# Patient Record
Sex: MALE | Race: WHITE | Employment: FULL TIME | URBAN - METROPOLITAN AREA
[De-identification: names, ages, dates, MRNs, and addresses within clinical notes are randomized per-mention and may not be internally consistent; named-entity substitution may affect disease eponyms.]

---

## 2017-01-19 ENCOUNTER — OFFICE VISIT (OUTPATIENT)
Dept: INTERNAL MEDICINE CLINIC | Facility: CLINIC | Age: 52
End: 2017-01-19

## 2017-01-19 VITALS
DIASTOLIC BLOOD PRESSURE: 82 MMHG | SYSTOLIC BLOOD PRESSURE: 128 MMHG | WEIGHT: 233 LBS | HEIGHT: 71 IN | HEART RATE: 76 BPM | BODY MASS INDEX: 32.62 KG/M2 | RESPIRATION RATE: 16 BRPM

## 2017-01-19 DIAGNOSIS — R51.9 HEADACHE, UNSPECIFIED HEADACHE TYPE: Primary | ICD-10-CM

## 2017-01-19 PROCEDURE — 99212 OFFICE O/P EST SF 10 MIN: CPT | Performed by: INTERNAL MEDICINE

## 2017-01-19 PROCEDURE — 99213 OFFICE O/P EST LOW 20 MIN: CPT | Performed by: INTERNAL MEDICINE

## 2017-01-19 RX ORDER — SIMVASTATIN 20 MG
TABLET ORAL
Qty: 90 TABLET | Refills: 0 | Status: SHIPPED | OUTPATIENT
Start: 2017-01-19 | End: 2017-02-20

## 2017-01-19 RX ORDER — LISINOPRIL 40 MG/1
TABLET ORAL
Qty: 90 TABLET | Refills: 0 | Status: SHIPPED | OUTPATIENT
Start: 2017-01-19 | End: 2017-02-20

## 2017-01-19 NOTE — PROGRESS NOTES
Joyce Lewis is a 49year old male. Patient presents with:  Headache  Dizziness  Neck Pain  Ear Problem: itching    HPI:   Mr. Lamine Muller presents this morning for evaluation of multiple recent symptoms, concerned about a \"tumor. \"    For approxim nuclear; normal stress echo   • Other and unspecified hyperlipidemia    • Type II or unspecified type diabetes mellitus without mention of complication, not stated as uncontrolled    • Colon polyps 11/2016     Hyperplastic, repeat in 10 years      Social H plan.  The patient is asked to return soon to see Dr. Qasim Smallwood.     David Michael MD  1/19/2017  11:52 AM

## 2017-01-20 ENCOUNTER — TELEPHONE (OUTPATIENT)
Dept: INTERNAL MEDICINE CLINIC | Facility: CLINIC | Age: 52
End: 2017-01-20

## 2017-01-20 DIAGNOSIS — R51.9 NONINTRACTABLE HEADACHE, UNSPECIFIED CHRONICITY PATTERN, UNSPECIFIED HEADACHE TYPE: Primary | ICD-10-CM

## 2017-01-20 DIAGNOSIS — E11.9 TYPE 2 DIABETES MELLITUS WITHOUT COMPLICATION, UNSPECIFIED LONG TERM INSULIN USE STATUS: Primary | ICD-10-CM

## 2017-01-20 NOTE — TELEPHONE ENCOUNTER
Patient reports that he had just been seen in office by Dr Jared Tamayo and was given orders for CT scan.  He is concerned with this because he has recalled not tolerating the iodine that had been used for a previous test. Would like to know if MRI might be more a

## 2017-01-20 NOTE — TELEPHONE ENCOUNTER
Patient has been scheduled for an office visit to follow-up regarding his diabetes on February 20, 2017. He is requesting to have lab orders for routine testing so that he may discuss those results in office and obtain med refills at this visit.  Please tenisha

## 2017-01-23 ENCOUNTER — PATIENT MESSAGE (OUTPATIENT)
Dept: INTERNAL MEDICINE CLINIC | Facility: CLINIC | Age: 52
End: 2017-01-23

## 2017-01-23 ENCOUNTER — TELEPHONE (OUTPATIENT)
Dept: INTERNAL MEDICINE CLINIC | Facility: CLINIC | Age: 52
End: 2017-01-23

## 2017-01-24 RX ORDER — LANCETS 30 GAUGE
EACH MISCELLANEOUS
Qty: 100 EACH | Refills: 3 | Status: SHIPPED | OUTPATIENT
Start: 2017-01-24 | End: 2018-06-12

## 2017-01-24 RX ORDER — BLOOD-GLUCOSE METER
1 EACH MISCELLANEOUS DAILY
Qty: 1 KIT | Refills: 0 | Status: SHIPPED | OUTPATIENT
Start: 2017-01-24

## 2017-01-24 NOTE — TELEPHONE ENCOUNTER
[address] Rios  PER PHARM : NEED NEW RX FOR TRUEMETRIX MACHINE,STRPS,LANCETS IS OK TO [facility]  Refill Protocol Appointment Criteria  · Appointment scheduled in the past 6 months or in the next 3 months  Recent Visits       Provider Department P

## 2017-01-25 NOTE — TELEPHONE ENCOUNTER
[facility]  To: Param Oh MD  Sent: 1/23/2017 8:12 AM CST  Subject: Other    Last week Dr Sherrell Molina ordered a CT scan for me because I had complained of almost-daily headaches, brief bouts of dizziness and pain/tenderness behind the ear

## 2017-01-26 NOTE — TELEPHONE ENCOUNTER
PATIENT CONTACTED AND INFORMED, STATES HE CALLED INSURANCE AND PRIOR AUTH IS NOT NEEDED AND MRI SCHEDULED.

## 2017-02-04 ENCOUNTER — TELEPHONE (OUTPATIENT)
Dept: INTERNAL MEDICINE CLINIC | Facility: CLINIC | Age: 52
End: 2017-02-04

## 2017-02-04 NOTE — TELEPHONE ENCOUNTER
Patient was questioning the dye in the MRI. Talked to MRI department and Iodine is not used but gadolinium. Patient was called and informed with understanding. Will continue with the MRI.

## 2017-02-04 NOTE — TELEPHONE ENCOUNTER
Please advise on refill request   No current  Creatine noted. Informed the patient labs are pending and will have the labs done on 2/19/17.     Diabetes Medications  Protocol Criteria:  · Appointment scheduled in the past 6 months or the next 3 months  ·

## 2017-02-04 NOTE — TELEPHONE ENCOUNTER
Current outpatient prescriptions:     •  MetFORMIN HCl 500 MG Oral Tab, Take 1 tablet (500 mg total) by mouth 3 (three) times daily. , Disp: 90 tablet, Rfl: 0    Pt stts that he has been out of this medication for 1 week and that his pharmacy has been try

## 2017-02-04 NOTE — TELEPHONE ENCOUNTER
Pt left a voice message yesterday 02/03 at 3:45pm stating that he has an MRI scheduled for 02/10. He is questioning the contrast that was ordered with it. Could we please contact him to answer his questions?

## 2017-02-15 ENCOUNTER — HOSPITAL ENCOUNTER (OUTPATIENT)
Dept: MRI IMAGING | Facility: HOSPITAL | Age: 52
Discharge: HOME | End: 2017-02-15
Attending: INTERNAL MEDICINE
Payer: COMMERCIAL

## 2017-02-15 ENCOUNTER — LAB ENCOUNTER (OUTPATIENT)
Dept: LAB | Facility: HOSPITAL | Age: 52
End: 2017-02-15
Attending: INTERNAL MEDICINE
Payer: COMMERCIAL

## 2017-02-15 DIAGNOSIS — R51.9 NONINTRACTABLE HEADACHE, UNSPECIFIED CHRONICITY PATTERN, UNSPECIFIED HEADACHE TYPE: ICD-10-CM

## 2017-02-15 DIAGNOSIS — E11.9 TYPE 2 DIABETES MELLITUS WITHOUT COMPLICATION, UNSPECIFIED LONG TERM INSULIN USE STATUS: ICD-10-CM

## 2017-02-15 LAB
ALBUMIN SERPL BCP-MCNC: 4.6 G/DL (ref 3.5–4.8)
ALBUMIN/GLOB SERPL: 1.8 {RATIO} (ref 1–2)
ALP SERPL-CCNC: 57 U/L (ref 32–100)
ALT SERPL-CCNC: 56 U/L (ref 17–63)
ANION GAP SERPL CALC-SCNC: 9 MMOL/L (ref 0–18)
AST SERPL-CCNC: 32 U/L (ref 15–41)
BASOPHILS # BLD: 0 K/UL (ref 0–0.2)
BASOPHILS NFR BLD: 0 %
BILIRUB SERPL-MCNC: 0.7 MG/DL (ref 0.3–1.2)
BUN SERPL-MCNC: 13 MG/DL (ref 8–20)
BUN/CREAT SERPL: 16.7 (ref 10–20)
CALCIUM SERPL-MCNC: 9.2 MG/DL (ref 8.5–10.5)
CHLORIDE SERPL-SCNC: 104 MMOL/L (ref 95–110)
CHOLEST SERPL-MCNC: 160 MG/DL (ref 110–200)
CO2 SERPL-SCNC: 26 MMOL/L (ref 22–32)
CREAT SERPL-MCNC: 0.78 MG/DL (ref 0.5–1.5)
CREAT UR-MCNC: 141.1 MG/DL
EOSINOPHIL # BLD: 0.1 K/UL (ref 0–0.7)
EOSINOPHIL NFR BLD: 3 %
ERYTHROCYTE [DISTWIDTH] IN BLOOD BY AUTOMATED COUNT: 13.9 % (ref 11–15)
GLOBULIN PLAS-MCNC: 2.5 G/DL (ref 2.5–3.7)
GLUCOSE SERPL-MCNC: 233 MG/DL (ref 70–99)
HCT VFR BLD AUTO: 43.8 % (ref 41–52)
HDLC SERPL-MCNC: 46 MG/DL
HGB BLD-MCNC: 14.8 G/DL (ref 13.5–17.5)
LDLC SERPL CALC-MCNC: 89 MG/DL (ref 0–99)
LYMPHOCYTES # BLD: 1.8 K/UL (ref 1–4)
LYMPHOCYTES NFR BLD: 38 %
MCH RBC QN AUTO: 30.9 PG (ref 27–32)
MCHC RBC AUTO-ENTMCNC: 33.8 G/DL (ref 32–37)
MCV RBC AUTO: 91.7 FL (ref 80–100)
MICROALBUMIN UR-MCNC: 1.3 MG/DL (ref 0–1.8)
MICROALBUMIN/CREAT UR: 9.2 MG/G{CREAT} (ref 0–20)
MONOCYTES # BLD: 0.3 K/UL (ref 0–1)
MONOCYTES NFR BLD: 7 %
NEUTROPHILS # BLD AUTO: 2.5 K/UL (ref 1.8–7.7)
NEUTROPHILS NFR BLD: 52 %
NONHDLC SERPL-MCNC: 114 MG/DL
OSMOLALITY UR CALC.SUM OF ELEC: 296 MOSM/KG (ref 275–295)
PLATELET # BLD AUTO: 173 K/UL (ref 140–400)
PMV BLD AUTO: 8.9 FL (ref 7.4–10.3)
POTASSIUM SERPL-SCNC: 4.3 MMOL/L (ref 3.3–5.1)
PROT SERPL-MCNC: 7.1 G/DL (ref 5.9–8.4)
RBC # BLD AUTO: 4.78 M/UL (ref 4.5–5.9)
SODIUM SERPL-SCNC: 139 MMOL/L (ref 136–144)
TRIGL SERPL-MCNC: 123 MG/DL (ref 1–149)
TSH SERPL-ACNC: 3.29 UIU/ML (ref 0.34–5.6)
WBC # BLD AUTO: 4.7 K/UL (ref 4–11)

## 2017-02-15 PROCEDURE — 36415 COLL VENOUS BLD VENIPUNCTURE: CPT

## 2017-02-15 PROCEDURE — 80053 COMPREHEN METABOLIC PANEL: CPT

## 2017-02-15 PROCEDURE — 84443 ASSAY THYROID STIM HORMONE: CPT

## 2017-02-15 PROCEDURE — 82043 UR ALBUMIN QUANTITATIVE: CPT

## 2017-02-15 PROCEDURE — 70553 MRI BRAIN STEM W/O & W/DYE: CPT

## 2017-02-15 PROCEDURE — 80061 LIPID PANEL: CPT

## 2017-02-15 PROCEDURE — A9575 INJ GADOTERATE MEGLUMI 0.1ML: HCPCS | Performed by: INTERNAL MEDICINE

## 2017-02-15 PROCEDURE — 83036 HEMOGLOBIN GLYCOSYLATED A1C: CPT

## 2017-02-15 PROCEDURE — 85025 COMPLETE CBC W/AUTO DIFF WBC: CPT

## 2017-02-15 PROCEDURE — 82570 ASSAY OF URINE CREATININE: CPT

## 2017-02-16 LAB — HBA1C MFR BLD: 7.8 % (ref 4–6)

## 2017-02-20 ENCOUNTER — OFFICE VISIT (OUTPATIENT)
Dept: INTERNAL MEDICINE CLINIC | Facility: CLINIC | Age: 52
End: 2017-02-20

## 2017-02-20 VITALS
TEMPERATURE: 98 F | HEART RATE: 76 BPM | HEIGHT: 71 IN | DIASTOLIC BLOOD PRESSURE: 86 MMHG | RESPIRATION RATE: 18 BRPM | SYSTOLIC BLOOD PRESSURE: 128 MMHG | BODY MASS INDEX: 32.59 KG/M2 | WEIGHT: 232.81 LBS

## 2017-02-20 DIAGNOSIS — R51.9 HEADACHE, UNSPECIFIED HEADACHE TYPE: ICD-10-CM

## 2017-02-20 DIAGNOSIS — E78.5 HYPERLIPIDEMIA LDL GOAL <100: ICD-10-CM

## 2017-02-20 DIAGNOSIS — E11.9 TYPE 2 DIABETES MELLITUS WITHOUT COMPLICATION, UNSPECIFIED LONG TERM INSULIN USE STATUS: Primary | ICD-10-CM

## 2017-02-20 DIAGNOSIS — I10 ESSENTIAL HYPERTENSION: ICD-10-CM

## 2017-02-20 PROCEDURE — 99212 OFFICE O/P EST SF 10 MIN: CPT | Performed by: INTERNAL MEDICINE

## 2017-02-20 PROCEDURE — 99214 OFFICE O/P EST MOD 30 MIN: CPT | Performed by: INTERNAL MEDICINE

## 2017-02-20 RX ORDER — SIMVASTATIN 20 MG
TABLET ORAL
Qty: 90 TABLET | Refills: 3 | Status: SHIPPED | OUTPATIENT
Start: 2017-02-20 | End: 2017-06-01

## 2017-02-20 RX ORDER — LISINOPRIL 40 MG/1
TABLET ORAL
Qty: 90 TABLET | Refills: 3 | Status: SHIPPED | OUTPATIENT
Start: 2017-02-20 | End: 2017-06-01

## 2017-02-20 NOTE — PROGRESS NOTES
HPI:    Patient ID: Sidra Benavidez is a 49year old male.     HPI  Patient returns to the office today to discuss chronic medical issues which include Patient Active Problem List:     Type II diabetes mellitus ([facility] [address]     Hyperlipidemia     Obesity Type II or unspecified type diabetes mellitus without mention of complication, not stated as uncontrolled    • Colon polyps 11/2016     Hyperplastic, repeat in 10 years          Past Surgical History    COLONOSCOPY N/A 11/15/2016    Comment Procedure: COLO TABLET BY MOUTH EVERY EVENING Disp: 90 tablet Rfl: 0   lisinopril 40 MG Oral Tab TAKE 1 TABLET(40 MG) BY MOUTH EVERY DAY Disp: 90 tablet Rfl: 0   Glucose Blood (TRUETRACK TEST) In Vitro Strip Test blood sugar by intradermal route once daily.   Diagnosis cod headache type  Plan: Patient with recent worsening of headache. Likely related to stress. MRI unremarkable other than empty sella. No masses or other abnormalities in the pituitary gland. No symptoms or signs of pituitary dysfunction.   We will monitor

## 2017-04-06 ENCOUNTER — TELEPHONE (OUTPATIENT)
Dept: INTERNAL MEDICINE CLINIC | Facility: CLINIC | Age: 52
End: 2017-04-06

## 2017-04-06 DIAGNOSIS — E78.5 HYPERLIPIDEMIA, UNSPECIFIED HYPERLIPIDEMIA TYPE: Primary | ICD-10-CM

## 2017-04-06 DIAGNOSIS — E11.9 TYPE 2 DIABETES MELLITUS WITHOUT COMPLICATION, UNSPECIFIED LONG TERM INSULIN USE STATUS: ICD-10-CM

## 2017-04-24 ENCOUNTER — PATIENT MESSAGE (OUTPATIENT)
Dept: INTERNAL MEDICINE CLINIC | Facility: CLINIC | Age: 52
End: 2017-04-24

## 2017-04-26 NOTE — TELEPHONE ENCOUNTER
From: Lexx Moscoso  To: Nisa Lyons MD  Sent: 4/24/2017 2:02 PM CDT  Subject: Prescription Question    My waking blood sugars are still in the 190s. .. despite daily exercise and diet restrictions.  Should I increase my current Metformin dose to

## 2017-05-22 ENCOUNTER — PATIENT MESSAGE (OUTPATIENT)
Dept: INTERNAL MEDICINE CLINIC | Facility: CLINIC | Age: 52
End: 2017-05-22

## 2017-05-24 NOTE — TELEPHONE ENCOUNTER
From: Kishor Vazquez  To: Alie Padilla MD  Sent: 5/22/2017 8:39 AM CDT  Subject: Prescription Question    I am nearly out of glucose test strips. .. can you submit a prescription order to [facility] in [facility] [place])?     Does my insuran

## 2017-05-30 ENCOUNTER — APPOINTMENT (OUTPATIENT)
Dept: LAB | Facility: HOSPITAL | Age: 52
End: 2017-05-30
Attending: INTERNAL MEDICINE
Payer: COMMERCIAL

## 2017-05-30 DIAGNOSIS — E11.9 TYPE 2 DIABETES MELLITUS WITHOUT COMPLICATION, UNSPECIFIED LONG TERM INSULIN USE STATUS: ICD-10-CM

## 2017-05-30 DIAGNOSIS — E78.5 HYPERLIPIDEMIA, UNSPECIFIED HYPERLIPIDEMIA TYPE: ICD-10-CM

## 2017-05-30 PROCEDURE — 36415 COLL VENOUS BLD VENIPUNCTURE: CPT

## 2017-05-30 PROCEDURE — 84450 TRANSFERASE (AST) (SGOT): CPT

## 2017-05-30 PROCEDURE — 82947 ASSAY GLUCOSE BLOOD QUANT: CPT

## 2017-05-30 PROCEDURE — 83036 HEMOGLOBIN GLYCOSYLATED A1C: CPT

## 2017-05-30 PROCEDURE — 80061 LIPID PANEL: CPT

## 2017-05-30 PROCEDURE — 84460 ALANINE AMINO (ALT) (SGPT): CPT

## 2017-06-01 ENCOUNTER — OFFICE VISIT (OUTPATIENT)
Dept: INTERNAL MEDICINE CLINIC | Facility: CLINIC | Age: 52
End: 2017-06-01

## 2017-06-01 VITALS
DIASTOLIC BLOOD PRESSURE: 74 MMHG | BODY MASS INDEX: 32.57 KG/M2 | HEIGHT: 71 IN | RESPIRATION RATE: 18 BRPM | TEMPERATURE: 98 F | HEART RATE: 78 BPM | WEIGHT: 232.63 LBS | SYSTOLIC BLOOD PRESSURE: 124 MMHG

## 2017-06-01 DIAGNOSIS — E11.9 TYPE 2 DIABETES MELLITUS WITHOUT COMPLICATION, WITHOUT LONG-TERM CURRENT USE OF INSULIN (HCC): Primary | ICD-10-CM

## 2017-06-01 DIAGNOSIS — I10 ESSENTIAL HYPERTENSION: ICD-10-CM

## 2017-06-01 DIAGNOSIS — E78.5 HYPERLIPIDEMIA, UNSPECIFIED HYPERLIPIDEMIA TYPE: ICD-10-CM

## 2017-06-01 PROCEDURE — 99214 OFFICE O/P EST MOD 30 MIN: CPT | Performed by: INTERNAL MEDICINE

## 2017-06-01 PROCEDURE — 99212 OFFICE O/P EST SF 10 MIN: CPT | Performed by: INTERNAL MEDICINE

## 2017-06-01 RX ORDER — LISINOPRIL 40 MG/1
TABLET ORAL
Qty: 90 TABLET | Refills: 3 | Status: SHIPPED | OUTPATIENT
Start: 2017-06-01 | End: 2017-09-07

## 2017-06-01 RX ORDER — SIMVASTATIN 20 MG
TABLET ORAL
Qty: 90 TABLET | Refills: 3 | Status: SHIPPED | OUTPATIENT
Start: 2017-06-01 | End: 2017-09-07

## 2017-06-01 NOTE — PROGRESS NOTES
HPI:    Patient ID: Geovany Slater is a 49year old male. HPI  Patient here for follow-up on chronic medical issues as listed below. Last seen here in February. At that time increased metformin 500 mg 3 times a day.   On May 1 over the phone we i Grandfather      Colon Ca        Smoking Status: Never Smoker                      Alcohol Use: No                   Review of Systems   Constitutional: Negative for fever, chills and fatigue. HENT: Negative for hearing loss.     Eyes: Negative for visual appears well-developed and well-nourished. HENT:   Nose: Nose normal.   Mouth/Throat: Oropharynx is clear and moist.   Eyes: Pupils are equal, round, and reactive to light.    Cardiovascular: Normal rate, regular rhythm, normal heart sounds and intact dis (500 mg total) by mouth 4 (four) times daily.            Imaging & Referrals:  None         FE#[id number]

## 2017-09-05 ENCOUNTER — APPOINTMENT (OUTPATIENT)
Dept: LAB | Facility: HOSPITAL | Age: 52
End: 2017-09-05
Attending: INTERNAL MEDICINE
Payer: COMMERCIAL

## 2017-09-05 DIAGNOSIS — E11.9 TYPE 2 DIABETES MELLITUS WITHOUT COMPLICATION, WITHOUT LONG-TERM CURRENT USE OF INSULIN (HCC): ICD-10-CM

## 2017-09-05 LAB — HBA1C MFR BLD: 7 % (ref 4–6)

## 2017-09-05 PROCEDURE — 36415 COLL VENOUS BLD VENIPUNCTURE: CPT

## 2017-09-05 PROCEDURE — 83036 HEMOGLOBIN GLYCOSYLATED A1C: CPT

## 2017-09-07 ENCOUNTER — OFFICE VISIT (OUTPATIENT)
Dept: INTERNAL MEDICINE CLINIC | Facility: CLINIC | Age: 52
End: 2017-09-07

## 2017-09-07 VITALS
BODY MASS INDEX: 32.37 KG/M2 | HEART RATE: 82 BPM | RESPIRATION RATE: 18 BRPM | DIASTOLIC BLOOD PRESSURE: 80 MMHG | WEIGHT: 231.19 LBS | TEMPERATURE: 99 F | SYSTOLIC BLOOD PRESSURE: 136 MMHG | HEIGHT: 71 IN

## 2017-09-07 DIAGNOSIS — E11.9 TYPE 2 DIABETES MELLITUS WITHOUT COMPLICATION, WITHOUT LONG-TERM CURRENT USE OF INSULIN (HCC): Primary | ICD-10-CM

## 2017-09-07 DIAGNOSIS — I10 ESSENTIAL HYPERTENSION: ICD-10-CM

## 2017-09-07 PROCEDURE — 99212 OFFICE O/P EST SF 10 MIN: CPT | Performed by: INTERNAL MEDICINE

## 2017-09-07 PROCEDURE — 99213 OFFICE O/P EST LOW 20 MIN: CPT | Performed by: INTERNAL MEDICINE

## 2017-09-07 RX ORDER — LISINOPRIL 40 MG/1
TABLET ORAL
Qty: 90 TABLET | Refills: 3 | Status: SHIPPED | OUTPATIENT
Start: 2017-09-07 | End: 2017-12-05

## 2017-09-07 RX ORDER — SIMVASTATIN 20 MG
TABLET ORAL
Qty: 90 TABLET | Refills: 3 | Status: SHIPPED | OUTPATIENT
Start: 2017-09-07 | End: 2017-12-05

## 2017-09-07 NOTE — PROGRESS NOTES
HPI:    Patient ID: Judy Guajardo is a 47year old male. HPI  Patient is here for follow-up on chronic medical issues as listed below. Last seen here in June. A1c at that time 7.8.   Patient refused any change in medication and said want to try status: Never Smoker                                                              Smokeless tobacco: Never Used                      Alcohol use: No                   Review of Systems   Constitutional: Negative for chills, fatigue and fever.    HENT: Antoine Pendleton diabetes mellitus without complication, without long-term current use of insulin ([facility] [address]  (primary encounter diagnosis)  Plan: LIPID PANEL, HEMOGLOBIN A1C, TESTOSTERONE,FREE         AND TOTAL, COMP METABOLIC PANEL (14)         Entire visit today was spent fa

## 2017-09-21 ENCOUNTER — OFFICE VISIT (OUTPATIENT)
Dept: OPTOMETRY | Facility: CLINIC | Age: 52
End: 2017-09-21

## 2017-09-21 DIAGNOSIS — E11.9 TYPE 2 DIABETES MELLITUS WITHOUT COMPLICATION, WITHOUT LONG-TERM CURRENT USE OF INSULIN (HCC): Primary | ICD-10-CM

## 2017-09-21 DIAGNOSIS — H52.13 MYOPIA OF BOTH EYES: ICD-10-CM

## 2017-09-21 PROCEDURE — 92015 DETERMINE REFRACTIVE STATE: CPT | Performed by: OPTOMETRIST

## 2017-09-21 PROCEDURE — 92014 COMPRE OPH EXAM EST PT 1/>: CPT | Performed by: OPTOMETRIST

## 2017-09-21 NOTE — PROGRESS NOTES
Krystle Flores is a 47year old male. HPI:     HPI     Diabetic Eye Exam   Diabetes characteristics include Type 2, controlled with diet and taking oral medications. Duration of 9 years.            Comments   Patient is In for an annual diabetic e TABLET(40 MG) BY MOUTH EVERY DAY Disp: 90 tablet Rfl: 3   Blood Glucose Monitoring Suppl (TRUE METRIX GO GLUCOSE METER) W/DEVICE Does not apply Kit 1 Stick by Does not apply route daily.  E11.9 Disp: 1 kit Rfl: 0   Glucose Blood (TRUE METRIX BLOOD GLUCOSE T Clear Clear          Fundus Exam       Right Left    Disc Normal Normal    C/D Ratio 0.5 0.5    Macula Normal No BDR Normal No BDR    Vessels Normal Normal    Periphery Normal Normal            Refraction     Wearing Rx       Sphere Cylinder Axis    Right

## 2017-09-21 NOTE — PATIENT INSTRUCTIONS
Myopia  New glasses RX given to update as needed.       Type 2 diabetes mellitus without complication, without long-term current use of insulin ([address]  I advised patient that there is no background diabetic retinopathy in either eye and that they should cont

## 2017-12-01 ENCOUNTER — APPOINTMENT (OUTPATIENT)
Dept: LAB | Facility: HOSPITAL | Age: 52
End: 2017-12-01
Attending: INTERNAL MEDICINE
Payer: COMMERCIAL

## 2017-12-01 DIAGNOSIS — I10 ESSENTIAL HYPERTENSION: ICD-10-CM

## 2017-12-01 DIAGNOSIS — E11.9 TYPE 2 DIABETES MELLITUS WITHOUT COMPLICATION, WITHOUT LONG-TERM CURRENT USE OF INSULIN (HCC): ICD-10-CM

## 2017-12-01 PROCEDURE — 84403 ASSAY OF TOTAL TESTOSTERONE: CPT

## 2017-12-01 PROCEDURE — 36415 COLL VENOUS BLD VENIPUNCTURE: CPT

## 2017-12-01 PROCEDURE — 83036 HEMOGLOBIN GLYCOSYLATED A1C: CPT

## 2017-12-01 PROCEDURE — 80053 COMPREHEN METABOLIC PANEL: CPT

## 2017-12-01 PROCEDURE — 80061 LIPID PANEL: CPT

## 2017-12-01 PROCEDURE — 84402 ASSAY OF FREE TESTOSTERONE: CPT

## 2017-12-05 ENCOUNTER — OFFICE VISIT (OUTPATIENT)
Dept: INTERNAL MEDICINE CLINIC | Facility: CLINIC | Age: 52
End: 2017-12-05

## 2017-12-05 VITALS
TEMPERATURE: 98 F | BODY MASS INDEX: 32.34 KG/M2 | WEIGHT: 231 LBS | SYSTOLIC BLOOD PRESSURE: 146 MMHG | DIASTOLIC BLOOD PRESSURE: 84 MMHG | HEIGHT: 71 IN | HEART RATE: 64 BPM | RESPIRATION RATE: 20 BRPM

## 2017-12-05 DIAGNOSIS — E78.5 HYPERLIPIDEMIA, UNSPECIFIED HYPERLIPIDEMIA TYPE: ICD-10-CM

## 2017-12-05 DIAGNOSIS — E11.9 TYPE 2 DIABETES MELLITUS WITHOUT COMPLICATION, WITHOUT LONG-TERM CURRENT USE OF INSULIN (HCC): Primary | ICD-10-CM

## 2017-12-05 DIAGNOSIS — I10 ESSENTIAL HYPERTENSION: ICD-10-CM

## 2017-12-05 PROCEDURE — 99212 OFFICE O/P EST SF 10 MIN: CPT | Performed by: INTERNAL MEDICINE

## 2017-12-05 PROCEDURE — 99214 OFFICE O/P EST MOD 30 MIN: CPT | Performed by: INTERNAL MEDICINE

## 2017-12-05 NOTE — PROGRESS NOTES
HPI:    Patient ID: Tanvir Coates is a 47year old male. HPI  Patient is here to discuss recent test results and follow-up on chronic medical issues as listed below. Last seen here about 3 months ago. No changes made at that time.   He seen the Family History   Problem Relation Age of Onset   • Cancer Paternal Grandfather      Colon Ca   • Gastro-Intestinal Disorder Brother      celiac sprue   • Cancer Other      liver cancer - close relative   • Diabetes Neg    • Glaucoma Neg       Smoking sta Comment:Other reaction(s): Hives   PHYSICAL EXAM:   Physical Exam    Constitutional: He appears well-developed and well-nourished.    HENT:   Nose: Nose normal.   Mouth/Throat: Oropharynx is clear and moist.   Eyes: Pupils are equal, round, and reactive Prescriptions Disp Refills    lisinopril 40 MG Oral Tab 90 tablet 3     Sig: TAKE 1 TABLET(40 MG) BY MOUTH EVERY DAY      simvastatin 20 MG Oral Tab 90 tablet 3     Sig: TAKE 1 TABLET BY MOUTH EVERY EVENING      MetFORMIN HCl 500 MG Oral Tab 360 tablet 3

## 2017-12-06 RX ORDER — SIMVASTATIN 20 MG
TABLET ORAL
Qty: 90 TABLET | Refills: 3 | Status: SHIPPED | OUTPATIENT
Start: 2017-12-06 | End: 2018-03-06

## 2017-12-06 RX ORDER — LISINOPRIL 40 MG/1
TABLET ORAL
Qty: 90 TABLET | Refills: 3 | Status: SHIPPED | OUTPATIENT
Start: 2017-12-06 | End: 2018-03-06

## 2017-12-18 ENCOUNTER — NURSE TRIAGE (OUTPATIENT)
Dept: OTHER | Age: 52
End: 2017-12-18

## 2017-12-18 ENCOUNTER — OFFICE VISIT (OUTPATIENT)
Dept: FAMILY MEDICINE CLINIC | Facility: CLINIC | Age: 52
End: 2017-12-18

## 2017-12-18 VITALS
DIASTOLIC BLOOD PRESSURE: 85 MMHG | WEIGHT: 233 LBS | RESPIRATION RATE: 18 BRPM | HEIGHT: 71 IN | HEART RATE: 73 BPM | BODY MASS INDEX: 32.62 KG/M2 | TEMPERATURE: 99 F | SYSTOLIC BLOOD PRESSURE: 152 MMHG

## 2017-12-18 DIAGNOSIS — B02.9 HERPES ZOSTER WITHOUT COMPLICATION: ICD-10-CM

## 2017-12-18 PROCEDURE — 99213 OFFICE O/P EST LOW 20 MIN: CPT | Performed by: FAMILY MEDICINE

## 2017-12-18 PROCEDURE — 99212 OFFICE O/P EST SF 10 MIN: CPT | Performed by: FAMILY MEDICINE

## 2017-12-18 RX ORDER — VALACYCLOVIR HYDROCHLORIDE 1 G/1
1 TABLET, FILM COATED ORAL 3 TIMES DAILY
Qty: 21 TABLET | Refills: 0 | Status: SHIPPED | OUTPATIENT
Start: 2017-12-18 | End: 2018-01-09 | Stop reason: ALTCHOICE

## 2017-12-18 NOTE — TELEPHONE ENCOUNTER
Action Requested: Summary for Provider     []  Critical Lab, Recommendations Needed  [] Need Additional Advice  [x]   FYI    []   Need Orders  [] Need Medications Sent to Pharmacy  []  Other     SUMMARY: rash and pain on the right side of the body-hip and

## 2017-12-18 NOTE — PROGRESS NOTES
HPI:    Patient ID: Mirella Luevano is a 47year old male. Pt presents with some burning rash on the right flank area over the last 2 days. Pt has had no fevers. Pt has had hx of chicken pox. Pt has had some sig stress recently and hx of DM. Visit:  Signed Prescriptions Disp Refills    ValACYclovir HCl (VALTREX) 1 G Oral Tab 21 tablet 0      Sig: Take 1 tablet (1,000 mg total) by mouth 3 (three) times daily.  For shingles           Imaging & Referrals:  None       GF#[id number]

## 2017-12-20 ENCOUNTER — NURSE TRIAGE (OUTPATIENT)
Dept: OTHER | Age: 52
End: 2017-12-20

## 2017-12-20 NOTE — TELEPHONE ENCOUNTER
Action Requested: Summary for Provider     []  Critical Lab, Recommendations Needed  [x] Need Additional Advice  []   FYI    []   Need Orders  [] Need Medications Sent to Pharmacy  []  Other     SUMMARY:possible further MD advise ( pt requesting advice fro

## 2017-12-21 ENCOUNTER — PATIENT MESSAGE (OUTPATIENT)
Dept: INTERNAL MEDICINE CLINIC | Facility: CLINIC | Age: 52
End: 2017-12-21

## 2017-12-21 NOTE — TELEPHONE ENCOUNTER
From: Dylon Regalado  To: Lucy Fan MD  Sent: 12/21/2017 10:58 AM CST  Subject: Non-Urgent Medical Question    Dr Nik Davenport,    On 12/19 Dr Xochitl Cruz diagnosed me with shingles. He put me on Valacyclovir.  A day after I began this Rx, I developed a mus

## 2017-12-21 NOTE — TELEPHONE ENCOUNTER
Pt sent a [employer] message asking if doctor could see him today, see message below. Contacted pt and informed him no appt available today. Please advise.

## 2017-12-22 ENCOUNTER — OFFICE VISIT (OUTPATIENT)
Dept: INTERNAL MEDICINE CLINIC | Facility: CLINIC | Age: 52
End: 2017-12-22

## 2017-12-22 VITALS
DIASTOLIC BLOOD PRESSURE: 82 MMHG | SYSTOLIC BLOOD PRESSURE: 120 MMHG | BODY MASS INDEX: 32 KG/M2 | HEART RATE: 69 BPM | WEIGHT: 231.69 LBS | TEMPERATURE: 98 F

## 2017-12-22 DIAGNOSIS — S29.011A PECTORALIS MUSCLE STRAIN, INITIAL ENCOUNTER: Primary | ICD-10-CM

## 2017-12-22 PROCEDURE — 99212 OFFICE O/P EST SF 10 MIN: CPT | Performed by: PHYSICIAN ASSISTANT

## 2017-12-22 NOTE — TELEPHONE ENCOUNTER
This is difficult for me to diagnose over the phone. I am going to be out of town starting tomorrow. He should come in tomorrow make an appointment with my [de-identified] assistant, Cindy Olivas.   Continue with the valacyclovir

## 2017-12-22 NOTE — PROGRESS NOTES
Juancarlos English is a 47year old male. Patient presents with:  Shingles: f/u      HPI:   Patient presents today complaining of right pectoral pain for the last 4 days.  Was recently diagnosed with herpes zoster on the right flank 5 days ago and is chato hyperlipidemia    • Type II or unspecified type diabetes mellitus without mention of complication, not stated as uncontrolled    • Unspecified essential hypertension       Past Surgical History:   Procedure Laterality Date   • Colonoscopy N/A 11/15/2016 injury to the area. Mild tenderness on palpation, no mass or spasm. Advised to continue Tylenol as needed for pain, application of heat and/or ice three times daily. Gentle stretching as discussed in the office.  Contact the office if symptoms worsen or do

## 2017-12-27 ENCOUNTER — PATIENT MESSAGE (OUTPATIENT)
Dept: INTERNAL MEDICINE CLINIC | Facility: CLINIC | Age: 52
End: 2017-12-27

## 2017-12-28 RX ORDER — VALACYCLOVIR HYDROCHLORIDE 1 G/1
1 TABLET, FILM COATED ORAL 3 TIMES DAILY
Qty: 21 TABLET | Refills: 0 | Status: CANCELLED | OUTPATIENT
Start: 2017-12-28

## 2017-12-28 NOTE — TELEPHONE ENCOUNTER
From: Donna Michelle  To: Maykel Troy MD  Sent: 12/27/2017 6:20 PM CST  Subject: Prescription Question    Dr,    I finished my week long regimen of Valacyclovir yesterday for my shingles.  I still have pronounced remnants of the rash (scarring?)

## 2018-01-02 ENCOUNTER — PATIENT MESSAGE (OUTPATIENT)
Dept: INTERNAL MEDICINE CLINIC | Facility: CLINIC | Age: 53
End: 2018-01-02

## 2018-01-02 NOTE — TELEPHONE ENCOUNTER
BRITTANY please advise on below     From: Sindhu White  To: Stefan Kathleen MD  Sent: 1/2/2018  8:29 AM CST  Subject: Non-Urgent Medical Question    Dr,    So what is the name of the flu vaccine that [facility] will administer me, should you approve it?   Her

## 2018-01-09 ENCOUNTER — OFFICE VISIT (OUTPATIENT)
Dept: INTERNAL MEDICINE CLINIC | Facility: CLINIC | Age: 53
End: 2018-01-09

## 2018-01-09 VITALS
SYSTOLIC BLOOD PRESSURE: 108 MMHG | DIASTOLIC BLOOD PRESSURE: 74 MMHG | TEMPERATURE: 98 F | WEIGHT: 227 LBS | HEIGHT: 72 IN | HEART RATE: 89 BPM | BODY MASS INDEX: 30.75 KG/M2

## 2018-01-09 DIAGNOSIS — J06.9 VIRAL URI: Primary | ICD-10-CM

## 2018-01-09 PROCEDURE — 99211 OFF/OP EST MAY X REQ PHY/QHP: CPT | Performed by: PHYSICIAN ASSISTANT

## 2018-01-10 NOTE — PROGRESS NOTES
HPI:    Patient ID: Ramon Mace is a 47year old male. Patient presents today with upper respiratory symptoms for the last 5 days. He has increased fatigue, body aches, chest congestion, and diarrhea.   Symptoms worsened over the weekend but he Physical Exam   Nursing note and vitals reviewed. Constitutional: He appears well-developed and well-nourished. HENT:   Head: Normocephalic and atraumatic.    Right Ear: Tympanic membrane and external ear normal.   Left Ear: Tympanic membrane and exte

## 2018-01-30 ENCOUNTER — IMMUNIZATION (OUTPATIENT)
Dept: INTERNAL MEDICINE CLINIC | Facility: CLINIC | Age: 53
End: 2018-01-30

## 2018-01-30 DIAGNOSIS — Z23 NEED FOR VACCINATION: ICD-10-CM

## 2018-01-30 PROCEDURE — 90686 IIV4 VACC NO PRSV 0.5 ML IM: CPT | Performed by: INTERNAL MEDICINE

## 2018-01-30 PROCEDURE — 90471 IMMUNIZATION ADMIN: CPT | Performed by: INTERNAL MEDICINE

## 2018-03-01 ENCOUNTER — APPOINTMENT (OUTPATIENT)
Dept: LAB | Facility: HOSPITAL | Age: 53
End: 2018-03-01
Attending: INTERNAL MEDICINE
Payer: COMMERCIAL

## 2018-03-01 PROCEDURE — 36415 COLL VENOUS BLD VENIPUNCTURE: CPT | Performed by: INTERNAL MEDICINE

## 2018-03-01 PROCEDURE — 83036 HEMOGLOBIN GLYCOSYLATED A1C: CPT | Performed by: INTERNAL MEDICINE

## 2018-03-01 PROCEDURE — 85025 COMPLETE CBC W/AUTO DIFF WBC: CPT | Performed by: INTERNAL MEDICINE

## 2018-03-01 PROCEDURE — 84403 ASSAY OF TOTAL TESTOSTERONE: CPT | Performed by: INTERNAL MEDICINE

## 2018-03-01 PROCEDURE — 80053 COMPREHEN METABOLIC PANEL: CPT | Performed by: INTERNAL MEDICINE

## 2018-03-01 PROCEDURE — 84402 ASSAY OF FREE TESTOSTERONE: CPT | Performed by: INTERNAL MEDICINE

## 2018-03-06 ENCOUNTER — OFFICE VISIT (OUTPATIENT)
Dept: INTERNAL MEDICINE CLINIC | Facility: CLINIC | Age: 53
End: 2018-03-06

## 2018-03-06 VITALS
SYSTOLIC BLOOD PRESSURE: 138 MMHG | HEART RATE: 82 BPM | RESPIRATION RATE: 18 BRPM | TEMPERATURE: 98 F | HEIGHT: 72 IN | BODY MASS INDEX: 30.88 KG/M2 | DIASTOLIC BLOOD PRESSURE: 84 MMHG | WEIGHT: 228 LBS

## 2018-03-06 DIAGNOSIS — M25.511 RIGHT SHOULDER PAIN, UNSPECIFIED CHRONICITY: ICD-10-CM

## 2018-03-06 DIAGNOSIS — E11.9 TYPE 2 DIABETES MELLITUS WITHOUT COMPLICATION, WITHOUT LONG-TERM CURRENT USE OF INSULIN (HCC): Primary | ICD-10-CM

## 2018-03-06 DIAGNOSIS — R97.20 ELEVATED PSA: ICD-10-CM

## 2018-03-06 DIAGNOSIS — I10 ESSENTIAL HYPERTENSION: ICD-10-CM

## 2018-03-06 PROCEDURE — 99214 OFFICE O/P EST MOD 30 MIN: CPT | Performed by: INTERNAL MEDICINE

## 2018-03-06 PROCEDURE — 99212 OFFICE O/P EST SF 10 MIN: CPT | Performed by: INTERNAL MEDICINE

## 2018-03-06 RX ORDER — VALACYCLOVIR HYDROCHLORIDE 1 G/1
TABLET, FILM COATED ORAL
Refills: 0 | COMMUNITY
Start: 2017-12-18 | End: 2018-03-06 | Stop reason: ALTCHOICE

## 2018-03-06 RX ORDER — SIMVASTATIN 20 MG
TABLET ORAL
Qty: 90 TABLET | Refills: 3 | Status: SHIPPED | OUTPATIENT
Start: 2018-03-06 | End: 2018-06-12

## 2018-03-06 RX ORDER — LISINOPRIL 40 MG/1
TABLET ORAL
Qty: 90 TABLET | Refills: 3 | Status: SHIPPED | OUTPATIENT
Start: 2018-03-06 | End: 2018-06-12

## 2018-03-06 NOTE — PROGRESS NOTES
HPI:    Patient ID: Oscar Mcelroy is a 47year old male. HPI  Patient is here for follow-up on chronic medical issues as listed below. Last seen here in December. A1c at that time 7.9. We again discussed the importance of medication.   He was a unspecified type diabetes mellitus without mention of complication, not stated as uncontrolled    • Unspecified essential hypertension       Past Surgical History:  11/15/2016: COLONOSCOPY N/A      Comment: Procedure: COLONOSCOPY;  Surgeon: Masood Davis apply Kit 1 Stick by Does not apply route daily. E11.9 Disp: 1 kit Rfl: 0   Lancets Does not apply Misc Test blood glucose daily.   E11.9 Disp: 100 each Rfl: 3     Allergies:  Aspirin                     Comment:Other reaction(s): ASPIRIN  Sulfanilamide medications.    (M25.006) Right shoulder pain, unspecified chronicity  Plan: ORTHOPEDIC - INTERNAL         Patient with right shoulder pain reinjured in January. Now with significant limitations and pain. Refer to orthopedics for evaluation.   May need MR obese/well-developed/well-groomed/well-nourished/no distress

## 2018-03-13 ENCOUNTER — OFFICE VISIT (OUTPATIENT)
Dept: ORTHOPEDICS CLINIC | Facility: CLINIC | Age: 53
End: 2018-03-13

## 2018-03-13 ENCOUNTER — HOSPITAL ENCOUNTER (OUTPATIENT)
Dept: GENERAL RADIOLOGY | Facility: HOSPITAL | Age: 53
Discharge: HOME | End: 2018-03-13
Attending: ORTHOPAEDIC SURGERY
Payer: COMMERCIAL

## 2018-03-13 DIAGNOSIS — M75.41 INTERNAL IMPINGEMENT OF RIGHT SHOULDER: ICD-10-CM

## 2018-03-13 DIAGNOSIS — S43.431S SUPERIOR GLENOID LABRUM LESION OF RIGHT SHOULDER, SEQUELA: ICD-10-CM

## 2018-03-13 DIAGNOSIS — M25.511 ACUTE PAIN OF RIGHT SHOULDER: ICD-10-CM

## 2018-03-13 DIAGNOSIS — M25.511 ACUTE PAIN OF RIGHT SHOULDER: Primary | ICD-10-CM

## 2018-03-13 PROBLEM — S43.431A SUPERIOR GLENOID LABRUM LESION OF RIGHT SHOULDER: Status: ACTIVE | Noted: 2018-03-13

## 2018-03-13 PROCEDURE — 73030 X-RAY EXAM OF SHOULDER: CPT | Performed by: ORTHOPAEDIC SURGERY

## 2018-03-13 PROCEDURE — 99212 OFFICE O/P EST SF 10 MIN: CPT | Performed by: ORTHOPAEDIC SURGERY

## 2018-03-13 PROCEDURE — 99243 OFF/OP CNSLTJ NEW/EST LOW 30: CPT | Performed by: ORTHOPAEDIC SURGERY

## 2018-03-13 NOTE — PROGRESS NOTES
3/13/2018  Ray Pelayo  66/1965  47year old   male  Ana Ascencio MD    HPI:   Patient presents with:  Shoulder Pain: Right - onset in January when with a throw he reinjured his R shoulder - had initialy injured his R  shoulder in 2007 - rate Disp: 100 each Rfl: 3      HISTORY:  Past Medical History:   Diagnosis Date   • Chest pain 2004,2010    normal stress nuclear; normal stress echo   • Colon polyps 11/2016    Hyperplastic, repeat in 10 years   • Diabetes ([facility] [address]     diet controlled w/ no BDR interosseous muscle function.   The patient has right shoulder range of motion of 180 degrees of elevation, 80 degrees of external rotation, and internal rotation to L1 compared to 180 degrees of elevation, 80 degrees of external rotation, and internal rota were answered and they are in agreement with the treatment plan. The patient will return to clinic in 6 weeks as needed to consider an MRI or injection    No orders of the defined types were placed in this encounter.

## 2018-03-14 ENCOUNTER — PATIENT MESSAGE (OUTPATIENT)
Dept: INTERNAL MEDICINE CLINIC | Facility: CLINIC | Age: 53
End: 2018-03-14

## 2018-03-14 NOTE — TELEPHONE ENCOUNTER
From: Ignacia Sutherland  To: Jackelyn Saba MD  Sent: 3/14/2018 9:33 AM CDT  Subject: Other    Dr,    Dr Margo Rios recommended a cortisone shot yesterday to ease the swelling and pain in my right shoulder.  I declined because I was worried about this st

## 2018-03-16 NOTE — TELEPHONE ENCOUNTER
Called pt, he was under the impression that he could walk in and just get the cortisone injection at [facility]. Advised pt that this would require an appt with either Dr. Ml Castro or Dr. Erika Roberts if he agrees to administer.  He voices understanding and agrees with joelle

## 2018-03-19 ENCOUNTER — TELEPHONE (OUTPATIENT)
Dept: ORTHOPEDICS CLINIC | Facility: CLINIC | Age: 53
End: 2018-03-19

## 2018-03-19 ENCOUNTER — PATIENT MESSAGE (OUTPATIENT)
Dept: INTERNAL MEDICINE CLINIC | Facility: CLINIC | Age: 53
End: 2018-03-19

## 2018-03-19 ENCOUNTER — PATIENT MESSAGE (OUTPATIENT)
Dept: ORTHOPEDICS CLINIC | Facility: CLINIC | Age: 53
End: 2018-03-19

## 2018-03-19 NOTE — TELEPHONE ENCOUNTER
From: Yoan Freeman  To: Yvonne Jean-Baptiste MD  Sent: 3/19/2018 1:26 PM CDT  Subject: Other    Dr Ml Castro,    I saw you last week for my shoulder and received an X-ray.  You offered me a cortisone shot, but I declined because I was concerned about th

## 2018-03-19 NOTE — TELEPHONE ENCOUNTER
LVM for the patient stating that we have openings in [facility] on Wednesday for the injection. Patient has to be seen by Dr. Sully Esparza to get the injection. Asked for patient to call back to be scheduled for the injection.

## 2018-03-19 NOTE — TELEPHONE ENCOUNTER
From: Audie Pardo  To: Celeste Hathaway MD  Sent: 3/19/2018 1:08 PM CDT  Subject: Non-Urgent Medical Question    Can I stop by today and get a cortisone shot for my shoulder? Or should I ask Dr Hector Cisneros?

## 2018-03-19 NOTE — TELEPHONE ENCOUNTER
Pt states he continues to have shoulder pain, pt would like cortisone injection, pt upset there is no availability online for this week, pt asking if he can be seen this week. Pls advise thank you. Aware office is closed for the day.

## 2018-03-20 NOTE — TELEPHONE ENCOUNTER
Dr. Rosanne Weston, this pt was seen last week on  03/13/18 for his left shoulder. Pt was instructed to do therapy. Pt calling today wanting to schedule appt for cortisone injection. May I schedule pt in the MD only slot this Thursday, 03/22/18?

## 2018-03-20 NOTE — TELEPHONE ENCOUNTER
LMTCB on pt's preferred #     -- You may use MD slot for 03/22/18 to schedule pt when he calls back. Thank you!

## 2018-03-20 NOTE — TELEPHONE ENCOUNTER
From: Addy Covington  To: Chelsie Arias MD  Sent: 3/19/2018 5:25 PM CDT  Subject: Other    Dr,     I can't seem to get an appt with Dr Arely Schroeder -- or anyone else for that matter -- for my cortisone shot. It's like a comedy of errors over there.  I'

## 2018-03-21 NOTE — TELEPHONE ENCOUNTER
Pt. Called back and he has accepted the appt for 3/22 at 9:50am for his Cortisone Inj. For his shoulder.

## 2018-03-22 ENCOUNTER — OFFICE VISIT (OUTPATIENT)
Dept: ORTHOPEDICS CLINIC | Facility: CLINIC | Age: 53
End: 2018-03-22

## 2018-03-22 VITALS — DIASTOLIC BLOOD PRESSURE: 83 MMHG | RESPIRATION RATE: 16 BRPM | SYSTOLIC BLOOD PRESSURE: 138 MMHG | HEART RATE: 76 BPM

## 2018-03-22 DIAGNOSIS — S43.431A SUPERIOR GLENOID LABRUM LESION OF RIGHT SHOULDER, INITIAL ENCOUNTER: ICD-10-CM

## 2018-03-22 DIAGNOSIS — IMO0002 DISORDER OF ROTATOR CUFF SYNDROME OF RIGHT SHOULDER AND ALLIED DISORDER: Primary | ICD-10-CM

## 2018-03-22 PROCEDURE — 99213 OFFICE O/P EST LOW 20 MIN: CPT | Performed by: ORTHOPAEDIC SURGERY

## 2018-03-22 PROCEDURE — 20610 DRAIN/INJ JOINT/BURSA W/O US: CPT | Performed by: ORTHOPAEDIC SURGERY

## 2018-03-22 PROCEDURE — 99212 OFFICE O/P EST SF 10 MIN: CPT | Performed by: ORTHOPAEDIC SURGERY

## 2018-03-22 NOTE — PROGRESS NOTES
Per verbal order from JL, draw up 8ml 1% lidocaine and 2ml of Kenalog 10 for cortisone injection to right shoulder. Pt given steroid medication information sheet. Pre injection vs stable.  Time out progressed and Consent signed for right shoulder steroid in

## 2018-03-22 NOTE — PROGRESS NOTES
3/22/2018  Maurice Carmona  66/1965  47year old   male  Dona Haywood MD    HPI:   Patient presents with:  Shoulder Pain: Pt is here for his right shoulder. Pt wants a cortisone injection. Has had bad reactions in the past with oral steroids.  Gil Herrera Flowers sign, and abduction sign. The patient has no pain with cross body adduction localized to the acromioclavicular joint. The patient has a negative Speed's test and negative Knox test.  The patient has no evidence of generalized laxity.       WALTER

## 2018-04-17 ENCOUNTER — OFFICE VISIT (OUTPATIENT)
Dept: ORTHOPEDICS CLINIC | Facility: CLINIC | Age: 53
End: 2018-04-17

## 2018-04-17 DIAGNOSIS — IMO0002 DISORDER OF ROTATOR CUFF SYNDROME OF RIGHT SHOULDER AND ALLIED DISORDER: ICD-10-CM

## 2018-04-17 DIAGNOSIS — M25.511 CHRONIC RIGHT SHOULDER PAIN: Primary | ICD-10-CM

## 2018-04-17 DIAGNOSIS — G89.29 CHRONIC RIGHT SHOULDER PAIN: Primary | ICD-10-CM

## 2018-04-17 DIAGNOSIS — S43.431A SUPERIOR GLENOID LABRUM LESION OF RIGHT SHOULDER, INITIAL ENCOUNTER: ICD-10-CM

## 2018-04-17 PROCEDURE — 99213 OFFICE O/P EST LOW 20 MIN: CPT | Performed by: ORTHOPAEDIC SURGERY

## 2018-04-17 PROCEDURE — 99212 OFFICE O/P EST SF 10 MIN: CPT | Performed by: ORTHOPAEDIC SURGERY

## 2018-04-17 NOTE — PROGRESS NOTES
4/17/2018  Trish Moe  66/1965  47year old   male  Elyssa Lin MD    HPI:   Patient presents with:  Shoulder Pain: Right f/u - had cortisone inj on 3/22/18 - had PT and will doo HEP - shoulder is better but it dose not feel right - has occ laxity.       ASSESSMENT AND PLAN:   Chronic right shoulder pain  (primary encounter diagnosis)  Disorder of rotator cuff syndrome of right shoulder and allied disorder  Superior glenoid labrum lesion of right shoulder, initial encounter    The risks, indic

## 2018-04-18 ENCOUNTER — TELEPHONE (OUTPATIENT)
Dept: ORTHOPEDICS CLINIC | Facility: CLINIC | Age: 53
End: 2018-04-18

## 2018-04-18 NOTE — TELEPHONE ENCOUNTER
Called [facility] and s/w faheem to initiate PA for MRI right shoulder arthrogram. Gave clinicals per [facility] OV notes. MRI approved ALE#[id number] exp 5/17/18 at [address] [place] [address].    Called pt and informed him of MRI approval and he was driving so asked that I send him a mychart m

## 2018-04-19 ENCOUNTER — OFFICE VISIT (OUTPATIENT)
Dept: SURGERY | Facility: CLINIC | Age: 53
End: 2018-04-19

## 2018-04-19 VITALS
RESPIRATION RATE: 16 BRPM | WEIGHT: 215 LBS | TEMPERATURE: 98 F | DIASTOLIC BLOOD PRESSURE: 78 MMHG | HEART RATE: 69 BPM | BODY MASS INDEX: 28.49 KG/M2 | SYSTOLIC BLOOD PRESSURE: 136 MMHG | HEIGHT: 73 IN

## 2018-04-19 DIAGNOSIS — N13.8 BPH WITH OBSTRUCTION/LOWER URINARY TRACT SYMPTOMS: ICD-10-CM

## 2018-04-19 DIAGNOSIS — N40.1 BPH WITH OBSTRUCTION/LOWER URINARY TRACT SYMPTOMS: ICD-10-CM

## 2018-04-19 DIAGNOSIS — R97.20 ELEVATED PSA: Primary | ICD-10-CM

## 2018-04-19 PROCEDURE — 99244 OFF/OP CNSLTJ NEW/EST MOD 40: CPT | Performed by: UROLOGY

## 2018-04-19 PROCEDURE — 99212 OFFICE O/P EST SF 10 MIN: CPT | Performed by: UROLOGY

## 2018-04-19 NOTE — PROGRESS NOTES
SUBJECTIVE:  Ramon Mace is a 47year old male who presents for a consultation at the request of, and a copy of this note will be sent to, Dr. Frances Boss, for evaluation of  benign prostatic hyperplasia and elevated PSA.  He states that the problem is Neg    • Glaucoma Neg       Social History: Smoking status: Never Smoker                                                              Smokeless tobacco: Never Used                      Alcohol use:  No                     REVIEW OF SYSTEMS:  RESPIRATORY:  N defined types were placed in this encounter. Reviewed findings at length with patient. He and I agreed to have him follow-up in 6 weeks with a repeat PSA 1-2 days prior to the visit to confirm elevation.   If he continues to be elevated, the patient under

## 2018-06-06 ENCOUNTER — APPOINTMENT (OUTPATIENT)
Dept: LAB | Facility: HOSPITAL | Age: 53
End: 2018-06-06
Attending: INTERNAL MEDICINE
Payer: COMMERCIAL

## 2018-06-06 DIAGNOSIS — E11.9 TYPE 2 DIABETES MELLITUS WITHOUT COMPLICATION, WITHOUT LONG-TERM CURRENT USE OF INSULIN (HCC): ICD-10-CM

## 2018-06-06 DIAGNOSIS — R97.20 ELEVATED PSA: ICD-10-CM

## 2018-06-06 PROCEDURE — 82947 ASSAY GLUCOSE BLOOD QUANT: CPT

## 2018-06-06 PROCEDURE — 80061 LIPID PANEL: CPT

## 2018-06-06 PROCEDURE — 84153 ASSAY OF PSA TOTAL: CPT

## 2018-06-06 PROCEDURE — 36415 COLL VENOUS BLD VENIPUNCTURE: CPT

## 2018-06-06 PROCEDURE — 83036 HEMOGLOBIN GLYCOSYLATED A1C: CPT

## 2018-06-11 ENCOUNTER — OFFICE VISIT (OUTPATIENT)
Dept: SURGERY | Facility: CLINIC | Age: 53
End: 2018-06-11

## 2018-06-11 VITALS
BODY MASS INDEX: 28.99 KG/M2 | SYSTOLIC BLOOD PRESSURE: 149 MMHG | TEMPERATURE: 98 F | HEART RATE: 67 BPM | RESPIRATION RATE: 16 BRPM | HEIGHT: 72 IN | DIASTOLIC BLOOD PRESSURE: 87 MMHG | WEIGHT: 214 LBS

## 2018-06-11 DIAGNOSIS — R97.20 ELEVATED PSA: Primary | ICD-10-CM

## 2018-06-11 DIAGNOSIS — N40.1 BPH WITH OBSTRUCTION/LOWER URINARY TRACT SYMPTOMS: ICD-10-CM

## 2018-06-11 DIAGNOSIS — N13.8 BPH WITH OBSTRUCTION/LOWER URINARY TRACT SYMPTOMS: ICD-10-CM

## 2018-06-11 PROCEDURE — 99212 OFFICE O/P EST SF 10 MIN: CPT | Performed by: UROLOGY

## 2018-06-11 PROCEDURE — 99213 OFFICE O/P EST LOW 20 MIN: CPT | Performed by: UROLOGY

## 2018-06-12 ENCOUNTER — OFFICE VISIT (OUTPATIENT)
Dept: INTERNAL MEDICINE CLINIC | Facility: CLINIC | Age: 53
End: 2018-06-12

## 2018-06-12 VITALS
WEIGHT: 223.13 LBS | TEMPERATURE: 98 F | SYSTOLIC BLOOD PRESSURE: 142 MMHG | HEIGHT: 72 IN | BODY MASS INDEX: 30.22 KG/M2 | RESPIRATION RATE: 20 BRPM | DIASTOLIC BLOOD PRESSURE: 84 MMHG | HEART RATE: 68 BPM

## 2018-06-12 DIAGNOSIS — E11.9 TYPE 2 DIABETES MELLITUS WITHOUT COMPLICATION, WITHOUT LONG-TERM CURRENT USE OF INSULIN (HCC): Primary | ICD-10-CM

## 2018-06-12 DIAGNOSIS — E78.5 HYPERLIPIDEMIA LDL GOAL <100: ICD-10-CM

## 2018-06-12 DIAGNOSIS — I10 ESSENTIAL HYPERTENSION: ICD-10-CM

## 2018-06-12 PROCEDURE — 99214 OFFICE O/P EST MOD 30 MIN: CPT | Performed by: INTERNAL MEDICINE

## 2018-06-12 PROCEDURE — 99212 OFFICE O/P EST SF 10 MIN: CPT | Performed by: INTERNAL MEDICINE

## 2018-06-12 RX ORDER — PEN NEEDLE, DIABETIC
NEEDLE, DISPOSABLE MISCELLANEOUS
Refills: 0 | COMMUNITY
Start: 2018-04-06 | End: 2018-08-09

## 2018-06-12 RX ORDER — LANCETS 30 GAUGE
EACH MISCELLANEOUS
Qty: 100 EACH | Refills: 3 | Status: SHIPPED | OUTPATIENT
Start: 2018-06-12 | End: 2019-02-27

## 2018-06-12 RX ORDER — LISINOPRIL 40 MG/1
TABLET ORAL
Qty: 90 TABLET | Refills: 3 | Status: SHIPPED | OUTPATIENT
Start: 2018-06-12 | End: 2019-06-17

## 2018-06-12 RX ORDER — LISINOPRIL 10 MG/1
10 TABLET ORAL
COMMUNITY
End: 2018-06-12

## 2018-06-12 RX ORDER — SIMVASTATIN 40 MG
40 TABLET ORAL
COMMUNITY
End: 2018-06-12

## 2018-06-12 RX ORDER — SIMVASTATIN 20 MG
TABLET ORAL
Qty: 90 TABLET | Refills: 3 | Status: SHIPPED | OUTPATIENT
Start: 2018-06-12 | End: 2019-05-01

## 2018-06-12 NOTE — PROGRESS NOTES
HPI:    Patient ID: Kishor Vazquez is a 47year old male. HPI  Patient is here for follow-up on chronic medical issues as listed below. Last seen here on March 6. At that time diabetes was not terribly well controlled. We prescribed Victoza.   H OU   • Other and unspecified hyperlipidemia    • Type II or unspecified type diabetes mellitus without mention of complication, not stated as uncontrolled    • Unspecified essential hypertension       Past Surgical History:  11/15/2016: COLONOSCOPY N/A blood glucose daily. E11.9 Disp: 100 strip Rfl: 3   Blood Glucose Monitoring Suppl (TRUE METRIX GO GLUCOSE METER) W/DEVICE Does not apply Kit 1 Stick by Does not apply route daily.  E11.9 Disp: 1 kit Rfl: 0   Lancets Does not apply Misc Test blood glucose Currently well controlled. Continue current medications.    (E78.5) Hyperlipidemia LDL goal <100  Plan: Recent lipid profile looks good. Continue current treatment. Keep working on diet and exercise.     Elevated PSA  It has since returned to normal.

## 2018-06-13 NOTE — PROGRESS NOTES
Lesli Chin is a 47year old male. HPI:   Patient presents with:  elevated psa      32-year-old male most recently seen in the office April 19, 2018 for evaluation of BPH and elevated serum PSA.   Noted on recent testing to have an elevated PSA to today's visit):    Current Outpatient Prescriptions:  Blood Glucose Monitoring Suppl (TRUE METRIX GO GLUCOSE METER) W/DEVICE Does not apply Kit 1 Stick by Does not apply route daily. E11.9 Disp: 1 kit Rfl: 0   aspirin 81 MG Oral Tab Take 81 mg by mouth.

## 2018-08-09 ENCOUNTER — OFFICE VISIT (OUTPATIENT)
Dept: SURGERY | Facility: CLINIC | Age: 53
End: 2018-08-09
Payer: COMMERCIAL

## 2018-08-09 VITALS
RESPIRATION RATE: 18 BRPM | DIASTOLIC BLOOD PRESSURE: 93 MMHG | HEART RATE: 75 BPM | SYSTOLIC BLOOD PRESSURE: 139 MMHG | BODY MASS INDEX: 30.48 KG/M2 | WEIGHT: 225.06 LBS | OXYGEN SATURATION: 98 % | HEIGHT: 72 IN

## 2018-08-09 DIAGNOSIS — I10 ESSENTIAL HYPERTENSION: Primary | ICD-10-CM

## 2018-08-09 DIAGNOSIS — E66.9 OBESITY (BMI 30-39.9): ICD-10-CM

## 2018-08-09 DIAGNOSIS — E78.1 HYPERTRIGLYCERIDEMIA: ICD-10-CM

## 2018-08-09 DIAGNOSIS — E11.9 TYPE 2 DIABETES MELLITUS WITHOUT COMPLICATION, WITHOUT LONG-TERM CURRENT USE OF INSULIN (HCC): ICD-10-CM

## 2018-08-09 PROCEDURE — 99204 OFFICE O/P NEW MOD 45 MIN: CPT | Performed by: INTERNAL MEDICINE

## 2018-08-09 NOTE — PROGRESS NOTES
[facility] Consultation Note       Date of Consult:  2018    Patient:  Judy Guajardo  :      1965  MRN:      [id number]    Referring Provider: Dr. Eshan Delgado       Chief Complaint:  Patient presents with:  Consult: non uriarte daily. Disp: 360 tablet Rfl: 3   lisinopril 40 MG Oral Tab TAKE 1 TABLET(40 MG) BY MOUTH EVERY DAY Disp: 90 tablet Rfl: 3   Glucose Blood In Vitro Strip Test blood glucose daily.   E11.9 Disp: 100 strip Rfl: 3   Lancets Does not apply Misc Test blood glucos meals/week    Nutritional Goals Reviewed and Discussed:     Limit carbohydrates to 100 gms per day, Eat 100-200 calories within 1 hour of waking up and Eat 3-4 cups of fresh fruit or vegetables daily    Behavior Modifications Reviewed and Discussed:    Eat ulcers. Encounter Diagnosis(ses):   Essential hypertension  (primary encounter diagnosis)  Type 2 diabetes mellitus without complication, without long-term current use of insulin (HCC)  Hypertriglyceridemia  Obesity (BMI 30-39. 9)    PLAN     Patient

## 2018-08-27 ENCOUNTER — OFFICE VISIT (OUTPATIENT)
Dept: INTERNAL MEDICINE CLINIC | Facility: CLINIC | Age: 53
End: 2018-08-27
Payer: COMMERCIAL

## 2018-08-27 ENCOUNTER — NURSE TRIAGE (OUTPATIENT)
Dept: OTHER | Age: 53
End: 2018-08-27

## 2018-08-27 VITALS
TEMPERATURE: 98 F | BODY MASS INDEX: 30.59 KG/M2 | SYSTOLIC BLOOD PRESSURE: 142 MMHG | HEIGHT: 72 IN | HEART RATE: 74 BPM | RESPIRATION RATE: 18 BRPM | DIASTOLIC BLOOD PRESSURE: 68 MMHG | WEIGHT: 225.81 LBS

## 2018-08-27 DIAGNOSIS — M54.50 ACUTE RIGHT-SIDED LOW BACK PAIN WITHOUT SCIATICA: Primary | ICD-10-CM

## 2018-08-27 PROCEDURE — 99212 OFFICE O/P EST SF 10 MIN: CPT | Performed by: INTERNAL MEDICINE

## 2018-08-27 PROCEDURE — 99213 OFFICE O/P EST LOW 20 MIN: CPT | Performed by: INTERNAL MEDICINE

## 2018-08-27 RX ORDER — CYCLOBENZAPRINE HCL 10 MG
10 TABLET ORAL 3 TIMES DAILY PRN
Qty: 30 TABLET | Refills: 0 | Status: SHIPPED | OUTPATIENT
Start: 2018-08-27 | End: 2018-09-05

## 2018-08-27 NOTE — PATIENT INSTRUCTIONS
Please rest your back and apply heat 2-3 times daily. Take Aleve as needed for pain relief. Use cyclobenzaprine 10 mg 3 times daily as needed, watching for drowsiness. Call if not soon better.

## 2018-08-27 NOTE — TELEPHONE ENCOUNTER
Action Requested: Summary for Provider     []  Critical Lab, Recommendations Needed  [] Need Additional Advice  []   FYI    []   Need Orders  [] Need Medications Sent to Pharmacy  []  Other     SUMMARY: pt states that for the past several days he has had r

## 2018-08-27 NOTE — PROGRESS NOTES
Ramon Mace is a 47year old male. Patient presents with:  Back Pain    HPI:   For the past 3 weeks, he has had intermittent low back pain. Yesterday, low back pain worsened.   Pain is primarily right-sided and localized to his lower back area, an OU   • Lipid screening 9/10/2013   • MGD (meibomian gland dysfunction) 2008 OU   • Obesity (BMI 30-39. 9)    • Other and unspecified hyperlipidemia    • Type II or unspecified type diabetes mellitus without mention of complication, not stated as Rosa Maria Salvador

## 2018-08-31 ENCOUNTER — TELEPHONE (OUTPATIENT)
Dept: SURGERY | Facility: CLINIC | Age: 53
End: 2018-08-31

## 2018-08-31 NOTE — TELEPHONE ENCOUNTER
8/31/18 @ 9:15am Spoke to Meera resendiz at [facility], [phone], HRO#[id number]. He verified that patient has following benefits for Bariatric services:   • No weight management criteria. • No ALEKSANDRA/Blue Distinction required.    • TASHA (CKX#[id number]) DX E66.9   - Gloria Rutledge

## 2018-09-05 ENCOUNTER — OFFICE VISIT (OUTPATIENT)
Dept: INTERNAL MEDICINE CLINIC | Facility: CLINIC | Age: 53
End: 2018-09-05
Payer: COMMERCIAL

## 2018-09-05 VITALS
BODY MASS INDEX: 30.2 KG/M2 | WEIGHT: 223 LBS | HEIGHT: 72 IN | SYSTOLIC BLOOD PRESSURE: 136 MMHG | HEART RATE: 73 BPM | DIASTOLIC BLOOD PRESSURE: 86 MMHG

## 2018-09-05 DIAGNOSIS — M54.50 ACUTE RIGHT-SIDED LOW BACK PAIN WITHOUT SCIATICA: Primary | ICD-10-CM

## 2018-09-05 LAB
APPEARANCE: CLEAR
BILIRUBIN: NEGATIVE
GLUCOSE (URINE DIPSTICK): NEGATIVE MG/DL
KETONES (URINE DIPSTICK): NEGATIVE MG/DL
LEUKOCYTES: NEGATIVE
MULTISTIX LOT#: NORMAL NUMERIC
NITRITE, URINE: NEGATIVE
OCCULT BLOOD: NEGATIVE
PH, URINE: 5 (ref 4.5–8)
PROTEIN (URINE DIPSTICK): NEGATIVE MG/DL
SPECIFIC GRAVITY: 1.01 (ref 1–1.03)
URINE-COLOR: YELLOW
UROBILINOGEN,SEMI-QN: 0 MG/DL (ref 0–1.9)

## 2018-09-05 PROCEDURE — 99212 OFFICE O/P EST SF 10 MIN: CPT | Performed by: INTERNAL MEDICINE

## 2018-09-05 PROCEDURE — 81002 URINALYSIS NONAUTO W/O SCOPE: CPT | Performed by: INTERNAL MEDICINE

## 2018-09-05 PROCEDURE — 99213 OFFICE O/P EST LOW 20 MIN: CPT | Performed by: INTERNAL MEDICINE

## 2018-09-05 NOTE — PROGRESS NOTES
Natividad Neal is a 47year old male. Patient presents with:  Back Pain  Urinary    HPI:   Mr. Yue Stone presents this morning for reevaluation, concerned about a possible kidney stone.     He was last seen here August 27 with right-sided low back pa controlled w/ no BDR - 2008   • Disorder of eye, unspecified 2008    increased C/D ratio - OU   • Lipid screening 9/10/2013   • MGD (meibomian gland dysfunction) 2008    OU   • Obesity (BMI 30-39. 9)    • Other and unspecified hyperlipidemia    • Type II or

## 2018-09-05 NOTE — PATIENT INSTRUCTIONS
Continue rest, heat application and Tylenol as needed for back pain. Call if back pain does not continue to improve and resolve.

## 2018-09-07 ENCOUNTER — OFFICE VISIT (OUTPATIENT)
Dept: SURGERY | Facility: CLINIC | Age: 53
End: 2018-09-07

## 2018-09-07 VITALS — HEIGHT: 72 IN | BODY MASS INDEX: 30.36 KG/M2 | WEIGHT: 224.13 LBS

## 2018-09-07 DIAGNOSIS — E66.9 OBESITY (BMI 30-39.9): ICD-10-CM

## 2018-09-07 DIAGNOSIS — E11.9 TYPE 2 DIABETES MELLITUS WITHOUT COMPLICATION, WITHOUT LONG-TERM CURRENT USE OF INSULIN (HCC): Primary | ICD-10-CM

## 2018-09-07 PROCEDURE — 97802 MEDICAL NUTRITION INDIV IN: CPT | Performed by: DIETITIAN, REGISTERED

## 2018-09-07 NOTE — PROGRESS NOTES
[address] [place] [address] AND WEIGHT LOSS CLINIC  [address] [place] [id number]  Dept: [phone]  Loc: [phone]    09/07/18    Bariatric Initial Nutrition Assessment    Addy Covington is a 47year old male.     Re months. Patient has tried these medications for weight loss: None    Patient has received these behavioral treatments for weight loss: None    Patient is being followed by Dr. Cadena Speaks for medical weight loss.     Patient has support from: Primary car 06/06/2018   TRIG 65 06/06/2018   HDL 52 06/06/2018   LDL 51 06/06/2018   [place] 64 06/06/2018   [place] 95 06/07/2016       Vitamins/Minerals:    Lab Results  Component Value Date   VITB12 365 02/05/2011     No results found for: VITD, QVITD, VITD25, potato w/ marshmellow    Nuts, rice crackers, ice cream or peanut M&Ms   Meal pattern consists of 3 meals, 2-3 snacks and 0 protein supplements.     Total Kcal: 3871-4197 calories/day per MyFitnessPal  Excessive in: calories, fat and simple sugars  [place] nightly routine instead of 8PM snack    Monitor/Evaluate     · Food/fluid intake/choices  · Nutrition Rx  · Anthropometric measurements  · Body composition-InBody Testing at next appointment per MD  recommendation  · Updated labs  · F/U MD plan of care  ·

## 2018-10-15 ENCOUNTER — APPOINTMENT (OUTPATIENT)
Dept: LAB | Facility: HOSPITAL | Age: 53
End: 2018-10-15
Attending: INTERNAL MEDICINE
Payer: COMMERCIAL

## 2018-10-15 DIAGNOSIS — R79.89 LOW TESTOSTERONE: ICD-10-CM

## 2018-10-15 DIAGNOSIS — E11.9 TYPE 2 DIABETES MELLITUS WITHOUT COMPLICATION, UNSPECIFIED WHETHER LONG TERM INSULIN USE (HCC): ICD-10-CM

## 2018-10-15 PROCEDURE — 82947 ASSAY GLUCOSE BLOOD QUANT: CPT

## 2018-10-15 PROCEDURE — 84403 ASSAY OF TOTAL TESTOSTERONE: CPT

## 2018-10-15 PROCEDURE — 84402 ASSAY OF FREE TESTOSTERONE: CPT

## 2018-10-15 PROCEDURE — 80061 LIPID PANEL: CPT

## 2018-10-15 PROCEDURE — 36415 COLL VENOUS BLD VENIPUNCTURE: CPT

## 2018-10-15 PROCEDURE — 83036 HEMOGLOBIN GLYCOSYLATED A1C: CPT

## 2018-10-18 ENCOUNTER — OFFICE VISIT (OUTPATIENT)
Dept: INTERNAL MEDICINE CLINIC | Facility: CLINIC | Age: 53
End: 2018-10-18
Payer: COMMERCIAL

## 2018-10-18 VITALS
SYSTOLIC BLOOD PRESSURE: 150 MMHG | HEART RATE: 80 BPM | DIASTOLIC BLOOD PRESSURE: 84 MMHG | BODY MASS INDEX: 31.07 KG/M2 | WEIGHT: 229.38 LBS | TEMPERATURE: 99 F | HEIGHT: 72 IN | RESPIRATION RATE: 20 BRPM

## 2018-10-18 DIAGNOSIS — E11.9 TYPE 2 DIABETES MELLITUS WITHOUT COMPLICATION, UNSPECIFIED WHETHER LONG TERM INSULIN USE (HCC): Primary | ICD-10-CM

## 2018-10-18 DIAGNOSIS — Z23 NEED FOR VACCINATION: ICD-10-CM

## 2018-10-18 PROCEDURE — 90471 IMMUNIZATION ADMIN: CPT | Performed by: INTERNAL MEDICINE

## 2018-10-18 PROCEDURE — 90686 IIV4 VACC NO PRSV 0.5 ML IM: CPT | Performed by: INTERNAL MEDICINE

## 2018-10-18 PROCEDURE — 99212 OFFICE O/P EST SF 10 MIN: CPT | Performed by: INTERNAL MEDICINE

## 2018-10-18 PROCEDURE — 99214 OFFICE O/P EST MOD 30 MIN: CPT | Performed by: INTERNAL MEDICINE

## 2018-10-18 RX ORDER — SIMVASTATIN 40 MG
40 TABLET ORAL
COMMUNITY
End: 2018-10-18

## 2018-10-18 RX ORDER — LISINOPRIL 10 MG/1
10 TABLET ORAL
COMMUNITY
End: 2018-10-18

## 2018-10-18 NOTE — PROGRESS NOTES
HPI:    Patient ID: Krystle Flores is a 47year old male. HPI  Patient is here to discuss recent results with diabetes and borderline testosterone levels. Last seen here on June 12. At that time A1c was 7.5.   He again voices concern about starti Obesity (BMI 30-39. 9)    • Other and unspecified hyperlipidemia    • Type II or unspecified type diabetes mellitus without mention of complication, not stated as uncontrolled    • Unspecified essential hypertension       Past Surgical History:   Procedure HCl 500 MG Oral Tab Take 1 tablet (500 mg total) by mouth 4 (four) times daily. Disp: 360 tablet Rfl: 3   lisinopril 40 MG Oral Tab TAKE 1 TABLET(40 MG) BY MOUTH EVERY DAY Disp: 90 tablet Rfl: 3   Glucose Blood In Vitro Strip Test blood glucose daily.   E11 testosterone. He thinks that if he gets on the testosterone he can build muscle mass which often burn calories and lose weight and better control the diabetes.   I told him that I could not guarantee that all of those things would happen with testosterone

## 2018-11-07 ENCOUNTER — PATIENT MESSAGE (OUTPATIENT)
Dept: INTERNAL MEDICINE CLINIC | Facility: CLINIC | Age: 53
End: 2018-11-07

## 2018-11-07 NOTE — TELEPHONE ENCOUNTER
From: Cayden Bullock  To: Sandeep Mohamud MD  Sent: 11/7/2018 8:57 AM CST  Subject: Prescription Question    Ayesha Richardson been on Victoza now since Oct 20 and I've put on 5 lbs without changing my eating habits. :( Is there a safe appetite suppressio

## 2018-11-27 ENCOUNTER — HOSPITAL ENCOUNTER (OUTPATIENT)
Age: 53
Discharge: HOME | End: 2018-11-27
Attending: EMERGENCY MEDICINE
Payer: COMMERCIAL

## 2018-11-27 VITALS
TEMPERATURE: 98 F | SYSTOLIC BLOOD PRESSURE: 143 MMHG | HEART RATE: 86 BPM | RESPIRATION RATE: 20 BRPM | DIASTOLIC BLOOD PRESSURE: 96 MMHG | WEIGHT: 235 LBS | HEIGHT: 73 IN | BODY MASS INDEX: 31.14 KG/M2 | OXYGEN SATURATION: 100 %

## 2018-11-27 DIAGNOSIS — H65.92 LEFT OTITIS MEDIA WITH EFFUSION: Primary | ICD-10-CM

## 2018-11-27 DIAGNOSIS — J00 ACUTE NASOPHARYNGITIS: ICD-10-CM

## 2018-11-27 PROCEDURE — 87430 STREP A AG IA: CPT

## 2018-11-27 PROCEDURE — 99214 OFFICE O/P EST MOD 30 MIN: CPT

## 2018-11-27 PROCEDURE — 99213 OFFICE O/P EST LOW 20 MIN: CPT

## 2018-11-27 RX ORDER — AMOXICILLIN 500 MG/1
500 TABLET, FILM COATED ORAL 2 TIMES DAILY
Qty: 14 TABLET | Refills: 0 | Status: SHIPPED | OUTPATIENT
Start: 2018-11-27 | End: 2018-12-04

## 2018-11-28 NOTE — ED INITIAL ASSESSMENT (HPI)
Patient presents with complaints of sore throat that started 2 days ago. Denies fever, night sweats or chills. Denies headaches, nausea or vomiting. Had a few loose stools yesterday and today. Denies significant cough.

## 2018-11-28 NOTE — ED PROVIDER NOTES
Patient Seen in: [address]    History   Patient presents with:  Sore Throat    Stated Complaint: sore throat    HPI    The patient is a 64-year-old male who presents with 2 days of sore throat, postnasal drip and conges SpO2 100%   BMI 31.00 kg/m²         Physical Exam   Constitutional: He is oriented to person, place, and time. He appears well-developed and well-nourished. He does not appear ill. HENT:   Head: Normocephalic.    Right Ear: External ear normal. No drain Prescribed:  Current Discharge Medication List    START taking these medications    amoxicillin 500 MG Oral Tab  Take 1 tablet (500 mg total) by mouth 2 (two) times daily for 7 days.   Qty: 14 tablet Refills: 0

## 2018-12-15 ENCOUNTER — HOSPITAL ENCOUNTER (EMERGENCY)
Facility: HOSPITAL | Age: 53
Discharge: HOME | End: 2018-12-15
Attending: EMERGENCY MEDICINE
Payer: COMMERCIAL

## 2018-12-15 ENCOUNTER — APPOINTMENT (OUTPATIENT)
Dept: CT IMAGING | Facility: HOSPITAL | Age: 53
End: 2018-12-15
Attending: EMERGENCY MEDICINE
Payer: COMMERCIAL

## 2018-12-15 VITALS
HEART RATE: 85 BPM | RESPIRATION RATE: 18 BRPM | TEMPERATURE: 98 F | SYSTOLIC BLOOD PRESSURE: 149 MMHG | OXYGEN SATURATION: 97 % | DIASTOLIC BLOOD PRESSURE: 85 MMHG | BODY MASS INDEX: 31 KG/M2 | WEIGHT: 235 LBS

## 2018-12-15 DIAGNOSIS — R51.9 ACUTE NONINTRACTABLE HEADACHE, UNSPECIFIED HEADACHE TYPE: Primary | ICD-10-CM

## 2018-12-15 PROCEDURE — 99284 EMERGENCY DEPT VISIT MOD MDM: CPT

## 2018-12-15 PROCEDURE — 96375 TX/PRO/DX INJ NEW DRUG ADDON: CPT

## 2018-12-15 PROCEDURE — 96374 THER/PROPH/DIAG INJ IV PUSH: CPT

## 2018-12-15 PROCEDURE — 70450 CT HEAD/BRAIN W/O DYE: CPT | Performed by: EMERGENCY MEDICINE

## 2018-12-15 RX ORDER — METOCLOPRAMIDE HYDROCHLORIDE 5 MG/ML
10 INJECTION INTRAMUSCULAR; INTRAVENOUS ONCE
Status: COMPLETED | OUTPATIENT
Start: 2018-12-15 | End: 2018-12-15

## 2018-12-15 RX ORDER — METOCLOPRAMIDE 10 MG/1
10 TABLET ORAL EVERY 6 HOURS PRN
Qty: 10 TABLET | Refills: 0 | Status: SHIPPED | OUTPATIENT
Start: 2018-12-15 | End: 2019-02-04

## 2018-12-15 RX ORDER — KETOROLAC TROMETHAMINE 30 MG/ML
30 INJECTION, SOLUTION INTRAMUSCULAR; INTRAVENOUS ONCE
Status: COMPLETED | OUTPATIENT
Start: 2018-12-15 | End: 2018-12-15

## 2018-12-15 RX ORDER — ACETAMINOPHEN 500 MG
1000 TABLET ORAL ONCE
Status: DISCONTINUED | OUTPATIENT
Start: 2018-12-15 | End: 2018-12-15

## 2018-12-15 RX ORDER — NAPROXEN 500 MG/1
500 TABLET ORAL 2 TIMES DAILY PRN
Qty: 14 TABLET | Refills: 0 | Status: SHIPPED | OUTPATIENT
Start: 2018-12-15 | End: 2018-12-22

## 2018-12-16 NOTE — ED INITIAL ASSESSMENT (HPI)
Patient here with c/o headache that began while loading car onto trailer. Also c/o hypertension at home with reading 170/108. Denies anticoagulants.

## 2018-12-16 NOTE — ED PROVIDER NOTES
Patient Seen in: [facility] AND [facility] Emergency Department    History   Patient presents with:  Headache (neurologic)    Stated Complaint: Headache, hypertension    HPI    56-year-old male patient presents complaining of acute onset of headache that occurre 149/85   Pulse 85   Temp 97.6 °F (36.4 °C) (Oral)   Resp 18   Wt 106.6 kg   SpO2 97%   BMI 31.01 kg/m²         Physical Exam    Gen: Awake alert in no apparent distress  HEENT: Anicteric, EOMI, PERRL, clear oropharynx. Head without any evidence of trauma.

## 2018-12-17 ENCOUNTER — OFFICE VISIT (OUTPATIENT)
Dept: INTERNAL MEDICINE CLINIC | Facility: CLINIC | Age: 53
End: 2018-12-17
Payer: COMMERCIAL

## 2018-12-17 ENCOUNTER — PATIENT MESSAGE (OUTPATIENT)
Dept: INTERNAL MEDICINE CLINIC | Facility: CLINIC | Age: 53
End: 2018-12-17

## 2018-12-17 ENCOUNTER — TELEPHONE (OUTPATIENT)
Dept: INTERNAL MEDICINE CLINIC | Facility: CLINIC | Age: 53
End: 2018-12-17

## 2018-12-17 VITALS
HEART RATE: 86 BPM | DIASTOLIC BLOOD PRESSURE: 91 MMHG | SYSTOLIC BLOOD PRESSURE: 147 MMHG | TEMPERATURE: 99 F | WEIGHT: 228.69 LBS | BODY MASS INDEX: 30.97 KG/M2 | HEIGHT: 72 IN

## 2018-12-17 DIAGNOSIS — I10 ESSENTIAL HYPERTENSION: Primary | ICD-10-CM

## 2018-12-17 PROCEDURE — 99212 OFFICE O/P EST SF 10 MIN: CPT | Performed by: INTERNAL MEDICINE

## 2018-12-17 PROCEDURE — 99213 OFFICE O/P EST LOW 20 MIN: CPT | Performed by: INTERNAL MEDICINE

## 2018-12-17 RX ORDER — AMLODIPINE BESYLATE 5 MG/1
5 TABLET ORAL DAILY
Qty: 30 TABLET | Refills: 1 | Status: SHIPPED | OUTPATIENT
Start: 2018-12-17 | End: 2019-01-08

## 2018-12-17 NOTE — PROGRESS NOTES
Yoan  is a 47year old male.   Patient presents with:  ER F/U: patient went to [address] [place] [address] ER on 12/15/18 for headache, patient states his blood pressure readings have been high      HPI:     HPI  Patient is here for ER follow-up on 12/15/2018 for he tablet (500 mg total) by mouth 4 (four) times daily. Disp: 360 tablet Rfl: 3   lisinopril 40 MG Oral Tab TAKE 1 TABLET(40 MG) BY MOUTH EVERY DAY Disp: 90 tablet Rfl: 3   Glucose Blood In Vitro Strip Test blood glucose daily.   E11.9 Disp: 100 strip Rfl: 3 diarrhea and heartburn. Genitourinary: Negative for dysuria and frequency. Musculoskeletal: Negative for back pain, joint pain and neck pain. Skin: Negative for itching and rash. Neurological: Positive for headaches. Negative for dizziness.    Endo/ daily.            The patient indicates understanding of these issues and agrees to the plan.               Rach Lind MD  12/17/2018  1:38 PM

## 2018-12-17 NOTE — TELEPHONE ENCOUNTER
Pt returned call to follow up. Pt states he is open to seeing other physicians if Dr. Jing Cleary doesn't have any availabilities. Pt scheduled with Dr. Kacy Porter at 130p today.     Future Appointments   Date Time Provider Ana Dunn   12/17/2018  1:30 PM Quyen Dailey

## 2018-12-17 NOTE — PATIENT INSTRUCTIONS
Please contact me if your blood pressure remains elevated more than 140/90 in 1 week after starting medication. Reduce stress. Limit the sodium intake to less than 1.5 g/day. Exercise regularly.

## 2018-12-17 NOTE — TELEPHONE ENCOUNTER
Patient seen in ED 12/15/18 for symptoms of HA, has had recent increase in BP, today was 150/110, this has been around his last several readings also.  Reports first noticed his symptoms of HA when he started Victoza and has been having the HA's Nov and Dec

## 2018-12-19 ENCOUNTER — APPOINTMENT (OUTPATIENT)
Dept: GENERAL RADIOLOGY | Facility: HOSPITAL | Age: 53
End: 2018-12-19
Payer: COMMERCIAL

## 2018-12-19 ENCOUNTER — TELEPHONE (OUTPATIENT)
Dept: INTERNAL MEDICINE CLINIC | Facility: CLINIC | Age: 53
End: 2018-12-19

## 2018-12-19 ENCOUNTER — HOSPITAL ENCOUNTER (EMERGENCY)
Facility: HOSPITAL | Age: 53
Discharge: HOME | End: 2018-12-19
Payer: COMMERCIAL

## 2018-12-19 VITALS
HEART RATE: 72 BPM | DIASTOLIC BLOOD PRESSURE: 96 MMHG | OXYGEN SATURATION: 96 % | RESPIRATION RATE: 19 BRPM | SYSTOLIC BLOOD PRESSURE: 144 MMHG

## 2018-12-19 DIAGNOSIS — I10 ESSENTIAL HYPERTENSION: ICD-10-CM

## 2018-12-19 DIAGNOSIS — I10 ESSENTIAL HYPERTENSION: Primary | ICD-10-CM

## 2018-12-19 DIAGNOSIS — R07.9 CHEST PAIN OF UNCERTAIN ETIOLOGY: Primary | ICD-10-CM

## 2018-12-19 PROCEDURE — 84484 ASSAY OF TROPONIN QUANT: CPT | Performed by: EMERGENCY MEDICINE

## 2018-12-19 PROCEDURE — 93010 ELECTROCARDIOGRAM REPORT: CPT | Performed by: EMERGENCY MEDICINE

## 2018-12-19 PROCEDURE — 96374 THER/PROPH/DIAG INJ IV PUSH: CPT

## 2018-12-19 PROCEDURE — 93005 ELECTROCARDIOGRAM TRACING: CPT

## 2018-12-19 PROCEDURE — 85025 COMPLETE CBC W/AUTO DIFF WBC: CPT

## 2018-12-19 PROCEDURE — 99285 EMERGENCY DEPT VISIT HI MDM: CPT

## 2018-12-19 PROCEDURE — 84484 ASSAY OF TROPONIN QUANT: CPT

## 2018-12-19 PROCEDURE — 71046 X-RAY EXAM CHEST 2 VIEWS: CPT

## 2018-12-19 PROCEDURE — 80048 BASIC METABOLIC PNL TOTAL CA: CPT

## 2018-12-19 PROCEDURE — 85379 FIBRIN DEGRADATION QUANT: CPT | Performed by: EMERGENCY MEDICINE

## 2018-12-19 PROCEDURE — 93010 ELECTROCARDIOGRAM REPORT: CPT | Performed by: INTERNAL MEDICINE

## 2018-12-19 RX ORDER — HYDROCHLOROTHIAZIDE 12.5 MG/1
12.5 TABLET ORAL DAILY
Qty: 30 TABLET | Refills: 1 | Status: SHIPPED | OUTPATIENT
Start: 2018-12-19 | End: 2019-01-08

## 2018-12-19 RX ORDER — KETOROLAC TROMETHAMINE 30 MG/ML
30 INJECTION, SOLUTION INTRAMUSCULAR; INTRAVENOUS ONCE
Status: COMPLETED | OUTPATIENT
Start: 2018-12-19 | End: 2018-12-19

## 2018-12-19 NOTE — TELEPHONE ENCOUNTER
Advised to call triage on dVisit    Regarding: Prescription Question  Contact: [phone]  ----- Message from [employer], [employer] sent at 12/19/2018  7:40 AM CST -----    Dr Cristina Phillips been taking the amlodipine (along with my other meds) since Monday,

## 2018-12-19 NOTE — ED PROVIDER NOTES
Patient Seen in: [facility] AND [facility] Emergency Department    History   Patient presents with:  Chest Pain Angina (cardiovascular)    Stated Complaint: high BP, chest pain    HPI    16-year-old male with history of hypertension, diabetes, and hyperlipidemia Smokeless tobacco: Never Used    Alcohol use: No      Alcohol/week: 0.0 oz    Drug use: No      Review of Systems    Positive for stated complaint: high BP, chest pain  Other systems are as noted in HPI. Constitutional and vital signs reviewed.       All o DRAW DARK GREEN   RAINBOW DRAW LIGHT GREEN   RAINBOW DRAW GOLD   RAINBOW DRAW LAVENDER TALL (BNP)   CBC W/ DIFFERENTIAL     EKG    Rate, intervals and axes as noted on EKG Report.   Rate: 81  Rhythm: Sinus Rhythm  Axis: Normal  Reading: Normal EKG         2

## 2018-12-19 NOTE — TELEPHONE ENCOUNTER
Talked to the patient. I have added a water pill- hydroclorothiazide 12.5 mg for BP control. Continue amlodipine and lisinopril. Monitor blood pressure closely.   If the blood pressure still not better, will increase the dose of amlodipine to 10 mg. I  ha

## 2018-12-19 NOTE — ED INITIAL ASSESSMENT (HPI)
Pt c/o pain to left side of the chest that began today at 1030 as he was lifting a box. Pt reports the pain has been intermittent and not increased with movement or deep breathing. Pt in the ED Saturday for HTN and headache and d/c home.  PCP started pt on

## 2018-12-19 NOTE — ED PROVIDER NOTES
Patient received in signout from Dr. Chelsie Carlson. Await second troponin, if negative plan for discharge home. Troponin noted to be stable at 0, discharged according to plan.

## 2018-12-19 NOTE — TELEPHONE ENCOUNTER
Spoke with pt regarding my chart message below. Pt states he had OV with Dr Brianna Nguyen 12/17/18 for ER f/u for elevated b/p and headaches. Pt states Amlodipine was added and pt had been taking that in addition to his lisinopril.     Pt states he has been monit

## 2019-01-07 ENCOUNTER — APPOINTMENT (OUTPATIENT)
Dept: LAB | Facility: HOSPITAL | Age: 54
End: 2019-01-07
Attending: INTERNAL MEDICINE
Payer: COMMERCIAL

## 2019-01-07 DIAGNOSIS — I10 ESSENTIAL HYPERTENSION: ICD-10-CM

## 2019-01-07 DIAGNOSIS — E11.9 TYPE 2 DIABETES MELLITUS WITHOUT COMPLICATION, WITHOUT LONG-TERM CURRENT USE OF INSULIN (HCC): ICD-10-CM

## 2019-01-07 LAB
ANION GAP SERPL CALC-SCNC: 10 MMOL/L (ref 0–18)
BUN SERPL-MCNC: 11 MG/DL (ref 8–20)
BUN/CREAT SERPL: 15.7 (ref 10–20)
CALCIUM SERPL-MCNC: 9.3 MG/DL (ref 8.5–10.5)
CHLORIDE SERPL-SCNC: 102 MMOL/L (ref 95–110)
CHOLEST SERPL-MCNC: 143 MG/DL (ref 110–200)
CO2 SERPL-SCNC: 26 MMOL/L (ref 22–32)
CREAT SERPL-MCNC: 0.7 MG/DL (ref 0.5–1.5)
CREAT UR-MCNC: 121.6 MG/DL
EST. AVERAGE GLUCOSE BLD GHB EST-MCNC: 177 MG/DL (ref 68–126)
GLUCOSE SERPL-MCNC: 200 MG/DL (ref 70–99)
HBA1C MFR BLD HPLC: 7.8 % (ref ?–5.7)
HDLC SERPL-MCNC: 46 MG/DL
LDLC SERPL CALC-MCNC: 75 MG/DL (ref 0–99)
MICROALBUMIN UR-MCNC: 1.1 MG/DL (ref 0–1.8)
MICROALBUMIN/CREAT UR: 9 MG/G{CREAT} (ref 0–20)
NONHDLC SERPL-MCNC: 97 MG/DL
OSMOLALITY UR CALC.SUM OF ELEC: 291 MOSM/KG (ref 275–295)
POTASSIUM SERPL-SCNC: 4 MMOL/L (ref 3.3–5.1)
SODIUM SERPL-SCNC: 138 MMOL/L (ref 136–144)
TRIGL SERPL-MCNC: 109 MG/DL (ref 1–149)
TSH SERPL-ACNC: 4 UIU/ML (ref 0.45–5.33)

## 2019-01-07 PROCEDURE — 80048 BASIC METABOLIC PNL TOTAL CA: CPT

## 2019-01-07 PROCEDURE — 82570 ASSAY OF URINE CREATININE: CPT

## 2019-01-07 PROCEDURE — 82043 UR ALBUMIN QUANTITATIVE: CPT

## 2019-01-07 PROCEDURE — 84443 ASSAY THYROID STIM HORMONE: CPT

## 2019-01-07 PROCEDURE — 83036 HEMOGLOBIN GLYCOSYLATED A1C: CPT

## 2019-01-07 PROCEDURE — 36415 COLL VENOUS BLD VENIPUNCTURE: CPT

## 2019-01-07 PROCEDURE — 80061 LIPID PANEL: CPT

## 2019-01-08 ENCOUNTER — OFFICE VISIT (OUTPATIENT)
Dept: INTERNAL MEDICINE CLINIC | Facility: CLINIC | Age: 54
End: 2019-01-08
Payer: COMMERCIAL

## 2019-01-08 VITALS
DIASTOLIC BLOOD PRESSURE: 70 MMHG | SYSTOLIC BLOOD PRESSURE: 120 MMHG | HEART RATE: 84 BPM | BODY MASS INDEX: 30.57 KG/M2 | TEMPERATURE: 98 F | HEIGHT: 73 IN | WEIGHT: 230.63 LBS | RESPIRATION RATE: 20 BRPM

## 2019-01-08 DIAGNOSIS — E78.5 HYPERLIPIDEMIA LDL GOAL <100: ICD-10-CM

## 2019-01-08 DIAGNOSIS — I10 ESSENTIAL HYPERTENSION: ICD-10-CM

## 2019-01-08 DIAGNOSIS — E11.9 TYPE 2 DIABETES MELLITUS WITHOUT COMPLICATION, WITHOUT LONG-TERM CURRENT USE OF INSULIN (HCC): Primary | ICD-10-CM

## 2019-01-08 PROCEDURE — 99214 OFFICE O/P EST MOD 30 MIN: CPT | Performed by: INTERNAL MEDICINE

## 2019-01-08 PROCEDURE — 99212 OFFICE O/P EST SF 10 MIN: CPT | Performed by: INTERNAL MEDICINE

## 2019-01-08 RX ORDER — AMLODIPINE BESYLATE 5 MG/1
5 TABLET ORAL DAILY
Qty: 90 TABLET | Refills: 1 | Status: SHIPPED | OUTPATIENT
Start: 2019-01-08 | End: 2019-06-17

## 2019-01-08 RX ORDER — HYDROCHLOROTHIAZIDE 12.5 MG/1
12.5 TABLET ORAL DAILY
Qty: 90 TABLET | Refills: 1 | Status: SHIPPED | OUTPATIENT
Start: 2019-01-08 | End: 2019-06-17

## 2019-01-08 RX ORDER — PEN NEEDLE, DIABETIC
NEEDLE, DISPOSABLE MISCELLANEOUS
Qty: 100 EACH | Refills: 1 | Status: SHIPPED | OUTPATIENT
Start: 2019-01-08 | End: 2022-01-03

## 2019-01-08 NOTE — PATIENT INSTRUCTIONS
Increase to Victoza to 1.8. Send the sugar readings in a couple weeks. If they are still high, we may consider another diabetes medication such as Jardiance.

## 2019-01-12 NOTE — PROGRESS NOTES
HPI:    Patient ID: Sury Parker is a 47year old male. HPI  Patient is here for follow-up on chronic medical issues as listed below. He was last seen here in October. At that time we restarted the Victoza.   Is been in the emergency room 3 linda mention of complication, not stated as uncontrolled    • Unspecified essential hypertension       Past Surgical History:   Procedure Laterality Date   • COLONOSCOPY N/A 11/15/2016    Performed by Blaine Queen MD at [address] [place] [address] ENDOSCOPY      Family History   P the skin daily. Disp: 1 pen Rfl: 5   aspirin 81 MG Oral Tab Take 81 mg by mouth.  Disp:  Rfl:    simvastatin 20 MG Oral Tab TAKE 1 TABLET BY MOUTH EVERY EVENING Disp: 90 tablet Rfl: 3   MetFORMIN HCl 500 MG Oral Tab Take 1 tablet (500 mg total) by mouth 4 ( Patient continues to be frustrated with his inability to control the diabetes and lose weight. Various options discussed. We will increase the Victoza up to 1.8 mg a day. Continue other medication.   Check blood sugars and contact the office with results

## 2019-02-04 ENCOUNTER — OFFICE VISIT (OUTPATIENT)
Dept: INTERNAL MEDICINE CLINIC | Facility: CLINIC | Age: 54
End: 2019-02-04
Payer: COMMERCIAL

## 2019-02-04 VITALS
HEIGHT: 73 IN | BODY MASS INDEX: 30.09 KG/M2 | WEIGHT: 227 LBS | DIASTOLIC BLOOD PRESSURE: 82 MMHG | HEART RATE: 77 BPM | SYSTOLIC BLOOD PRESSURE: 127 MMHG

## 2019-02-04 DIAGNOSIS — M79.605 LEFT LEG PAIN: Primary | ICD-10-CM

## 2019-02-04 PROCEDURE — 99212 OFFICE O/P EST SF 10 MIN: CPT | Performed by: INTERNAL MEDICINE

## 2019-02-04 PROCEDURE — 99213 OFFICE O/P EST LOW 20 MIN: CPT | Performed by: INTERNAL MEDICINE

## 2019-02-04 NOTE — PROGRESS NOTES
Hermes Grimaldo is a 47year old male. Patient presents with:  Leg Pain: C/o pain in the left leg underneath his left knee for about 2.5-3 weeks ago.   Stts there's numbness as well    HPI:   For approximately 2-1/2 to 3 weeks, he has had persistent in Disp: 100 strip Rfl: 3   Blood Glucose Monitoring Suppl (TRUE METRIX GO GLUCOSE METER) W/DEVICE Does not apply Kit 1 Stick by Does not apply route daily.  E11.9 Disp: 1 kit Rfl: 0       Aspirin                     Comment:Other reaction(s): ASPIRIN  Sulfani observation. Recommend avoiding painful activities and applying heat. Call if no better. The patient indicates understanding of these issues and agrees to the plan. The patient is asked to return as needed.     Bishnu Quinones MD  2/4/2019  4:50 PM

## 2019-02-28 RX ORDER — LANCETS 30 GAUGE
EACH MISCELLANEOUS
Qty: 100 EACH | Refills: 3 | Status: SHIPPED | OUTPATIENT
Start: 2019-02-28 | End: 2021-12-19

## 2019-03-12 ENCOUNTER — APPOINTMENT (OUTPATIENT)
Dept: LAB | Facility: HOSPITAL | Age: 54
End: 2019-03-12
Attending: INTERNAL MEDICINE
Payer: COMMERCIAL

## 2019-03-12 DIAGNOSIS — E11.9 TYPE 2 DIABETES MELLITUS WITHOUT COMPLICATION, WITHOUT LONG-TERM CURRENT USE OF INSULIN (HCC): ICD-10-CM

## 2019-03-12 LAB
EST. AVERAGE GLUCOSE BLD GHB EST-MCNC: 197 MG/DL (ref 68–126)
HBA1C MFR BLD HPLC: 8.5 % (ref ?–5.7)

## 2019-03-12 PROCEDURE — 36415 COLL VENOUS BLD VENIPUNCTURE: CPT

## 2019-03-12 PROCEDURE — 83036 HEMOGLOBIN GLYCOSYLATED A1C: CPT

## 2019-03-14 ENCOUNTER — OFFICE VISIT (OUTPATIENT)
Dept: INTERNAL MEDICINE CLINIC | Facility: CLINIC | Age: 54
End: 2019-03-14
Payer: COMMERCIAL

## 2019-03-14 VITALS
TEMPERATURE: 99 F | SYSTOLIC BLOOD PRESSURE: 122 MMHG | HEIGHT: 73 IN | HEART RATE: 86 BPM | WEIGHT: 231 LBS | DIASTOLIC BLOOD PRESSURE: 76 MMHG | RESPIRATION RATE: 20 BRPM | BODY MASS INDEX: 30.62 KG/M2

## 2019-03-14 DIAGNOSIS — I10 ESSENTIAL HYPERTENSION: ICD-10-CM

## 2019-03-14 DIAGNOSIS — E11.9 TYPE 2 DIABETES MELLITUS WITHOUT COMPLICATION, WITHOUT LONG-TERM CURRENT USE OF INSULIN (HCC): Primary | ICD-10-CM

## 2019-03-14 PROCEDURE — 99212 OFFICE O/P EST SF 10 MIN: CPT | Performed by: INTERNAL MEDICINE

## 2019-03-14 PROCEDURE — 99214 OFFICE O/P EST MOD 30 MIN: CPT | Performed by: INTERNAL MEDICINE

## 2019-03-14 NOTE — PROGRESS NOTES
HPI:    Patient ID: Ramon Mace is a 47year old male. HPI  Patient is here to discuss issues with diabetes. In January his A1c was 7.8. Try to increase the Victoza. Most recent A1c on March 12 was 8.5. Diet is not changed.   Exercises not c Family History   Problem Relation Age of Onset   • Cancer Paternal Grandfather         Colon Ca   • Gastro-Intestinal Disorder Brother         celiac sprue   • Cancer Other         liver cancer - close relative   • Diabetes Neg    • Glaucoma Neg       So 360 tablet Rfl: 3   lisinopril 40 MG Oral Tab TAKE 1 TABLET(40 MG) BY MOUTH EVERY DAY Disp: 90 tablet Rfl: 3   Blood Glucose Monitoring Suppl (TRUE METRIX GO GLUCOSE METER) W/DEVICE Does not apply Kit 1 Stick by Does not apply route daily.  E11.9 Disp: 1 ki

## 2019-03-20 RX ORDER — LIRAGLUTIDE 6 MG/ML
INJECTION SUBCUTANEOUS
Qty: 9 ML | Refills: 0 | OUTPATIENT
Start: 2019-03-20

## 2019-03-26 ENCOUNTER — TELEPHONE (OUTPATIENT)
Dept: INTERNAL MEDICINE CLINIC | Facility: CLINIC | Age: 54
End: 2019-03-26

## 2019-03-26 DIAGNOSIS — E11.9 TYPE 2 DIABETES MELLITUS WITHOUT COMPLICATION, WITHOUT LONG-TERM CURRENT USE OF INSULIN (HCC): Primary | ICD-10-CM

## 2019-03-26 NOTE — TELEPHONE ENCOUNTER
Pt requesting orders for follow up A1C lab be added to the chart. Please call back with confirmation once added.

## 2019-03-26 NOTE — TELEPHONE ENCOUNTER
JSK please advise pt had A1C on 3/12/2019 and it was 8.5   Pt has an appointment with you in June   Future Appointments   Date Time Provider Ana Dunn   6/4/2019  4:00 PM Douglas Meléndez MD [facility]

## 2019-04-01 ENCOUNTER — NURSE TRIAGE (OUTPATIENT)
Dept: INTERNAL MEDICINE CLINIC | Facility: CLINIC | Age: 54
End: 2019-04-01

## 2019-04-01 ENCOUNTER — PATIENT MESSAGE (OUTPATIENT)
Dept: INTERNAL MEDICINE CLINIC | Facility: CLINIC | Age: 54
End: 2019-04-01

## 2019-04-01 NOTE — TELEPHONE ENCOUNTER
His recent A1c of 8.5 is the highest it has ever been according to our records.   The risks of not going on medications include worsening blood sugars and complications of diabetes including microvascular disease, damage to the kidneys, damage to the eyes,

## 2019-04-01 NOTE — TELEPHONE ENCOUNTER
Pt was called and informed of [address] message below. Pt stated that he will like to hold off for now. He will extercise and do the ketogenic diet. He will monitor his sugars if still to elevated he will call us back.

## 2019-04-01 NOTE — TELEPHONE ENCOUNTER
From: Kishor Vazquez  To: Alie Padilla MD  Sent: 4/1/2019 7:17 AM CDT  Subject: Prescription Question    DrMariah been on Jardiance now for about 2 weeks, and I'm struggling with two very distressing side effects: frequent urination and progre

## 2019-04-01 NOTE — TELEPHONE ENCOUNTER
Action Requested: Summary for Provider     []  Critical Lab, Recommendations Needed  [] Need Additional Advice  []   FYI    []   Need Orders  [] Need Medications Sent to Pharmacy  []  Other     SUMMARY: Sajan Joe for Dr Jing Cleary:  Please review patient mychart.

## 2019-04-01 NOTE — TELEPHONE ENCOUNTER
Advised patient of Charlie Quinones's note below. Patient verbalized understanding. Patient asking what are the risks of not going on any more medications and instead going on a ketogenic diet or trying to loose weight?  Patient concerned about side effects

## 2019-04-01 NOTE — TELEPHONE ENCOUNTER
Regarding: Prescription Question  Contact: [phone]  ----- Message from Mychart Generic sent at 4/1/2019  7:17 AM CDT -----    Alyssa Richardson been on Jardiance now for about 2 weeks, and I'm struggling with two very distressing side effects: frequent urin

## 2019-04-01 NOTE — TELEPHONE ENCOUNTER
He can stop Jardiance, it is likely the cause of increased urination. Unclear whether it is related to the back pain or not. We can consider starting [place] or Donney Fan for diabetes.

## 2019-04-08 ENCOUNTER — OFFICE VISIT (OUTPATIENT)
Dept: INTERNAL MEDICINE CLINIC | Facility: CLINIC | Age: 54
End: 2019-04-08
Payer: COMMERCIAL

## 2019-04-08 ENCOUNTER — TELEPHONE (OUTPATIENT)
Dept: OTHER | Age: 54
End: 2019-04-08

## 2019-04-08 VITALS
SYSTOLIC BLOOD PRESSURE: 127 MMHG | HEIGHT: 73 IN | HEART RATE: 80 BPM | WEIGHT: 229.63 LBS | DIASTOLIC BLOOD PRESSURE: 82 MMHG | BODY MASS INDEX: 30.43 KG/M2

## 2019-04-08 DIAGNOSIS — E11.9 TYPE 2 DIABETES MELLITUS WITHOUT COMPLICATION, WITHOUT LONG-TERM CURRENT USE OF INSULIN (HCC): ICD-10-CM

## 2019-04-08 DIAGNOSIS — M62.830 MUSCLE SPASM OF BACK: Primary | ICD-10-CM

## 2019-04-08 PROCEDURE — 99212 OFFICE O/P EST SF 10 MIN: CPT | Performed by: PHYSICIAN ASSISTANT

## 2019-04-08 PROCEDURE — 99213 OFFICE O/P EST LOW 20 MIN: CPT | Performed by: PHYSICIAN ASSISTANT

## 2019-04-08 RX ORDER — TIZANIDINE 4 MG/1
4 TABLET ORAL EVERY 8 HOURS PRN
Qty: 10 TABLET | Refills: 0 | Status: SHIPPED | OUTPATIENT
Start: 2019-04-08 | End: 2019-09-09 | Stop reason: ALTCHOICE

## 2019-04-08 NOTE — PROGRESS NOTES
HPI:    Patient ID: Kishor Vazquez is a 47year old male. HPI   Patient presents today c/o back pain for the last few weeks. He started having pain 3 days after starting Jardiance. He also experienced increased urinary frequency.  He stopped the me TABLET BY MOUTH EVERY EVENING Disp: 90 tablet Rfl: 3   MetFORMIN HCl 500 MG Oral Tab Take 1 tablet (500 mg total) by mouth 4 (four) times daily.  Disp: 360 tablet Rfl: 3   lisinopril 40 MG Oral Tab TAKE 1 TABLET(40 MG) BY MOUTH EVERY DAY Disp: 90 tablet Rfl past.  Recommend trial of Tarceva or Januvia but patient declines. He is motivated to work on diet and is currently on a low-carb diet restricting to 40 g of carbs per day.   Advised to continue monitoring blood sugars at home and contact the office with a

## 2019-04-08 NOTE — TELEPHONE ENCOUNTER
Verified pt name and . Pt states he stopped taking the Jardiance at the end of March, states he was having back pain 2-3 days after starting the medication. Pt rates the pain around 6-7/10.  Pt has been applying heat with some relief, also taking Tylenol

## 2019-04-08 NOTE — TELEPHONE ENCOUNTER
Regarding: Non-Urgent Medical Question  Contact: [phone]  ----- Message from [employer] sent at 4/8/2019  8:13 AM CDT -----    ,    My lower back pain/spasms haven't gotten much better since I stopped taking Jardiance on Apr 8.      These sp

## 2019-05-30 ENCOUNTER — APPOINTMENT (OUTPATIENT)
Dept: LAB | Facility: HOSPITAL | Age: 54
End: 2019-05-30
Attending: INTERNAL MEDICINE
Payer: COMMERCIAL

## 2019-05-30 DIAGNOSIS — E11.9 TYPE 2 DIABETES MELLITUS WITHOUT COMPLICATION, WITHOUT LONG-TERM CURRENT USE OF INSULIN (HCC): ICD-10-CM

## 2019-05-30 PROCEDURE — 36415 COLL VENOUS BLD VENIPUNCTURE: CPT

## 2019-05-30 PROCEDURE — 83036 HEMOGLOBIN GLYCOSYLATED A1C: CPT

## 2019-06-04 ENCOUNTER — OFFICE VISIT (OUTPATIENT)
Dept: INTERNAL MEDICINE CLINIC | Facility: CLINIC | Age: 54
End: 2019-06-04
Payer: COMMERCIAL

## 2019-06-04 VITALS
TEMPERATURE: 98 F | HEART RATE: 72 BPM | HEIGHT: 73 IN | DIASTOLIC BLOOD PRESSURE: 86 MMHG | BODY MASS INDEX: 29.42 KG/M2 | SYSTOLIC BLOOD PRESSURE: 128 MMHG | RESPIRATION RATE: 20 BRPM | WEIGHT: 222 LBS

## 2019-06-04 DIAGNOSIS — E11.9 TYPE 2 DIABETES MELLITUS WITHOUT COMPLICATION, WITHOUT LONG-TERM CURRENT USE OF INSULIN (HCC): Primary | ICD-10-CM

## 2019-06-04 DIAGNOSIS — I10 ESSENTIAL HYPERTENSION: ICD-10-CM

## 2019-06-04 DIAGNOSIS — E78.5 HYPERLIPIDEMIA LDL GOAL <100: ICD-10-CM

## 2019-06-04 PROCEDURE — 99214 OFFICE O/P EST MOD 30 MIN: CPT | Performed by: INTERNAL MEDICINE

## 2019-06-04 PROCEDURE — 99212 OFFICE O/P EST SF 10 MIN: CPT | Performed by: INTERNAL MEDICINE

## 2019-06-04 NOTE — PROGRESS NOTES
HPI:    Patient ID: Hermes Grimaldo is a 47year old male. HPI  Patient is here for follow-up on chronic medical issues as listed below. Last seen here on March 14. At that time changes made in medications.   We stopped the Victoza due to side eff Laterality Date   • COLONOSCOPY N/A 11/15/2016    Performed by Daniel Fitch MD at [address] [place] [address] ENDOSCOPY      Family History   Problem Relation Age of Onset   • Cancer Paternal Grandfather         Colon Ca   • Gastro-Intestinal Disorder Brother         celiac s EVERY EVENING Disp: 90 tablet Rfl: 3   MetFORMIN HCl 500 MG Oral Tab Take 1 tablet (500 mg total) by mouth 4 (four) times daily.  Disp: 360 tablet Rfl: 3   lisinopril 40 MG Oral Tab TAKE 1 TABLET(40 MG) BY MOUTH EVERY DAY Disp: 90 tablet Rfl: 3   Blood Gluc medications. 3. Hyperlipidemia LDL goal <100  Patient is switched to a higher fat and higher protein diet. He is also stop the statin medication. We will check lipid profile.   Discussed the recommendations for use of statin medication in diabetics reg

## 2019-06-18 RX ORDER — AMLODIPINE BESYLATE 5 MG/1
TABLET ORAL
Qty: 90 TABLET | Refills: 1 | Status: SHIPPED | OUTPATIENT
Start: 2019-06-18 | End: 2019-12-28

## 2019-06-18 RX ORDER — HYDROCHLOROTHIAZIDE 12.5 MG/1
TABLET ORAL
Qty: 90 TABLET | Refills: 1 | Status: SHIPPED | OUTPATIENT
Start: 2019-06-18 | End: 2019-12-28

## 2019-06-18 RX ORDER — LISINOPRIL 40 MG/1
TABLET ORAL
Qty: 90 TABLET | Refills: 1 | Status: SHIPPED | OUTPATIENT
Start: 2019-06-18 | End: 2019-12-28

## 2019-06-19 NOTE — TELEPHONE ENCOUNTER
Refill passed per [facility] protocol.   Hypertensive Medications  Protocol Criteria:  · Appointment scheduled in the past 6 months or in the next 3 months  · BMP or CMP in the past 12 months  · Creatinine result < 2  Recent Outpatient Visits

## 2019-06-19 NOTE — TELEPHONE ENCOUNTER
Please review; protocol failed.     Requested Prescriptions     Pending Prescriptions Disp Refills   • METFORMIN  MG Oral Tab [Pharmacy Med Name: METFORMIN 500MG TABLETS] 360 tablet 0     Sig: TAKE 1 TABLET(500 MG) BY MOUTH FOUR TIMES DAILY     [place]

## 2019-06-22 NOTE — TELEPHONE ENCOUNTER
Please review; protocol failed.    Requested Prescriptions     Pending Prescriptions Disp Refills   • SIMVASTATIN 20 MG Oral Tab [Pharmacy Med Name: SIMVASTATIN 20MG TABLETS] 90 tablet 0     Sig: TAKE 1 TABLET BY MOUTH EVERY EVENING         Recent Visits  D

## 2019-06-23 RX ORDER — SIMVASTATIN 20 MG
TABLET ORAL
Qty: 90 TABLET | Refills: 1 | Status: SHIPPED | OUTPATIENT
Start: 2019-06-23 | End: 2019-09-09 | Stop reason: ALTCHOICE

## 2019-08-26 ENCOUNTER — APPOINTMENT (OUTPATIENT)
Dept: LAB | Facility: HOSPITAL | Age: 54
End: 2019-08-26
Attending: INTERNAL MEDICINE
Payer: COMMERCIAL

## 2019-08-26 ENCOUNTER — TELEPHONE (OUTPATIENT)
Dept: INTERNAL MEDICINE CLINIC | Facility: CLINIC | Age: 54
End: 2019-08-26

## 2019-08-26 DIAGNOSIS — E11.9 TYPE 2 DIABETES MELLITUS WITHOUT COMPLICATION, WITHOUT LONG-TERM CURRENT USE OF INSULIN (HCC): Primary | ICD-10-CM

## 2019-08-26 DIAGNOSIS — E78.5 HYPERLIPIDEMIA LDL GOAL <100: ICD-10-CM

## 2019-08-26 DIAGNOSIS — E11.9 TYPE 2 DIABETES MELLITUS WITHOUT COMPLICATION, WITHOUT LONG-TERM CURRENT USE OF INSULIN (HCC): ICD-10-CM

## 2019-08-26 LAB
CHOLEST SMN-MCNC: 217 MG/DL (ref ?–200)
EST. AVERAGE GLUCOSE BLD GHB EST-MCNC: 148 MG/DL (ref 68–126)
GLUCOSE BLD-MCNC: 149 MG/DL (ref 70–99)
HBA1C MFR BLD HPLC: 6.8 % (ref ?–5.7)
HDLC SERPL-MCNC: 56 MG/DL (ref 40–59)
LDLC SERPL CALC-MCNC: 143 MG/DL (ref ?–100)
NONHDLC SERPL-MCNC: 161 MG/DL (ref ?–130)
PATIENT FASTING: YES
PATIENT FASTING: YES
TRIGL SERPL-MCNC: 88 MG/DL (ref 30–149)
VLDLC SERPL CALC-MCNC: 18 MG/DL (ref 0–30)

## 2019-08-26 PROCEDURE — 36415 COLL VENOUS BLD VENIPUNCTURE: CPT

## 2019-08-26 PROCEDURE — 82947 ASSAY GLUCOSE BLOOD QUANT: CPT

## 2019-08-26 PROCEDURE — 83036 HEMOGLOBIN GLYCOSYLATED A1C: CPT

## 2019-08-26 PROCEDURE — 80061 LIPID PANEL: CPT

## 2019-08-26 NOTE — TELEPHONE ENCOUNTER
Verbal orders received from Dr Ana Rios for [facility] and Glucose. Orders generated in EMR as instructed.

## 2019-08-26 NOTE — TELEPHONE ENCOUNTER
Pt currently at [facility] [employer] lab stating that he should have more labs to do, only Lipid panel was ordered. Please advise.

## 2019-09-09 ENCOUNTER — OFFICE VISIT (OUTPATIENT)
Dept: INTERNAL MEDICINE CLINIC | Facility: CLINIC | Age: 54
End: 2019-09-09
Payer: COMMERCIAL

## 2019-09-09 VITALS
DIASTOLIC BLOOD PRESSURE: 68 MMHG | HEART RATE: 73 BPM | TEMPERATURE: 99 F | WEIGHT: 218.13 LBS | SYSTOLIC BLOOD PRESSURE: 110 MMHG | HEIGHT: 73 IN | BODY MASS INDEX: 28.91 KG/M2

## 2019-09-09 DIAGNOSIS — E78.5 HYPERLIPIDEMIA LDL GOAL <100: ICD-10-CM

## 2019-09-09 DIAGNOSIS — I10 ESSENTIAL HYPERTENSION: ICD-10-CM

## 2019-09-09 DIAGNOSIS — E11.9 TYPE 2 DIABETES MELLITUS WITHOUT COMPLICATION, WITHOUT LONG-TERM CURRENT USE OF INSULIN (HCC): Primary | ICD-10-CM

## 2019-09-09 PROCEDURE — 99213 OFFICE O/P EST LOW 20 MIN: CPT | Performed by: INTERNAL MEDICINE

## 2019-09-09 NOTE — PROGRESS NOTES
HPI:    Patient ID: Arabella Brambila is a 49year old male. HPI  Patient is here for follow-up on chronic medical issues as listed below. Last seen here in June. No changes made at that time.   He has steadfastly refused going on a statin medicatio unspecified type diabetes mellitus without mention of complication, not stated as uncontrolled    • Unspecified essential hypertension       Past Surgical History:   Procedure Laterality Date   • COLONOSCOPY N/A 11/15/2016    Performed by Margarita Celeste, daily.  E11.9 Disp: 100 strip Rfl: 3   Lancets Does not apply Misc Test blood glucose daily.   E11.9 Disp: 100 each Rfl: 3   UNIFINE PENTIPS 31G X 6 MM Does not apply Misc UTD Disp: 100 each Rfl: 1   Blood Glucose Monitoring Suppl (TRUE METRIX GO GLUCOSE ME well controlled. He has made some changes in his diet. They are not exactly the typical healthy diet changes. However it does seem to be working well for his blood sugars and A1c. We will not make any changes. Follow-up in 3 months with blood tests.

## 2019-10-08 ENCOUNTER — PATIENT MESSAGE (OUTPATIENT)
Dept: INTERNAL MEDICINE CLINIC | Facility: CLINIC | Age: 54
End: 2019-10-08

## 2019-10-08 NOTE — TELEPHONE ENCOUNTER
From: Mirella Luevano  To: Shyam Coyle MD  Sent: 10/8/2019 7:27 AM CDT  Subject: Non-Urgent Medical Question    Hi Doctor,    I hear the flu is really bad this season and that I should get the \"quad\" shot.  Can you order this innoculation for me

## 2019-10-08 NOTE — PROGRESS NOTES
Please see My Chart message and advise. Thank you. See 10/1/19 pt message in regards to form. Pt informed can call office to schedule nurse visit for influenza vaccine.

## 2019-10-08 NOTE — TELEPHONE ENCOUNTER
Completed physical results form / Saray Weston faxed to [phone]. Phone room please call patient and assist him in scheduling nurse visit for Influenza vaccine.

## 2019-10-09 ENCOUNTER — IMMUNIZATION (OUTPATIENT)
Dept: INTERNAL MEDICINE CLINIC | Facility: CLINIC | Age: 54
End: 2019-10-09
Payer: COMMERCIAL

## 2019-10-09 ENCOUNTER — MED REC SCAN ONLY (OUTPATIENT)
Dept: INTERNAL MEDICINE CLINIC | Facility: CLINIC | Age: 54
End: 2019-10-09

## 2019-10-09 DIAGNOSIS — Z23 NEED FOR VACCINATION: ICD-10-CM

## 2019-10-09 PROCEDURE — 90471 IMMUNIZATION ADMIN: CPT | Performed by: NURSE PRACTITIONER

## 2019-10-09 PROCEDURE — 90686 IIV4 VACC NO PRSV 0.5 ML IM: CPT | Performed by: NURSE PRACTITIONER

## 2019-10-16 ENCOUNTER — PATIENT MESSAGE (OUTPATIENT)
Dept: INTERNAL MEDICINE CLINIC | Facility: CLINIC | Age: 54
End: 2019-10-16

## 2019-10-16 NOTE — TELEPHONE ENCOUNTER
From: Estela Dale  To: Cassidy Gómez MD  Sent: 10/16/2019 10:22 AM CDT  Subject: Prescription Question    Dr Terri Wynne done really well losing weight and getting my A1C down (as you know).  But my waking glucose levels are persisting aroun

## 2019-12-04 ENCOUNTER — APPOINTMENT (OUTPATIENT)
Dept: LAB | Facility: HOSPITAL | Age: 54
End: 2019-12-04
Attending: INTERNAL MEDICINE
Payer: COMMERCIAL

## 2019-12-04 DIAGNOSIS — E11.9 TYPE 2 DIABETES MELLITUS WITHOUT COMPLICATION, WITHOUT LONG-TERM CURRENT USE OF INSULIN (HCC): ICD-10-CM

## 2019-12-04 PROCEDURE — 80061 LIPID PANEL: CPT

## 2019-12-04 PROCEDURE — 36415 COLL VENOUS BLD VENIPUNCTURE: CPT

## 2019-12-04 PROCEDURE — 82947 ASSAY GLUCOSE BLOOD QUANT: CPT

## 2019-12-04 PROCEDURE — 83036 HEMOGLOBIN GLYCOSYLATED A1C: CPT

## 2019-12-28 RX ORDER — AMLODIPINE BESYLATE 5 MG/1
TABLET ORAL
Qty: 90 TABLET | Refills: 1 | Status: SHIPPED | OUTPATIENT
Start: 2019-12-28 | End: 2020-06-18

## 2019-12-28 RX ORDER — HYDROCHLOROTHIAZIDE 12.5 MG/1
TABLET ORAL
Qty: 90 TABLET | Refills: 1 | Status: SHIPPED | OUTPATIENT
Start: 2019-12-28 | End: 2020-06-18

## 2019-12-28 RX ORDER — LISINOPRIL 40 MG/1
TABLET ORAL
Qty: 90 TABLET | Refills: 1 | Status: SHIPPED | OUTPATIENT
Start: 2019-12-28 | End: 2020-06-18

## 2020-01-09 ENCOUNTER — TELEPHONE (OUTPATIENT)
Dept: INTERNAL MEDICINE CLINIC | Facility: CLINIC | Age: 55
End: 2020-01-09

## 2020-01-09 DIAGNOSIS — E11.9 TYPE 2 DIABETES MELLITUS WITHOUT COMPLICATION, WITHOUT LONG-TERM CURRENT USE OF INSULIN (HCC): Primary | ICD-10-CM

## 2020-01-09 NOTE — TELEPHONE ENCOUNTER
Patient requesting an order for A1C and cholestrol and needed labs via [facility] before his appointment on 3/9, please call when order has been submitted.

## 2020-01-10 NOTE — TELEPHONE ENCOUNTER
Patient is due for a complete set of blood tests as well as a full physical.  Is the patient coming in for complete physical or just follow-up? Either type of visit would be the same length of time.   The diagnosis may change the way the insurance pays for

## 2020-01-10 NOTE — TELEPHONE ENCOUNTER
Dr. Valerie Gonzales, patient is schedule for a follow up visit on 03/09/2020. Patient is requesting blood test order for appointment. Please advise.      Please reply to pool: EM TRIAGE SUPPORT

## 2020-01-20 NOTE — TELEPHONE ENCOUNTER
Left message for patient to call office back, please transfer call to ext. [phone] when he calls office back. Message left also to inform patient od fasting labs ordered.

## 2020-03-05 ENCOUNTER — APPOINTMENT (OUTPATIENT)
Dept: LAB | Facility: HOSPITAL | Age: 55
End: 2020-03-05
Attending: INTERNAL MEDICINE
Payer: COMMERCIAL

## 2020-03-05 DIAGNOSIS — E11.9 TYPE 2 DIABETES MELLITUS WITHOUT COMPLICATION, WITHOUT LONG-TERM CURRENT USE OF INSULIN (HCC): ICD-10-CM

## 2020-03-05 LAB
CHOLEST SMN-MCNC: 214 MG/DL (ref ?–200)
EST. AVERAGE GLUCOSE BLD GHB EST-MCNC: 166 MG/DL (ref 68–126)
GLUCOSE BLD-MCNC: 221 MG/DL (ref 70–99)
HBA1C MFR BLD HPLC: 7.4 % (ref ?–5.7)
HDLC SERPL-MCNC: 51 MG/DL (ref 40–59)
LDLC SERPL CALC-MCNC: 137 MG/DL (ref ?–100)
NONHDLC SERPL-MCNC: 163 MG/DL (ref ?–130)
PATIENT FASTING Y/N/NP: YES
PATIENT FASTING Y/N/NP: YES
TRIGL SERPL-MCNC: 128 MG/DL (ref 30–149)
VLDLC SERPL CALC-MCNC: 26 MG/DL (ref 0–30)

## 2020-03-05 PROCEDURE — 82947 ASSAY GLUCOSE BLOOD QUANT: CPT

## 2020-03-05 PROCEDURE — 80061 LIPID PANEL: CPT

## 2020-03-05 PROCEDURE — 83036 HEMOGLOBIN GLYCOSYLATED A1C: CPT

## 2020-03-05 PROCEDURE — 36415 COLL VENOUS BLD VENIPUNCTURE: CPT

## 2020-03-22 ENCOUNTER — HOSPITAL ENCOUNTER (OUTPATIENT)
Age: 55
Discharge: HOME | End: 2020-03-22
Attending: EMERGENCY MEDICINE
Payer: COMMERCIAL

## 2020-03-22 ENCOUNTER — APPOINTMENT (OUTPATIENT)
Dept: GENERAL RADIOLOGY | Age: 55
End: 2020-03-22
Attending: EMERGENCY MEDICINE
Payer: COMMERCIAL

## 2020-03-22 VITALS
WEIGHT: 225 LBS | DIASTOLIC BLOOD PRESSURE: 80 MMHG | SYSTOLIC BLOOD PRESSURE: 143 MMHG | OXYGEN SATURATION: 99 % | HEIGHT: 72 IN | HEART RATE: 75 BPM | RESPIRATION RATE: 20 BRPM | BODY MASS INDEX: 30.48 KG/M2 | TEMPERATURE: 98 F

## 2020-03-22 DIAGNOSIS — T14.8XXA AVULSION FRACTURE: ICD-10-CM

## 2020-03-22 DIAGNOSIS — S49.92XA SHOULDER INJURY, LEFT, INITIAL ENCOUNTER: Primary | ICD-10-CM

## 2020-03-22 PROCEDURE — 99214 OFFICE O/P EST MOD 30 MIN: CPT

## 2020-03-22 PROCEDURE — 73030 X-RAY EXAM OF SHOULDER: CPT | Performed by: EMERGENCY MEDICINE

## 2020-03-22 NOTE — ED PROVIDER NOTES
Patient Seen in: [address]      History   Patient presents with:  Upper Extremity Injury    Stated Complaint: left shoulder problem    HPI    Patient is a 61-year-old male who presents to immediate care complaining of l Negative. Respiratory: Negative for cough. Cardiovascular: Negative for chest pain. Gastrointestinal: Negative. Genitourinary: Negative. Musculoskeletal: Positive for arthralgias and myalgias.  Negative for back pain, joint swelling, neck pain is warm. Capillary Refill: Capillary refill takes less than 2 seconds. Neurological:      General: No focal deficit present. Mental Status: He is alert and oriented to person, place, and time.                ED Course   Labs Reviewed - No data t
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
## 2020-03-23 PROBLEM — S42.292B: Status: ACTIVE | Noted: 2020-03-23

## 2020-03-31 ENCOUNTER — TELEPHONE (OUTPATIENT)
Dept: OPTOMETRY | Facility: CLINIC | Age: 55
End: 2020-03-31

## 2020-03-31 NOTE — TELEPHONE ENCOUNTER
I spoke with patient and he is OK with cancelling his appointment on Friday. He will go on My Chart to RS in the future.

## 2020-04-17 ENCOUNTER — HOSPITAL ENCOUNTER (OUTPATIENT)
Dept: GENERAL RADIOLOGY | Facility: HOSPITAL | Age: 55
Discharge: HOME | End: 2020-04-17
Attending: ORTHOPAEDIC SURGERY
Payer: COMMERCIAL

## 2020-04-17 ENCOUNTER — HOSPITAL ENCOUNTER (OUTPATIENT)
Dept: MRI IMAGING | Facility: HOSPITAL | Age: 55
Discharge: HOME | End: 2020-04-17
Attending: ORTHOPAEDIC SURGERY
Payer: COMMERCIAL

## 2020-04-17 DIAGNOSIS — S42.292D: ICD-10-CM

## 2020-04-17 PROCEDURE — 73222 MRI JOINT UPR EXTREM W/DYE: CPT | Performed by: ORTHOPAEDIC SURGERY

## 2020-04-17 PROCEDURE — A9575 INJ GADOTERATE MEGLUMI 0.1ML: HCPCS | Performed by: ORTHOPAEDIC SURGERY

## 2020-04-17 PROCEDURE — 23350 INJECTION FOR SHOULDER X-RAY: CPT | Performed by: ORTHOPAEDIC SURGERY

## 2020-04-17 PROCEDURE — 73040 CONTRAST X-RAY OF SHOULDER: CPT | Performed by: ORTHOPAEDIC SURGERY

## 2020-04-17 RX ORDER — LIDOCAINE HYDROCHLORIDE 10 MG/ML
INJECTION, SOLUTION EPIDURAL; INFILTRATION; INTRACAUDAL; PERINEURAL
Status: COMPLETED
Start: 2020-04-17 | End: 2020-04-17

## 2020-04-17 RX ADMIN — LIDOCAINE HYDROCHLORIDE: 10 INJECTION, SOLUTION EPIDURAL; INFILTRATION; INTRACAUDAL; PERINEURAL at 09:36:00

## 2020-04-17 NOTE — PROGRESS NOTES
MRI results reviewed, this does show a tear of a portion of the rotator cuff, as well as some thickening of the biceps tendon. The labrum does show some tears, but most of these appear to be old tears.   We can discuss further treatment options at the next

## 2020-04-25 ENCOUNTER — APPOINTMENT (OUTPATIENT)
Dept: LAB | Facility: HOSPITAL | Age: 55
End: 2020-04-25
Attending: ORTHOPAEDIC SURGERY
Payer: COMMERCIAL

## 2020-04-25 DIAGNOSIS — M75.22 BICIPITAL TENDINITIS OF LEFT SHOULDER: ICD-10-CM

## 2020-04-25 PROCEDURE — 80053 COMPREHEN METABOLIC PANEL: CPT

## 2020-04-25 PROCEDURE — 36415 COLL VENOUS BLD VENIPUNCTURE: CPT

## 2020-05-15 ENCOUNTER — OFFICE VISIT (OUTPATIENT)
Dept: OPTOMETRY | Facility: CLINIC | Age: 55
End: 2020-05-15
Payer: COMMERCIAL

## 2020-05-15 DIAGNOSIS — E11.9 TYPE 2 DIABETES MELLITUS WITHOUT COMPLICATION, WITHOUT LONG-TERM CURRENT USE OF INSULIN (HCC): Primary | ICD-10-CM

## 2020-05-15 PROCEDURE — 92014 COMPRE OPH EXAM EST PT 1/>: CPT | Performed by: OPTOMETRIST

## 2020-05-15 NOTE — PROGRESS NOTES
Neal Guzman is a 49year old male. HPI:     HPI     Diabetic Eye Exam     Diabetes characteristics include Type 2, controlled with diet and taking oral medications. Duration of 10 years. Number of years diabetic 10.   Number of years on p Outpatient Medications   Medication Sig Dispense Refill   • simvastatin 20 MG Oral Tab Take 20 mg by mouth nightly.      • AMLODIPINE BESYLATE 5 MG Oral Tab TAKE 1 TABLET(5 MG) BY MOUTH DAILY 90 tablet 1   • LISINOPRIL 40 MG Oral Tab TAKE 1 TABLET(40 MG) BY Exam       Right Left    External Normal Normal          Slit Lamp Exam       Right Left    Lids/Lashes Normal Normal    Conjunctiva/Sclera Normal Normal    Cornea Clear Clear    Anterior Chamber Deep and quiet Deep and quiet    Iris Normal Normal    Lens

## 2020-05-15 NOTE — PROGRESS NOTES
Piedad Torres is a 49year old male. HPI:     HPI     Diabetic Eye Exam     Diabetes characteristics include Type 2, controlled with diet and taking oral medications. Duration of 10 years. Number of years diabetic 10.   Number of years on p Outpatient Medications   Medication Sig Dispense Refill   • simvastatin 20 MG Oral Tab Take 20 mg by mouth nightly.      • AMLODIPINE BESYLATE 5 MG Oral Tab TAKE 1 TABLET(5 MG) BY MOUTH DAILY 90 tablet 1   • LISINOPRIL 40 MG Oral Tab TAKE 1 TABLET(40 MG) BY Exam       Right Left    External Normal Normal          Slit Lamp Exam       Right Left    Lids/Lashes Normal Normal    Conjunctiva/Sclera Normal Normal    Cornea Clear Clear    Anterior Chamber Deep and quiet Deep and quiet    Iris Normal Normal    Lens

## 2020-06-18 RX ORDER — HYDROCHLOROTHIAZIDE 12.5 MG/1
TABLET ORAL
Qty: 90 TABLET | Refills: 1 | Status: SHIPPED | OUTPATIENT
Start: 2020-06-18 | End: 2020-09-23

## 2020-06-18 RX ORDER — LISINOPRIL 40 MG/1
TABLET ORAL
Qty: 90 TABLET | Refills: 1 | Status: SHIPPED | OUTPATIENT
Start: 2020-06-18 | End: 2020-09-23

## 2020-06-18 RX ORDER — AMLODIPINE BESYLATE 5 MG/1
TABLET ORAL
Qty: 90 TABLET | Refills: 1 | Status: SHIPPED | OUTPATIENT
Start: 2020-06-18 | End: 2020-09-23

## 2020-07-30 ENCOUNTER — LAB ENCOUNTER (OUTPATIENT)
Dept: LAB | Facility: HOSPITAL | Age: 55
End: 2020-07-30
Attending: NURSE PRACTITIONER
Payer: COMMERCIAL

## 2020-07-30 ENCOUNTER — TELEPHONE (OUTPATIENT)
Dept: INTERNAL MEDICINE CLINIC | Facility: CLINIC | Age: 55
End: 2020-07-30

## 2020-07-30 DIAGNOSIS — Z00.00 ANNUAL PHYSICAL EXAM: ICD-10-CM

## 2020-07-30 DIAGNOSIS — Z00.00 ANNUAL PHYSICAL EXAM: Primary | ICD-10-CM

## 2020-07-30 LAB
ALBUMIN SERPL-MCNC: 4.2 G/DL (ref 3.4–5)
ALBUMIN/GLOB SERPL: 1.3 {RATIO} (ref 1–2)
ALP LIVER SERPL-CCNC: 61 U/L (ref 45–117)
ALT SERPL-CCNC: 39 U/L (ref 16–61)
ANION GAP SERPL CALC-SCNC: 6 MMOL/L (ref 0–18)
AST SERPL-CCNC: 16 U/L (ref 15–37)
BASOPHILS # BLD AUTO: 0.06 X10(3) UL (ref 0–0.2)
BASOPHILS NFR BLD AUTO: 1 %
BILIRUB SERPL-MCNC: 0.6 MG/DL (ref 0.1–2)
BUN BLD-MCNC: 13 MG/DL (ref 7–18)
BUN/CREAT SERPL: 16.9 (ref 10–20)
CALCIUM BLD-MCNC: 9.2 MG/DL (ref 8.5–10.1)
CHLORIDE SERPL-SCNC: 106 MMOL/L (ref 98–112)
CO2 SERPL-SCNC: 27 MMOL/L (ref 21–32)
COMPLEXED PSA SERPL-MCNC: 3.4 NG/ML (ref ?–4)
CREAT BLD-MCNC: 0.77 MG/DL (ref 0.7–1.3)
CREAT UR-SCNC: 68 MG/DL
DEPRECATED RDW RBC AUTO: 42.8 FL (ref 35.1–46.3)
EOSINOPHIL # BLD AUTO: 0.25 X10(3) UL (ref 0–0.7)
EOSINOPHIL NFR BLD AUTO: 4.1 %
ERYTHROCYTE [DISTWIDTH] IN BLOOD BY AUTOMATED COUNT: 13.1 % (ref 11–15)
EST. AVERAGE GLUCOSE BLD GHB EST-MCNC: 180 MG/DL (ref 68–126)
GLOBULIN PLAS-MCNC: 3.3 G/DL (ref 2.8–4.4)
GLUCOSE BLD-MCNC: 199 MG/DL (ref 70–99)
HBA1C MFR BLD HPLC: 7.9 % (ref ?–5.7)
HCT VFR BLD AUTO: 40.2 % (ref 39–53)
HGB BLD-MCNC: 14.1 G/DL (ref 13–17.5)
IMM GRANULOCYTES # BLD AUTO: 0.01 X10(3) UL (ref 0–1)
IMM GRANULOCYTES NFR BLD: 0.2 %
LYMPHOCYTES # BLD AUTO: 1.91 X10(3) UL (ref 1–4)
LYMPHOCYTES NFR BLD AUTO: 31.6 %
M PROTEIN MFR SERPL ELPH: 7.5 G/DL (ref 6.4–8.2)
MCH RBC QN AUTO: 31.5 PG (ref 26–34)
MCHC RBC AUTO-ENTMCNC: 35.1 G/DL (ref 31–37)
MCV RBC AUTO: 89.7 FL (ref 80–100)
MICROALBUMIN UR-MCNC: 1.05 MG/DL
MICROALBUMIN/CREAT 24H UR-RTO: 15.4 UG/MG (ref ?–30)
MONOCYTES # BLD AUTO: 0.39 X10(3) UL (ref 0.1–1)
MONOCYTES NFR BLD AUTO: 6.4 %
NEUTROPHILS # BLD AUTO: 3.43 X10 (3) UL (ref 1.5–7.7)
NEUTROPHILS # BLD AUTO: 3.43 X10(3) UL (ref 1.5–7.7)
NEUTROPHILS NFR BLD AUTO: 56.7 %
OSMOLALITY SERPL CALC.SUM OF ELEC: 294 MOSM/KG (ref 275–295)
PATIENT FASTING Y/N/NP: YES
PLATELET # BLD AUTO: 165 10(3)UL (ref 150–450)
POTASSIUM SERPL-SCNC: 3.5 MMOL/L (ref 3.5–5.1)
RBC # BLD AUTO: 4.48 X10(6)UL (ref 4.3–5.7)
SODIUM SERPL-SCNC: 139 MMOL/L (ref 136–145)
TSI SER-ACNC: 2.67 MIU/ML (ref 0.36–3.74)
WBC # BLD AUTO: 6.1 X10(3) UL (ref 4–11)

## 2020-07-30 PROCEDURE — 84403 ASSAY OF TOTAL TESTOSTERONE: CPT

## 2020-07-30 PROCEDURE — 82570 ASSAY OF URINE CREATININE: CPT

## 2020-07-30 PROCEDURE — 80053 COMPREHEN METABOLIC PANEL: CPT

## 2020-07-30 PROCEDURE — 85025 COMPLETE CBC W/AUTO DIFF WBC: CPT

## 2020-07-30 PROCEDURE — 83036 HEMOGLOBIN GLYCOSYLATED A1C: CPT

## 2020-07-30 PROCEDURE — 82043 UR ALBUMIN QUANTITATIVE: CPT

## 2020-07-30 PROCEDURE — 36415 COLL VENOUS BLD VENIPUNCTURE: CPT

## 2020-07-30 PROCEDURE — 84402 ASSAY OF FREE TESTOSTERONE: CPT

## 2020-07-30 PROCEDURE — 84443 ASSAY THYROID STIM HORMONE: CPT

## 2020-07-30 NOTE — TELEPHONE ENCOUNTER
Patient called in stating that he would like to get lab work done today (he's been fasting today). He states that it was supposed to be ordered, but the lab doesn't see it. Requesting normal lab work and the testosterone lab to check levels.      Please

## 2020-08-03 ENCOUNTER — OFFICE VISIT (OUTPATIENT)
Dept: INTERNAL MEDICINE CLINIC | Facility: CLINIC | Age: 55
End: 2020-08-03
Payer: COMMERCIAL

## 2020-08-03 VITALS
RESPIRATION RATE: 20 BRPM | WEIGHT: 225.88 LBS | DIASTOLIC BLOOD PRESSURE: 74 MMHG | BODY MASS INDEX: 30.6 KG/M2 | HEIGHT: 72 IN | HEART RATE: 86 BPM | SYSTOLIC BLOOD PRESSURE: 122 MMHG

## 2020-08-03 DIAGNOSIS — Z00.00 ROUTINE PHYSICAL EXAMINATION: Primary | ICD-10-CM

## 2020-08-03 DIAGNOSIS — IMO0002 DISORDER OF ROTATOR CUFF SYNDROME OF RIGHT SHOULDER AND ALLIED DISORDER: ICD-10-CM

## 2020-08-03 DIAGNOSIS — I10 ESSENTIAL HYPERTENSION: ICD-10-CM

## 2020-08-03 DIAGNOSIS — E78.5 HYPERLIPIDEMIA LDL GOAL <100: ICD-10-CM

## 2020-08-03 DIAGNOSIS — E11.9 TYPE 2 DIABETES MELLITUS WITHOUT COMPLICATION, WITHOUT LONG-TERM CURRENT USE OF INSULIN (HCC): ICD-10-CM

## 2020-08-03 PROCEDURE — 3008F BODY MASS INDEX DOCD: CPT | Performed by: INTERNAL MEDICINE

## 2020-08-03 PROCEDURE — 99396 PREV VISIT EST AGE 40-64: CPT | Performed by: INTERNAL MEDICINE

## 2020-08-03 PROCEDURE — 3078F DIAST BP <80 MM HG: CPT | Performed by: INTERNAL MEDICINE

## 2020-08-03 PROCEDURE — 3074F SYST BP LT 130 MM HG: CPT | Performed by: INTERNAL MEDICINE

## 2020-08-03 RX ORDER — HYDROCODONE BITARTRATE AND ACETAMINOPHEN 10; 325 MG/1; MG/1
1 TABLET ORAL EVERY 6 HOURS PRN
COMMUNITY
Start: 2020-04-28 | End: 2020-08-03 | Stop reason: ALTCHOICE

## 2020-08-03 RX ORDER — TRAMADOL HYDROCHLORIDE 50 MG/1
50 TABLET ORAL EVERY 6 HOURS PRN
COMMUNITY
Start: 2020-04-20 | End: 2020-08-03 | Stop reason: ALTCHOICE

## 2020-08-03 RX ORDER — DICLOFENAC SODIUM 75 MG/1
TABLET, DELAYED RELEASE ORAL 2 TIMES DAILY
COMMUNITY
Start: 2020-04-28 | End: 2020-08-03 | Stop reason: ALTCHOICE

## 2020-08-03 RX ORDER — HYDROXYZINE PAMOATE 25 MG/1
CAPSULE ORAL
COMMUNITY
Start: 2020-04-28 | End: 2020-08-03 | Stop reason: ALTCHOICE

## 2020-08-03 NOTE — PROGRESS NOTES
HPI:    Patient ID: Wang Ernandez is a 49year old male. HPI  Patient is here requesting a physical exam and follow-up on chronic medical issues as listed below. Last seen in the office in September.   Diabetes is doing well at that time wit screening 9/10/2013   • MGD (meibomian gland dysfunction) 2008    OU   • Obesity (BMI 30-39. 9)    • Other and unspecified hyperlipidemia    • Type II or unspecified type diabetes mellitus without mention of complication, not stated as uncontrolled    • Uns 1   • HYDROCHLOROTHIAZIDE 12.5 MG Oral Tab TAKE 1 TABLET(12.5 MG) BY MOUTH DAILY FOR HIGH BLOOD PRESSURE OR SWELLING 90 tablet 1   • AMLODIPINE BESYLATE 5 MG Oral Tab TAKE 1 TABLET(5 MG) BY MOUTH DAILY 90 tablet 1   • simvastatin 20 MG Oral Tab Take 20 mg adenopathy present. Left: No inguinal adenopathy present. Neurological: He is alert. No cranial nerve deficit. Coordination normal.   Skin: Skin is warm and dry. No rash noted. Psychiatric: He has a normal mood and affect.  His behavior is normal

## 2020-08-04 LAB
TESTOSTERONE, FREE, S: 7.62 NG/DL
TESTOSTERONE, TOTAL, S: 272 NG/DL

## 2020-08-05 ENCOUNTER — PATIENT MESSAGE (OUTPATIENT)
Dept: INTERNAL MEDICINE CLINIC | Facility: CLINIC | Age: 55
End: 2020-08-05

## 2020-08-05 DIAGNOSIS — R79.89 DECREASED FREE TESTOSTERONE LEVEL IN MALE: Primary | ICD-10-CM

## 2020-08-06 NOTE — TELEPHONE ENCOUNTER
From: Alexander Gee  To: Shira Barksdale MD  Sent: 8/5/2020 3:05 PM CDT  Subject: Referral Request    Hi Doctor,    Can you refer me to an endocrinologist to discuss/review my declining testosterone levels? I see the results came in today.     Mehdi Wagner

## 2020-08-07 ENCOUNTER — TELEPHONE (OUTPATIENT)
Dept: INTERNAL MEDICINE CLINIC | Facility: CLINIC | Age: 55
End: 2020-08-07

## 2020-08-07 NOTE — TELEPHONE ENCOUNTER
Pt calling to state he was exposed to Covid 19. Pt states he is asymptomatic. Pt requests covid 19 testing. Pt provided with testing sites that don't require a doctor's order.     Advised pt if symptoms develop call Dr Christ Chappell office and to ER

## 2020-09-23 ENCOUNTER — PATIENT MESSAGE (OUTPATIENT)
Dept: INTERNAL MEDICINE CLINIC | Facility: CLINIC | Age: 55
End: 2020-09-23

## 2020-09-23 NOTE — TELEPHONE ENCOUNTER
Routed to Dr Loco Hicks for advise, thanks. 30 days supply pended for cost saving.      Requested Prescriptions     Pending Prescriptions Disp Refills   • metFORMIN HCl 500 MG Oral Tab 120 tablet 0     Sig: Take 1 tablet (500 mg total) by mouth 4 (four) times

## 2020-09-23 NOTE — TELEPHONE ENCOUNTER
From: Penny Loja  To: Bertin Adams MD  Sent: 9/23/2020 10:12 AM CDT  Subject: Prescription Question    DrPancho visiting my son in [place] and I didn't bring enough Rx with me.  Can you call in 2-day's worth of Rx to the [facility] here in

## 2020-09-24 ENCOUNTER — PATIENT MESSAGE (OUTPATIENT)
Dept: INTERNAL MEDICINE CLINIC | Facility: CLINIC | Age: 55
End: 2020-09-24

## 2020-09-24 RX ORDER — AMLODIPINE BESYLATE 5 MG/1
5 TABLET ORAL DAILY
Qty: 30 TABLET | Refills: 5 | Status: SHIPPED | OUTPATIENT
Start: 2020-09-24 | End: 2020-12-18

## 2020-09-24 RX ORDER — HYDROCHLOROTHIAZIDE 12.5 MG/1
12.5 TABLET ORAL DAILY
Qty: 30 TABLET | Refills: 5 | Status: SHIPPED | OUTPATIENT
Start: 2020-09-24 | End: 2020-12-18

## 2020-09-24 RX ORDER — SIMVASTATIN 20 MG
20 TABLET ORAL NIGHTLY
Qty: 30 TABLET | Refills: 5 | Status: SHIPPED | OUTPATIENT
Start: 2020-09-24 | End: 2021-07-19

## 2020-09-24 RX ORDER — LISINOPRIL 40 MG/1
40 TABLET ORAL DAILY
Qty: 30 TABLET | Refills: 5 | Status: SHIPPED | OUTPATIENT
Start: 2020-09-24 | End: 2020-12-18

## 2020-09-24 RX ORDER — SIMVASTATIN 20 MG
TABLET ORAL
Qty: 90 TABLET | Refills: 1 | Status: SHIPPED | OUTPATIENT
Start: 2020-09-24 | End: 2020-12-17

## 2020-09-24 NOTE — TELEPHONE ENCOUNTER
From: Rachele Suarez  To: Vern Aleman MD  Sent: 9/24/2020 9:38 AM CDT  Subject: Prescription Question    Dr,    I run out of Rx after today and need to get 2 days worth of Rx for all my current medications.     jessica out here in [place]

## 2020-10-28 ENCOUNTER — PATIENT MESSAGE (OUTPATIENT)
Dept: INTERNAL MEDICINE CLINIC | Facility: CLINIC | Age: 55
End: 2020-10-28

## 2020-10-28 DIAGNOSIS — E11.9 TYPE 2 DIABETES MELLITUS WITHOUT COMPLICATION, WITHOUT LONG-TERM CURRENT USE OF INSULIN (HCC): Primary | ICD-10-CM

## 2020-10-29 ENCOUNTER — OFFICE VISIT (OUTPATIENT)
Dept: ENDOCRINOLOGY CLINIC | Facility: CLINIC | Age: 55
End: 2020-10-29
Payer: COMMERCIAL

## 2020-10-29 VITALS
SYSTOLIC BLOOD PRESSURE: 143 MMHG | DIASTOLIC BLOOD PRESSURE: 83 MMHG | BODY MASS INDEX: 31 KG/M2 | HEART RATE: 77 BPM | WEIGHT: 229 LBS

## 2020-10-29 DIAGNOSIS — R79.89 LOW SERUM TESTOSTERONE: Primary | ICD-10-CM

## 2020-10-29 PROCEDURE — 3077F SYST BP >= 140 MM HG: CPT | Performed by: INTERNAL MEDICINE

## 2020-10-29 PROCEDURE — 90471 IMMUNIZATION ADMIN: CPT | Performed by: INTERNAL MEDICINE

## 2020-10-29 PROCEDURE — 90686 IIV4 VACC NO PRSV 0.5 ML IM: CPT | Performed by: INTERNAL MEDICINE

## 2020-10-29 PROCEDURE — 99243 OFF/OP CNSLTJ NEW/EST LOW 30: CPT | Performed by: INTERNAL MEDICINE

## 2020-10-29 PROCEDURE — 3079F DIAST BP 80-89 MM HG: CPT | Performed by: INTERNAL MEDICINE

## 2020-10-29 NOTE — PROGRESS NOTES
Name: Cloretta Cap  Date: 10/29/2020    Referring Physician: No ref. provider found    Patient presents with:  Consult: Pt would like to establish care with Endocrinologist due to low testosterone. Pt reports weight gain and tiredness.       HI simvastatin 20 MG Oral Tab, Take 1 tablet (20 mg total) by mouth nightly., Disp: 30 tablet, Rfl: 5  •  SIMVASTATIN 20 MG Oral Tab, TAKE 1 TABLET BY MOUTH EVERY EVENING, Disp: 90 tablet, Rfl: 1  •  Aspirin Buf,CaCarb-MgCarb-MgO, 81 MG Oral Tab, Take 81 mg b stated as uncontrolled    • Unspecified essential hypertension        Surgical history:   Past Surgical History:   Procedure Laterality Date   • COLONOSCOPY N/A 11/15/2016    Performed by Judi Jose MD at [facility] ENDOSCOPY   • SHOULDER ARTHROSCOPY Left 0

## 2020-11-18 ENCOUNTER — LAB ENCOUNTER (OUTPATIENT)
Dept: LAB | Facility: HOSPITAL | Age: 55
End: 2020-11-18
Attending: NURSE PRACTITIONER
Payer: COMMERCIAL

## 2020-11-18 DIAGNOSIS — E11.9 TYPE 2 DIABETES MELLITUS WITHOUT COMPLICATION, WITHOUT LONG-TERM CURRENT USE OF INSULIN (HCC): ICD-10-CM

## 2020-11-18 PROCEDURE — 80053 COMPREHEN METABOLIC PANEL: CPT

## 2020-11-18 PROCEDURE — 83036 HEMOGLOBIN GLYCOSYLATED A1C: CPT

## 2020-11-18 PROCEDURE — 84403 ASSAY OF TOTAL TESTOSTERONE: CPT

## 2020-11-18 PROCEDURE — 36415 COLL VENOUS BLD VENIPUNCTURE: CPT

## 2020-11-18 PROCEDURE — 84402 ASSAY OF FREE TESTOSTERONE: CPT

## 2020-11-23 ENCOUNTER — OFFICE VISIT (OUTPATIENT)
Dept: INTERNAL MEDICINE CLINIC | Facility: CLINIC | Age: 55
End: 2020-11-23
Payer: COMMERCIAL

## 2020-11-23 VITALS
SYSTOLIC BLOOD PRESSURE: 126 MMHG | BODY MASS INDEX: 30.72 KG/M2 | DIASTOLIC BLOOD PRESSURE: 81 MMHG | HEIGHT: 72 IN | WEIGHT: 226.81 LBS | HEART RATE: 81 BPM

## 2020-11-23 DIAGNOSIS — E11.9 TYPE 2 DIABETES MELLITUS WITHOUT COMPLICATION, WITHOUT LONG-TERM CURRENT USE OF INSULIN (HCC): Primary | ICD-10-CM

## 2020-11-23 PROCEDURE — 99213 OFFICE O/P EST LOW 20 MIN: CPT | Performed by: INTERNAL MEDICINE

## 2020-11-23 PROCEDURE — 3008F BODY MASS INDEX DOCD: CPT | Performed by: INTERNAL MEDICINE

## 2020-11-23 PROCEDURE — 99072 ADDL SUPL MATRL&STAF TM PHE: CPT | Performed by: INTERNAL MEDICINE

## 2020-11-23 PROCEDURE — 3074F SYST BP LT 130 MM HG: CPT | Performed by: INTERNAL MEDICINE

## 2020-11-23 PROCEDURE — 3079F DIAST BP 80-89 MM HG: CPT | Performed by: INTERNAL MEDICINE

## 2020-11-23 RX ORDER — SEMAGLUTIDE 1.34 MG/ML
0.25 INJECTION, SOLUTION SUBCUTANEOUS
Qty: 1 PEN | Refills: 0 | Status: SHIPPED | OUTPATIENT
Start: 2020-11-23 | End: 2020-12-17

## 2020-11-23 RX ORDER — PEN NEEDLE, DIABETIC 30 GX3/16"
1 NEEDLE, DISPOSABLE MISCELLANEOUS
Qty: 30 EACH | Refills: 5 | Status: SHIPPED | OUTPATIENT
Start: 2020-11-23 | End: 2021-05-12

## 2020-11-23 RX ORDER — SEMAGLUTIDE 1.34 MG/ML
0.5 INJECTION, SOLUTION SUBCUTANEOUS
Qty: 1 PEN | Refills: 5 | Status: SHIPPED | OUTPATIENT
Start: 2020-11-23 | End: 2021-05-12 | Stop reason: DRUGHIGH

## 2020-11-23 RX ORDER — PEN NEEDLE, DIABETIC 30 GX3/16"
1 NEEDLE, DISPOSABLE MISCELLANEOUS
Qty: 30 EACH | Refills: 0 | Status: SHIPPED | OUTPATIENT
Start: 2020-11-23 | End: 2022-01-03

## 2020-11-25 NOTE — PROGRESS NOTES
HPI:    Patient ID: Michelle Norris is a 48year old male. HPI  Patient is here mostly for follow-up on diabetes. Last seen here on August 3. That time A1c 7.9. He since has seen Dr. Osborn Babinski of endocrinology.   That was for the diabetes and lo • Type II or unspecified type diabetes mellitus without mention of complication, not stated as uncontrolled    • Unspecified essential hypertension       Past Surgical History:   Procedure Laterality Date   • COLONOSCOPY N/A 11/15/2016    Performed by Augie Martinez Tab TAKE 1 TABLET BY MOUTH EVERY EVENING 90 tablet 1   • Aspirin Buf,CaCarb-MgCarb-MgO, 81 MG Oral Tab Take 81 mg by mouth daily. • Glucose Blood In Vitro Strip Test blood glucose daily.   E11.9 100 strip 3   • Lancets Does not apply Misc Test blood glu against starting testosterone. Entire visit today was spent face-to-face conversation regarding the patient's medical condition and plan of care. Approximately 15 minutes was spent with the patient.          Meds This Visit:  Requested Prescriptions

## 2020-11-30 ENCOUNTER — PATIENT MESSAGE (OUTPATIENT)
Dept: INTERNAL MEDICINE CLINIC | Facility: CLINIC | Age: 55
End: 2020-11-30

## 2020-11-30 NOTE — TELEPHONE ENCOUNTER
From: Michelle Norris  To: Gerhard Ardon MD  Sent: 11/30/2020 10:06 AM CST  Subject: Prescription Question    Hi Doctor,    I took my first Ozempic injection last Wed at 6:30 a.m.  (bc I didn't want to risk diarrhea or similar for some appointme

## 2020-12-17 RX ORDER — SEMAGLUTIDE 1.34 MG/ML
0.25 INJECTION, SOLUTION SUBCUTANEOUS
Qty: 1 PEN | Refills: 0 | Status: SHIPPED | OUTPATIENT
Start: 2020-12-17 | End: 2021-01-14

## 2020-12-17 NOTE — TELEPHONE ENCOUNTER
Current Outpatient Medications   Medication Sig Dispense Refill   • Semaglutide,0.25 or 0.5MG/DOS, (OZEMPIC, 0.25 OR 0.5 MG/DOSE,) 2 MG/1.5ML Subcutaneous Solution Pen-injector Inject 0.25 mg into the skin every 7 days for 28 days.  1 pen 0

## 2020-12-18 RX ORDER — HYDROCHLOROTHIAZIDE 12.5 MG/1
12.5 TABLET ORAL DAILY
Qty: 30 TABLET | Refills: 5 | Status: SHIPPED | OUTPATIENT
Start: 2020-12-18 | End: 2021-05-12

## 2020-12-18 RX ORDER — LISINOPRIL 40 MG/1
40 TABLET ORAL DAILY
Qty: 30 TABLET | Refills: 5 | Status: SHIPPED | OUTPATIENT
Start: 2020-12-18 | End: 2021-05-12

## 2020-12-18 RX ORDER — SIMVASTATIN 20 MG
20 TABLET ORAL EVERY EVENING
Qty: 90 TABLET | Refills: 1 | Status: SHIPPED | OUTPATIENT
Start: 2020-12-18 | End: 2021-05-12

## 2020-12-18 RX ORDER — AMLODIPINE BESYLATE 5 MG/1
5 TABLET ORAL DAILY
Qty: 30 TABLET | Refills: 5 | Status: SHIPPED | OUTPATIENT
Start: 2020-12-18 | End: 2021-05-12

## 2020-12-18 NOTE — TELEPHONE ENCOUNTER
Refills pended for review. Please advise on pt's mychart message below:     Hey Doctor,     My WBS levels are back to where they were before: around 295 or so. I haven't lost any weight and have just taken my 4th . 25mg injection Wednesday morning.   I sta

## 2021-01-03 ENCOUNTER — HOSPITAL ENCOUNTER (OUTPATIENT)
Age: 56
Discharge: HOME | End: 2021-01-03
Payer: COMMERCIAL

## 2021-01-03 ENCOUNTER — NURSE TRIAGE (OUTPATIENT)
Dept: INTERNAL MEDICINE CLINIC | Facility: CLINIC | Age: 56
End: 2021-01-03

## 2021-01-03 VITALS
SYSTOLIC BLOOD PRESSURE: 141 MMHG | HEIGHT: 73 IN | TEMPERATURE: 97 F | OXYGEN SATURATION: 100 % | WEIGHT: 230 LBS | RESPIRATION RATE: 16 BRPM | DIASTOLIC BLOOD PRESSURE: 70 MMHG | HEART RATE: 101 BPM | BODY MASS INDEX: 30.48 KG/M2

## 2021-01-03 DIAGNOSIS — R11.2 NAUSEA VOMITING AND DIARRHEA: ICD-10-CM

## 2021-01-03 DIAGNOSIS — R19.7 NAUSEA VOMITING AND DIARRHEA: ICD-10-CM

## 2021-01-03 DIAGNOSIS — R10.11 ABDOMINAL PAIN, RIGHT UPPER QUADRANT: ICD-10-CM

## 2021-01-03 DIAGNOSIS — R55 SYNCOPE, UNSPECIFIED SYNCOPE TYPE: ICD-10-CM

## 2021-01-03 DIAGNOSIS — R07.9 CHEST PAIN OF UNCERTAIN ETIOLOGY: Primary | ICD-10-CM

## 2021-01-03 PROCEDURE — 99204 OFFICE O/P NEW MOD 45 MIN: CPT | Performed by: NURSE PRACTITIONER

## 2021-01-03 NOTE — ED NOTES
Pt refused to go to ER , risk, consequence, benefits of going to ER were discussed with pt by Melinda SEGUNDO , pt verbalized understanding , pt aox3 stable.

## 2021-01-03 NOTE — ED PROVIDER NOTES
Patient Seen in: [facility]      History   No chief complaint on file.     Stated Complaint: flank area pain    HPI/Subjective:   HPI    This is a 71-year-old male with past medical history of type 2 diabetes presenting with right side chest noted above.     Physical Exam     ED Triage Vitals [01/03/21 1130]   /70   Pulse 101   Resp 16   Temp 97.3 °F (36.3 °C)   Temp src Temporal   SpO2 100 %   O2 Device None (Room air)       Current:/70   Pulse 101   Temp 97.3 °F (36.3 °C) (Tempora concerning. Given limited resources to check troponin, D-dimer, CMP, lipase and advanced imaging discussed with patient transfer to the ER for further evaluation. Explained rationale for this recommendation.   Discussed the risk factors of not going to th

## 2021-01-03 NOTE — ED INITIAL ASSESSMENT (HPI)
Pt c/o intermittent pain middle of right side of chest/ right upper quad of abdomen  occurs during night. Recently pt was experiencing  Vomiting,diarrhea,  severe pain , pt states he \"passed out\" x2 12/30/20, symptoms from 12/30/20 have resolved per pt.

## 2021-01-04 ENCOUNTER — HOSPITAL ENCOUNTER (OUTPATIENT)
Dept: GENERAL RADIOLOGY | Facility: HOSPITAL | Age: 56
Discharge: HOME | End: 2021-01-04
Attending: NURSE PRACTITIONER
Payer: COMMERCIAL

## 2021-01-04 ENCOUNTER — PATIENT MESSAGE (OUTPATIENT)
Dept: INTERNAL MEDICINE CLINIC | Facility: CLINIC | Age: 56
End: 2021-01-04

## 2021-01-04 ENCOUNTER — LAB ENCOUNTER (OUTPATIENT)
Dept: LAB | Facility: HOSPITAL | Age: 56
End: 2021-01-04
Attending: NURSE PRACTITIONER
Payer: COMMERCIAL

## 2021-01-04 ENCOUNTER — OFFICE VISIT (OUTPATIENT)
Dept: INTERNAL MEDICINE CLINIC | Facility: CLINIC | Age: 56
End: 2021-01-04
Payer: COMMERCIAL

## 2021-01-04 ENCOUNTER — TELEPHONE (OUTPATIENT)
Dept: INTERNAL MEDICINE CLINIC | Facility: CLINIC | Age: 56
End: 2021-01-04

## 2021-01-04 ENCOUNTER — HOSPITAL ENCOUNTER (OUTPATIENT)
Dept: ULTRASOUND IMAGING | Facility: HOSPITAL | Age: 56
Discharge: HOME | End: 2021-01-04
Attending: NURSE PRACTITIONER
Payer: COMMERCIAL

## 2021-01-04 VITALS
HEIGHT: 73 IN | BODY MASS INDEX: 30.2 KG/M2 | RESPIRATION RATE: 16 BRPM | HEART RATE: 81 BPM | SYSTOLIC BLOOD PRESSURE: 134 MMHG | DIASTOLIC BLOOD PRESSURE: 90 MMHG | WEIGHT: 227.88 LBS

## 2021-01-04 DIAGNOSIS — R10.11 RIGHT UPPER QUADRANT PAIN: ICD-10-CM

## 2021-01-04 DIAGNOSIS — R10.11 RIGHT UPPER QUADRANT ABDOMINAL PAIN: Primary | ICD-10-CM

## 2021-01-04 DIAGNOSIS — R07.9 CHEST PAIN, UNSPECIFIED TYPE: ICD-10-CM

## 2021-01-04 DIAGNOSIS — R10.11 RIGHT UPPER QUADRANT ABDOMINAL PAIN: ICD-10-CM

## 2021-01-04 DIAGNOSIS — R10.11 ABDOMINAL PAIN, RIGHT UPPER QUADRANT: Primary | ICD-10-CM

## 2021-01-04 DIAGNOSIS — K82.8 SLUDGE IN GALLBLADDER: ICD-10-CM

## 2021-01-04 LAB
ALBUMIN SERPL-MCNC: 4.3 G/DL (ref 3.4–5)
ALBUMIN/GLOB SERPL: 1.2 {RATIO} (ref 1–2)
ALP LIVER SERPL-CCNC: 76 U/L
ALT SERPL-CCNC: 46 U/L
AMYLASE SERPL-CCNC: 39 U/L (ref 25–115)
ANION GAP SERPL CALC-SCNC: 8 MMOL/L (ref 0–18)
AST SERPL-CCNC: 20 U/L (ref 15–37)
BASOPHILS # BLD AUTO: 0.05 X10(3) UL (ref 0–0.2)
BASOPHILS NFR BLD AUTO: 0.8 %
BILIRUB SERPL-MCNC: 0.5 MG/DL (ref 0.1–2)
BUN BLD-MCNC: 14 MG/DL (ref 7–18)
BUN/CREAT SERPL: 16.1 (ref 10–20)
CALCIUM BLD-MCNC: 9.7 MG/DL (ref 8.5–10.1)
CHLORIDE SERPL-SCNC: 104 MMOL/L (ref 98–112)
CO2 SERPL-SCNC: 28 MMOL/L (ref 21–32)
CREAT BLD-MCNC: 0.87 MG/DL
D DIMER PPP FEU-MCNC: 0.27 UG/ML FEU (ref ?–0.54)
DEPRECATED RDW RBC AUTO: 43.2 FL (ref 35.1–46.3)
EOSINOPHIL # BLD AUTO: 0.21 X10(3) UL (ref 0–0.7)
EOSINOPHIL NFR BLD AUTO: 3.2 %
ERYTHROCYTE [DISTWIDTH] IN BLOOD BY AUTOMATED COUNT: 13.1 % (ref 11–15)
EST. AVERAGE GLUCOSE BLD GHB EST-MCNC: 212 MG/DL (ref 68–126)
GLOBULIN PLAS-MCNC: 3.7 G/DL (ref 2.8–4.4)
GLUCOSE BLD-MCNC: 172 MG/DL (ref 70–99)
HBA1C MFR BLD HPLC: 9 % (ref ?–5.7)
HCT VFR BLD AUTO: 44.7 %
HGB BLD-MCNC: 15.2 G/DL
IMM GRANULOCYTES # BLD AUTO: 0.01 X10(3) UL (ref 0–1)
IMM GRANULOCYTES NFR BLD: 0.2 %
LIPASE SERPL-CCNC: 173 U/L (ref 73–393)
LYMPHOCYTES # BLD AUTO: 2.17 X10(3) UL (ref 1–4)
LYMPHOCYTES NFR BLD AUTO: 32.9 %
M PROTEIN MFR SERPL ELPH: 8 G/DL (ref 6.4–8.2)
MCH RBC QN AUTO: 31 PG (ref 26–34)
MCHC RBC AUTO-ENTMCNC: 34 G/DL (ref 31–37)
MCV RBC AUTO: 91.2 FL
MONOCYTES # BLD AUTO: 0.44 X10(3) UL (ref 0.1–1)
MONOCYTES NFR BLD AUTO: 6.7 %
NEUTROPHILS # BLD AUTO: 3.71 X10 (3) UL (ref 1.5–7.7)
NEUTROPHILS # BLD AUTO: 3.71 X10(3) UL (ref 1.5–7.7)
NEUTROPHILS NFR BLD AUTO: 56.2 %
OSMOLALITY SERPL CALC.SUM OF ELEC: 295 MOSM/KG (ref 275–295)
PATIENT FASTING Y/N/NP: NO
PLATELET # BLD AUTO: 191 10(3)UL (ref 150–450)
POTASSIUM SERPL-SCNC: 4.1 MMOL/L (ref 3.5–5.1)
RBC # BLD AUTO: 4.9 X10(6)UL
SODIUM SERPL-SCNC: 140 MMOL/L (ref 136–145)
TROPONIN I SERPL-MCNC: <0.045 NG/ML (ref ?–0.04)
WBC # BLD AUTO: 6.6 X10(3) UL (ref 4–11)

## 2021-01-04 PROCEDURE — 76705 ECHO EXAM OF ABDOMEN: CPT | Performed by: NURSE PRACTITIONER

## 2021-01-04 PROCEDURE — 82150 ASSAY OF AMYLASE: CPT

## 2021-01-04 PROCEDURE — 85379 FIBRIN DEGRADATION QUANT: CPT

## 2021-01-04 PROCEDURE — 71046 X-RAY EXAM CHEST 2 VIEWS: CPT | Performed by: NURSE PRACTITIONER

## 2021-01-04 PROCEDURE — 93010 ELECTROCARDIOGRAM REPORT: CPT | Performed by: NURSE PRACTITIONER

## 2021-01-04 PROCEDURE — 3008F BODY MASS INDEX DOCD: CPT | Performed by: NURSE PRACTITIONER

## 2021-01-04 PROCEDURE — 99215 OFFICE O/P EST HI 40 MIN: CPT | Performed by: NURSE PRACTITIONER

## 2021-01-04 PROCEDURE — 84484 ASSAY OF TROPONIN QUANT: CPT

## 2021-01-04 PROCEDURE — 3075F SYST BP GE 130 - 139MM HG: CPT | Performed by: NURSE PRACTITIONER

## 2021-01-04 PROCEDURE — 93005 ELECTROCARDIOGRAM TRACING: CPT

## 2021-01-04 PROCEDURE — 83690 ASSAY OF LIPASE: CPT

## 2021-01-04 PROCEDURE — 3080F DIAST BP >= 90 MM HG: CPT | Performed by: NURSE PRACTITIONER

## 2021-01-04 PROCEDURE — 85025 COMPLETE CBC W/AUTO DIFF WBC: CPT

## 2021-01-04 PROCEDURE — 36415 COLL VENOUS BLD VENIPUNCTURE: CPT

## 2021-01-04 PROCEDURE — 83036 HEMOGLOBIN GLYCOSYLATED A1C: CPT

## 2021-01-04 PROCEDURE — 80053 COMPREHEN METABOLIC PANEL: CPT

## 2021-01-04 NOTE — TELEPHONE ENCOUNTER
Action Requested: Summary for Provider     []  Critical Lab, Recommendations Needed  [] Need Additional Advice  []   FYI    []   Need Orders  [] Need Medications Sent to Pharmacy  []  Other     SUMMARY: Per protocol advised : OV today   Future Appointments

## 2021-01-04 NOTE — TELEPHONE ENCOUNTER
Patient states he when scheduling his US of gallbladder on Wednesday, was informed perhaps ROMY Craft may want to put this as a STAT order instead as patient discussed the importance of having this done as soon as possible at office visit today.

## 2021-01-04 NOTE — TELEPHONE ENCOUNTER
Please advise. The patient is calling back checking on the status. He has been fasting for 8 hours. He stated he can get in today if the order is placed in STAT other wise has to wait until Wednesday and he feels that is to long.

## 2021-01-04 NOTE — PROGRESS NOTES
HPI:    Patient ID: Rhina Sifuentes is a 48year old male. HPI 48year old male who went to the  on 1/3/2021. with vomiting diarrhea. See below note.   This is a 73-year-old male with past medical history of type 2 diabetes presenting with righ the episode of the diarrhea and vomiting is related to gallbladder. Patient had eaten some meatballs at a friend's house and became violently ill 4 hours after his meal.  During that episode there was no fever or chills.   Patient denies any loss of taste >6cups/day         Review of Systems   Constitutional: Negative for chills, fatigue and fever. HENT: Negative for ear pain and hearing loss. Eyes: Negative for visual disturbance. Respiratory: Negative for cough and shortness of breath.     Micael Osgood tablet (500 mg total) by mouth 4 (four) times daily. 120 tablet 5   • simvastatin 20 MG Oral Tab Take 1 tablet (20 mg total) by mouth nightly. 30 tablet 5   • Aspirin Buf,CaCarb-MgCarb-MgO, 81 MG Oral Tab Take 81 mg by mouth daily.      • Glucose Blood In V kg/m²   Wt Readings from Last 2 Encounters:  01/04/21 : 227 lb 14.4 oz (103.4 kg)  01/03/21 : 230 lb (104.3 kg)    Body mass index is 30.07 kg/m². (2)           ASSESSMENT/PLAN:     Problem List Items Addressed This Visit        Unprioritized    Right upper This Encounter      Lipase [E]      Amylase [E]      CBC W Differential W Platelet [E]      CMP      Hemoglobin A1C [E]      Troponin I [E]      D-Dimer [E]      Meds This Visit:  Requested Prescriptions      No prescriptions requested or ordered in this e

## 2021-01-04 NOTE — TELEPHONE ENCOUNTER
----- Message from Dony Shelton sent at 1/3/2021 12:34 PM CST -----  Regarding: Visit Follow-up Question  Contact: [phone]  ,    During my last visit with you in November, I mentioned the upper right intermittent internal pain that I was ex

## 2021-01-04 NOTE — ASSESSMENT & PLAN NOTE
A/P 29-year-old diabetic who is following up on urgent care visit on 1/3/2020. He had severe vomiting and diarrhea and passed out on the toilet for 10 seconds. Patient states his wife witnessed this episode and stated he was very pale.   He went to the ur

## 2021-01-04 NOTE — TELEPHONE ENCOUNTER
The order was changed to STAT. The patient was notified and call transferred to schedule the ultrasound.

## 2021-01-05 NOTE — TELEPHONE ENCOUNTER
Mikayla Menezes,    Patient had all the below test done today and results were reviewed with patient. Plan  1) EKG- NsR  2) D-dimer- WNL  3) CBC. CMP, Amylase, Lipase-WNL  4) U. S of gallbladder- Sludge  5) Chest X-ray- unremarkable  6) Hemoglo

## 2021-02-05 ENCOUNTER — TELEPHONE (OUTPATIENT)
Dept: INTERNAL MEDICINE CLINIC | Facility: CLINIC | Age: 56
End: 2021-02-05

## 2021-02-05 DIAGNOSIS — E11.9 TYPE 2 DIABETES MELLITUS WITHOUT COMPLICATION, WITHOUT LONG-TERM CURRENT USE OF INSULIN (HCC): Primary | ICD-10-CM

## 2021-02-05 NOTE — TELEPHONE ENCOUNTER
Called pt to schedule a follow up appointment with PCP regarding Diabetes, per patient he will schedule an appointment with PCP but wants to know if he should have any lab work completed looking to schedule in march/april

## 2021-02-05 NOTE — TELEPHONE ENCOUNTER
Contacted pt and informed that blood work order has been placed  Per patient he will schedule an appointment in April

## 2021-02-27 ENCOUNTER — PATIENT MESSAGE (OUTPATIENT)
Dept: INTERNAL MEDICINE CLINIC | Facility: CLINIC | Age: 56
End: 2021-02-27

## 2021-02-27 DIAGNOSIS — E11.9 TYPE 2 DIABETES MELLITUS WITHOUT COMPLICATION, WITHOUT LONG-TERM CURRENT USE OF INSULIN (HCC): Primary | ICD-10-CM

## 2021-03-01 NOTE — TELEPHONE ENCOUNTER
Patient was phoned and we had a long conversation on die and exercise. Hemoglobin A1C is at 9.0. Patient is on the treadmill with no chest pain. Plan  1) Calicum Score CT  2) Follow up with Dr. Cali Milton in April.   3) Repeat Hemoglobin A1C  4) Decrease

## 2021-03-09 ENCOUNTER — HOSPITAL ENCOUNTER (OUTPATIENT)
Dept: CT IMAGING | Age: 56
Discharge: HOME | End: 2021-03-09
Attending: NURSE PRACTITIONER

## 2021-03-09 ENCOUNTER — ORDER TRANSCRIPTION (OUTPATIENT)
Dept: ADMINISTRATIVE | Facility: HOSPITAL | Age: 56
End: 2021-03-09

## 2021-03-09 DIAGNOSIS — Z13.9 ENCOUNTER FOR SCREENING: Primary | ICD-10-CM

## 2021-03-09 DIAGNOSIS — E11.9 TYPE 2 DIABETES MELLITUS WITHOUT COMPLICATION, WITHOUT LONG-TERM CURRENT USE OF INSULIN (HCC): ICD-10-CM

## 2021-03-12 ENCOUNTER — TELEPHONE (OUTPATIENT)
Dept: INTERNAL MEDICINE CLINIC | Facility: CLINIC | Age: 56
End: 2021-03-12

## 2021-03-12 ENCOUNTER — PATIENT MESSAGE (OUTPATIENT)
Dept: INTERNAL MEDICINE CLINIC | Facility: CLINIC | Age: 56
End: 2021-03-12

## 2021-03-12 NOTE — TELEPHONE ENCOUNTER
3/12 [facility]      ----- Message -----       From:Luis Miguel Carver       Sent:3/12/2021 10:59 AM CST         Nicci Meyers MD    Subject:Prescription Question    Doctor,    I'm renewing my effort to eat less and to eat a very restrictive diet in

## 2021-03-12 NOTE — TELEPHONE ENCOUNTER
From: Shyanne Collins  To: Alberto Giraldo MD  Sent: 3/12/2021 10:59 AM CST  Subject: Prescription Question    Doctor,    I'm renewing my effort to eat less and to eat a very restrictive diet in an attempt to lose weight.  This is causing my Metfo

## 2021-03-13 NOTE — TELEPHONE ENCOUNTER
Followed up with patient, reports abdominal pain and diarrhea due to his Metformin and diet changes. Reports he wants to drop weight by his upcoming appt 4/5/21.  Patient is requesting if he can lower his dose or hold his dose of Metformin until his appt du

## 2021-04-13 ENCOUNTER — OFFICE VISIT (OUTPATIENT)
Dept: CARDIOLOGY CLINIC | Facility: CLINIC | Age: 56
End: 2021-04-13
Payer: COMMERCIAL

## 2021-04-13 VITALS
HEIGHT: 73 IN | DIASTOLIC BLOOD PRESSURE: 74 MMHG | WEIGHT: 225 LBS | BODY MASS INDEX: 29.82 KG/M2 | SYSTOLIC BLOOD PRESSURE: 122 MMHG | HEART RATE: 71 BPM

## 2021-04-13 DIAGNOSIS — R93.89 ABNORMAL FINDINGS ON DIAGNOSTIC IMAGING OF CARDIOVASCULAR SYSTEM: Primary | ICD-10-CM

## 2021-04-13 DIAGNOSIS — E78.49 OTHER HYPERLIPIDEMIA: ICD-10-CM

## 2021-04-13 DIAGNOSIS — I10 ESSENTIAL HYPERTENSION: ICD-10-CM

## 2021-04-13 PROCEDURE — 3074F SYST BP LT 130 MM HG: CPT | Performed by: INTERNAL MEDICINE

## 2021-04-13 PROCEDURE — 3078F DIAST BP <80 MM HG: CPT | Performed by: INTERNAL MEDICINE

## 2021-04-13 PROCEDURE — 3008F BODY MASS INDEX DOCD: CPT | Performed by: INTERNAL MEDICINE

## 2021-04-13 PROCEDURE — 99244 OFF/OP CNSLTJ NEW/EST MOD 40: CPT | Performed by: INTERNAL MEDICINE

## 2021-04-13 RX ORDER — BIOTIN 1 MG
1 TABLET ORAL DAILY
COMMUNITY
End: 2022-01-03

## 2021-04-13 RX ORDER — ZINC SULFATE 50(220)MG
220 CAPSULE ORAL DAILY
COMMUNITY

## 2021-04-13 NOTE — PROGRESS NOTES
Jennifer Edwards is a 48year old male. HPI:   This is a pleasant 48year old male with diabetes hypertension and elevated cholesterol who presents for cardiac assessment abnormal calcium score.   We are as tablet 5   • Aspirin Buf,CaCarb-MgCarb-MgO, 81 MG Oral Tab Take 81 mg by mouth daily. • Lancets Does not apply Misc Test blood glucose daily.   E11.9 100 each 3   • UNIFINE PENTIPS 31G X 6 MM Does not apply Misc  each 1   • Blood Glucose Monitori other systems reviewed and negative.   EXAM:   /74 (BP Location: Right arm, Patient Position: Sitting, Cuff Size: large)   Pulse 71   Ht 6' 1\" (1.854 m)   Wt 225 lb (102.1 kg)   BMI 29.69 kg/m²   GENERAL: well developed, well nourished,in no apparent

## 2021-04-15 ENCOUNTER — OFFICE VISIT (OUTPATIENT)
Dept: OPTOMETRY | Facility: CLINIC | Age: 56
End: 2021-04-15
Payer: COMMERCIAL

## 2021-04-15 DIAGNOSIS — H40.003 GLAUCOMA SUSPECT, BILATERAL: ICD-10-CM

## 2021-04-15 DIAGNOSIS — E11.9 TYPE 2 DIABETES MELLITUS WITHOUT COMPLICATION, WITHOUT LONG-TERM CURRENT USE OF INSULIN (HCC): Primary | ICD-10-CM

## 2021-04-15 PROCEDURE — 92014 COMPRE OPH EXAM EST PT 1/>: CPT | Performed by: OPTOMETRIST

## 2021-04-15 PROCEDURE — 92133 CPTRZD OPH DX IMG PST SGM ON: CPT | Performed by: OPTOMETRIST

## 2021-04-15 NOTE — PROGRESS NOTES
Shyanne Collins is a 48year old male.     HPI:     HPI     Diabetic Eye Exam     Diabetes Type: Type 2, controlled with diet and taking oral medications    Duration: 11 years    Number of years diabetic: 11    Number of years on pills: 11    Numb Alcohol/week: 0.0 standard drinks      Comment: very rarely     Drug use: No      Medications:  Current Outpatient Medications   Medication Sig Dispense Refill   • metFORMIN HCl 500 MG Oral Tab      • Cholecalciferol (VITAMIN D3) 25 MCG (1000 UT) Oral Cap Constitutional, Gastrointestinal, Neurological, Skin, Genitourinary, Musculoskeletal, HENT, Endocrine, Cardiovascular, Eyes, Respiratory, Psychiatric, Allergic/Imm, Heme/Lymph    Last edited by Bing Gardner OD on 4/15/2021  3:05 PM. (History)          PHYS there is no background diabetic retinopathy in either eye and that they should continue to keep their blood sugar under control and continue to see their physician as directed. I stressed the importance of yearly diabetic eye exams.  Patient has been advise

## 2021-05-07 ENCOUNTER — LAB ENCOUNTER (OUTPATIENT)
Dept: LAB | Facility: HOSPITAL | Age: 56
End: 2021-05-07
Attending: INTERNAL MEDICINE
Payer: COMMERCIAL

## 2021-05-07 DIAGNOSIS — E78.49 OTHER HYPERLIPIDEMIA: ICD-10-CM

## 2021-05-07 DIAGNOSIS — R30.0 DYSURIA: ICD-10-CM

## 2021-05-07 DIAGNOSIS — E11.9 TYPE 2 DIABETES MELLITUS WITHOUT COMPLICATION, WITHOUT LONG-TERM CURRENT USE OF INSULIN (HCC): ICD-10-CM

## 2021-05-07 PROCEDURE — 80061 LIPID PANEL: CPT

## 2021-05-07 PROCEDURE — 83036 HEMOGLOBIN GLYCOSYLATED A1C: CPT

## 2021-05-07 PROCEDURE — 82947 ASSAY GLUCOSE BLOOD QUANT: CPT

## 2021-05-07 PROCEDURE — 84153 ASSAY OF PSA TOTAL: CPT

## 2021-05-07 PROCEDURE — 36415 COLL VENOUS BLD VENIPUNCTURE: CPT

## 2021-05-07 PROCEDURE — 3052F HG A1C>EQUAL 8.0%<EQUAL 9.0%: CPT | Performed by: INTERNAL MEDICINE

## 2021-05-10 ENCOUNTER — OFFICE VISIT (OUTPATIENT)
Dept: GASTROENTEROLOGY | Facility: CLINIC | Age: 56
End: 2021-05-10
Payer: COMMERCIAL

## 2021-05-10 VITALS
SYSTOLIC BLOOD PRESSURE: 139 MMHG | HEART RATE: 79 BPM | HEIGHT: 73 IN | BODY MASS INDEX: 29.74 KG/M2 | DIASTOLIC BLOOD PRESSURE: 73 MMHG | TEMPERATURE: 99 F | WEIGHT: 224.38 LBS

## 2021-05-10 DIAGNOSIS — K83.8 BILIARY SLUDGE DETERMINED BY ULTRASOUND: Primary | ICD-10-CM

## 2021-05-10 PROCEDURE — 3078F DIAST BP <80 MM HG: CPT | Performed by: INTERNAL MEDICINE

## 2021-05-10 PROCEDURE — 3075F SYST BP GE 130 - 139MM HG: CPT | Performed by: INTERNAL MEDICINE

## 2021-05-10 PROCEDURE — 99203 OFFICE O/P NEW LOW 30 MIN: CPT | Performed by: INTERNAL MEDICINE

## 2021-05-10 PROCEDURE — 3008F BODY MASS INDEX DOCD: CPT | Performed by: INTERNAL MEDICINE

## 2021-05-12 ENCOUNTER — OFFICE VISIT (OUTPATIENT)
Dept: INTERNAL MEDICINE CLINIC | Facility: CLINIC | Age: 56
End: 2021-05-12
Payer: COMMERCIAL

## 2021-05-12 VITALS
BODY MASS INDEX: 29.42 KG/M2 | SYSTOLIC BLOOD PRESSURE: 118 MMHG | RESPIRATION RATE: 20 BRPM | HEIGHT: 73 IN | DIASTOLIC BLOOD PRESSURE: 76 MMHG | WEIGHT: 222 LBS | HEART RATE: 80 BPM

## 2021-05-12 DIAGNOSIS — I10 ESSENTIAL HYPERTENSION: ICD-10-CM

## 2021-05-12 DIAGNOSIS — E11.9 TYPE 2 DIABETES MELLITUS WITHOUT COMPLICATION, WITHOUT LONG-TERM CURRENT USE OF INSULIN (HCC): Primary | ICD-10-CM

## 2021-05-12 DIAGNOSIS — E78.5 HYPERLIPIDEMIA LDL GOAL <100: ICD-10-CM

## 2021-05-12 DIAGNOSIS — F43.9 STRESS: ICD-10-CM

## 2021-05-12 PROCEDURE — 3078F DIAST BP <80 MM HG: CPT | Performed by: INTERNAL MEDICINE

## 2021-05-12 PROCEDURE — 3074F SYST BP LT 130 MM HG: CPT | Performed by: INTERNAL MEDICINE

## 2021-05-12 PROCEDURE — 99214 OFFICE O/P EST MOD 30 MIN: CPT | Performed by: INTERNAL MEDICINE

## 2021-05-12 PROCEDURE — 3008F BODY MASS INDEX DOCD: CPT | Performed by: INTERNAL MEDICINE

## 2021-05-12 RX ORDER — SEMAGLUTIDE 1.34 MG/ML
1 INJECTION, SOLUTION SUBCUTANEOUS
Qty: 2 PEN | Refills: 1 | Status: SHIPPED | OUTPATIENT
Start: 2021-05-12 | End: 2021-07-25

## 2021-05-12 RX ORDER — PEN NEEDLE, DIABETIC 30 GX3/16"
1 NEEDLE, DISPOSABLE MISCELLANEOUS
Qty: 30 EACH | Refills: 3 | Status: SHIPPED | OUTPATIENT
Start: 2021-05-12

## 2021-05-12 RX ORDER — LISINOPRIL 40 MG/1
40 TABLET ORAL DAILY
Qty: 30 TABLET | Refills: 5 | Status: SHIPPED | OUTPATIENT
Start: 2021-05-12 | End: 2021-11-01

## 2021-05-12 RX ORDER — HYDROCHLOROTHIAZIDE 12.5 MG/1
12.5 TABLET ORAL DAILY
Qty: 30 TABLET | Refills: 5 | Status: SHIPPED | OUTPATIENT
Start: 2021-05-12 | End: 2021-11-01

## 2021-05-12 RX ORDER — AMLODIPINE BESYLATE 5 MG/1
5 TABLET ORAL DAILY
Qty: 30 TABLET | Refills: 5 | Status: SHIPPED | OUTPATIENT
Start: 2021-05-12 | End: 2021-11-01

## 2021-05-12 NOTE — PROGRESS NOTES
HPI:    Patient ID: Farrah Harper is a 48year old male. HPI  Patient is here for follow-up on recent blood test and on chronic medical issues as listed below. Last seen in the office on November 23. At that time A1c 9.6.   Borderline low t • Colon polyps 11/2016    Hyperplastic, repeat in 10 years   • Diabetes ([facility] [address]     diet controlled w/ no BDR - 2008   • Disorder of eye, unspecified 2008    increased C/D ratio - OU   • Lipid screening 9/10/2013   • MGD (meibomian gland dysfunction) 2008 Insulin Pen Needle (PEN NEEDLES) 32G X 4 MM Does not apply Misc 1 each by Does not apply route every 7 days.  30 each 0   • Semaglutide,0.25 or 0.5MG/DOS, (OZEMPIC, 0.25 OR 0.5 MG/DOSE,) 2 MG/1.5ML Subcutaneous Solution Pen-injector Inject 0.5 mg into the s Behavior normal.         Thought Content:  Thought content normal.          Wt Readings from Last 6 Encounters:  05/10/21 : 224 lb 6.4 oz (101.8 kg)  04/13/21 : 225 lb (102.1 kg)  01/04/21 : 227 lb 14.4 oz (103.4 kg)  01/03/21 : 230 lb (104.3 kg)  11/23/20

## 2021-05-20 NOTE — PROGRESS NOTES
HPI/Subjective:   Patient ID: Vargas Britt is a 48year old male. [facility]  Peyman Calvillo is seen today at the request of RATNA Muhammad to discuss self-limited abdominal symptoms and an abnormal gallbladder ultrasound.   The patient is known to Dr. Farrah Watkins 227 lb 14.4 oz (103.4 kg)  01/03/21 : 230 lb (104.3 kg)  11/23/20 : 226 lb 12.8 oz (102.9 kg)      Current Outpatient Medications   Medication Sig Dispense Refill   • metFORMIN HCl 500 MG Oral Tab Take 1,000 mg by mouth 2 (two) times daily with meals. Appearance: He is well-developed. HENT:      Mouth/Throat:      Pharynx: No oropharyngeal exudate. Eyes:      General: No scleral icterus. Conjunctiva/sclera: Conjunctivae normal.   Neck:      Thyroid: No thyromegaly.    Cardiovascular:      Rate an Finalized by (CST): Griselda Carvajal MD on 1/04/2021 at 4:44 PM                Component      Latest Ref Rng & Units 1/4/2021   WBC      4.0 - 11.0 x10(3) uL 6.6   RBC      4.30 - 5.70 x10(6)uL 4.90   Hemoglobin      13.0 - 17.5 g/dL 15.2   Hemato U/L 39   TROPONIN      <0.045 ng/mL <0.045   D-Dimer      <0.54 ug/mL FEU 0.27       Assessment & Plan:   Biliary sludge determined by ultrasound  (primary encounter diagnosis)  In the January the patient developed the abrupt onset of vomiting and diarrhea

## 2021-05-21 NOTE — PATIENT INSTRUCTIONS
1.  I do not believe that your episode in January was due to the biliary sludge. 2.  I would not recommend gallbladder removal at this time.   3.  Please contact us if you develop any recurrent symptoms of diarrhea and/or vomiting or if you develop episode

## 2021-07-08 RX ORDER — SEMAGLUTIDE 1.34 MG/ML
INJECTION, SOLUTION SUBCUTANEOUS
Qty: 1.5 ML | Refills: 0 | Status: SHIPPED | OUTPATIENT
Start: 2021-07-08 | End: 2021-10-01 | Stop reason: SINTOL

## 2021-07-19 RX ORDER — SIMVASTATIN 20 MG
20 TABLET ORAL EVERY EVENING
Qty: 90 TABLET | Refills: 1 | Status: SHIPPED | OUTPATIENT
Start: 2021-07-19 | End: 2022-01-03

## 2021-07-19 NOTE — TELEPHONE ENCOUNTER
[facility]. Requested Prescriptions   Pending Prescriptions Disp Refills    SIMVASTATIN 20 MG Oral Tab [Pharmacy Med Name: SIMVASTATIN 20MG TABLETS] 90 tablet 0     Sig: TAKE 1 TABLET(20 MG) BY MOUTH EVERY EVENING        Cholesterol Medication Protocol Passed - 7/19/2021  3:11 AM        Passed - ALT in past 12 months        Passed - LDL in past 12 months        Passed - Last ALT < 80     Lab Results   Component Value Date    ALT 46 01/04/2021             Passed - Last LDL < 130     Lab Results   Component Value Date    LDL 57 05/07/2021             Passed - Appointment in past 12 or next 3 months              Future Appointments         Provider Department Appt Notes    In 1 month Siva Eaton MD [facility], [place], [place] Followup: on 1mg of Ozempic since June. Need blood lab order.             Recent Outpatient Visits              2 months ago Type 2 diabetes mellitus without complication, without long-term current use of insulin [facility])    [facility], Heladio Borja Ramsey Jarred, MD    Office Visit    2 months ago Biliary sludge determined by ultrasound    [facility], [address] [place] [address], Sean Pantoja MD    Office Visit    3 months ago Type 2 diabetes mellitus without complication, without long-term current use of insulin [facility])    [facility] [address] [place]    Office Visit    3 months ago Abnormal findings on diagnostic imaging of cardiovascular system    [facility] Kecia Harris MD    Office Visit    6 months ago Right upper quadrant abdominal pain    Emy Giles [place], APRN    Office Visit

## 2021-07-25 RX ORDER — SEMAGLUTIDE 1.34 MG/ML
1 INJECTION, SOLUTION SUBCUTANEOUS
Qty: 3 ML | Refills: 1 | Status: SHIPPED | OUTPATIENT
Start: 2021-07-25 | End: 2021-10-01 | Stop reason: SINTOL

## 2021-07-25 NOTE — TELEPHONE ENCOUNTER
Please review. Protocol Failed or has No Protocol.     Requested Prescriptions   Pending Prescriptions Disp Refills    OZEMPIC, 1 MG/DOSE, 4 MG/3ML Subcutaneous Solution Pen-injector [Pharmacy Med Name: OZEMPIC 1MG PER DOSE (1X4MG PEN)] 3 mL 0     Sig: Reymundo Martinez

## 2021-08-10 RX ORDER — SEMAGLUTIDE 1.34 MG/ML
1 INJECTION, SOLUTION SUBCUTANEOUS
Qty: 3 ML | Refills: 1 | Status: CANCELLED | OUTPATIENT
Start: 2021-08-10

## 2021-09-27 ENCOUNTER — PATIENT MESSAGE (OUTPATIENT)
Dept: INTERNAL MEDICINE CLINIC | Facility: CLINIC | Age: 56
End: 2021-09-27

## 2021-09-27 ENCOUNTER — TELEPHONE (OUTPATIENT)
Dept: INTERNAL MEDICINE CLINIC | Facility: CLINIC | Age: 56
End: 2021-09-27

## 2021-09-27 DIAGNOSIS — E11.9 TYPE 2 DIABETES MELLITUS WITHOUT COMPLICATION, WITHOUT LONG-TERM CURRENT USE OF INSULIN (HCC): Primary | ICD-10-CM

## 2021-09-27 NOTE — TELEPHONE ENCOUNTER
Spoke to patient. He has mild cramping and mild pain on the left side under his ribs. Pain started last week. His Ozempic was increased to 1000 mg about 3 months ago.  He is supposed to take it tomorrow and wants to know if he should discontinue medication

## 2021-09-27 NOTE — TELEPHONE ENCOUNTER
----- Message from Nate Shelton sent at 9/27/2021  9:52 AM CDT -----  Regarding: Ozempic Issues. .. Hi Dr Loco Hicks,    I developed cramping and minor pain beneath my left-side front rib last week.  The cramping came and went during exertions, but th

## 2021-09-27 NOTE — TELEPHONE ENCOUNTER
patient should continue taking ozempic. If he has any persisting chest discomfort, he should go to the emergency room and be evaluated. He has multiple risk factors for heart disease.

## 2021-09-27 NOTE — TELEPHONE ENCOUNTER
Glenys Cavanaugh pt was called and informed of your message below and he verbalized understanding. He will go to ER if he has chest discomfort that persist. Pt stated that he will think about it if he will continue it or not taking the Ozempic.     Pt mentioned th

## 2021-09-27 NOTE — TELEPHONE ENCOUNTER
See telephone encounter on  9/27/21. From: Yolis Clark  To: Shayne Bamberger, MD  Sent: 9/27/2021  9:52 AM CDT  Subject: Ozempic Issues. .. Hi Dr Grajeda Plan,    I developed cramping and minor pain beneath my left-side front rib last week.

## 2021-09-28 NOTE — TELEPHONE ENCOUNTER
Spoke to patient and relayed Dr. Katie Salcedo message below. Informed patient about the fasting labs in the system. He would like to see Dr. Chris Fields on Friday at 1:20 PM. Slot was opened.      Future Appointments   [facility]

## 2021-09-28 NOTE — TELEPHONE ENCOUNTER
Lab order generated. Okay to get the patient in quicker if there is any open slots. We could potentially get him in this Friday at 120 if he wants.

## 2021-09-29 ENCOUNTER — LAB ENCOUNTER (OUTPATIENT)
Dept: LAB | Facility: HOSPITAL | Age: 56
End: 2021-09-29
Attending: INTERNAL MEDICINE
Payer: COMMERCIAL

## 2021-09-29 DIAGNOSIS — E11.9 TYPE 2 DIABETES MELLITUS WITHOUT COMPLICATION, WITHOUT LONG-TERM CURRENT USE OF INSULIN (HCC): ICD-10-CM

## 2021-09-29 PROCEDURE — 80053 COMPREHEN METABOLIC PANEL: CPT

## 2021-09-29 PROCEDURE — 36415 COLL VENOUS BLD VENIPUNCTURE: CPT

## 2021-09-29 PROCEDURE — 3051F HG A1C>EQUAL 7.0%<8.0%: CPT | Performed by: INTERNAL MEDICINE

## 2021-09-29 PROCEDURE — 80061 LIPID PANEL: CPT

## 2021-09-29 PROCEDURE — 83036 HEMOGLOBIN GLYCOSYLATED A1C: CPT

## 2021-10-01 ENCOUNTER — OFFICE VISIT (OUTPATIENT)
Dept: INTERNAL MEDICINE CLINIC | Facility: CLINIC | Age: 56
End: 2021-10-01
Payer: COMMERCIAL

## 2021-10-01 VITALS
TEMPERATURE: 98 F | BODY MASS INDEX: 28.49 KG/M2 | WEIGHT: 215 LBS | HEIGHT: 73 IN | HEART RATE: 96 BPM | DIASTOLIC BLOOD PRESSURE: 74 MMHG | SYSTOLIC BLOOD PRESSURE: 112 MMHG | RESPIRATION RATE: 20 BRPM

## 2021-10-01 DIAGNOSIS — E11.9 TYPE 2 DIABETES MELLITUS WITHOUT COMPLICATION, WITHOUT LONG-TERM CURRENT USE OF INSULIN (HCC): Primary | ICD-10-CM

## 2021-10-01 PROCEDURE — 3008F BODY MASS INDEX DOCD: CPT | Performed by: INTERNAL MEDICINE

## 2021-10-01 PROCEDURE — 3074F SYST BP LT 130 MM HG: CPT | Performed by: INTERNAL MEDICINE

## 2021-10-01 PROCEDURE — 3078F DIAST BP <80 MM HG: CPT | Performed by: INTERNAL MEDICINE

## 2021-10-01 PROCEDURE — 99214 OFFICE O/P EST MOD 30 MIN: CPT | Performed by: INTERNAL MEDICINE

## 2021-10-01 RX ORDER — LISINOPRIL 40 MG/1
TABLET ORAL
COMMUNITY
Start: 2021-05-12 | End: 2021-10-01

## 2021-10-01 NOTE — PROGRESS NOTES
HPI:    Patient ID: Mikayla Menezes is a 55year old male. HPI  Patient is here for follow-up on chronic medical issues. We last saw him back on May 12. At that time A1c still elevated at 8.0. We increased the Ozempic to 1.0 mg/week.   Was h fracture of head of left humerus, initial encounter     Right upper quadrant pain     Glaucoma suspect, bilateral         HISTORY:  Past Medical History:   Diagnosis Date   • Chest pain 2004,2010    normal stress nuclear; normal stress echo   • Colon polyp 0.5MG INTO THE SKIN EVERY 7 DAYS 1.5 mL 0   • amLODIPine Besylate 5 MG Oral Tab Take 1 tablet (5 mg total) by mouth daily. 30 tablet 5   • hydrochlorothiazide 12.5 MG Oral Tab Take 1 tablet (12.5 mg total) by mouth daily.  30 tablet 5   • lisinopril 40 MG O Effort: Pulmonary effort is normal.      Breath sounds: Normal breath sounds. No wheezing or rales. Abdominal:      General: Bowel sounds are normal.      Palpations: Abdomen is soft. There is no mass. Tenderness:  There is no abdominal tenderne vaccine. He has been very resistant to getting it and continues to decline at this time. Advised him that he is at high risk given his age, weight, and diabetes. He certainly could have a complicated course of Covid if he were to get it.          Meds Th

## 2021-11-01 RX ORDER — LISINOPRIL 40 MG/1
40 TABLET ORAL DAILY
Qty: 90 TABLET | Refills: 1 | Status: SHIPPED | OUTPATIENT
Start: 2021-11-01 | End: 2022-01-03

## 2021-11-01 RX ORDER — AMLODIPINE BESYLATE 5 MG/1
5 TABLET ORAL DAILY
Qty: 90 TABLET | Refills: 1 | Status: SHIPPED | OUTPATIENT
Start: 2021-11-01 | End: 2022-01-03

## 2021-11-01 RX ORDER — HYDROCHLOROTHIAZIDE 12.5 MG/1
12.5 TABLET ORAL DAILY
Qty: 90 TABLET | Refills: 1 | Status: SHIPPED | OUTPATIENT
Start: 2021-11-01 | End: 2022-01-03

## 2021-11-01 NOTE — TELEPHONE ENCOUNTER
Refill passed per [facility] protocol.     Requested Prescriptions   Pending Prescriptions Disp Refills    HYDROCHLOROTHIAZIDE 12.5 MG Oral Tab [Pharmacy Med Name: HYDROCHLOROTHIAZIDE 12.5MG TABLETS] 30 tablet 5     Sig: TAKE 1 TABLET(12.5 MG) BY MOUTH complication, without long-term current use of insulin [facility])    [address], [address], Charley Leija MD    Office Visit    5 months ago Biliary sludge determined by ultrasound    [address], [address] [place], [place]

## 2021-11-27 ENCOUNTER — HOSPITAL ENCOUNTER (OUTPATIENT)
Age: 56
Discharge: HOME | End: 2021-11-27
Payer: COMMERCIAL

## 2021-11-27 ENCOUNTER — NURSE TRIAGE (OUTPATIENT)
Dept: INTERNAL MEDICINE CLINIC | Facility: CLINIC | Age: 56
End: 2021-11-27

## 2021-11-27 VITALS
BODY MASS INDEX: 29.12 KG/M2 | HEIGHT: 72 IN | TEMPERATURE: 98 F | HEART RATE: 106 BPM | OXYGEN SATURATION: 99 % | SYSTOLIC BLOOD PRESSURE: 145 MMHG | WEIGHT: 215 LBS | RESPIRATION RATE: 16 BRPM | DIASTOLIC BLOOD PRESSURE: 79 MMHG

## 2021-11-27 DIAGNOSIS — R59.1 LYMPHADENOPATHY: Primary | ICD-10-CM

## 2021-11-27 PROCEDURE — 99213 OFFICE O/P EST LOW 20 MIN: CPT | Performed by: NURSE PRACTITIONER

## 2021-11-27 RX ORDER — ASPIRIN 81 MG/1
81 TABLET ORAL DAILY
COMMUNITY

## 2021-11-27 RX ORDER — CEPHALEXIN 500 MG/1
500 CAPSULE ORAL 4 TIMES DAILY
Qty: 28 CAPSULE | Refills: 0 | Status: SHIPPED | OUTPATIENT
Start: 2021-11-27 | End: 2021-12-04

## 2021-11-27 NOTE — TELEPHONE ENCOUNTER
Action Requested: Summary for Provider     []  Critical Lab, Recommendations Needed  [] Need Additional Advice  []   FYI    []   Need Orders  [] Need Medications Sent to Pharmacy  []  Other     SUMMARY:  Patient noticed a \"lump\" to right medial upper leg

## 2021-11-27 NOTE — ED PROVIDER NOTES
Patient Seen in: [facility]      History   Patient presents with:  Skin Problem    Stated Complaint: skin problem     Subjective: The history is provided by the patient. Abscess   This is a new problem.  The current episode started more t anorexia, diarrhea and vomiting. Genitourinary: Negative. Musculoskeletal: Negative. Skin: Negative. Neurological: Negative. Psychiatric/Behavioral: Negative.         Positive for stated complaint: skin problem   Other systems are as noted in Deep Tendon Reflexes: Reflexes are normal and symmetric. Psychiatric:         Behavior: Behavior normal.         Thought Content:  Thought content normal.         Judgment: Judgment normal.             ED Course   Labs Reviewed - No data to display am    Follow-up:  Hakan Logan MD  [address] [place] [address]  [phone]    In 2 days            Medications Prescribed:  Discharge Medication List as of 11/27/2021 11:43 AM    START taking these medications    cephalexin 500 MG Oral

## 2021-11-27 NOTE — ED INITIAL ASSESSMENT (HPI)
Pt c/o lump to R groin area x2 weeks. States stuck a needle to area 4 days ago and had a drop of blood from area. States bruising to area since. No pus drainage. No fever. Exam deferred to provider. Awake/alert.  Breathing easy and even without distres

## 2021-11-29 ENCOUNTER — OFFICE VISIT (OUTPATIENT)
Dept: INTERNAL MEDICINE CLINIC | Facility: CLINIC | Age: 56
End: 2021-11-29
Payer: COMMERCIAL

## 2021-11-29 ENCOUNTER — OFFICE VISIT (OUTPATIENT)
Dept: SURGERY | Facility: CLINIC | Age: 56
End: 2021-11-29
Payer: COMMERCIAL

## 2021-11-29 ENCOUNTER — TELEPHONE (OUTPATIENT)
Dept: INTERNAL MEDICINE CLINIC | Facility: CLINIC | Age: 56
End: 2021-11-29

## 2021-11-29 VITALS
HEART RATE: 81 BPM | BODY MASS INDEX: 29.26 KG/M2 | DIASTOLIC BLOOD PRESSURE: 81 MMHG | HEIGHT: 72 IN | SYSTOLIC BLOOD PRESSURE: 137 MMHG | WEIGHT: 216 LBS

## 2021-11-29 VITALS
HEIGHT: 72 IN | TEMPERATURE: 99 F | RESPIRATION RATE: 20 BRPM | WEIGHT: 216.81 LBS | HEART RATE: 100 BPM | DIASTOLIC BLOOD PRESSURE: 78 MMHG | SYSTOLIC BLOOD PRESSURE: 116 MMHG | BODY MASS INDEX: 29.36 KG/M2

## 2021-11-29 DIAGNOSIS — L02.214 INGUINAL ABSCESS: Primary | ICD-10-CM

## 2021-11-29 DIAGNOSIS — L02.214 ABSCESS OF GROIN, RIGHT: Primary | ICD-10-CM

## 2021-11-29 PROCEDURE — 3078F DIAST BP <80 MM HG: CPT | Performed by: INTERNAL MEDICINE

## 2021-11-29 PROCEDURE — 3075F SYST BP GE 130 - 139MM HG: CPT | Performed by: SURGERY

## 2021-11-29 PROCEDURE — 3074F SYST BP LT 130 MM HG: CPT | Performed by: INTERNAL MEDICINE

## 2021-11-29 PROCEDURE — 99213 OFFICE O/P EST LOW 20 MIN: CPT | Performed by: INTERNAL MEDICINE

## 2021-11-29 PROCEDURE — 3008F BODY MASS INDEX DOCD: CPT | Performed by: INTERNAL MEDICINE

## 2021-11-29 PROCEDURE — 3079F DIAST BP 80-89 MM HG: CPT | Performed by: SURGERY

## 2021-11-29 PROCEDURE — 3008F BODY MASS INDEX DOCD: CPT | Performed by: SURGERY

## 2021-11-29 PROCEDURE — 10061 I&D ABSCESS COMP/MULTIPLE: CPT | Performed by: SURGERY

## 2021-11-29 PROCEDURE — 99243 OFF/OP CNSLTJ NEW/EST LOW 30: CPT | Performed by: SURGERY

## 2021-11-29 RX ORDER — CLINDAMYCIN HYDROCHLORIDE 300 MG/1
300 CAPSULE ORAL 3 TIMES DAILY
Qty: 21 CAPSULE | Refills: 0 | Status: SHIPPED | OUTPATIENT
Start: 2021-11-29 | End: 2022-01-03

## 2021-11-29 RX ORDER — HYDROCODONE BITARTRATE AND ACETAMINOPHEN 5; 325 MG/1; MG/1
1 TABLET ORAL EVERY 4 HOURS PRN
Qty: 10 TABLET | Refills: 0 | Status: SHIPPED | OUTPATIENT
Start: 2021-11-29 | End: 2022-01-03

## 2021-11-29 RX ORDER — LEVOFLOXACIN 500 MG/1
500 TABLET, FILM COATED ORAL DAILY
Qty: 7 TABLET | Refills: 0 | Status: SHIPPED | OUTPATIENT
Start: 2021-11-29 | End: 2022-01-03

## 2021-11-29 NOTE — PROCEDURES
Surgery procedure note    Timeout performed and consent signed. Right inguinal crease prepped and draped with Betadine in a sterile fashion. 1% lidocaine with epinephrine is infiltrated. A 2 cm incision is made with a 15 blade scalpel.   This is carried

## 2021-11-29 NOTE — TELEPHONE ENCOUNTER
Patient went to [facility] on Saturday. Prescribed antibiotics, taking as prescribed. Reports he was diagnosed with a swollen lymph nodes which is now worse and has become increasingly painful. Skin surface is red. Afebrile.  \"feels like diaper rash\", having d

## 2021-11-29 NOTE — TELEPHONE ENCOUNTER
Okay to double book in the afternoon today, as early as possible. Advise patient that this is a double booked visit and will be quick and focused on this 1 problem only.

## 2021-11-29 NOTE — PROGRESS NOTES
HPI:    Patient ID: Jayshree Muller is a 55year old male. HPI  Patient is here with infection in the right groin area. Started as a lump in the right groin about a while ago. Is not changing in size.   None the patient cleaned the area up an 11/15/2016    Procedure: COLONOSCOPY;  Surgeon: Berenice Reardon MD;  Location: [address] [place] [address] ENDOSCOPY   • SHOULDER ARTHROSCOPY Left 04/28/2020      Family History   Problem Relation Age of Onset   • Cancer Paternal Grandfather         Colon Ca   • [facility] glucose daily. E11.9 (Patient taking differently: Test blood glucose daily. E11.9) 100 strip 3   • Lancets Does not apply Misc Test blood glucose daily. E11.9 (Patient taking differently: Test blood glucose daily.   E11.9) 100 each 3   • UNIFINE PE Keflex. May need to go on something with additional coverage such as Augmentin or other options.   - SURGERY - INTERNAL         Meds This Visit:  Requested Prescriptions      No prescriptions requested or ordered in this encounter       Imaging & Referrals

## 2021-11-29 NOTE — TELEPHONE ENCOUNTER
Called patient to ask him if we should cancel his 7:30 am appt with Dr. Johana Christensen for tomorrow. He stated, \"yes, please. \"

## 2021-11-29 NOTE — H&P
History and Physical      HPI   Patient presents with:  Abscess: Pt referred by Dr. French Cleveland regarding right groin abscess 5 days ago. Pt states he thought he had an ingrown hair and picked at it with a pin.         HPI  Piedad Torres is a 55 ye Take 1 tablet (20 mg total) by mouth every evening. 90 tablet 1   • Insulin Pen Needle (PEN NEEDLES) 32G X 4 MM Does not apply Misc 1 each by Does not apply route every 7 days.  30 each 3   • Cholecalciferol (VITAMIN D3) 25 MCG (1000 UT) Oral Cap Take 1 tab positives and negatives noted in the the HPI. PHYSICAL EXAM   /81   Pulse 81   Ht 6' (1.829 m)   Wt 216 lb (98 kg)   BMI 29.29 kg/m²  No LMP for male patient.    Constitutional: appears well hydrated alert and responsive no acute distress noted  He

## 2021-11-29 NOTE — PATIENT INSTRUCTIONS
Remove packing from right groin tomorrow and soak in a warm bath or warm shower. It is okay for the water to get into the abscess. Repack the hole with packing after the shower and cover with clean gauze.   Do this daily for the next week    Start antib

## 2021-12-03 ENCOUNTER — PATIENT MESSAGE (OUTPATIENT)
Dept: INTERNAL MEDICINE CLINIC | Facility: CLINIC | Age: 56
End: 2021-12-03

## 2021-12-03 ENCOUNTER — TELEPHONE (OUTPATIENT)
Dept: INTERNAL MEDICINE CLINIC | Facility: CLINIC | Age: 56
End: 2021-12-03

## 2021-12-04 ENCOUNTER — HOSPITAL ENCOUNTER (OUTPATIENT)
Age: 56
Discharge: ANOTHER HOSPITAL | End: 2021-12-04
Payer: COMMERCIAL

## 2021-12-04 ENCOUNTER — HOSPITAL ENCOUNTER (EMERGENCY)
Facility: HOSPITAL | Age: 56
Discharge: HOME | End: 2021-12-04
Attending: EMERGENCY MEDICINE
Payer: COMMERCIAL

## 2021-12-04 VITALS
HEIGHT: 72 IN | TEMPERATURE: 98 F | WEIGHT: 216 LBS | SYSTOLIC BLOOD PRESSURE: 133 MMHG | HEART RATE: 84 BPM | RESPIRATION RATE: 16 BRPM | BODY MASS INDEX: 29.26 KG/M2 | DIASTOLIC BLOOD PRESSURE: 87 MMHG | OXYGEN SATURATION: 99 %

## 2021-12-04 VITALS
OXYGEN SATURATION: 99 % | SYSTOLIC BLOOD PRESSURE: 142 MMHG | WEIGHT: 217 LBS | TEMPERATURE: 98 F | BODY MASS INDEX: 29.39 KG/M2 | RESPIRATION RATE: 18 BRPM | HEIGHT: 72 IN | HEART RATE: 92 BPM | DIASTOLIC BLOOD PRESSURE: 85 MMHG

## 2021-12-04 DIAGNOSIS — R73.9 HYPERGLYCEMIA: Primary | ICD-10-CM

## 2021-12-04 PROCEDURE — 85025 COMPLETE CBC W/AUTO DIFF WBC: CPT | Performed by: EMERGENCY MEDICINE

## 2021-12-04 PROCEDURE — 99213 OFFICE O/P EST LOW 20 MIN: CPT | Performed by: NURSE PRACTITIONER

## 2021-12-04 PROCEDURE — 82962 GLUCOSE BLOOD TEST: CPT

## 2021-12-04 PROCEDURE — 96360 HYDRATION IV INFUSION INIT: CPT

## 2021-12-04 PROCEDURE — 81001 URINALYSIS AUTO W/SCOPE: CPT | Performed by: EMERGENCY MEDICINE

## 2021-12-04 PROCEDURE — 80048 BASIC METABOLIC PNL TOTAL CA: CPT | Performed by: EMERGENCY MEDICINE

## 2021-12-04 PROCEDURE — 96361 HYDRATE IV INFUSION ADD-ON: CPT

## 2021-12-04 PROCEDURE — 99284 EMERGENCY DEPT VISIT MOD MDM: CPT

## 2021-12-04 PROCEDURE — 82962 GLUCOSE BLOOD TEST: CPT | Performed by: NURSE PRACTITIONER

## 2021-12-04 NOTE — ED PROVIDER NOTES
Patient Seen in: [facility]      History   Patient presents with:  Hyperglycemia    Stated Complaint: High blood sugars - possibly due to infection/meds    Subjective: The history is provided by the patient.      Patient presents to the imm Used    Vaping Use      Vaping Use: Never used    Alcohol use: Not Currently      Alcohol/week: 0.0 standard drinks    Drug use: No             Review of Systems   Constitutional: Negative. HENT: Negative. Eyes: Negative. Respiratory: Negative. Reflexes: Reflexes are normal and symmetric. Psychiatric:         Behavior: Behavior normal.         Thought Content:  Thought content normal.         Judgment: Judgment normal.              ED Course     Labs Reviewed   POCT GLUCOSE - Abnormal; Notable f

## 2021-12-04 NOTE — ED PROVIDER NOTES
Patient Seen in: [facility] AND [facility] Emergency Department      History   Patient presents with:  Hyperglycemia    Stated Complaint: hyperglycemia    Subjective:   HPI  Patient is a 55-year-old male history of diabetes on Metformin, hypertension, hyperlipid reviewed and negative except as noted above.     Physical Exam     ED Triage Vitals   BP 12/04/21 0950 135/83   Pulse 12/04/21 0950 86   Resp 12/04/21 0950 20   Temp 12/04/21 0950 98.1 °F (36.7 °C)   Temp src --    SpO2 12/04/21 0950 98 %   O2 Device 12/04/ CULTURE REFLEX - Abnormal; Notable for the following components:    Glucose Urine >=500 (*)     Ketones Urine 20  (*)     Squamous Epi.  Cells Few (*)     All other components within normal limits   POCT GLUCOSE - Abnormal; Notable for the following compone [id number]  [phone]    Call            Medications Prescribed:  Discharge Medication List as of 12/4/2021  1:40 PM

## 2021-12-04 NOTE — TELEPHONE ENCOUNTER
----- Message from Luis Armando Shelton sent at 12/3/2021  4:39 PM CST -----  Regarding: Melissa Astudillo Dr,    I woke up this morning with a BSL of 317. Admittedly, I ate a couple chocolate chip cookies last night -- something I don't ever do.     I

## 2021-12-04 NOTE — TELEPHONE ENCOUNTER
From: Gibson Wren  To: Wang Yu MD  Sent: 12/3/2021 4:39 PM CST  Subject: Aaron Michelle Dr,    I woke up this morning with a BSL of 317. Admittedly, I ate a couple chocolate chip cookies last night -- something I don't ever do.

## 2021-12-04 NOTE — ED INITIAL ASSESSMENT (HPI)
Pt reports he has had high blood sugars he states he did have an abscess drained and is on antibiotic. Pt states his blood sugars have been 350s. He has no other symptoms but is concerned how high it has been these past few days.

## 2021-12-06 ENCOUNTER — PATIENT MESSAGE (OUTPATIENT)
Dept: INTERNAL MEDICINE CLINIC | Facility: CLINIC | Age: 56
End: 2021-12-06

## 2021-12-06 ENCOUNTER — TELEPHONE (OUTPATIENT)
Dept: ENDOCRINOLOGY CLINIC | Facility: CLINIC | Age: 56
End: 2021-12-06

## 2021-12-06 ENCOUNTER — NURSE ONLY (OUTPATIENT)
Dept: SURGERY | Facility: CLINIC | Age: 56
End: 2021-12-06
Payer: COMMERCIAL

## 2021-12-06 VITALS — WEIGHT: 216 LBS | BODY MASS INDEX: 29 KG/M2

## 2021-12-06 PROCEDURE — A6266 IMPREG GAUZE NO H20/SAL/YARD: HCPCS | Performed by: SURGERY

## 2021-12-06 NOTE — TELEPHONE ENCOUNTER
Called patient back who states he will call back as he is currently at a doctor's appointment. CSS -- please assist in making first available appointment (see message below) with Emmanuelle Del Cid. He will be seen for diabetes, post ER visit. Thank you.

## 2021-12-06 NOTE — TELEPHONE ENCOUNTER
OSKAR Chen saw you on 10/1/21 for DM, was in [address] [place] [address] ER on 12/4. His BS was 347 upon arrival on POCT.

## 2021-12-06 NOTE — TELEPHONE ENCOUNTER
From: Shyanne Collins  To: Alberto Giraldo MD  Sent: 12/3/2021 4:39 PM CST  Subject: Tomer Watkins Dr,    I woke up this morning with a BSL of 317. Admittedly, I ate a couple chocolate chip cookies last night -- something I don't ever do.

## 2021-12-07 ENCOUNTER — TELEPHONE (OUTPATIENT)
Dept: INTERNAL MEDICINE CLINIC | Facility: CLINIC | Age: 56
End: 2021-12-07

## 2021-12-07 ENCOUNTER — TELEMEDICINE (OUTPATIENT)
Dept: INTERNAL MEDICINE CLINIC | Facility: CLINIC | Age: 56
End: 2021-12-07

## 2021-12-07 DIAGNOSIS — E11.9 TYPE 2 DIABETES MELLITUS WITHOUT COMPLICATION, WITHOUT LONG-TERM CURRENT USE OF INSULIN (HCC): Primary | ICD-10-CM

## 2021-12-07 PROCEDURE — 99213 OFFICE O/P EST LOW 20 MIN: CPT | Performed by: NURSE PRACTITIONER

## 2021-12-07 RX ORDER — INSULIN GLARGINE 100 [IU]/ML
10 INJECTION, SOLUTION SUBCUTANEOUS NIGHTLY
Qty: 36 ML | Refills: 0 | Status: SHIPPED | OUTPATIENT
Start: 2021-12-07 | End: 2021-12-19

## 2021-12-07 NOTE — PROGRESS NOTES
HPI:    Patient ID: Yessica Morrow is a 55year old male. Please note that the following visit was completed using two-way, real-time interactive audio and video communication.   This has been done in good margaret to provide continuity of care in unspecified type diabetes mellitus without mention of complication, not stated as uncontrolled    • Unspecified essential hypertension       Past Surgical History:   Procedure Laterality Date   • Colonoscopy N/A 11/15/2016    Procedure: COLONOSCOPY;  Surge Medication Sig Dispense Refill   • insulin glargine (LANTUS) 100 UNIT/ML Subcutaneous Solution Inject 10 Units into the skin nightly. 36 mL 0   • clindamycin 300 MG Oral Cap Take 1 capsule (300 mg total) by mouth 3 (three) times daily.  21 capsule 0   • l Glucose Monitoring Suppl (TRUE METRIX GO GLUCOSE METER) W/DEVICE Does not apply Kit 1 Stick by Does not apply route daily.  E11.9 1 kit 0     Allergies:  Sulfa Antibiotics       UNKNOWN  Sulfanilamide               Comment:Other reaction(s): Hives   PHYSICA Value Date     (H) 09/29/2021    A1C 7.9 (H) 09/29/2021      Lab Results   Component Value Date    CHOLEST 127 09/29/2021    TRIG 91 09/29/2021    HDL 46 09/29/2021    LDL 64 09/29/2021    VLDL 14 09/29/2021    NONHDLC 81 09/29/2021    CALCNONHDL 95 nightly.        Imaging & Referrals:  DIABETIC EDUCATION - INTERNAL         Gloria Mendez, APRN

## 2021-12-07 NOTE — TELEPHONE ENCOUNTER
Patient will be starting on Lantus 10 units. Please pend order for insulin needles to inject lantus. 10 units nightly.     JUANCARLOS Ayon  Working with Geovanna Freeman

## 2021-12-07 NOTE — ASSESSMENT & PLAN NOTE
55year old male with hx of diabetes. He had a boil in his right groin and decided to tico it himself. The lump became infected and he went to [facility] He followed up with Dr. Qasim Smallwood and the wound had worsened.  Increased redness and swelling.. Dr. Qasim Smallwood se

## 2021-12-10 ENCOUNTER — TELEPHONE (OUTPATIENT)
Dept: INTERNAL MEDICINE CLINIC | Facility: CLINIC | Age: 56
End: 2021-12-10

## 2021-12-10 ENCOUNTER — APPOINTMENT (OUTPATIENT)
Dept: ENDOCRINOLOGY | Facility: HOSPITAL | Age: 56
End: 2021-12-10
Attending: NURSE PRACTITIONER
Payer: COMMERCIAL

## 2021-12-11 NOTE — TELEPHONE ENCOUNTER
On my basket. Did not see Dr. Tammi Aguirre resonded. Reiterated the information.     JUANCARLOS Fallon  Working with Maximo Tinajero

## 2021-12-14 ENCOUNTER — PATIENT MESSAGE (OUTPATIENT)
Dept: INTERNAL MEDICINE CLINIC | Facility: CLINIC | Age: 56
End: 2021-12-14

## 2021-12-15 NOTE — TELEPHONE ENCOUNTER
From: Johana Araiza  To: RATNA Reno  Sent: 12/14/2021 10:46 AM CST  Subject: Lantus and Waking Blood Sugars    Hi Sanford Curling,    I've been at 15 units of nightly dose and my last three waking blood sugar levels were: 181, 185 and 253 (this mo

## 2021-12-16 ENCOUNTER — NURSE TRIAGE (OUTPATIENT)
Dept: INTERNAL MEDICINE CLINIC | Facility: CLINIC | Age: 56
End: 2021-12-16

## 2021-12-16 ENCOUNTER — PATIENT MESSAGE (OUTPATIENT)
Dept: INTERNAL MEDICINE CLINIC | Facility: CLINIC | Age: 56
End: 2021-12-16

## 2021-12-16 NOTE — TELEPHONE ENCOUNTER
From: Nicola Braun  To: RATNA Ayon  Sent: 12/16/2021  7:51 AM CST  Subject: Lantus and Waking Blood Sugar Levels    Javier Francis,    I didn't hear back from you or Dr Helga Doan, so I increased my Lantus to 18 units Tuesday night.     I woke up

## 2021-12-16 NOTE — TELEPHONE ENCOUNTER
From: Cassidy Males  To: RATNA Glasgow  Sent: 12/16/2021 7:51 AM CST  Subject: Lantus and Waking Blood Sugar Levels    [facility],    I didn't hear back from you or Dr Baltazar Almodovar, so I increased my Lantus to 18 units Tuesday night.     I woke up

## 2021-12-16 NOTE — TELEPHONE ENCOUNTER
See mychart message,   Pt concern why he can't get BS down, 11 29/21 last OV-stated area is non tender

## 2021-12-17 NOTE — TELEPHONE ENCOUNTER
I agree with going up to the 20 units a day. It is not unexpected that he would need this much or possibly even more. Unless the infection site is getting worse or is having a fever, I doubt recurrent infection.   Advised him to keep in touch with the blo

## 2021-12-17 NOTE — TELEPHONE ENCOUNTER
Left message to pt to call back. Also mychart message with MD recommendation sent to pt.      Future Appointments   Date Time Provider Ana Dunn   1/3/2022  4:20 PM Mike Watts MD [facility]

## 2021-12-18 NOTE — TELEPHONE ENCOUNTER
Patient has read [employer] message    Medical advise  Message [phone]  From   Darby Medley RN To   Norah Dasilva and Delivered   12/17/2021  5:31 PM   Last Read in [address]   12/17/2021  5:50 PM by Patrick Stewart

## 2021-12-19 DIAGNOSIS — E11.9 TYPE 2 DIABETES MELLITUS WITHOUT COMPLICATION, WITHOUT LONG-TERM CURRENT USE OF INSULIN (HCC): ICD-10-CM

## 2021-12-20 RX ORDER — LANCETS 30 GAUGE
EACH MISCELLANEOUS
Qty: 100 EACH | Refills: 3 | Status: SHIPPED | OUTPATIENT
Start: 2021-12-20 | End: 2022-02-23

## 2021-12-20 RX ORDER — INSULIN GLARGINE 100 [IU]/ML
10 INJECTION, SOLUTION SUBCUTANEOUS NIGHTLY
Qty: 36 ML | Refills: 1 | Status: SHIPPED | OUTPATIENT
Start: 2021-12-20 | End: 2022-01-03

## 2021-12-20 NOTE — TELEPHONE ENCOUNTER
Please review. Protocol Failed / No Protocol. Requested Prescriptions   Pending Prescriptions Disp Refills    insulin glargine (LANTUS) 100 UNIT/ML Subcutaneous Solution 36 mL 0     Sig: Inject 10 Units into the skin nightly.         Diabetes Medication

## 2021-12-20 NOTE — TELEPHONE ENCOUNTER
[facility]. Requested Prescriptions   Pending Prescriptions Disp Refills    Glucose Blood In Vitro Strip 100 strip 3     Sig: Test blood glucose daily. E11.9        Diabetic Supplies Protocol Passed - 12/19/2021  5:16 PM        Passed - Appointment in past 12 or next 3 months           Lancets Does not apply Misc 100 each 3     Sig: Test blood glucose daily.   E11.9        Diabetic Supplies Protocol Passed - 12/19/2021  5:16 PM        Passed - Appointment in past 12 or next 3 months               Recent Outpatient Visits              1 week ago Type 2 diabetes mellitus without complication, without long-term current use of insulin [facility])    [facility], [address], Miracle Quevedo APRN    Telemedicine    2 weeks ago     [facility]    Nurse Only    3 weeks ago Inguinal abscess    [facility] Jossie Goodpasture, MD    Office Visit    3 weeks ago Abscess of groin, right    [facility], [address], Bebo Leija MD    Office Visit    2 months ago Type 2 diabetes mellitus without complication, without long-term current use of insulin [facility])    [facility], [address], Bebo Leija MD    Office Visit            Future Appointments         Provider Department Appt Notes    In 2 weeks Mariel Harris MD [facility], [address], [place] Determine success of losing weight, lowering A1C, managing BP & necessity of further Rx intervention

## 2021-12-22 ENCOUNTER — TELEPHONE (OUTPATIENT)
Dept: INTERNAL MEDICINE CLINIC | Facility: CLINIC | Age: 56
End: 2021-12-22

## 2021-12-22 RX ORDER — INSULIN GLARGINE 100 [IU]/ML
20 INJECTION, SOLUTION SUBCUTANEOUS NIGHTLY
Qty: 2 EACH | Refills: 5 | Status: SHIPPED | OUTPATIENT
Start: 2021-12-22 | End: 2022-01-03

## 2021-12-22 NOTE — TELEPHONE ENCOUNTER
Patient calling, identified name and , states insulin was increased to 20 units sub q daily and reports Pravin will not refill his insulin due to insurance until  and he juanita be out of insulin by then.       [employer], spoke with An

## 2022-01-03 ENCOUNTER — OFFICE VISIT (OUTPATIENT)
Dept: INTERNAL MEDICINE CLINIC | Facility: CLINIC | Age: 57
End: 2022-01-03
Payer: COMMERCIAL

## 2022-01-03 VITALS
DIASTOLIC BLOOD PRESSURE: 82 MMHG | TEMPERATURE: 98 F | WEIGHT: 219 LBS | HEIGHT: 72 IN | RESPIRATION RATE: 20 BRPM | BODY MASS INDEX: 29.66 KG/M2 | SYSTOLIC BLOOD PRESSURE: 132 MMHG | HEART RATE: 74 BPM

## 2022-01-03 DIAGNOSIS — L02.214 ABSCESS OF GROIN, RIGHT: ICD-10-CM

## 2022-01-03 DIAGNOSIS — I10 ESSENTIAL HYPERTENSION: ICD-10-CM

## 2022-01-03 DIAGNOSIS — E11.9 TYPE 2 DIABETES MELLITUS WITHOUT COMPLICATION, WITH LONG-TERM CURRENT USE OF INSULIN (HCC): Primary | ICD-10-CM

## 2022-01-03 DIAGNOSIS — Z79.4 TYPE 2 DIABETES MELLITUS WITHOUT COMPLICATION, WITH LONG-TERM CURRENT USE OF INSULIN (HCC): Primary | ICD-10-CM

## 2022-01-03 DIAGNOSIS — E78.5 HYPERLIPIDEMIA LDL GOAL <100: ICD-10-CM

## 2022-01-03 LAB
CARTRIDGE LOT#: 874 NUMERIC
HEMOGLOBIN A1C: 8.8 % (ref 4.3–5.6)

## 2022-01-03 PROCEDURE — 83036 HEMOGLOBIN GLYCOSYLATED A1C: CPT | Performed by: INTERNAL MEDICINE

## 2022-01-03 PROCEDURE — 3079F DIAST BP 80-89 MM HG: CPT | Performed by: INTERNAL MEDICINE

## 2022-01-03 PROCEDURE — 99214 OFFICE O/P EST MOD 30 MIN: CPT | Performed by: INTERNAL MEDICINE

## 2022-01-03 PROCEDURE — 3075F SYST BP GE 130 - 139MM HG: CPT | Performed by: INTERNAL MEDICINE

## 2022-01-03 PROCEDURE — 3052F HG A1C>EQUAL 8.0%<EQUAL 9.0%: CPT | Performed by: INTERNAL MEDICINE

## 2022-01-03 PROCEDURE — 3008F BODY MASS INDEX DOCD: CPT | Performed by: INTERNAL MEDICINE

## 2022-01-03 RX ORDER — LISINOPRIL 40 MG/1
40 TABLET ORAL DAILY
Qty: 90 TABLET | Refills: 1 | Status: CANCELLED | OUTPATIENT
Start: 2022-01-03

## 2022-01-03 RX ORDER — INSULIN GLARGINE 100 [IU]/ML
24 INJECTION, SOLUTION SUBCUTANEOUS NIGHTLY
Qty: 2 EACH | Refills: 5 | Status: SHIPPED | OUTPATIENT
Start: 2022-01-03

## 2022-01-03 RX ORDER — SIMVASTATIN 20 MG
20 TABLET ORAL EVERY EVENING
Qty: 90 TABLET | Refills: 1 | Status: CANCELLED | OUTPATIENT
Start: 2022-01-03

## 2022-01-03 RX ORDER — HYDROCHLOROTHIAZIDE 12.5 MG/1
12.5 TABLET ORAL DAILY
Qty: 90 TABLET | Refills: 1 | Status: CANCELLED | OUTPATIENT
Start: 2022-01-03

## 2022-01-03 RX ORDER — HYDROCHLOROTHIAZIDE 12.5 MG/1
12.5 TABLET ORAL DAILY
Qty: 90 TABLET | Refills: 1 | Status: SHIPPED | OUTPATIENT
Start: 2022-01-03

## 2022-01-03 RX ORDER — INSULIN GLARGINE 100 [IU]/ML
20 INJECTION, SOLUTION SUBCUTANEOUS NIGHTLY
COMMUNITY
Start: 2021-12-22 | End: 2022-01-03

## 2022-01-03 RX ORDER — AMLODIPINE BESYLATE 5 MG/1
5 TABLET ORAL DAILY
Qty: 90 TABLET | Refills: 1 | Status: CANCELLED | OUTPATIENT
Start: 2022-01-03

## 2022-01-03 RX ORDER — LISINOPRIL 40 MG/1
40 TABLET ORAL DAILY
Qty: 90 TABLET | Refills: 1 | Status: SHIPPED | OUTPATIENT
Start: 2022-01-03

## 2022-01-03 RX ORDER — SIMVASTATIN 20 MG
20 TABLET ORAL EVERY EVENING
Qty: 90 TABLET | Refills: 1 | Status: SHIPPED | OUTPATIENT
Start: 2022-01-03

## 2022-01-03 RX ORDER — AMLODIPINE BESYLATE 5 MG/1
5 TABLET ORAL DAILY
Qty: 90 TABLET | Refills: 1 | Status: SHIPPED | OUTPATIENT
Start: 2022-01-03

## 2022-01-03 NOTE — PROGRESS NOTES
HPI:    Patient ID: Delfina Botello is a 55year old male. HPI  Patient is here to discuss chronic medical issues as listed below. Last seen in the office on November 29 for abscess in the right groin.  Sent to the surgeon who did a incision an (meibomian gland dysfunction) 2008    OU   • Obesity (BMI 30-39. 9)    • Other and unspecified hyperlipidemia    • Type II or unspecified type diabetes mellitus without mention of complication, not stated as uncontrolled    • Unspecified essential hypertens each 3   • zinc sulfate 220 (50 Zn) MG Oral Cap Take 220 mg by mouth daily. • Blood Glucose Monitoring Suppl (TRUE METRIX GO GLUCOSE METER) W/DEVICE Does not apply Kit 1 Stick by Does not apply route daily.  E11.9 1 kit 0     Allergies:  Sulfa Antibioti months. Work on diet and exercise. Again considerable time spent discussing the Covid vaccines. Given his underlying diabetes, he is clearly at high risk for a complicated course if he should get COVID-19. I strongly believe he should get the vaccination.

## 2022-01-14 ENCOUNTER — PATIENT MESSAGE (OUTPATIENT)
Dept: INTERNAL MEDICINE CLINIC | Facility: CLINIC | Age: 57
End: 2022-01-14

## 2022-01-14 NOTE — TELEPHONE ENCOUNTER
Yamil Paul RN 1/14/2022 9:42 AM CST        ----- Message -----  From: Bonnie Louis  Sent: 1/14/2022 8:53 AM CST  To: Em Rn Triage  Subject: Lantus Rx Refill     Hi Doctor,    I'm having trouble refilling my Lantus pen at 24 units/night.

## 2022-01-29 ENCOUNTER — PATIENT MESSAGE (OUTPATIENT)
Dept: INTERNAL MEDICINE CLINIC | Facility: CLINIC | Age: 57
End: 2022-01-29

## 2022-01-29 NOTE — TELEPHONE ENCOUNTER
From: Padmini Moore  To: Daniel Carbajal MD  Sent: 1/29/2022 6:48 AM CST  Subject: Waking blood sugars. .. Hi Dr,    My WBS numbers are still trending pretty high.  Here they are for the last 26 days (the avg is 146):    Date WBS Previous Night's Units  1/4/22 138  24  1/5/22 109  33  1/6/22 158  24  1/7/22 160  24  1/8/22 miss  24  1/9/22 miss  24  1/10/22 147  24  1/11/22 157  24  1/12/22 140  24  1/13/22 132  24  1/14/22 131  24  1/15/22 miss  24  1/16/22 167  28  1/17/22 144  28  1/18/22 121  28  1/19/22 152  28  1/20/22 142  28  1/21/22 152  28  1/22/22 145  28  1/23/22 160  28  1/24/22 133 28  1/25/22 132  28  1/26/22 130  28  1/27/22 160  28  1/28/22 165  28  1/29/22 178  28

## 2022-02-14 ENCOUNTER — PATIENT MESSAGE (OUTPATIENT)
Dept: INTERNAL MEDICINE CLINIC | Facility: CLINIC | Age: 57
End: 2022-02-14

## 2022-02-14 RX ORDER — BLOOD SUGAR DIAGNOSTIC
STRIP MISCELLANEOUS
Qty: 100 STRIP | Refills: 3 | Status: SHIPPED | OUTPATIENT
Start: 2022-02-14 | End: 2022-03-01

## 2022-02-14 NOTE — TELEPHONE ENCOUNTER
Neva Lara, RN 2/14/2022 1:24 PM CST        ----- Message -----  From: Triston Sher  Sent: 2/14/2022 6:48 AM CST  To: Em Rn Triage  Subject: Glucose Meter Test Strips (Chuy Drew)     Javier Richardson,    My old glucose meter seems to have broken, so I received a new one and I need test strips for it. It's a Contour Next One. Can you submit an Rx for the strips today? Thank you.     Brayden Ernandez

## 2022-02-18 ENCOUNTER — PATIENT MESSAGE (OUTPATIENT)
Dept: INTERNAL MEDICINE CLINIC | Facility: CLINIC | Age: 57
End: 2022-02-18

## 2022-02-18 RX ORDER — SIMVASTATIN 20 MG
20 TABLET ORAL EVERY EVENING
Qty: 90 TABLET | Refills: 1 | Status: SHIPPED | OUTPATIENT
Start: 2022-02-18

## 2022-02-18 RX ORDER — HYDROCHLOROTHIAZIDE 12.5 MG/1
12.5 TABLET ORAL DAILY
Qty: 90 TABLET | Refills: 1 | Status: SHIPPED | OUTPATIENT
Start: 2022-02-18

## 2022-02-18 NOTE — TELEPHONE ENCOUNTER
From: Andrade Kellogg  To: Carrillo Panchal MD  Sent: 2/18/2022 1:25 PM CST  Subject: Rx Refill     Javier Richardson,    My Rx insurance provider (via my employer benefits) sent me a letter to switch to home delivery for my prescriptions and save. .. I enrolled online like they directed, and sent all my Rxs to them for future fulfillment. Some of my medications aren't due for refill yet. .. so they may reach out to you for approval.    Just wanted to give you a heads up. I'm not running low (yet) on anything, and have a couple weeks left of Lantus inj left.     Priscilla Martinez

## 2022-02-19 NOTE — TELEPHONE ENCOUNTER
Per chart review, scripts were sent to local pharm on 1/3/22. rx's now sent to mail order as requested.

## 2022-02-23 ENCOUNTER — TELEPHONE (OUTPATIENT)
Dept: INTERNAL MEDICINE CLINIC | Facility: CLINIC | Age: 57
End: 2022-02-23

## 2022-02-23 RX ORDER — LISINOPRIL 40 MG/1
40 TABLET ORAL DAILY
Qty: 90 TABLET | Refills: 1 | Status: SHIPPED | OUTPATIENT
Start: 2022-02-23

## 2022-02-23 RX ORDER — AMLODIPINE BESYLATE 5 MG/1
5 TABLET ORAL DAILY
Qty: 90 TABLET | Refills: 1 | Status: SHIPPED | OUTPATIENT
Start: 2022-02-23

## 2022-02-23 RX ORDER — INSULIN GLARGINE 100 [IU]/ML
24 INJECTION, SOLUTION SUBCUTANEOUS NIGHTLY
Qty: 45 ML | Refills: 0 | Status: SHIPPED | OUTPATIENT
Start: 2022-02-23

## 2022-02-23 RX ORDER — LANCETS 30 GAUGE
EACH MISCELLANEOUS
Qty: 100 EACH | Refills: 3 | Status: SHIPPED | OUTPATIENT
Start: 2022-02-23 | End: 2023-03-10

## 2022-02-23 NOTE — TELEPHONE ENCOUNTER
[facility]. Requested Prescriptions   Pending Prescriptions Disp Refills    Lancets Does not apply Misc 100 each 3     Sig: Test blood glucose daily.   E11.9        Diabetic Supplies Protocol Passed - 2/23/2022 12:13 PM        Passed - Appointment in past 12 or next 3 months                  Recent Outpatient Visits              1 month ago Type 2 diabetes mellitus without complication, with long-term current use of insulin [facility])    [facility], Debo Zaldivar MD    Office Visit    2 months ago Type 2 diabetes mellitus without complication, without long-term current use of insulin [facility])    [facility], Emy Zaldivar Diana, APRN    Telemedicine    2 months ago     [facility]    Nurse Only    2 months ago Inguinal abscess    [facility] Silvia Thompson MD    Office Visit    2 months ago Abscess of groin, right    [facility], Jason Bolanos MD    Office Visit

## 2022-02-28 ENCOUNTER — TELEPHONE (OUTPATIENT)
Dept: INTERNAL MEDICINE CLINIC | Facility: CLINIC | Age: 57
End: 2022-02-28

## 2022-03-01 RX ORDER — BLOOD SUGAR DIAGNOSTIC
1 STRIP MISCELLANEOUS DAILY
Qty: 100 STRIP | Refills: 3 | Status: SHIPPED | OUTPATIENT
Start: 2022-03-01

## 2022-03-24 RX ORDER — PEN NEEDLE, DIABETIC 30 GX3/16"
1 NEEDLE, DISPOSABLE MISCELLANEOUS
Qty: 30 EACH | Refills: 3 | Status: SHIPPED | OUTPATIENT
Start: 2022-03-24

## 2022-05-06 ENCOUNTER — LAB ENCOUNTER (OUTPATIENT)
Dept: LAB | Facility: HOSPITAL | Age: 57
End: 2022-05-06
Attending: INTERNAL MEDICINE
Payer: COMMERCIAL

## 2022-05-06 DIAGNOSIS — Z79.4 TYPE 2 DIABETES MELLITUS WITHOUT COMPLICATION, WITH LONG-TERM CURRENT USE OF INSULIN (HCC): ICD-10-CM

## 2022-05-06 DIAGNOSIS — Z12.5 SCREENING FOR PROSTATE CANCER: ICD-10-CM

## 2022-05-06 DIAGNOSIS — E11.9 TYPE 2 DIABETES MELLITUS WITHOUT COMPLICATION, WITH LONG-TERM CURRENT USE OF INSULIN (HCC): ICD-10-CM

## 2022-05-06 LAB
ALBUMIN SERPL-MCNC: 4.1 G/DL (ref 3.4–5)
ALBUMIN/GLOB SERPL: 1.2 {RATIO} (ref 1–2)
ALP LIVER SERPL-CCNC: 55 U/L
ALT SERPL-CCNC: 33 U/L
ANION GAP SERPL CALC-SCNC: 2 MMOL/L (ref 0–18)
AST SERPL-CCNC: 16 U/L (ref 15–37)
BASOPHILS # BLD AUTO: 0.06 X10(3) UL (ref 0–0.2)
BASOPHILS NFR BLD AUTO: 1 %
BILIRUB SERPL-MCNC: 0.4 MG/DL (ref 0.1–2)
BUN BLD-MCNC: 16 MG/DL (ref 7–18)
BUN/CREAT SERPL: 19.3 (ref 10–20)
CALCIUM BLD-MCNC: 9.1 MG/DL (ref 8.5–10.1)
CHLORIDE SERPL-SCNC: 110 MMOL/L (ref 98–112)
CHOLEST SERPL-MCNC: 152 MG/DL (ref ?–200)
CO2 SERPL-SCNC: 31 MMOL/L (ref 21–32)
COMPLEXED PSA SERPL-MCNC: 3.38 NG/ML (ref ?–4)
CREAT BLD-MCNC: 0.83 MG/DL
CREAT UR-SCNC: 144 MG/DL
DEPRECATED RDW RBC AUTO: 44.2 FL (ref 35.1–46.3)
EOSINOPHIL # BLD AUTO: 0.32 X10(3) UL (ref 0–0.7)
EOSINOPHIL NFR BLD AUTO: 5.3 %
ERYTHROCYTE [DISTWIDTH] IN BLOOD BY AUTOMATED COUNT: 13.2 % (ref 11–15)
EST. AVERAGE GLUCOSE BLD GHB EST-MCNC: 151 MG/DL (ref 68–126)
FASTING PATIENT LIPID ANSWER: YES
FASTING STATUS PATIENT QL REPORTED: YES
GLOBULIN PLAS-MCNC: 3.4 G/DL (ref 2.8–4.4)
GLUCOSE BLD-MCNC: 136 MG/DL (ref 70–99)
HBA1C MFR BLD: 6.9 % (ref ?–5.7)
HCT VFR BLD AUTO: 42.6 %
HDLC SERPL-MCNC: 61 MG/DL (ref 40–59)
HGB BLD-MCNC: 14.3 G/DL
IMM GRANULOCYTES # BLD AUTO: 0.01 X10(3) UL (ref 0–1)
IMM GRANULOCYTES NFR BLD: 0.2 %
LDLC SERPL CALC-MCNC: 75 MG/DL (ref ?–100)
LYMPHOCYTES # BLD AUTO: 2.07 X10(3) UL (ref 1–4)
LYMPHOCYTES NFR BLD AUTO: 34.2 %
MCH RBC QN AUTO: 31.2 PG (ref 26–34)
MCHC RBC AUTO-ENTMCNC: 33.6 G/DL (ref 31–37)
MCV RBC AUTO: 93 FL
MICROALBUMIN UR-MCNC: 1.44 MG/DL
MICROALBUMIN/CREAT 24H UR-RTO: 10 UG/MG (ref ?–30)
MONOCYTES # BLD AUTO: 0.45 X10(3) UL (ref 0.1–1)
MONOCYTES NFR BLD AUTO: 7.4 %
NEUTROPHILS # BLD AUTO: 3.14 X10 (3) UL (ref 1.5–7.7)
NEUTROPHILS # BLD AUTO: 3.14 X10(3) UL (ref 1.5–7.7)
NEUTROPHILS NFR BLD AUTO: 51.9 %
NONHDLC SERPL-MCNC: 91 MG/DL (ref ?–130)
OSMOLALITY SERPL CALC.SUM OF ELEC: 299 MOSM/KG (ref 275–295)
PLATELET # BLD AUTO: 173 10(3)UL (ref 150–450)
POTASSIUM SERPL-SCNC: 4.1 MMOL/L (ref 3.5–5.1)
PROT SERPL-MCNC: 7.5 G/DL (ref 6.4–8.2)
RBC # BLD AUTO: 4.58 X10(6)UL
SODIUM SERPL-SCNC: 143 MMOL/L (ref 136–145)
T4 FREE SERPL-MCNC: 0.9 NG/DL (ref 0.8–1.7)
TRIGL SERPL-MCNC: 85 MG/DL (ref 30–149)
TSI SER-ACNC: 3.86 MIU/ML (ref 0.36–3.74)
VLDLC SERPL CALC-MCNC: 13 MG/DL (ref 0–30)
WBC # BLD AUTO: 6.1 X10(3) UL (ref 4–11)

## 2022-05-06 PROCEDURE — 82570 ASSAY OF URINE CREATININE: CPT

## 2022-05-06 PROCEDURE — 82043 UR ALBUMIN QUANTITATIVE: CPT

## 2022-05-06 PROCEDURE — 85025 COMPLETE CBC W/AUTO DIFF WBC: CPT

## 2022-05-06 PROCEDURE — 83036 HEMOGLOBIN GLYCOSYLATED A1C: CPT

## 2022-05-06 PROCEDURE — 80053 COMPREHEN METABOLIC PANEL: CPT

## 2022-05-06 PROCEDURE — 84439 ASSAY OF FREE THYROXINE: CPT

## 2022-05-06 PROCEDURE — 36415 COLL VENOUS BLD VENIPUNCTURE: CPT

## 2022-05-06 PROCEDURE — 84443 ASSAY THYROID STIM HORMONE: CPT

## 2022-05-06 PROCEDURE — 80061 LIPID PANEL: CPT

## 2022-05-06 PROCEDURE — 3044F HG A1C LEVEL LT 7.0%: CPT | Performed by: INTERNAL MEDICINE

## 2022-05-06 NOTE — TELEPHONE ENCOUNTER
Please review. Protocol Failed or has no Protocol  Requested Prescriptions   Pending Prescriptions Disp Refills    LANTUS SOLOSTAR 100 UNIT/ML Subcutaneous Solution Pen-injector [Pharmacy Med Name: Lantus SoloStar 100 UNIT/ML Subcutaneous Solution Pen-injector] 30 mL 3     Sig: INJECT SUBCUTANEOUSLY 24  UNITS AT NIGHT        Diabetes Medication Protocol Failed - 5/4/2022 11:39 PM        Failed - Last A1C < 7.5 and within past 6 months     Lab Results   Component Value Date    A1C 6.9 (H) 05/06/2022               Passed - Appointment in past 6 or next 3 months        Passed - GFR Non- > 50     Lab Results   Component Value Date    GFRNAA 99 05/06/2022                 Passed - GFR in the past 12 months            Future Appointments         Provider Department Appt Notes    In 5 days Jere Bonilla MD [facility], [address] I have been taking Lantus nightly injections, purpose of visit is to review A1C. Need blood draw.           Recent Outpatient Visits              4 months ago Type 2 diabetes mellitus without complication, with long-term current use of insulin [facility])    [facility]    Office Visit    5 months ago Type 2 diabetes mellitus without complication, without long-term current use of insulin [facility])    [address] [place], [address] [place] [address] [address], Miracle Quevedo APRN    Telemedicine    5 months ago     [facility] [place] [facility]    Nurse Only    5 months ago Inguinal abscess    [facility] Zofia Zaragoza MD    Office Visit    5 months ago Abscess of groin, right    [facility]    Office Visit

## 2022-05-08 RX ORDER — INSULIN GLARGINE 100 [IU]/ML
INJECTION, SOLUTION SUBCUTANEOUS
Qty: 30 ML | Refills: 3 | Status: SHIPPED | OUTPATIENT
Start: 2022-05-08

## 2022-05-11 ENCOUNTER — OFFICE VISIT (OUTPATIENT)
Dept: INTERNAL MEDICINE CLINIC | Facility: CLINIC | Age: 57
End: 2022-05-11
Payer: COMMERCIAL

## 2022-05-11 VITALS
RESPIRATION RATE: 20 BRPM | SYSTOLIC BLOOD PRESSURE: 122 MMHG | DIASTOLIC BLOOD PRESSURE: 70 MMHG | WEIGHT: 226 LBS | HEART RATE: 80 BPM | TEMPERATURE: 98 F | HEIGHT: 72 IN | BODY MASS INDEX: 30.61 KG/M2

## 2022-05-11 DIAGNOSIS — I10 ESSENTIAL HYPERTENSION: ICD-10-CM

## 2022-05-11 DIAGNOSIS — Z79.4 TYPE 2 DIABETES MELLITUS WITHOUT COMPLICATION, WITH LONG-TERM CURRENT USE OF INSULIN (HCC): Primary | ICD-10-CM

## 2022-05-11 DIAGNOSIS — E11.9 TYPE 2 DIABETES MELLITUS WITHOUT COMPLICATION, WITH LONG-TERM CURRENT USE OF INSULIN (HCC): Primary | ICD-10-CM

## 2022-05-11 PROCEDURE — 3008F BODY MASS INDEX DOCD: CPT | Performed by: INTERNAL MEDICINE

## 2022-05-11 PROCEDURE — 3074F SYST BP LT 130 MM HG: CPT | Performed by: INTERNAL MEDICINE

## 2022-05-11 PROCEDURE — 99214 OFFICE O/P EST MOD 30 MIN: CPT | Performed by: INTERNAL MEDICINE

## 2022-05-11 PROCEDURE — 3078F DIAST BP <80 MM HG: CPT | Performed by: INTERNAL MEDICINE

## 2022-05-13 ENCOUNTER — TELEPHONE (OUTPATIENT)
Dept: INTERNAL MEDICINE CLINIC | Facility: CLINIC | Age: 57
End: 2022-05-13

## 2022-05-13 RX ORDER — SIMVASTATIN 20 MG
20 TABLET ORAL EVERY EVENING
Qty: 90 TABLET | Refills: 1 | Status: SHIPPED | OUTPATIENT
Start: 2022-05-13 | End: 2022-11-26

## 2022-05-13 RX ORDER — PEN NEEDLE, DIABETIC 30 GX3/16"
1 NEEDLE, DISPOSABLE MISCELLANEOUS
Qty: 30 EACH | Refills: 3 | Status: SHIPPED | OUTPATIENT
Start: 2022-05-13

## 2022-05-13 NOTE — TELEPHONE ENCOUNTER
Refill passed per [employer] [facility] protocol.     Requested Prescriptions   Pending Prescriptions Disp Refills    METFORMIN 500 MG Oral Tab [Pharmacy Med Name: metFORMIN HCl 500 MG Oral Tablet] 360 tablet 3     Sig: TAKE 1 TABLET BY MOUTH 4  TIMES DAILY        Diabetes Medication Protocol Passed - 5/13/2022  8:49 AM        Passed - Last A1C < 7.5 and within past 6 months     Lab Results   Component Value Date    A1C 6.9 (H) 05/06/2022               Passed - Appointment in past 6 or next 3 months        Passed - GFR Non- > 50     Lab Results   Component Value Date    GFRNAA 99 05/06/2022                 Passed - GFR in the past 12 months           SIMVASTATIN 20 MG Oral Tab [Pharmacy Med Name: Simvastatin 20 MG Oral Tablet] 90 tablet 3     Sig: TAKE 1 TABLET BY MOUTH IN  THE EVENING        Cholesterol Medication Protocol Passed - 5/13/2022  8:49 AM        Passed - ALT in past 12 months        Passed - LDL in past 12 months        Passed - Last ALT < 80       Lab Results   Component Value Date    ALT 33 05/06/2022             Passed - Last LDL < 130     Lab Results   Component Value Date    LDL 75 05/06/2022               Passed - Appointment in past 12 or next 3 months              Future Appointments         Provider Department Appt Notes    In 3 months Isa Bills MD [employer] [address] [facility], [address] Follow up to review blood labs and A1C            Recent Outpatient Visits              2 days ago Type 2 diabetes mellitus without complication, with long-term current use of insulin [facility])    Jonny Freitas MD    Office Visit    4 months ago Type 2 diabetes mellitus without complication, with long-term current use of insulin [facility])    [facility]    Office Visit    5 months ago Type 2 diabetes mellitus without complication, without long-term current use of insulin [facility])    [place] [employer] [address] [facility]    Telemedicine    5 months ago     [facility] [place] [facility]    Nurse Only    5 months ago Inguinal abscess    [facility] Mary Kaur MD    Office Visit

## 2022-05-17 DIAGNOSIS — Z79.4 TYPE 2 DIABETES MELLITUS WITHOUT COMPLICATION, WITH LONG-TERM CURRENT USE OF INSULIN (HCC): ICD-10-CM

## 2022-05-17 DIAGNOSIS — E11.9 TYPE 2 DIABETES MELLITUS WITHOUT COMPLICATION, WITH LONG-TERM CURRENT USE OF INSULIN (HCC): ICD-10-CM

## 2022-05-17 RX ORDER — PERPHENAZINE 16 MG/1
1 TABLET, FILM COATED ORAL DAILY
Qty: 100 STRIP | Refills: 3 | OUTPATIENT
Start: 2022-05-17

## 2022-05-17 RX ORDER — PEN NEEDLE, DIABETIC 30 GX3/16"
1 NEEDLE, DISPOSABLE MISCELLANEOUS
Qty: 30 EACH | Refills: 3 | OUTPATIENT
Start: 2022-05-17

## 2022-05-23 ENCOUNTER — TELEPHONE (OUTPATIENT)
Dept: INTERNAL MEDICINE CLINIC | Facility: CLINIC | Age: 57
End: 2022-05-23

## 2022-05-23 DIAGNOSIS — E11.9 TYPE 2 DIABETES MELLITUS WITHOUT COMPLICATION, WITH LONG-TERM CURRENT USE OF INSULIN (HCC): ICD-10-CM

## 2022-05-23 DIAGNOSIS — Z79.4 TYPE 2 DIABETES MELLITUS WITHOUT COMPLICATION, WITH LONG-TERM CURRENT USE OF INSULIN (HCC): ICD-10-CM

## 2022-06-01 ENCOUNTER — OFFICE VISIT (OUTPATIENT)
Dept: INTERNAL MEDICINE CLINIC | Facility: CLINIC | Age: 57
End: 2022-06-01
Payer: COMMERCIAL

## 2022-06-01 ENCOUNTER — TELEPHONE (OUTPATIENT)
Dept: INTERNAL MEDICINE CLINIC | Facility: CLINIC | Age: 57
End: 2022-06-01

## 2022-06-01 VITALS
WEIGHT: 226 LBS | HEART RATE: 74 BPM | SYSTOLIC BLOOD PRESSURE: 108 MMHG | BODY MASS INDEX: 30.61 KG/M2 | TEMPERATURE: 97 F | HEIGHT: 72 IN | DIASTOLIC BLOOD PRESSURE: 66 MMHG

## 2022-06-01 DIAGNOSIS — H92.01 POSTERIOR AURICULAR PAIN OF RIGHT EAR: Primary | ICD-10-CM

## 2022-06-01 PROCEDURE — 3074F SYST BP LT 130 MM HG: CPT | Performed by: NURSE PRACTITIONER

## 2022-06-01 PROCEDURE — 3008F BODY MASS INDEX DOCD: CPT | Performed by: NURSE PRACTITIONER

## 2022-06-01 PROCEDURE — 3078F DIAST BP <80 MM HG: CPT | Performed by: NURSE PRACTITIONER

## 2022-06-01 PROCEDURE — 99213 OFFICE O/P EST LOW 20 MIN: CPT | Performed by: NURSE PRACTITIONER

## 2022-06-01 NOTE — TELEPHONE ENCOUNTER
Patient contacted; he has appt Thursday for \"severe\" pain; Patient states he feels sharp pain behind jaw/rt side behind ear. No swelling, no teeth pain. No chest pain. no dizziness; no sob. Symptoms for several days. Sooner appt offered. Patient agreed to come in today.  Carleen flores appt today.

## 2022-06-01 NOTE — PATIENT INSTRUCTIONS
To help relieve pain, put hot or cold packs on the area that hurts. Try both hot and cold to find out which works best for you. You may take acetaminophen or ibuprofen for your pain. I have reviewed and confirmed nurses' notes...

## 2022-06-13 LAB — AMB EXT COVID-19 RESULT: DETECTED

## 2022-06-14 ENCOUNTER — PATIENT MESSAGE (OUTPATIENT)
Dept: INTERNAL MEDICINE CLINIC | Facility: CLINIC | Age: 57
End: 2022-06-14

## 2022-06-14 NOTE — TELEPHONE ENCOUNTER
[employer] message sent. When pt responds back, please update external lab to reflect positive test and so banner updates on story board. From: Triston Sher  To: Max Encarnacion MD  Sent: 6/14/2022  6:09 AM CDT  Subject: Padmini Lam,    Maria D Saleem tested positive for COVID 19 according to a home test kit. I'm experiencing very mild symptoms, just a headache, a little fatigue and some sore throat pain. No joint pain. No breathing issues. Nasal cavities are surprisingly clear. Symptoms began last Friday and have been consistent since then. I'm quarantining. Do I just wait it out with Tylenol?     Brayden Ernandez

## 2022-07-28 ENCOUNTER — TELEPHONE (OUTPATIENT)
Dept: INTERNAL MEDICINE CLINIC | Facility: CLINIC | Age: 57
End: 2022-07-28

## 2022-07-28 DIAGNOSIS — Z79.4 TYPE 2 DIABETES MELLITUS WITHOUT COMPLICATION, WITH LONG-TERM CURRENT USE OF INSULIN (HCC): ICD-10-CM

## 2022-07-28 DIAGNOSIS — E11.9 TYPE 2 DIABETES MELLITUS WITHOUT COMPLICATION, WITH LONG-TERM CURRENT USE OF INSULIN (HCC): ICD-10-CM

## 2022-07-28 NOTE — TELEPHONE ENCOUNTER
Please contact the pharmacy and help them clarify the instructions for the insulin pen needles. Instructions as follows: Use 1 needle a day for subcutaneous injection of insulin.

## 2022-07-29 RX ORDER — AMLODIPINE BESYLATE 5 MG/1
TABLET ORAL
Qty: 90 TABLET | Refills: 3 | Status: SHIPPED | OUTPATIENT
Start: 2022-07-29

## 2022-07-29 RX ORDER — HYDROCHLOROTHIAZIDE 12.5 MG/1
TABLET ORAL
Qty: 90 TABLET | Refills: 3 | Status: SHIPPED | OUTPATIENT
Start: 2022-07-29

## 2022-07-29 RX ORDER — PEN NEEDLE, DIABETIC 30 GX3/16"
1 NEEDLE, DISPOSABLE MISCELLANEOUS DAILY
Qty: 90 EACH | Refills: 3 | Status: SHIPPED | OUTPATIENT
Start: 2022-07-29

## 2022-07-29 RX ORDER — LISINOPRIL 40 MG/1
TABLET ORAL
Qty: 90 TABLET | Refills: 3 | Status: SHIPPED | OUTPATIENT
Start: 2022-07-29

## 2022-08-29 ENCOUNTER — PATIENT MESSAGE (OUTPATIENT)
Dept: INTERNAL MEDICINE CLINIC | Facility: CLINIC | Age: 57
End: 2022-08-29

## 2022-09-11 RX ORDER — BLOOD SUGAR DIAGNOSTIC
1 STRIP MISCELLANEOUS DAILY
Qty: 100 STRIP | Refills: 3 | Status: SHIPPED | OUTPATIENT
Start: 2022-09-11

## 2022-09-11 RX ORDER — INSULIN GLARGINE 100 [IU]/ML
30 INJECTION, SOLUTION SUBCUTANEOUS NIGHTLY
Qty: 27 ML | Refills: 1 | Status: SHIPPED | OUTPATIENT
Start: 2022-09-11

## 2022-09-11 NOTE — TELEPHONE ENCOUNTER
Patient reports taking 30 units of Lantus, please see patient message 9/10/22. Prescription updated to 30 units.

## 2022-09-12 NOTE — TELEPHONE ENCOUNTER
Refill passed per [facility] protocol. GFR 22  Requested Prescriptions   Pending Prescriptions Disp Refills    Glucose Blood (CONTOUR NEXT TEST) In Vitro Strip 100 strip 3     Si each by Other route daily. Diabetic Supplies Protocol Passed - 2022 11:52 AM        Passed - In person appointment or virtual visit in the past 12 mos or appointment in next 3 mos       Recent Outpatient Visits              3 months ago Posterior auricular pain of right ear    [facility], [address] [place], APRDEMETRIO    Office Visit    4 months ago Type 2 diabetes mellitus without complication, with long-term current use of insulin [facility])    [facility], [address], Odalys Ricardo MD    Office Visit    8 months ago Type 2 diabetes mellitus without complication, with long-term current use of insulin [facility])    [facility], [address], Chaparrita Leija MD    Office Visit    9 months ago Type 2 diabetes mellitus without complication, without long-term current use of insulin [facility])    [facility], [address], Vesna Quevedo, APRN    Telemedicine    9 months ago     [facility]    Nurse Only     Future Appointments         Provider Department Appt Notes    In 3 days Gemini Jacobson MD [facility], [address] [facility]  1st MyChart msg to reschedule due to provider-BA  Follow up to review blood labs and A1C PT IS AWARE OF LOCATION CHANGE                  insulin glargine (LANTUS SOLOSTAR) 100 UNIT/ML Subcutaneous Solution Pen-injector 30 mL 3     Sig: Inject 30 Units into the skin nightly.         Diabetes Medication Protocol Failed - 2022 11:52 AM        Failed - GFR in the past 12 months        Passed - Last A1C < 7.5 and within past 6 months     Lab Results   Component Value Date    A1C 6.9 (H) 2022               Passed - In person appointment or virtual visit in the past 6 mos or appointment in next 3 mos       Recent Outpatient Visits              3 months ago Posterior auricular pain of right ear    [facility], [address], [place], APRN    Office Visit    4 months ago Type 2 diabetes mellitus without complication, with long-term current use of insulin [facility])    [facility], [address], Tracey Leija MD    Office Visit    8 months ago Type 2 diabetes mellitus without complication, with long-term current use of insulin [facility])    [facility], [address], Tracey Leija MD    Office Visit    9 months ago Type 2 diabetes mellitus without complication, without long-term current use of insulin [facility])    [facility], [address], Miracle Quevedo, APRN    Telemedicine    9 months ago     [facility]    Nurse Only     Future Appointments         Provider Department Appt Notes    In 3 days Nithya Maloney MD [facility], [address] [facility] 7/5 1st MyChart msg to reschedule due to provider-BA  Follow up to review blood labs and A1C PT IS AWARE OF LOCATION CHANGE               Passed - GFR > 50     No results found for: [facility]                  Future Appointments         Provider Department Appt Notes    In 3 days Nithya Maloney MD [facility], [address] [facility] 7/5 1st MyChart msg to reschedule due to provider-BA  Follow up to review blood labs and A1C PT IS AWARE OF LOCATION CHANGE          Recent Outpatient Visits              3 months ago Posterior auricular pain of right ear    [facility], [address], [place], APRN    Office Visit    4 months ago Type 2 diabetes mellitus without complication, with long-term current use of insulin [facility])    [facility], [address], Tracey Leija MD    Office Visit    8 months ago Type 2 diabetes mellitus without complication, with long-term current use of insulin [facility])    [facility], [facility], [address] [facility] Maggie Garg MD    Office Visit    9 months ago Type 2 diabetes mellitus without complication, without long-term current use of insulin [facility])    [address], [address], Miracle Quevedo APRN    Telemedicine    9 months ago     [facility]    Nurse Only

## 2022-09-14 ENCOUNTER — OFFICE VISIT (OUTPATIENT)
Dept: INTERNAL MEDICINE CLINIC | Facility: CLINIC | Age: 57
End: 2022-09-14
Payer: COMMERCIAL

## 2022-09-14 VITALS
DIASTOLIC BLOOD PRESSURE: 80 MMHG | TEMPERATURE: 98 F | BODY MASS INDEX: 30.48 KG/M2 | WEIGHT: 225 LBS | HEART RATE: 80 BPM | SYSTOLIC BLOOD PRESSURE: 132 MMHG | HEIGHT: 72 IN | RESPIRATION RATE: 18 BRPM

## 2022-09-14 DIAGNOSIS — Z86.16 HISTORY OF COVID-19: ICD-10-CM

## 2022-09-14 DIAGNOSIS — I10 ESSENTIAL HYPERTENSION: ICD-10-CM

## 2022-09-14 DIAGNOSIS — E78.5 HYPERLIPIDEMIA LDL GOAL <100: ICD-10-CM

## 2022-09-14 DIAGNOSIS — E11.9 TYPE 2 DIABETES MELLITUS WITHOUT COMPLICATION, WITH LONG-TERM CURRENT USE OF INSULIN (HCC): Primary | ICD-10-CM

## 2022-09-14 DIAGNOSIS — Z79.4 TYPE 2 DIABETES MELLITUS WITHOUT COMPLICATION, WITH LONG-TERM CURRENT USE OF INSULIN (HCC): Primary | ICD-10-CM

## 2022-09-14 LAB
CARTRIDGE LOT#: 989 NUMERIC
HEMOGLOBIN A1C: 6.6 % (ref 4.3–5.6)

## 2022-09-14 PROCEDURE — 3075F SYST BP GE 130 - 139MM HG: CPT | Performed by: INTERNAL MEDICINE

## 2022-09-14 PROCEDURE — 83036 HEMOGLOBIN GLYCOSYLATED A1C: CPT | Performed by: INTERNAL MEDICINE

## 2022-09-14 PROCEDURE — 99214 OFFICE O/P EST MOD 30 MIN: CPT | Performed by: INTERNAL MEDICINE

## 2022-09-14 PROCEDURE — 3044F HG A1C LEVEL LT 7.0%: CPT | Performed by: INTERNAL MEDICINE

## 2022-09-14 PROCEDURE — 3079F DIAST BP 80-89 MM HG: CPT | Performed by: INTERNAL MEDICINE

## 2022-09-14 PROCEDURE — 3008F BODY MASS INDEX DOCD: CPT | Performed by: INTERNAL MEDICINE

## 2022-09-14 RX ORDER — MULTIVIT WITH MINERALS/LUTEIN
1000 TABLET ORAL EVERY OTHER DAY
COMMUNITY

## 2023-01-01 NOTE — TELEPHONE ENCOUNTER
From: Elina Miller  To: RATNA Kee  Sent: 1/4/2021 12:55 PM CST  Subject: Other    Hi Milo Velarde,    Can you assign my ultrasound order a \"stat\" level request and call the hospital to get me in today?     I really want to know if my gall bl
See TE and results  1-4-21.
Statement Selected

## 2023-02-04 ENCOUNTER — NURSE TRIAGE (OUTPATIENT)
Dept: INTERNAL MEDICINE CLINIC | Facility: CLINIC | Age: 58
End: 2023-02-04

## 2023-02-08 ENCOUNTER — OFFICE VISIT (OUTPATIENT)
Dept: INTERNAL MEDICINE CLINIC | Facility: CLINIC | Age: 58
End: 2023-02-08

## 2023-02-08 ENCOUNTER — LAB ENCOUNTER (OUTPATIENT)
Dept: LAB | Age: 58
End: 2023-02-08
Attending: INTERNAL MEDICINE
Payer: COMMERCIAL

## 2023-02-08 VITALS
HEIGHT: 72 IN | SYSTOLIC BLOOD PRESSURE: 148 MMHG | HEART RATE: 78 BPM | WEIGHT: 229 LBS | DIASTOLIC BLOOD PRESSURE: 82 MMHG | TEMPERATURE: 98 F | RESPIRATION RATE: 18 BRPM | BODY MASS INDEX: 31.02 KG/M2

## 2023-02-08 DIAGNOSIS — I10 ESSENTIAL HYPERTENSION: ICD-10-CM

## 2023-02-08 DIAGNOSIS — R31.9 HEMATURIA, UNSPECIFIED TYPE: ICD-10-CM

## 2023-02-08 DIAGNOSIS — R31.9 HEMATURIA, UNSPECIFIED TYPE: Primary | ICD-10-CM

## 2023-02-08 DIAGNOSIS — E11.9 TYPE 2 DIABETES MELLITUS WITHOUT COMPLICATION, WITH LONG-TERM CURRENT USE OF INSULIN (HCC): ICD-10-CM

## 2023-02-08 DIAGNOSIS — Z79.4 TYPE 2 DIABETES MELLITUS WITHOUT COMPLICATION, WITH LONG-TERM CURRENT USE OF INSULIN (HCC): ICD-10-CM

## 2023-02-08 LAB
ANION GAP SERPL CALC-SCNC: 5 MMOL/L (ref 0–18)
APPEARANCE: CLEAR
BASOPHILS # BLD AUTO: 0.05 X10(3) UL (ref 0–0.2)
BASOPHILS NFR BLD AUTO: 0.8 %
BILIRUBIN: NEGATIVE
BUN BLD-MCNC: 15 MG/DL (ref 7–18)
BUN/CREAT SERPL: 17.9 (ref 10–20)
CALCIUM BLD-MCNC: 9.3 MG/DL (ref 8.5–10.1)
CARTRIDGE LOT#: 36 NUMERIC
CHLORIDE SERPL-SCNC: 109 MMOL/L (ref 98–112)
CO2 SERPL-SCNC: 27 MMOL/L (ref 21–32)
CREAT BLD-MCNC: 0.84 MG/DL
DEPRECATED RDW RBC AUTO: 43.5 FL (ref 35.1–46.3)
EOSINOPHIL # BLD AUTO: 0.25 X10(3) UL (ref 0–0.7)
EOSINOPHIL NFR BLD AUTO: 3.8 %
ERYTHROCYTE [DISTWIDTH] IN BLOOD BY AUTOMATED COUNT: 13.3 % (ref 11–15)
FASTING STATUS PATIENT QL REPORTED: NO
GFR SERPLBLD BASED ON 1.73 SQ M-ARVRAT: 102 ML/MIN/1.73M2 (ref 60–?)
GLUCOSE (URINE DIPSTICK): NEGATIVE MG/DL
GLUCOSE BLD-MCNC: 170 MG/DL (ref 70–99)
HCT VFR BLD AUTO: 42.1 %
HEMOGLOBIN A1C: 7.4 % (ref 4.3–5.6)
HGB BLD-MCNC: 14.4 G/DL
IMM GRANULOCYTES # BLD AUTO: 0.01 X10(3) UL (ref 0–1)
IMM GRANULOCYTES NFR BLD: 0.2 %
KETONES (URINE DIPSTICK): NEGATIVE MG/DL
LEUKOCYTES: NEGATIVE
LYMPHOCYTES # BLD AUTO: 2.28 X10(3) UL (ref 1–4)
LYMPHOCYTES NFR BLD AUTO: 34.8 %
MCH RBC QN AUTO: 30.9 PG (ref 26–34)
MCHC RBC AUTO-ENTMCNC: 34.2 G/DL (ref 31–37)
MCV RBC AUTO: 90.3 FL
MONOCYTES # BLD AUTO: 0.5 X10(3) UL (ref 0.1–1)
MONOCYTES NFR BLD AUTO: 7.6 %
MULTISTIX LOT#: ABNORMAL NUMERIC
NEUTROPHILS # BLD AUTO: 3.47 X10 (3) UL (ref 1.5–7.7)
NEUTROPHILS # BLD AUTO: 3.47 X10(3) UL (ref 1.5–7.7)
NEUTROPHILS NFR BLD AUTO: 52.8 %
NITRITE, URINE: NEGATIVE
OCCULT BLOOD: NEGATIVE
OSMOLALITY SERPL CALC.SUM OF ELEC: 297 MOSM/KG (ref 275–295)
PH, URINE: 5 (ref 4.5–8)
PLATELET # BLD AUTO: 201 10(3)UL (ref 150–450)
POTASSIUM SERPL-SCNC: 3.8 MMOL/L (ref 3.5–5.1)
PSA SERPL-MCNC: 3.33 NG/ML (ref ?–4)
RBC # BLD AUTO: 4.66 X10(6)UL
SODIUM SERPL-SCNC: 141 MMOL/L (ref 136–145)
SPECIFIC GRAVITY: 1.02 (ref 1–1.03)
URINE-COLOR: YELLOW
UROBILINOGEN,SEMI-QN: 0.2 MG/DL (ref 0–1.9)
WBC # BLD AUTO: 6.6 X10(3) UL (ref 4–11)

## 2023-02-08 PROCEDURE — 3077F SYST BP >= 140 MM HG: CPT | Performed by: INTERNAL MEDICINE

## 2023-02-08 PROCEDURE — 3051F HG A1C>EQUAL 7.0%<8.0%: CPT | Performed by: INTERNAL MEDICINE

## 2023-02-08 PROCEDURE — 80048 BASIC METABOLIC PNL TOTAL CA: CPT

## 2023-02-08 PROCEDURE — 36415 COLL VENOUS BLD VENIPUNCTURE: CPT

## 2023-02-08 PROCEDURE — 81002 URINALYSIS NONAUTO W/O SCOPE: CPT | Performed by: INTERNAL MEDICINE

## 2023-02-08 PROCEDURE — 3008F BODY MASS INDEX DOCD: CPT | Performed by: INTERNAL MEDICINE

## 2023-02-08 PROCEDURE — 3079F DIAST BP 80-89 MM HG: CPT | Performed by: INTERNAL MEDICINE

## 2023-02-08 PROCEDURE — 84153 ASSAY OF PSA TOTAL: CPT

## 2023-02-08 PROCEDURE — 85025 COMPLETE CBC W/AUTO DIFF WBC: CPT

## 2023-02-08 PROCEDURE — 99214 OFFICE O/P EST MOD 30 MIN: CPT | Performed by: INTERNAL MEDICINE

## 2023-02-08 PROCEDURE — 83036 HEMOGLOBIN GLYCOSYLATED A1C: CPT | Performed by: INTERNAL MEDICINE

## 2023-02-21 ENCOUNTER — HOSPITAL ENCOUNTER (OUTPATIENT)
Dept: CT IMAGING | Facility: HOSPITAL | Age: 58
Discharge: HOME | End: 2023-02-21
Attending: INTERNAL MEDICINE
Payer: COMMERCIAL

## 2023-02-21 DIAGNOSIS — R31.9 HEMATURIA, UNSPECIFIED TYPE: ICD-10-CM

## 2023-02-21 PROCEDURE — 76377 3D RENDER W/INTRP POSTPROCES: CPT | Performed by: INTERNAL MEDICINE

## 2023-02-21 PROCEDURE — 74178 CT ABD&PLV WO CNTR FLWD CNTR: CPT | Performed by: INTERNAL MEDICINE

## 2023-03-03 ENCOUNTER — MED REC SCAN ONLY (OUTPATIENT)
Dept: INTERNAL MEDICINE CLINIC | Facility: CLINIC | Age: 58
End: 2023-03-03

## 2023-03-03 ENCOUNTER — TELEPHONE (OUTPATIENT)
Dept: INTERNAL MEDICINE CLINIC | Facility: CLINIC | Age: 58
End: 2023-03-03

## 2023-03-03 NOTE — TELEPHONE ENCOUNTER
Received fax from [facility] pharmacies requesting order for Dexcom continuous glucose monitoring system for patient. Form completed and placed on JSK desk for review and signature.

## 2023-03-04 NOTE — TELEPHONE ENCOUNTER
Per last visit:    2. Type 2 diabetes mellitus without complication, with long-term current use of insulin ([facility])  A1c is now elevated at 7.4. Discussed the importance of diet and exercise. He is frustrated feels that some of this is due to his lack of discipline and willpower. We discussed various strategies. He will be returning soon for regular checkup and we can discuss it at that time.  - HEMOGLOBIN A1C    mychart clarification sent on change in dosage.     Future Appointments   Date Time Provider Ana Dunn   3/29/2023  3:00 PM [facility]   6/6/2023  4:15 PM Kai Stokes MD [address] [facility]

## 2023-03-06 RX ORDER — INSULIN GLARGINE 100 [IU]/ML
30 INJECTION, SOLUTION SUBCUTANEOUS NIGHTLY
Qty: 30 ML | Refills: 5 | Status: SHIPPED | OUTPATIENT
Start: 2023-03-06

## 2023-03-06 NOTE — TELEPHONE ENCOUNTER
Signed form for Dexcom glucose monitor was faxed to [facility] pharmacies [phone].

## 2023-03-06 NOTE — TELEPHONE ENCOUNTER
Dr. Tray Lemon, Please review patient's message    Rx Pended, adjust and authorize if appropriate    Requested Prescriptions   Pending Prescriptions Disp Refills    insulin glargine (LANTUS SOLOSTAR) 100 UNIT/ML Subcutaneous Solution Pen-injector 30 mL 5     Sig: Inject 30 Units into the skin nightly.        Diabetes Medication Protocol Passed - 3/3/2023 11:54 AM        Passed - Last A1C < 7.5 and within past 6 months     Lab Results   Component Value Date    A1C 7.4 (A) 02/08/2023             Passed - In person appointment or virtual visit in the past 6 mos or appointment in next 3 mos     Recent Outpatient Visits              3 weeks ago Hematuria, unspecified type    Caesar Toledo MD    Office Visit    5 months ago Type 2 diabetes mellitus without complication, with long-term current use of insulin [facility])    Angelica Murphy MD    Office Visit    9 months ago Posterior auricular pain of right ear    [address], [address] [facility] [address] [place], RATNA    Office Visit    9 months ago Type 2 diabetes mellitus without complication, with long-term current use of insulin ([facility] [address]    [address], [address], Luis Angel Leija MD    Office Visit    1 year ago Type 2 diabetes mellitus without complication, with long-term current use of insulin ([address]    Angelica Galeana MD    Office Visit          Future Appointments         Provider Department Appt Notes    In 3 weeks Kamran Sánchez MD [address], [address] [facility] referred by PCP, cysto consult    In 3 months MD [facility], [address], [place] EP EE               Passed - [facility] or GFRNAA > 50     GFR Evaluation  EGFRCR: 102 , resulted on 2/8/2023          Passed - GFR in the past 12 months Recent Outpatient Visits              3 weeks ago Hematuria, unspecified type    [facility], [address] [facility] [place]    Office Visit    5 months ago Type 2 diabetes mellitus without complication, with long-term current use of insulin [facility])    Sharon Brown MD    Office Visit    9 months ago Posterior auricular pain of right ear    [address] [place], APRN    Office Visit    9 months ago Type 2 diabetes mellitus without complication, with long-term current use of insulin ([facility] [address]    [address], [address] [facility] [address], Chace Leija MD    Office Visit    1 year ago Type 2 diabetes mellitus without complication, with long-term current use of insulin ([facility] [address]    [address] [facility] Chace Leija MD    Office Visit          Future Appointments         Provider Department Appt Notes    In 3 weeks Deana Membreno MD [address], [address] [facility] referred by PCP, cysto consult    In 3 months Edmundo Diaz MD [address], [address] [facility] [address], [address] EP EE

## 2023-03-10 RX ORDER — GLUCOSAM/CHON-MSM1/C/MANG/BOSW 500-416.6
TABLET ORAL
Qty: 100 EACH | Refills: 3 | Status: SHIPPED | OUTPATIENT
Start: 2023-03-10

## 2023-03-10 NOTE — TELEPHONE ENCOUNTER
Refill passed per [place] [employer] protocol.     Requested Prescriptions   Pending Prescriptions Disp Refills    TRUEPLUS LANCETS 30G Does not apply Misc [Pharmacy Med Name: Anne-Marie Jerad 30 G] 100 each 3     Sig: USE TO TEST BLOOD GLUCOSE  DAILY       Diabetic Supplies Protocol Passed - 3/9/2023  9:44 PM        Passed - In person appointment or virtual visit in the past 12 mos or appointment in next 3 mos     Recent Outpatient Visits              1 month ago Hematuria, unspecified type    Heladio Mullins Norine Snider, MD    Office Visit    5 months ago Type 2 diabetes mellitus without complication, with long-term current use of insulin [facility])    Po Mullins MD    Office Visit    9 months ago Posterior auricular pain of right ear    [facility], [address] [facility] [place], [facility]    Office Visit    10 months ago Type 2 diabetes mellitus without complication, with long-term current use of insulin ([address]    [address], [address]adio Norine Snider, MD    Office Visit    1 year ago Type 2 diabetes mellitus without complication, with long-term current use of insulin ([address]    [address], [address]Heladio Norine Snider, MD    Office Visit          Future Appointments         Provider Department Appt Notes    In 2 weeks Paige Tran MD [address], [address], [facility] referred by PCP, cysto consult    In 2 months Jeremi Dolan MD [address], [address], [facility] EP EE                  Recent Outpatient Visits              1 month ago Hematuria, unspecified type    Heladio Mullins Norine Snider, MD    Office Visit    5 months ago Type 2 diabetes mellitus without complication, with long-term current use of insulin ([address]    [address] [employer] [facility], Rozina Rodgers, Nacho Haynes MD    Office Visit    9 months ago Posterior auricular pain of right ear    [address] [facility]    Office Visit    10 months ago Type 2 diabetes mellitus without complication, with long-term current use of insulin ([facility] [address]    Ashley Rivas, [address] [facility],[address], Becca Leija MD    Office Visit    1 year ago Type 2 diabetes mellitus without complication, with long-term current use of insulin ([facility]    Heladio Mckeon Dorothy Saras, MD    Office Visit          Future Appointments         Provider Department Appt Notes    In 2 weeks MD Ashley Almeida, [address] [facility] [address] [facility] [address] referred by PCP, cysto consult    In 2 months MD Ashley Fontenot, [address] [facility] [address], [facility]

## 2023-03-20 ENCOUNTER — PATIENT MESSAGE (OUTPATIENT)
Facility: CLINIC | Age: 58
End: 2023-03-20

## 2023-03-21 NOTE — TELEPHONE ENCOUNTER
From: Margaret Roth  To: Jaimie Bartholomew MD  Sent: 3/20/2023 11:40 AM CDT  Subject: CGM Certification and Prescription    Hi Dr Stephany Cruz,    My [facility] representative said that they would cover the expense of a Continuous Glucose Monitor system such as Dexcomm or Buzz. But they said that you have to submit a certification letter for it before you can write an Rx for me. Can you please have your admin submit this info to [address]? Thank you.     Sina Cullen

## 2023-03-21 NOTE — TELEPHONE ENCOUNTER
From: Angela Wagner  To: Mara Adam MD  Sent: 3/20/2023 12:22 PM CDT  Subject: CGM - [address],    I'd like the Dexcom G6 CGM system. It allows me to use my iPhone to get readings from the arm implant.     Joy Rothman

## 2023-03-24 RX ORDER — PROCHLORPERAZINE 25 MG/1
SUPPOSITORY RECTAL
Qty: 1 EACH | Refills: 0 | Status: SHIPPED | OUTPATIENT
Start: 2023-03-24

## 2023-03-24 NOTE — TELEPHONE ENCOUNTER
Prescriptions completed and signed. Please proceed with the letter stating the need for continuous glucose meter in order to closely monitor sugars and allow the patient to modify diet and other lifestyle issues. This is indicated as the patient has had a difficult time fully controlling his blood sugars with conventional intermittent fingersticks.

## 2023-03-27 NOTE — TELEPHONE ENCOUNTER
Prior authorization form has been filled out for dexcom G6 and faxed to [employer] to [phone] along with 2/8/23 office note.  It can take 1-5 business days for a decision to come back

## 2023-03-27 NOTE — TELEPHONE ENCOUNTER
Received call from [employer], stating that PA is required. They will be sending a PA fax for completion.

## 2023-05-29 NOTE — TELEPHONE ENCOUNTER
Please review. Protocol Failed or has No Protocol. Requested Prescriptions   Pending Prescriptions Disp Refills    SIMVASTATIN 20 MG Oral Tab [Pharmacy Med Name: Simvastatin 20 MG Oral Tablet] 90 tablet 3     Sig: TAKE 1 TABLET BY MOUTH IN THE  EVENING       Cholesterol Medication Protocol Failed - 5/27/2023  6:46 AM        Failed - ALT in past 12 months        Failed - LDL in past 12 months        Failed - Last ALT < 80     Lab Results   Component Value Date    ALT 33 05/06/2022             Failed - Last LDL < 130     Lab Results   Component Value Date    LDL 75 05/06/2022               Passed - In person appointment or virtual visit in the past 12 mos or appointment in next 3 mos     Recent Outpatient Visits              3 months ago Hematuria, unspecified type    [facility]    Office Visit    8 months ago Type 2 diabetes mellitus without complication, with long-term current use of insulin [facility])    [facility]    Office Visit    12 months ago Posterior auricular pain of right ear    [facility], [address] [facility]    Office Visit    1 year ago Type 2 diabetes mellitus without complication, with long-term current use of insulin ([facility])    [address], [address], Shandra Leija MD    Office Visit    1 year ago Type 2 diabetes mellitus without complication, with long-term current use of insulin ([facility])    [address], [address], Shandra Leija MD    Office Visit          Future Appointments         Provider Department Appt Notes    In 1 week Lucina Madrigal MD [address], [address], Gwendolyn [address]    In 1 month Omega Casarez MD [address], [address] [facility] [place] Follow-up on A1C, weight loss results, liver and blood work. Future Appointments         Provider Department Appt Notes    In 1 week Angélica Rehman MD [address], [address], [facility] EP EE    In 1 month Alonzo Crews MD [address] [facility] Follow-up on A1C, weight loss results, liver and blood work.             Recent Outpatient Visits              3 months ago Hematuria, unspecified type    Klaudia Menezes MD    Office Visit    8 months ago Type 2 diabetes mellitus without complication, with long-term current use of insulin [facility])    [address] [facility]    Office Visit    12 months ago Posterior auricular pain of right ear    [address] [facility] [place]    Office Visit    1 year ago Type 2 diabetes mellitus without complication, with long-term current use of insulin ([facility] [address]    [address], [address], Gonzalez Leija MD    Office Visit    1 year ago Type 2 diabetes mellitus without complication, with long-term current use of insulin ([address]    Heladio Lamb Darris Smart, MD    Office Visit

## 2023-05-30 ENCOUNTER — PATIENT MESSAGE (OUTPATIENT)
Dept: INTERNAL MEDICINE CLINIC | Facility: CLINIC | Age: 58
End: 2023-05-30

## 2023-05-30 RX ORDER — SIMVASTATIN 20 MG
20 TABLET ORAL EVERY EVENING
Qty: 90 TABLET | Refills: 1 | Status: SHIPPED | OUTPATIENT
Start: 2023-05-30

## 2023-06-06 ENCOUNTER — OFFICE VISIT (OUTPATIENT)
Dept: OPHTHALMOLOGY | Facility: CLINIC | Age: 58
End: 2023-06-06

## 2023-06-06 DIAGNOSIS — H25.13 AGE-RELATED NUCLEAR CATARACT OF BOTH EYES: ICD-10-CM

## 2023-06-06 DIAGNOSIS — E11.9 TYPE 2 DIABETES MELLITUS WITHOUT COMPLICATION, WITHOUT LONG-TERM CURRENT USE OF INSULIN (HCC): Primary | ICD-10-CM

## 2023-06-06 DIAGNOSIS — H43.393 VITREOUS FLOATERS OF BOTH EYES: ICD-10-CM

## 2023-06-06 PROCEDURE — 92004 COMPRE OPH EXAM NEW PT 1/>: CPT | Performed by: OPHTHALMOLOGY

## 2023-06-06 PROCEDURE — 2023F DILAT RTA XM W/O RTNOPTHY: CPT | Performed by: OPHTHALMOLOGY

## 2023-06-06 NOTE — PATIENT INSTRUCTIONS
Type 2 diabetes mellitus without complication, without long-term current use of insulin (HCC)  Diabetes type II: no background of retinopathy, no signs of neovascularization noted. Discussed ocular and systemic benefits of blood sugar control. Diagnosis and treatment discussed in detail with patient. Will see patient in 1 year for a diabetic exam    Age-related nuclear cataract of both eyes  Discussed early cataracts with patient. No treatment recommended at this time. Vitreous floaters of both eyes   There is no evidence of retinal pathology. All signs and symptoms of retinal detachment/tears explained in detail. Patient instructed to call the office if they experience increase in floaters, increase in flashes of light, loss of vision or curtain or veil effect.

## 2023-06-06 NOTE — ASSESSMENT & PLAN NOTE
Diabetes type II: no background of retinopathy, no signs of neovascularization noted. Discussed ocular and systemic benefits of blood sugar control. Diagnosis and treatment discussed in detail with patient.     Will see patient in 1 year for a diabetic exam
Discussed early cataracts with patient. No treatment recommended at this time.
There is no evidence of retinal pathology. All signs and symptoms of retinal detachment/tears explained in detail. Patient instructed to call the office if they experience increase in floaters, increase in flashes of light, loss of vision or curtain or veil effect.
2

## 2023-07-07 ENCOUNTER — LAB ENCOUNTER (OUTPATIENT)
Dept: LAB | Facility: HOSPITAL | Age: 58
End: 2023-07-07
Attending: INTERNAL MEDICINE
Payer: COMMERCIAL

## 2023-07-07 DIAGNOSIS — E11.9 TYPE 2 DIABETES MELLITUS WITHOUT COMPLICATION, WITH LONG-TERM CURRENT USE OF INSULIN (HCC): ICD-10-CM

## 2023-07-07 DIAGNOSIS — Z79.4 TYPE 2 DIABETES MELLITUS WITHOUT COMPLICATION, WITH LONG-TERM CURRENT USE OF INSULIN (HCC): ICD-10-CM

## 2023-07-07 LAB
CHOLEST SERPL-MCNC: 212 MG/DL (ref ?–200)
CREAT UR-SCNC: 105 MG/DL
EST. AVERAGE GLUCOSE BLD GHB EST-MCNC: 163 MG/DL (ref 68–126)
FASTING PATIENT GLUCOSE ANSWER: YES
FASTING PATIENT LIPID ANSWER: YES
GLUCOSE BLD-MCNC: 200 MG/DL (ref 70–99)
HBA1C MFR BLD: 7.3 % (ref ?–5.7)
HDLC SERPL-MCNC: 57 MG/DL (ref 40–59)
LDLC SERPL CALC-MCNC: 137 MG/DL (ref ?–100)
MICROALBUMIN UR-MCNC: 1.88 MG/DL
MICROALBUMIN/CREAT 24H UR-RTO: 17.9 UG/MG (ref ?–30)
NONHDLC SERPL-MCNC: 155 MG/DL (ref ?–130)
TRIGL SERPL-MCNC: 99 MG/DL (ref 30–149)
VLDLC SERPL CALC-MCNC: 18 MG/DL (ref 0–30)

## 2023-07-07 PROCEDURE — 80061 LIPID PANEL: CPT

## 2023-07-07 PROCEDURE — 36415 COLL VENOUS BLD VENIPUNCTURE: CPT

## 2023-07-07 PROCEDURE — 82947 ASSAY GLUCOSE BLOOD QUANT: CPT

## 2023-07-07 PROCEDURE — 3061F NEG MICROALBUMINURIA REV: CPT | Performed by: INTERNAL MEDICINE

## 2023-07-07 PROCEDURE — 82043 UR ALBUMIN QUANTITATIVE: CPT

## 2023-07-07 PROCEDURE — 3051F HG A1C>EQUAL 7.0%<8.0%: CPT | Performed by: INTERNAL MEDICINE

## 2023-07-07 PROCEDURE — 83036 HEMOGLOBIN GLYCOSYLATED A1C: CPT

## 2023-07-07 PROCEDURE — 82570 ASSAY OF URINE CREATININE: CPT

## 2023-07-12 ENCOUNTER — LAB ENCOUNTER (OUTPATIENT)
Dept: LAB | Age: 58
End: 2023-07-12
Attending: INTERNAL MEDICINE
Payer: COMMERCIAL

## 2023-07-12 ENCOUNTER — OFFICE VISIT (OUTPATIENT)
Dept: INTERNAL MEDICINE CLINIC | Facility: CLINIC | Age: 58
End: 2023-07-12

## 2023-07-12 VITALS
BODY MASS INDEX: 29.66 KG/M2 | HEIGHT: 72 IN | SYSTOLIC BLOOD PRESSURE: 138 MMHG | DIASTOLIC BLOOD PRESSURE: 72 MMHG | RESPIRATION RATE: 20 BRPM | WEIGHT: 219 LBS | TEMPERATURE: 98 F | HEART RATE: 82 BPM

## 2023-07-12 DIAGNOSIS — Z79.4 TYPE 2 DIABETES MELLITUS WITHOUT COMPLICATION, WITH LONG-TERM CURRENT USE OF INSULIN (HCC): ICD-10-CM

## 2023-07-12 DIAGNOSIS — E78.5 HYPERLIPIDEMIA LDL GOAL <100: ICD-10-CM

## 2023-07-12 DIAGNOSIS — E11.9 TYPE 2 DIABETES MELLITUS WITHOUT COMPLICATION, WITH LONG-TERM CURRENT USE OF INSULIN (HCC): Primary | ICD-10-CM

## 2023-07-12 DIAGNOSIS — Z79.4 TYPE 2 DIABETES MELLITUS WITHOUT COMPLICATION, WITH LONG-TERM CURRENT USE OF INSULIN (HCC): Primary | ICD-10-CM

## 2023-07-12 DIAGNOSIS — I10 ESSENTIAL HYPERTENSION: ICD-10-CM

## 2023-07-12 DIAGNOSIS — E11.9 TYPE 2 DIABETES MELLITUS WITHOUT COMPLICATION, WITH LONG-TERM CURRENT USE OF INSULIN (HCC): ICD-10-CM

## 2023-07-12 DIAGNOSIS — R31.9 HEMATURIA, UNSPECIFIED TYPE: ICD-10-CM

## 2023-07-12 LAB
ALBUMIN SERPL-MCNC: 4.1 G/DL (ref 3.4–5)
ALBUMIN/GLOB SERPL: 1.3 {RATIO} (ref 1–2)
ALP LIVER SERPL-CCNC: 83 U/L
ALT SERPL-CCNC: 27 U/L
ANION GAP SERPL CALC-SCNC: 6 MMOL/L (ref 0–18)
AST SERPL-CCNC: 14 U/L (ref 15–37)
BILIRUB SERPL-MCNC: 0.4 MG/DL (ref 0.1–2)
BUN BLD-MCNC: 21 MG/DL (ref 7–18)
BUN/CREAT SERPL: 25 (ref 10–20)
CALCIUM BLD-MCNC: 9.3 MG/DL (ref 8.5–10.1)
CHLORIDE SERPL-SCNC: 109 MMOL/L (ref 98–112)
CO2 SERPL-SCNC: 28 MMOL/L (ref 21–32)
CREAT BLD-MCNC: 0.84 MG/DL
FASTING STATUS PATIENT QL REPORTED: NO
GFR SERPLBLD BASED ON 1.73 SQ M-ARVRAT: 102 ML/MIN/1.73M2 (ref 60–?)
GLOBULIN PLAS-MCNC: 3.2 G/DL (ref 2.8–4.4)
GLUCOSE BLD-MCNC: 171 MG/DL (ref 70–99)
OSMOLALITY SERPL CALC.SUM OF ELEC: 303 MOSM/KG (ref 275–295)
POTASSIUM SERPL-SCNC: 4.1 MMOL/L (ref 3.5–5.1)
PROT SERPL-MCNC: 7.3 G/DL (ref 6.4–8.2)
SODIUM SERPL-SCNC: 143 MMOL/L (ref 136–145)

## 2023-07-12 PROCEDURE — 3075F SYST BP GE 130 - 139MM HG: CPT | Performed by: INTERNAL MEDICINE

## 2023-07-12 PROCEDURE — 36415 COLL VENOUS BLD VENIPUNCTURE: CPT

## 2023-07-12 PROCEDURE — 99214 OFFICE O/P EST MOD 30 MIN: CPT | Performed by: INTERNAL MEDICINE

## 2023-07-12 PROCEDURE — 3078F DIAST BP <80 MM HG: CPT | Performed by: INTERNAL MEDICINE

## 2023-07-12 PROCEDURE — 3008F BODY MASS INDEX DOCD: CPT | Performed by: INTERNAL MEDICINE

## 2023-07-12 PROCEDURE — 80053 COMPREHEN METABOLIC PANEL: CPT

## 2023-07-13 RX ORDER — AMLODIPINE BESYLATE 5 MG/1
5 TABLET ORAL DAILY
Qty: 90 TABLET | Refills: 3 | Status: SHIPPED | OUTPATIENT
Start: 2023-07-13

## 2023-07-13 RX ORDER — LISINOPRIL 40 MG/1
40 TABLET ORAL DAILY
Qty: 90 TABLET | Refills: 3 | Status: SHIPPED | OUTPATIENT
Start: 2023-07-13

## 2023-07-13 NOTE — TELEPHONE ENCOUNTER
Refill passed per [employer] protocol.     Requested Prescriptions   Pending Prescriptions Disp Refills    AMLODIPINE 5 MG Oral Tab [Pharmacy Med Name: amLODIPine Besylate 5 MG Oral Tablet] 90 tablet 3     Sig: TAKE 1 TABLET BY MOUTH  DAILY       Hypertensive Medications Protocol Passed - 7/13/2023 10:11 AM        Passed - In person appointment in the past 12 or next 3 months     Recent Outpatient Visits              Yesterday Type 2 diabetes mellitus without complication, with long-term current use of insulin ([address]    Heladio Gray Rod Neighbors, MD    Office Visit    1 month ago Type 2 diabetes mellitus without complication, without long-term current use of insulin ([address]    [address], [address], [place]    Office Visit    5 months ago Hematuria, unspecified type    Heladio Gray Rod Neighbors, MD    Office Visit    10 months ago Type 2 diabetes mellitus without complication, with long-term current use of insulin [facility])    Heladio Gray Rod Neighbors, MD    Office Visit    1 year ago Posterior auricular pain of right ear    Liliana Gray [place], APRN    Office Visit          Future Appointments         Provider Department Appt Notes    In 11 months MD Chung [facility], [address], [place] EP EE/pt conf               Passed - Last BP reading less than 140/90     BP Readings from Last 1 Encounters:  07/12/23 : 138/72              Passed - CMP or BMP in past 6 months     Recent Results (from the past 4392 hour(s))   COMP METABOLIC PANEL (14)    Collection Time: 07/12/23  4:33 PM   Result Value Ref Range    Glucose 171 (H) 70 - 99 mg/dL    Sodium 143 136 - 145 mmol/L    Potassium 4.1 3.5 - 5.1 mmol/L    Chloride 109 98 - 112 mmol/L    CO2 28.0 21.0 - 32.0 mmol/L    Anion Gap 6 0 - 18 mmol/L    BUN 21 (H) 7 - 18 mg/dL    Creatinine 0.84 0.70 - 1.30 mg/dL    BUN/CREA Ratio 25.0 (H) 10.0 - 20.0    Calcium, Total 9.3 8.5 - 10.1 mg/dL    Calculated Osmolality 303 (H) 275 - 295 mOsm/kg    eGFR-Cr 102 >=60 mL/min/1.73m2    ALT 27 16 - 61 U/L    AST 14 (L) 15 - 37 U/L    Alkaline Phosphatase 83 45 - 117 U/L    Bilirubin, Total 0.4 0.1 - 2.0 mg/dL    Total Protein 7.3 6.4 - 8.2 g/dL    Albumin 4.1 3.4 - 5.0 g/dL    Globulin  3.2 2.8 - 4.4 g/dL    A/G Ratio 1.3 1.0 - 2.0    Patient Fasting for CMP? No      *Note: Due to a large number of results and/or encounters for the requested time period, some results have not been displayed. A complete set of results can be found in Results Review.                Passed - In person appointment or virtual visit in the past 6 months     Recent Outpatient Visits              Yesterday Type 2 diabetes mellitus without complication, with long-term current use of insulin ([facility]    Anthony Marshall MD    Office Visit    1 month ago Type 2 diabetes mellitus without complication, without long-term current use of insulin ([facility]    [address], [address], [place]    Office Visit    5 months ago Hematuria, unspecified type    Heladio Ty Clarence Prom, MD    Office Visit    10 months ago Type 2 diabetes mellitus without complication, with long-term current use of insulin [facility])    Precious Ty MD    Office Visit    1 year ago Posterior auricular pain of right ear    [facility], [address], [facility] [place], [facility]    Office Visit          Future Appointments         Provider Department Appt Notes    In 11 months Roberto Wood MD [address], [address], [facility] EE/pt Mela Passed - EGFRCR or GFRNAA > 50     GFR Evaluation  EGFRCR: 102 , resulted on 7/12/2023            LISINOPRIL 40 MG Oral Tab [Pharmacy Med Name: Lisinopril 40 MG Oral Tablet] 90 tablet 3     Sig: TAKE 1 TABLET BY MOUTH  DAILY       Hypertensive Medications Protocol Passed - 7/13/2023 10:11 AM        Passed - In person appointment in the past 12 or next 3 months     Recent Outpatient Visits              Yesterday Type 2 diabetes mellitus without complication, with long-term current use of insulin ([address]    Heladio Juarez Alto Sager, MD    Office Visit    1 month ago Type 2 diabetes mellitus without complication, without long-term current use of insulin ([address]    [place], [address] [facility] [address], Efrain Ortiz MD    Office Visit    5 months ago Hematuria, unspecified type    Heladio Juarez Alto Sager, MD    Office Visit    10 months ago Type 2 diabetes mellitus without complication, with long-term current use of insulin [facility])    Heladio Juarez Alto Sager, MD    Office Visit    1 year ago Posterior auricular pain of right ear    Liliana Juarez [place], APRN    Office Visit          Future Appointments         Provider Department Appt Notes    In 11 months Aislinn Traylor MD [facility], [address], [place] EE/pt conf               Passed - Last BP reading less than 140/90     BP Readings from Last 1 Encounters:  07/12/23 : 138/72              Passed - CMP or BMP in past 6 months     Recent Results (from the past 4392 hour(s))   COMP METABOLIC PANEL (14)    Collection Time: 07/12/23  4:33 PM   Result Value Ref Range    Glucose 171 (H) 70 - 99 mg/dL    Sodium 143 136 - 145 mmol/L    Potassium 4.1 3.5 - 5.1 mmol/L    Chloride 109 98 - 112 mmol/L    CO2 28.0 21.0 - 32.0 mmol/L    Anion Gap 6 0 - 18 mmol/L    BUN 21 (H) 7 - 18 mg/dL    Creatinine 0.84 0.70 - 1.30 mg/dL    BUN/CREA Ratio 25.0 (H) 10.0 - 20.0    Calcium, Total 9.3 8.5 - 10.1 mg/dL    Calculated Osmolality 303 (H) 275 - 295 mOsm/kg    eGFR-Cr 102 >=60 mL/min/1.73m2    ALT 27 16 - 61 U/L    AST 14 (L) 15 - 37 U/L    Alkaline Phosphatase 83 45 - 117 U/L    Bilirubin, Total 0.4 0.1 - 2.0 mg/dL    Total Protein 7.3 6.4 - 8.2 g/dL    Albumin 4.1 3.4 - 5.0 g/dL    Globulin  3.2 2.8 - 4.4 g/dL    A/G Ratio 1.3 1.0 - 2.0    Patient Fasting for CMP? No      *Note: Due to a large number of results and/or encounters for the requested time period, some results have not been displayed. A complete set of results can be found in Results Review.                Passed - In person appointment or virtual visit in the past 6 months     Recent Outpatient Visits              Yesterday Type 2 diabetes mellitus without complication, with long-term current use of insulin ([address]    Claire Meek MD    Office Visit    1 month ago Type 2 diabetes mellitus without complication, without long-term current use of insulin ([address]    [place], [address], [place]    Office Visit    5 months ago Hematuria, unspecified type    Heladio Montague Ane Browning, MD    Office Visit    10 months ago Type 2 diabetes mellitus without complication, with long-term current use of insulin [facility])    Griselda Montague MD    Office Visit    1 year ago Posterior auricular pain of right ear    Liliana Montague [place], APRN    Office Visit          Future Appointments         Provider Department Appt Notes    In 11 months MD Sandro Maciel, [address], [facility] EE/pt conf               Passed - [facility] or GFRNAA > 50     GFR Evaluation  EGFRCR: 102 , resulted on 7/12/2023             Future Appointments         Provider Department Appt Notes    In 11 months MD Magdalena Cesar, [address], [place] EE/pt conf          Recent Outpatient Visits              Yesterday Type 2 diabetes mellitus without complication, with long-term current use of insulin [facility])    Magdalena Jacome, [address] O[address], MD    Office Visit    1 month ago Type 2 diabetes mellitus without complication, without long-term current use of insulin [facility]    Magdalena Jacome, [address], John Ortiz MD    Office Visit    5 months ago Hematuria, unspecified type    Heladio Casas Odetta Cool, MD    Office Visit    10 months ago Type 2 diabetes mellitus without complication, with long-term current use of insulin [facility])    Valerie Casas MD    Office Visit    1 year ago Posterior auricular pain of right ear    Liliana Casas [place], APRN    Office Visit

## 2023-08-24 ENCOUNTER — HOSPITAL ENCOUNTER (OUTPATIENT)
Age: 58
Discharge: HOME | End: 2023-08-24
Payer: COMMERCIAL

## 2023-08-24 VITALS
RESPIRATION RATE: 18 BRPM | DIASTOLIC BLOOD PRESSURE: 73 MMHG | HEART RATE: 66 BPM | OXYGEN SATURATION: 100 % | SYSTOLIC BLOOD PRESSURE: 142 MMHG | TEMPERATURE: 98 F

## 2023-08-24 DIAGNOSIS — H66.002 NON-RECURRENT ACUTE SUPPURATIVE OTITIS MEDIA OF LEFT EAR WITHOUT SPONTANEOUS RUPTURE OF TYMPANIC MEMBRANE: Primary | ICD-10-CM

## 2023-08-24 DIAGNOSIS — H60.502 ACUTE NONINFECTIVE OTITIS EXTERNA OF LEFT EAR, UNSPECIFIED TYPE: ICD-10-CM

## 2023-08-24 PROCEDURE — 99213 OFFICE O/P EST LOW 20 MIN: CPT | Performed by: NURSE PRACTITIONER

## 2023-08-24 RX ORDER — AMOXICILLIN 875 MG/1
875 TABLET, COATED ORAL 2 TIMES DAILY
Qty: 14 TABLET | Refills: 0 | Status: SHIPPED | OUTPATIENT
Start: 2023-08-24 | End: 2023-08-31

## 2023-08-24 RX ORDER — CIPROFLOXACIN AND DEXAMETHASONE 3; 1 MG/ML; MG/ML
4 SUSPENSION/ DROPS AURICULAR (OTIC) 2 TIMES DAILY
Qty: 7.5 ML | Refills: 0 | Status: SHIPPED | OUTPATIENT
Start: 2023-08-24 | End: 2023-09-03

## 2023-08-24 NOTE — DISCHARGE INSTRUCTIONS
No Q-tips in the ears no swimming or submerging hand underwater. Do not allow water to run in the ear start the oral antibiotic and the eardrops as prescribed. Recommend taking it with over-the-counter probiotic as some antibiotics can cause upset stomach or diarrhea. You may take Tylenol for pain. Follow-up with primary care provider 3 to 4 days for reevaluation follow-up with ears nose and throat specialist if you continue to have ear pain. If you develop fevers or chills headache neck stiffness outer ear swelling redness or warmth or drainage from the ear or any new or worsening symptoms go to the nearest emergency department.

## 2023-08-25 ENCOUNTER — OFFICE VISIT (OUTPATIENT)
Dept: OTOLARYNGOLOGY | Facility: CLINIC | Age: 58
End: 2023-08-25

## 2023-08-25 VITALS — WEIGHT: 219 LBS | BODY MASS INDEX: 30 KG/M2

## 2023-08-25 DIAGNOSIS — H60.392 OTHER INFECTIVE ACUTE OTITIS EXTERNA OF LEFT EAR: Primary | ICD-10-CM

## 2023-08-25 PROCEDURE — 92504 EAR MICROSCOPY EXAMINATION: CPT | Performed by: OTOLARYNGOLOGY

## 2023-08-25 PROCEDURE — 99203 OFFICE O/P NEW LOW 30 MIN: CPT | Performed by: OTOLARYNGOLOGY

## 2023-08-25 RX ORDER — CIPROFLOXACIN 500 MG/1
500 TABLET, FILM COATED ORAL EVERY 12 HOURS
Qty: 14 TABLET | Refills: 0 | Status: SHIPPED | OUTPATIENT
Start: 2023-08-25

## 2023-08-25 RX ORDER — TOBRAMYCIN AND DEXAMETHASONE 3; 1 MG/ML; MG/ML
3 SUSPENSION/ DROPS OPHTHALMIC EVERY 8 HOURS
Qty: 10 ML | Refills: 0 | Status: SHIPPED | OUTPATIENT
Start: 2023-08-25

## 2023-09-26 ENCOUNTER — OFFICE VISIT (OUTPATIENT)
Dept: INTERNAL MEDICINE CLINIC | Facility: CLINIC | Age: 58
End: 2023-09-26

## 2023-09-26 ENCOUNTER — NURSE TRIAGE (OUTPATIENT)
Dept: INTERNAL MEDICINE CLINIC | Facility: CLINIC | Age: 58
End: 2023-09-26

## 2023-09-26 VITALS
HEART RATE: 81 BPM | OXYGEN SATURATION: 97 % | WEIGHT: 215 LBS | BODY MASS INDEX: 29.12 KG/M2 | HEIGHT: 72 IN | SYSTOLIC BLOOD PRESSURE: 160 MMHG | DIASTOLIC BLOOD PRESSURE: 80 MMHG

## 2023-09-26 DIAGNOSIS — M54.2 NECK PAIN: Primary | ICD-10-CM

## 2023-09-26 PROCEDURE — 3077F SYST BP >= 140 MM HG: CPT | Performed by: NURSE PRACTITIONER

## 2023-09-26 PROCEDURE — 3008F BODY MASS INDEX DOCD: CPT | Performed by: NURSE PRACTITIONER

## 2023-09-26 PROCEDURE — 99214 OFFICE O/P EST MOD 30 MIN: CPT | Performed by: NURSE PRACTITIONER

## 2023-09-26 PROCEDURE — 3079F DIAST BP 80-89 MM HG: CPT | Performed by: NURSE PRACTITIONER

## 2023-09-26 RX ORDER — CYCLOBENZAPRINE HCL 10 MG
10 TABLET ORAL 3 TIMES DAILY
Qty: 30 TABLET | Refills: 1 | Status: SHIPPED | OUTPATIENT
Start: 2023-09-26 | End: 2023-10-16

## 2023-09-26 NOTE — TELEPHONE ENCOUNTER
Please reply to pool: EM RN TRIAGE  Action Requested: Summary for Provider     []  Critical Lab, Recommendations Needed  [] Need Additional Advice  [x]   FYI    []   Need Orders  [] Need Medications Sent to Pharmacy  []  Other     SUMMARY: Patient contacts clinic reporting pain to posterior left neck and shoulder blade, travels down arm with numbness and tingling. No known trauma. Present for 10 days. Denies chest pain, shortness of breath, dizziness or lightheadedness. Acute visit booked today.       Reason for call: Acute  Onset: Data Unavailable                       Reason for Disposition   Numbness in an arm or hand (i.e., loss of sensation)    Protocols used: Neck Pain or [id number]

## 2023-09-26 NOTE — PATIENT INSTRUCTIONS
Plan  1) Ice neck for 15 to 20 minutes four times per day. 2) Rest  3) Cyclobenzaprine 10 mg po TID. May cause drowsiness and may only be able to take it at night. Do not drive while taking this medications. 4) Diclofenac 50 mg po Tid for two weeks.         Electronically signed by therapist: Melody Adrian PT

## 2023-09-26 NOTE — ASSESSMENT & PLAN NOTE
Last hemoglobin A1C was 7.3. Patient states he has been following a strict diet and exercising. Plan  Will check labs and follow up with patient next month. If hemoglobin A1C is over 7. A new treatment plan will be considered.

## 2023-09-26 NOTE — ASSESSMENT & PLAN NOTE
Instructions   from Gina  1) Ice neck for 15 to 20 minutes four times per day. 2) Rest  3) Cyclobenzaprine 10 mg po TID. May cause drowsiness and may only be able to take it at night. Do not drive while taking this medications. 4) Diclofenac 50 mg po Tid for two weeks.

## 2023-09-27 ENCOUNTER — HOSPITAL ENCOUNTER (OUTPATIENT)
Dept: GENERAL RADIOLOGY | Facility: HOSPITAL | Age: 58
Discharge: HOME | End: 2023-09-27
Attending: NURSE PRACTITIONER
Payer: COMMERCIAL

## 2023-09-27 DIAGNOSIS — M54.2 NECK PAIN: ICD-10-CM

## 2023-09-27 PROCEDURE — 72040 X-RAY EXAM NECK SPINE 2-3 VW: CPT | Performed by: NURSE PRACTITIONER

## 2023-10-07 DIAGNOSIS — M54.2 NECK PAIN: Primary | ICD-10-CM

## 2023-10-21 ENCOUNTER — LAB ENCOUNTER (OUTPATIENT)
Dept: LAB | Facility: HOSPITAL | Age: 58
End: 2023-10-21
Attending: INTERNAL MEDICINE

## 2023-10-21 DIAGNOSIS — Z79.4 TYPE 2 DIABETES MELLITUS WITHOUT COMPLICATION, WITH LONG-TERM CURRENT USE OF INSULIN (HCC): ICD-10-CM

## 2023-10-21 DIAGNOSIS — E78.00 ELEVATED LDL CHOLESTEROL LEVEL: ICD-10-CM

## 2023-10-21 DIAGNOSIS — E11.9 TYPE 2 DIABETES MELLITUS WITHOUT COMPLICATION, WITH LONG-TERM CURRENT USE OF INSULIN (HCC): ICD-10-CM

## 2023-10-21 LAB
CHOLEST SERPL-MCNC: 238 MG/DL (ref ?–200)
EST. AVERAGE GLUCOSE BLD GHB EST-MCNC: 203 MG/DL (ref 68–126)
FASTING PATIENT GLUCOSE ANSWER: YES
FASTING PATIENT LIPID ANSWER: YES
GLUCOSE BLD-MCNC: 232 MG/DL (ref 70–99)
HBA1C MFR BLD: 8.7 % (ref ?–5.7)
HDLC SERPL-MCNC: 70 MG/DL (ref 40–59)
LDLC SERPL CALC-MCNC: 145 MG/DL (ref ?–100)
NONHDLC SERPL-MCNC: 168 MG/DL (ref ?–130)
TRIGL SERPL-MCNC: 132 MG/DL (ref 30–149)
VLDLC SERPL CALC-MCNC: 25 MG/DL (ref 0–30)

## 2023-10-21 PROCEDURE — 83036 HEMOGLOBIN GLYCOSYLATED A1C: CPT

## 2023-10-21 PROCEDURE — 80061 LIPID PANEL: CPT

## 2023-10-21 PROCEDURE — 82947 ASSAY GLUCOSE BLOOD QUANT: CPT

## 2023-10-21 PROCEDURE — 36415 COLL VENOUS BLD VENIPUNCTURE: CPT

## 2023-10-22 ENCOUNTER — PATIENT MESSAGE (OUTPATIENT)
Dept: INTERNAL MEDICINE CLINIC | Facility: CLINIC | Age: 58
End: 2023-10-22

## 2023-10-22 DIAGNOSIS — Z79.4 TYPE 2 DIABETES MELLITUS WITHOUT COMPLICATION, WITH LONG-TERM CURRENT USE OF INSULIN (HCC): ICD-10-CM

## 2023-10-22 DIAGNOSIS — E11.9 TYPE 2 DIABETES MELLITUS WITHOUT COMPLICATION, WITH LONG-TERM CURRENT USE OF INSULIN (HCC): ICD-10-CM

## 2023-10-23 RX ORDER — PEN NEEDLE, DIABETIC 32GX 5/32"
1 NEEDLE, DISPOSABLE MISCELLANEOUS DAILY
Qty: 90 EACH | Refills: 3 | OUTPATIENT
Start: 2023-10-23

## 2023-10-23 RX ORDER — PERPHENAZINE 16 MG/1
TABLET, FILM COATED ORAL DAILY
Qty: 100 STRIP | Refills: 3 | OUTPATIENT
Start: 2023-10-23

## 2023-10-25 RX ORDER — PEN NEEDLE, DIABETIC 32GX 5/32"
NEEDLE, DISPOSABLE MISCELLANEOUS
Qty: 100 EACH | Refills: 3 | Status: CANCELLED | OUTPATIENT
Start: 2023-10-25

## 2023-10-25 RX ORDER — AMLODIPINE BESYLATE 5 MG/1
5 TABLET ORAL DAILY
Qty: 90 TABLET | Refills: 3 | Status: CANCELLED
Start: 2023-10-25

## 2023-10-25 RX ORDER — PERPHENAZINE 16 MG/1
TABLET, FILM COATED ORAL
Qty: 300 EACH | Refills: 3 | Status: CANCELLED
Start: 2023-10-25

## 2023-10-25 RX ORDER — PEN NEEDLE, DIABETIC 30 GX3/16"
1 NEEDLE, DISPOSABLE MISCELLANEOUS NIGHTLY
Qty: 100 EACH | Refills: 3 | Status: SHIPPED | OUTPATIENT
Start: 2023-10-25

## 2023-10-25 RX ORDER — GLUCOSAM/CHON-MSM1/C/MANG/BOSW 500-416.6
1 TABLET ORAL 3 TIMES DAILY
Qty: 100 EACH | Refills: 3 | Status: SHIPPED | OUTPATIENT
Start: 2023-10-25

## 2023-10-25 RX ORDER — INSULIN GLARGINE 100 [IU]/ML
30 INJECTION, SOLUTION SUBCUTANEOUS NIGHTLY
Qty: 27 ML | Refills: 3 | Status: CANCELLED
Start: 2023-10-25

## 2023-10-25 RX ORDER — LISINOPRIL 40 MG/1
40 TABLET ORAL DAILY
Qty: 90 TABLET | Refills: 3 | Status: CANCELLED
Start: 2023-10-25

## 2023-10-25 RX ORDER — PERPHENAZINE 16 MG/1
TABLET, FILM COATED ORAL
Qty: 300 STRIP | Refills: 1 | Status: SHIPPED | OUTPATIENT
Start: 2023-10-25

## 2023-10-25 NOTE — TELEPHONE ENCOUNTER
Please review. Protocol failed / Has no protocol. Requested Prescriptions   Pending Prescriptions Disp Refills    amLODIPine 5 MG Oral Tab 90 tablet 1     Sig: Take 1 tablet (5 mg total) by mouth daily.        Hypertensive Medications Protocol Failed - 10/25/2023  2:39 PM        Failed - Last BP reading less than 140/90     BP Readings from Last 1 Encounters:  09/26/23 : 160/80              Passed - In person appointment in the past 12 or next 3 months     Recent Outpatient Visits              4 weeks ago Neck pain    [facility], [address], [place], [place], APRN    Office Visit    2 months ago Other infective acute otitis externa of left ear    [facility], [address], Michelle Cm MD    Office Visit    3 months ago Type 2 diabetes mellitus without complication, with long-term current use of insulin [facility])    Heladio Larson Vernida Desanctis, MD    Office Visit    4 months ago Type 2 diabetes mellitus without complication, without long-term current use of insulin ([facility]    [address] [place],[address], [address], Randa Herring MD    Office Visit    8 months ago Hematuria, unspecified type    Cori Larson MD    Office Visit          Future Appointments         Provider Department Appt Notes    In 7 months Stephanie Lee MD [facility], [address], [address] EP EE/pt conf                      Passed - CMP or BMP in past 6 months     Recent Results (from the past 4392 hour(s))   Comp Metabolic Panel (14)    Collection Time: 07/12/23  4:33 PM   Result Value Ref Range    Glucose 171 (H) 70 - 99 mg/dL    Sodium 143 136 - 145 mmol/L    Potassium 4.1 3.5 - 5.1 mmol/L    Chloride 109 98 - 112 mmol/L    CO2 28.0 21.0 - 32.0 mmol/L    Anion Gap 6 0 - 18 mmol/L    BUN 21 (H) 7 - 18 mg/dL    Creatinine 0.84 0.70 - 1.30 mg/dL BUN/CREA Ratio 25.0 (H) 10.0 - 20.0    Calcium, Total 9.3 8.5 - 10.1 mg/dL    Calculated Osmolality 303 (H) 275 - 295 mOsm/kg    eGFR-Cr 102 >=60 mL/min/1.73m2    ALT 27 16 - 61 U/L    AST 14 (L) 15 - 37 U/L    Alkaline Phosphatase 83 45 - 117 U/L    Bilirubin, Total 0.4 0.1 - 2.0 mg/dL    Total Protein 7.3 6.4 - 8.2 g/dL    Albumin 4.1 3.4 - 5.0 g/dL    Globulin  3.2 2.8 - 4.4 g/dL    A/G Ratio 1.3 1.0 - 2.0    Patient Fasting for CMP? No      *Note: Due to a large number of results and/or encounters for the requested time period, some results have not been displayed. A complete set of results can be found in Results Review.                Passed - In person appointment or virtual visit in the past 6 months     Recent Outpatient Visits              4 weeks ago Neck pain    [facility], [address], [place], [place], APRN    Office Visit    2 months ago Other infective acute otitis externa of left ear    [facility], [address], Ipax-Sugryp-DfhgjzvRadha Fernández MD    Office Visit    3 months ago Type 2 diabetes mellitus without complication, with long-term current use of insulin [facility])    [facility] [facility]    Office Visit    4 months ago Type 2 diabetes mellitus without complication, without long-term current use of insulin ([facility] [address]    [facility], [address], Seamus Ortiz MD    Office Visit    8 months ago Hematuria, unspecified type    [address] [facility], Abiola Hilario MD    Office Visit          Future Appointments         Provider Department Appt Notes    In 7 months Cayetano Marin MD [facility], [address], [place] EP EE/pt conf                      Passed - [facility] or GFRNAA > 50     GFR Evaluation  EGFRCR: 102 , resulted on 7/12/2023            lisinopril 40 MG Oral Tab 90 tablet 1     Sig: Take 1 tablet (40 mg total) by mouth daily.        Hypertensive Medications Protocol Failed - 10/25/2023  2:39 PM        Failed - Last BP reading less than 140/90     BP Readings from Last 1 Encounters:  09/26/23 : 160/80              Passed - In person appointment in the past 12 or next 3 months     Recent Outpatient Visits              4 weeks ago Neck pain    [facility], [address], [place], [place], APRN    Office Visit    2 months ago Other infective acute otitis externa of left ear    [facility], [address], Zhra-Qyonnz-OykxbnyMarleni Rudd MD    Office Visit    3 months ago Type 2 diabetes mellitus without complication, with long-term current use of insulin [facility])    Heladio Ty Clarence Prom, MD    Office Visit    4 months ago Type 2 diabetes mellitus without complication, without long-term current use of insulin ([facility]    [address], [address], [place]Gonzalo ogden MD    Office Visit    8 months ago Hematuria, unspecified type    Precious Ty MD    Office Visit          Future Appointments         Provider Department Appt Notes    In 7 months Roberto Wood MD [facility], [address], [place] EP EE/pt conf                      Passed - CMP or BMP in past 6 months     Recent Results (from the past 4392 hour(s))   Comp Metabolic Panel (14)    Collection Time: 07/12/23  4:33 PM   Result Value Ref Range    Glucose 171 (H) 70 - 99 mg/dL    Sodium 143 136 - 145 mmol/L    Potassium 4.1 3.5 - 5.1 mmol/L    Chloride 109 98 - 112 mmol/L    CO2 28.0 21.0 - 32.0 mmol/L    Anion Gap 6 0 - 18 mmol/L    BUN 21 (H) 7 - 18 mg/dL    Creatinine 0.84 0.70 - 1.30 mg/dL    BUN/CREA Ratio 25.0 (H) 10.0 - 20.0    Calcium, Total 9.3 8.5 - 10.1 mg/dL    Calculated Osmolality 303 (H) 275 - 295 mOsm/kg    eGFR-Cr 102 >=60 mL/min/1.73m2    ALT 27 16 - 61 U/L    AST 14 (L) 15 - 37 U/L    Alkaline Phosphatase 83 45 - 117 U/L    Bilirubin, Total 0.4 0.1 - 2.0 mg/dL    Total Protein 7.3 6.4 - 8.2 g/dL    Albumin 4.1 3.4 - 5.0 g/dL    Globulin  3.2 2.8 - 4.4 g/dL    A/G Ratio 1.3 1.0 - 2.0    Patient Fasting for CMP? No      *Note: Due to a large number of results and/or encounters for the requested time period, some results have not been displayed. A complete set of results can be found in Results Review. Passed - In person appointment or virtual visit in the past 6 months     Recent Outpatient Visits              4 weeks ago Neck pain    [facility], [address], [place], [place], APRN    Office Visit    2 months ago Other infective acute otitis externa of left ear    [address], [address], Isi Cm MD    Office Visit    3 months ago Type 2 diabetes mellitus without complication, with long-term current use of insulin [facility])    Nelma Eisenmenger, Peggyann Duval, MD    Office Visit    4 months ago Type 2 diabetes mellitus without complication, without long-term current use of insulin ([facility]    [address], [address], Vu Hernandez MD    Office Visit    8 months ago Hematuria, unspecified type    Nelma Eisenmenger, Peggyann Duval, MD    Office Visit          Future Appointments         Provider Department Appt Notes    In 7 months Kai Stokes MD [facility], [address], [address] EP EE/pt conf                      Passed - [facility] or GFRNAA > 50     GFR Evaluation  EGFRCR: 102 , resulted on 7/12/2023            insulin glargine (LANTUS SOLOSTAR) 100 UNIT/ML Subcutaneous Solution Pen-injector 30 mL 1     Sig: Inject 30 Units into the skin nightly.        Diabetes Medication Protocol Failed - 10/25/2023  2:39 PM        Failed - Last A1C < 7.5 and within past 6 months     Lab Results Component Value Date    A1C 8.7 (H) 10/21/2023             Passed - In person appointment or virtual visit in the past 6 mos or appointment in next 3 mos     Recent Outpatient Visits              4 weeks ago Neck pain    [facility], [address], [place], APRDEMETRIO    Office Visit    2 months ago Other infective acute otitis externa of left ear    [facility], [address], Yqia-Tjdwfz-KgokzxnKeira MD    Office Visit    3 months ago Type 2 diabetes mellitus without complication, with long-term current use of insulin [facility])    Heladio Vogt Darris Smart, MD    Office Visit    4 months ago Type 2 diabetes mellitus without complication, without long-term current use of insulin ([facility]    [address], [address], CallaoJr ogden MD    Office Visit    8 months ago Hematuria, unspecified type    Heladio Vogt Darris Smart, MD    Office Visit          Future Appointments         Provider Department Appt Notes    In 7 months Angélica Rehman MD [address], [address], [facility] EE/pt conf                      Passed - [facility] or GFRNAA > 50     GFR Evaluation  EGFRCR: 102 , resulted on 7/12/2023          Passed - GFR in the past 12 months         Signed Prescriptions Disp Refills    Glucose Blood (CONTOUR NEXT TEST) In Vitro Strip 300 strip 1     Sig: Use 1 (one) new strip three times daily for blood glucose monitoring       Diabetic Supplies Protocol Passed - 10/25/2023  2:39 PM        Passed - In person appointment or virtual visit in the past 12 mos or appointment in next 3 mos     Recent Outpatient Visits              4 weeks ago Neck pain    [facility], [address] [place], APRDEMETRIO    Office Visit    2 months ago Other infective acute otitis externa of left ear    [facility], [address], Vaughn Cm MD    Office Visit    3 months ago Type 2 diabetes mellitus without complication, with long-term current use of insulin [facility])    Griselda Montague MD    Office Visit    4 months ago Type 2 diabetes mellitus without complication, without long-term current use of insulin ([facility] [address]    [address], [address], Janet Ortiz MD    Office Visit    8 months ago Hematuria, unspecified type    Griselda Montague MD    Office Visit          Future Appointments         Provider Department Appt Notes    In 7 months Ethyl MD Ramona [facility], [address], [place] EP EE/pt conf                        TRUEplus Lancets 30G Does not apply Misc 100 each 3     Si Lancet by Finger stick route 3 (three) times daily.        Diabetic Supplies Protocol Passed - 10/25/2023  2:39 PM        Passed - In person appointment or virtual visit in the past 12 mos or appointment in next 3 mos     Recent Outpatient Visits              4 weeks ago Neck pain    [facility], [address], [place], [place], APRN    Office Visit    2 months ago Other infective acute otitis externa of left ear    [facility], [address], Vaughn Cm MD    Office Visit    3 months ago Type 2 diabetes mellitus without complication, with long-term current use of insulin [facility])    Griselda Montague MD    Office Visit    4 months ago Type 2 diabetes mellitus without complication, without long-term current use of insulin ([facility] [address]    [address], [address], Janet Ortiz MD    Office Visit    8 months ago Hematuria, unspecified type    Griselda Montague MD    Office Visit Future Appointments         Provider Department Appt Notes    In 7 months Adrienne Borges MD [facility], [address], [facility] EP EE/pt conf                        Insulin Pen Needle (PEN NEEDLES) 32G X 4 MM Does not apply Misc 100 each 3     Sig: Inject 1 Needle as directed nightly.  For use with Lantus / insulin glargine       Diabetic Supplies Protocol Passed - 10/25/2023  2:39 PM        Passed - In person appointment or virtual visit in the past 12 mos or appointment in next 3 mos     Recent Outpatient Visits              4 weeks ago Neck pain    [facility], [address] [facility] Vicenta Quevedo APRN    Office Visit    2 months ago Other infective acute otitis externa of left ear    [facility], [address], Uvwd-Kblwvu-AxvyetdAbdi Fernández MD    Office Visit    3 months ago Type 2 diabetes mellitus without complication, with long-term current use of insulin [facility])    [address], [address] [facility]    Office Visit    4 months ago Type 2 diabetes mellitus without complication, without long-term current use of insulin ([facility]    [address], [address], [place]    Office Visit    8 months ago Hematuria, unspecified type    [address] [facility] [place] Mariel Harris MD    Office Visit          Future Appointments         Provider Department Appt Notes    In 7 months Adrienne Borges MD [address], [address], [address] [employer]         Provider Department Appt Notes    In 7 months Adrienne Borges MD [address] [facility], [address] conf           Recent Outpatient Visits              4 weeks ago Neck pain    [facility], [address] [facility] RATNA Herzog    Office Visit    2 months ago Other infective acute otitis externa of left ear    [facility], [address], Qxgx-Tmwroc-EowufhoMilagro Fernández MD    Office Visit    3 months ago Type 2 diabetes mellitus without complication, with long-term current use of insulin [facility])    Heladio Gray Rod Neighbors, MD    Office Visit    4 months ago Type 2 diabetes mellitus without complication, without long-term current use of insulin [facility])    [address], [address], Joseline Ortiz MD    Office Visit    8 months ago Hematuria, unspecified type    Ankit Gray MD    Office Visit

## 2023-10-25 NOTE — TELEPHONE ENCOUNTER
[employer] message sent for clarification. Aaron Nova's OFFICE VISIT note 9/26/23;  Hyperglycemia  In review of Dr. Josesito Mcdermott note and talking with the patient he took it upon himself to go off all of his medications and wants to try and control his glucose levels with diet and exercise    Dr Dorthula Sandhoff note 7/12/23;  ASSESSMENT/PLAN:   1. Type 2 diabetes mellitus without complication, with long-term current use of insulin ([facility])  A1c is 7.3. Patient has made some radical lifestyle changes over the past 2 months. He is eating only between the hours of noon and 6 PM.  He is eating a very low carb high-protein high-fat diet. His weight is down substantially. He has stopped insulin, metformin, simvastatin. First and foremost, I congratulated him on his weight loss accomplishment. Based on the A1c of 7.3, we do not need to restart metformin or insulin. We did discuss the indication for statins and all diabetics. I disagree with his stopping of this medication. He is aware the increased risk of a heart attack or stroke that he is taking by not using the statin medication. From: Earl Esparza  To: Evans Han  Sent: 10/22/2023  5:58 PM CDT  Subject: Refill Lantus Solostar injection pen    Javier Richardson    My A1C went up 1 pt. Bad, I know. I'm still losing weight and eating low-carb, but I restarted my nightly injections of insulin. I'm almost out. Can you issue a new Rx? I get my Rx from [facility] (if that matters). I don't want to go thru [place] if possible. Thank you.     Todd Feldman

## 2023-10-25 NOTE — TELEPHONE ENCOUNTER
Refill passed per [place] [employer], [facility] protocol. Requested Prescriptions   Pending Prescriptions Disp Refills    amLODIPine 5 MG Oral Tab 90 tablet 3     Sig: Take 1 tablet (5 mg total) by mouth daily.        Hypertensive Medications Protocol Failed - 10/25/2023  2:39 PM        Failed - Last BP reading less than 140/90     BP Readings from Last 1 Encounters:  09/26/23 : 160/80              Passed - In person appointment in the past 12 or next 3 months     Recent Outpatient Visits              4 weeks ago Neck pain    [facility], [address] Kumar Paulette Dial, RATNA    Office Visit    2 months ago Other infective acute otitis externa of left ear    [facility], [address], Yazan Cm MD    Office Visit    3 months ago Type 2 diabetes mellitus without complication, with long-term current use of insulin [facility])    Heladio Hernandez Collis Pottier, MD    Office Visit    4 months ago Type 2 diabetes mellitus without complication, without long-term current use of insulin ([facility]    [address], [address], AngelSharath ogden MD    Office Visit    8 months ago Hematuria, unspecified type    Kay Hernandez MD    Office Visit          Future Appointments         Provider Department Appt Notes    In 7 months Iraj Li MD [facility], [address], [place] EP EE/pt conf                      Passed - CMP or BMP in past 6 months     Recent Results (from the past 4392 hour(s))   Comp Metabolic Panel (14)    Collection Time: 07/12/23  4:33 PM   Result Value Ref Range    Glucose 171 (H) 70 - 99 mg/dL    Sodium 143 136 - 145 mmol/L    Potassium 4.1 3.5 - 5.1 mmol/L    Chloride 109 98 - 112 mmol/L    CO2 28.0 21.0 - 32.0 mmol/L    Anion Gap 6 0 - 18 mmol/L    BUN 21 (H) 7 - 18 mg/dL    Creatinine 0.84 0.70 - 1.30 mg/dL    BUN/CREA Ratio 25.0 (H) 10.0 - 20.0    Calcium, Total 9.3 8.5 - 10.1 mg/dL    Calculated Osmolality 303 (H) 275 - 295 mOsm/kg    eGFR-Cr 102 >=60 mL/min/1.73m2    ALT 27 16 - 61 U/L    AST 14 (L) 15 - 37 U/L    Alkaline Phosphatase 83 45 - 117 U/L    Bilirubin, Total 0.4 0.1 - 2.0 mg/dL    Total Protein 7.3 6.4 - 8.2 g/dL    Albumin 4.1 3.4 - 5.0 g/dL    Globulin  3.2 2.8 - 4.4 g/dL    A/G Ratio 1.3 1.0 - 2.0    Patient Fasting for CMP? No      *Note: Due to a large number of results and/or encounters for the requested time period, some results have not been displayed. A complete set of results can be found in Results Review. Passed - In person appointment or virtual visit in the past 6 months     Recent Outpatient Visits              4 weeks ago Neck pain    [facility], [address], [place], RATNA    Office Visit    2 months ago Other infective acute otitis externa of left ear    [facility], [address], Xsfp-Yhauxj-MqzbzoaMichelle Rudd MD    Office Visit    3 months ago Type 2 diabetes mellitus without complication, with long-term current use of insulin [facility])    Heladio Larson Vernida Desanctis, MD    Office Visit    4 months ago Type 2 diabetes mellitus without complication, without long-term current use of insulin ([facility]    [facility], [address], Abdirahman Ortiz MD    Office Visit    8 months ago Hematuria, unspecified type    Cori Larson MD    Office Visit          Future Appointments         Provider Department Appt Notes    In 7 months Stephanie Lee MD [facility], [address], [facility] EP EE/pt conf                      [facility] or GFRNAA > 50     GFR Evaluation  EGFRCR: 102 , resulted on 7/12/2023            lisinopril 40 MG Oral Tab 90 tablet 3     Sig: Take 1 tablet (40 mg total) by mouth daily. Hypertensive Medications Protocol Failed - 10/25/2023  2:39 PM        Failed - Last BP reading less than 140/90     BP Readings from Last 1 Encounters:  09/26/23 : 160/80              Passed - In person appointment in the past 12 or next 3 months     Recent Outpatient Visits              4 weeks ago Neck pain    [facility], [address], [place], [place], APRN    Office Visit    2 months ago Other infective acute otitis externa of left ear    [facility], [address], Ymyj-Kihxka-XpugxdpIsi Rudd MD    Office Visit    3 months ago Type 2 diabetes mellitus without complication, with long-term current use of insulin [facility])    Nelma Eisenmenger, [place], Claudean Man, MD    Office Visit    4 months ago Type 2 diabetes mellitus without complication, without long-term current use of insulin ([facility]    [address], [address] [facility] [place]    Office Visit    8 months ago Hematuria, unspecified type    Nelma Eisenmenger, [place], Claudean Man, MD    Office Visit          Future Appointments         Provider Department Appt Notes    In 7 months Kai Stokes MD [facility], [address] [facility], [place] EP EE/pt conf                      Passed - CMP or BMP in past 6 months     Recent Results (from the past 4392 hour(s))   Comp Metabolic Panel (14)    Collection Time: 07/12/23  4:33 PM   Result Value Ref Range    Glucose 171 (H) 70 - 99 mg/dL    Sodium 143 136 - 145 mmol/L    Potassium 4.1 3.5 - 5.1 mmol/L    Chloride 109 98 - 112 mmol/L    CO2 28.0 21.0 - 32.0 mmol/L    Anion Gap 6 0 - 18 mmol/L    BUN 21 (H) 7 - 18 mg/dL    Creatinine 0.84 0.70 - 1.30 mg/dL    BUN/CREA Ratio 25.0 (H) 10.0 - 20.0    Calcium, Total 9.3 8.5 - 10.1 mg/dL    Calculated Osmolality 303 (H) 275 - 295 mOsm/kg    eGFR-Cr 102 >=60 mL/min/1.73m2    ALT 27 16 - 61 U/L    AST 14 (L) 15 - 37 U/L Alkaline Phosphatase 83 45 - 117 U/L    Bilirubin, Total 0.4 0.1 - 2.0 mg/dL    Total Protein 7.3 6.4 - 8.2 g/dL    Albumin 4.1 3.4 - 5.0 g/dL    Globulin  3.2 2.8 - 4.4 g/dL    A/G Ratio 1.3 1.0 - 2.0    Patient Fasting for CMP? No      *Note: Due to a large number of results and/or encounters for the requested time period, some results have not been displayed. A complete set of results can be found in Results Review. Passed - In person appointment or virtual visit in the past 6 months     Recent Outpatient Visits              4 weeks ago Neck pain    [facility], [address], [place], [place], RATNA    Office Visit    2 months ago Other infective acute otitis externa of left ear    [facility], [address], Pcqx-Gbvbvj-RaldeyuMaxwell Fernández MD    Office Visit    3 months ago Type 2 diabetes mellitus without complication, with long-term current use of insulin [facility])    Heladio Romo Dea Molder, MD    Office Visit    4 months ago Type 2 diabetes mellitus without complication, without long-term current use of insulin ([facility]    [address], [address], AngelKristen ogden MD    Office Visit    8 months ago Hematuria, unspecified type    Bryant Romo MD    Office Visit          Future Appointments         Provider Department Appt Notes    In 7 months Everton Oconnor MD [facility], [address], [place] EP EE/pt conf                      Passed - [facility] or GFRNAA > 50     GFR Evaluation  EGFRCR: 102 , resulted on 2023            Glucose Blood (CONTOUR NEXT TEST) In Vitro Strip 100 strip 3     Si each by Other route daily.        Diabetic Supplies Protocol Passed - 10/25/2023  2:39 PM        Passed - In person appointment or virtual visit in the past 12 mos or appointment in next 3 mos     Recent Outpatient Visits 4 weeks ago Neck pain    [facility], [address], [place], APRN    Office Visit    2 months ago Other infective acute otitis externa of left ear    [facility], [address], George Cm MD    Office Visit    3 months ago Type 2 diabetes mellitus without complication, with long-term current use of insulin [facility])    Heladio Colbert Bertie Funk, MD    Office Visit    4 months ago Type 2 diabetes mellitus without complication, without long-term current use of insulin ([address]    [address], [address], Arlette Ortiz MD    Office Visit    8 months ago Hematuria, unspecified type    Chapincito Colbert MD    Office Visit          Future Appointments         Provider Department Appt Notes    In 7 months Ralf Nava MD [facility], [address], [place] EP EE/pt conf                        TRUEplus Lancets 30G Does not apply Misc 100 each 3     Sig: USE TO TEST BLOOD GLUCOSE  DAILY       Diabetic Supplies Protocol Passed - 10/25/2023  2:39 PM        Passed - In person appointment or virtual visit in the past 12 mos or appointment in next 3 mos     Recent Outpatient Visits              4 weeks ago Neck pain    [facility], Ariana Hoyos, [place], [place], APRN    Office Visit    2 months ago Other infective acute otitis externa of left ear    [address], [address], George Cm MD    Office Visit    3 months ago Type 2 diabetes mellitus without complication, with long-term current use of insulin [facility])    Aria Colbert MD    Office Visit    4 months ago Type 2 diabetes mellitus without complication, without long-term current use of insulin ([address]    [facility], [address], AngelChanel ogden MD    Office Visit    8 months ago Hematuria, unspecified type    [address] [facility] [place] Mirella Puentes MD    Office Visit          Future Appointments         Provider Department Appt Notes    In 7 months Ryanne John MD [facility], [address], [place] EP EE/pt conf                        insulin glargine (LANTUS SOLOSTAR) 100 UNIT/ML Subcutaneous Solution Pen-injector 30 mL 5     Sig: Inject 30 Units into the skin nightly.        Diabetes Medication Protocol Failed - 10/25/2023  2:39 PM        Failed - Last A1C < 7.5 and within past 6 months     Lab Results   Component Value Date    A1C 8.7 (H) 10/21/2023             Passed - In person appointment or virtual visit in the past 6 mos or appointment in next 3 mos     Recent Outpatient Visits              4 weeks ago Neck pain    [facility], [address] [place], [place], [place], APRN    Office Visit    2 months ago Other infective acute otitis externa of left ear    [address], [address], Fanny Cm MD    Office Visit    3 months ago Type 2 diabetes mellitus without complication, with long-term current use of insulin [facility])    [address] [place] Shlomo Delacruz MD    Office Visit    4 months ago Type 2 diabetes mellitus without complication, without long-term current use of insulin ([facility]    [address], [address], Chanel Ortiz MD    Office Visit    8 months ago Hematuria, unspecified type    [address] [facility] Shlomo Delacruz MD    Office Visit          Future Appointments         Provider Department Appt Notes    In 7 months [facility], [address], [place] EP EE/pt conf                      Passed - EGFRCR or GFRNAA > 50     GFR Evaluation  EGFRCR: 102 , resulted on 2023          Passed - GFR in the past 12 months          Insulin Pen Needle (PEN NEEDLES) 32G X 4 MM Does not apply Misc 90 each 3     Si each daily.        Diabetic Supplies Protocol Passed - 10/25/2023  2:39 PM        Passed - In person appointment or virtual visit in the past 12 mos or appointment in next 3 mos     Recent Outpatient Visits              4 weeks ago Neck pain    [facility], [address] [facility] [place], APRN    Office Visit    2 months ago Other infective acute otitis externa of left ear    [facility], [address], Bryan Cm MD    Office Visit    3 months ago Type 2 diabetes mellitus without complication, with long-term current use of insulin [facility])    [address], [address] [facility] Ramsey Jarred, MD    Office Visit    4 months ago Type 2 diabetes mellitus without complication, without long-term current use of insulin ([facility]    [address], [address], Maya Ortiz MD    Office Visit    8 months ago Hematuria, unspecified type    Olvin Martinez, Brenda Grewal MD    Office Visit          Future Appointments         Provider Department Appt Notes    In 7 months Linette Santiago MD [address], [address], [address] [employer]         Provider Department Appt Notes    In 7 months Linette Santiago MD [address], [address], [address] conf          Recent Outpatient Visits              4 weeks ago Neck pain    [facility], [address] [facility] RATNA Jerry    Office Visit    2 months ago Other infective acute otitis externa of left ear    [address], [address], Bryan Cm MD    Office Visit    3 months ago Type 2 diabetes mellitus without complication, with long-term current use of insulin [facility])    Devendra Cordero MD    Office Visit    4 months ago Type 2 diabetes mellitus without complication, without long-term current use of insulin [facility])    [address], [address], [place], Teri Singh MD    Office Visit    8 months ago Hematuria, unspecified type    [place]otia Phi, Amanda Partida MD    Office Visit

## 2023-10-26 RX ORDER — AMLODIPINE BESYLATE 5 MG/1
5 TABLET ORAL DAILY
Qty: 90 TABLET | Refills: 1 | Status: SHIPPED | OUTPATIENT
Start: 2023-10-26

## 2023-10-26 RX ORDER — LISINOPRIL 40 MG/1
40 TABLET ORAL DAILY
Qty: 90 TABLET | Refills: 1 | Status: SHIPPED | OUTPATIENT
Start: 2023-10-26

## 2023-10-26 RX ORDER — INSULIN GLARGINE 100 [IU]/ML
30 INJECTION, SOLUTION SUBCUTANEOUS NIGHTLY
Qty: 30 ML | Refills: 1 | Status: SHIPPED | OUTPATIENT
Start: 2023-10-26

## 2024-01-03 ENCOUNTER — TELEPHONE (OUTPATIENT)
Dept: INTERNAL MEDICINE CLINIC | Facility: CLINIC | Age: 59
End: 2024-01-03

## 2024-01-03 NOTE — TELEPHONE ENCOUNTER
Patient due for annual physical exam, HGA1C, blood pressure follow-up and diabetic foot exam. [place] letter generated and sent to patient.

## 2024-01-03 NOTE — TELEPHONE ENCOUNTER
Pt needs a pre-op clearance visit w/ Dr Montoya prior to his ruptured cervical disk surgery on 1-26-24.    Dr Dave's first avail was Feb/Mar    Call pt at:  [phone]  ok to leave a voicemail

## 2024-01-05 ENCOUNTER — OFFICE VISIT (OUTPATIENT)
Dept: INTERNAL MEDICINE CLINIC | Facility: CLINIC | Age: 59
End: 2024-01-05
Payer: COMMERCIAL

## 2024-01-05 VITALS
BODY MASS INDEX: 29.53 KG/M2 | HEIGHT: 72 IN | HEART RATE: 104 BPM | OXYGEN SATURATION: 97 % | SYSTOLIC BLOOD PRESSURE: 144 MMHG | WEIGHT: 218 LBS | DIASTOLIC BLOOD PRESSURE: 74 MMHG

## 2024-01-05 DIAGNOSIS — E11.9 TYPE 2 DIABETES MELLITUS WITHOUT COMPLICATION, WITHOUT LONG-TERM CURRENT USE OF INSULIN (HCC): ICD-10-CM

## 2024-01-05 DIAGNOSIS — Z01.818 PREOP EXAMINATION: Primary | ICD-10-CM

## 2024-01-05 DIAGNOSIS — T14.8XXA BRUISING: ICD-10-CM

## 2024-01-05 PROCEDURE — 3008F BODY MASS INDEX DOCD: CPT | Performed by: INTERNAL MEDICINE

## 2024-01-05 PROCEDURE — 3078F DIAST BP <80 MM HG: CPT | Performed by: INTERNAL MEDICINE

## 2024-01-05 PROCEDURE — 3077F SYST BP >= 140 MM HG: CPT | Performed by: INTERNAL MEDICINE

## 2024-01-05 PROCEDURE — 99214 OFFICE O/P EST MOD 30 MIN: CPT | Performed by: INTERNAL MEDICINE

## 2024-01-05 NOTE — PROGRESS NOTES
Outpatient Office Note    HPI:     Luis Miguel Shelton is a 58 year old male.  Chief Complaint   Patient presents with    Pre-Op Exam     Surgery is on 01/26/24 C6-7 Anterior Cervical Discectomy and fusion with Dr. Satya Poole MD          Pt presents for preoperative clearance.  Planned surgery -C6-7 anterior cervical discectomy and fusion  Type of anesthesia - GA  Surgeon - Satya Poole  Date of surgery - 1/26/2024  Cardiac history - no  Myocardial infarction in past 6 months - No  Bleeding disorders - No  Taking blood thinners - No  Chest pain or shortness of breath with ADLs - No  Number of alcoholic beverages consumed weekly - None  Tobacco consumption - No  RCRI-to be calculated after blood work is reviewed  METs >4          Current Outpatient Medications   Medication Sig Dispense Refill    BABY ASPIRIN OR Take 81 mg by mouth.      amLODIPine 5 MG Oral Tab Take 1 tablet (5 mg total) by mouth daily. 90 tablet 1    lisinopril 40 MG Oral Tab Take 1 tablet (40 mg total) by mouth daily. 90 tablet 1    Glucose Blood (CONTOUR NEXT TEST) In Vitro Strip Use 1 (one) new strip three times daily for blood glucose monitoring 300 strip 1    TRUEplus Lancets 30G Does not apply Misc 1 Lancet by Finger stick route 3 (three) times daily. 100 each 3    Insulin Pen Needle (PEN NEEDLES) 32G X 4 MM Does not apply Misc Inject 1 Needle as directed nightly. For use with Lantus / insulin glargine 100 each 3      Past Medical History:   Diagnosis Date    Chest pain 2004,2010    normal stress nuclear; normal stress echo    Colon polyps 11/2016    Hyperplastic, repeat in 10 years    Diabetes (HCC)     diet controlled w/ no BDR - 2008    Disorder of eye, unspecified 2008    increased C/D ratio - OU    Lipid screening 9/10/2013    MGD (meibomian gland dysfunction) 2008    OU    Obesity (BMI 30-39.9)     Other and unspecified hyperlipidemia     Type II or unspecified type diabetes mellitus without mention of complication, not  stated as uncontrolled     Unspecified essential hypertension       Past Surgical History:   Procedure Laterality Date    COLONOSCOPY N/A 11/15/2016    Procedure: COLONOSCOPY;  Surgeon: GISEL Ramirez MD;  Location: [facility] ENDOSCOPY    SHOULDER ARTHROSCOPY Left 04/28/2020      Social History:  Social History     Socioeconomic History    Marital status:    Tobacco Use    Smoking status: Never    Smokeless tobacco: Never   Vaping Use    Vaping Use: Never used   Substance and Sexual Activity    Alcohol use: Not Currently     Alcohol/week: 0.0 standard drinks of alcohol    Drug use: No    Sexual activity: Yes     Partners: Female   Other Topics Concern    Caffeine Concern Yes     Comment: coffee/tea, >6cups/day      Family History   Problem Relation Age of Onset    Gastro-Intestinal Disorder Brother         celiac sprue    Cancer Paternal Grandfather         Colon Ca    Cancer Other         liver cancer - close relative    Diabetes Neg     Glaucoma Neg     Macular degeneration Neg       Allergies   Allergen Reactions    Sulfa Antibiotics UNKNOWN    Sulfanilamide      Other reaction(s): Hives        REVIEW OF SYSTEMS:   Review of Systems   Review of Systems   Constitutional: Negative for activity change, appetite change and fever.   HENT: Negative for congestion and voice change.    Respiratory: Negative for cough and shortness of breath.    Cardiovascular: Negative for chest pain.   Gastrointestinal: Negative for abdominal distention, abdominal pain and vomiting.   Genitourinary: Negative for hematuria.   Skin: Negative for wound.   Psychiatric/Behavioral: Negative for behavioral problems.   Wt Readings from Last 5 Encounters:   01/05/24 218 lb (98.9 kg)   09/26/23 215 lb (97.5 kg)   08/25/23 219 lb (99.3 kg)   07/12/23 219 lb (99.3 kg)   02/08/23 229 lb (103.9 kg)     Body mass index is 29.57 kg/m².      EXAM:   /74 (BP Location: Left arm, Patient Position: Sitting, Cuff Size: adult)   Pulse 104   Ht 6'  (1.829 m)   Wt 218 lb (98.9 kg)   SpO2 97%   BMI 29.57 kg/m²   Physical Exam   Constitutional:       Appearance: Normal appearance.   HENT:      Head: Normocephalic.   Eyes:      Conjunctiva/sclera: Conjunctivae normal.   Cardiovascular:      Rate and Rhythm: Normal rate and regular rhythm.      Heart sounds: Normal heart sounds. No murmur heard.  Pulmonary:      Effort: Pulmonary effort is normal.      Breath sounds: Normal breath sounds. No rhonchi or rales.   Abdominal:      General: Bowel sounds are normal.      Palpations: Abdomen is soft.      Tenderness: There is no abdominal tenderness.   Musculoskeletal:      Cervical back: Neck supple.      Right lower leg: No edema.      Left lower leg: No edema.   Skin:     General: Skin is warm and dry.   Neurological:      General: No focal deficit present.      Mental Status: He is alert and oriented to person, place, and time. Mental status is at baseline.   Psychiatric:         Mood and Affect: Mood normal.         Behavior: Behavior normal.       Health Maintenence:     Health Maintenance Topics with due status: Overdue       Topic Date Due    Annual Physical Never done    COVID-19 Vaccine Never done    Pneumococcal Vaccine: Birth to 64yrs Never done    DTaP,Tdap,and Td Vaccines Never done    Zoster Vaccines Never done    HTN: BP Follow-Up 10/26/2023    Annual Depression Screening 01/01/2024    Diabetes Care Foot Exam (Annual) Never done     Health Maintenance Topics with due status: Due Soon       Topic Date Due    Diabetes Care A1C 01/21/2024              ASSESSMENT AND PLAN:   1. Preop examination  -We will addend the note and cleared the patient for surgery when workup is completed  Orders:  - XR CHEST PA + LAT CHEST (CPT=71046); Future  - EKG 12 Lead; Future  - Prothrombin Time (PT) [E]; Future  - PTT, Activated [E]; Future  - Comp Metabolic Panel (14) [E]; Future  - CBC W Differential W Platelet [E]; Future  - UA/M WITH CULTURE REFLEX [3020]  - MSSA and  MRSA Culture Screen; Future    2. Bruising  - Prothrombin Time (PT) [E]; Future  - PTT, Activated [E]; Future    3. Type 2 diabetes mellitus without complication, without long-term current use of insulin (HCC)  -Patient was previously on metformin and insulin but took himself off of them and decided to try to control his diabetes with healthy diet and exercise.  Not sure if this has been working well as the patient's last A1c was 8.6  -Instructed the patient to at least go back on the metformin to try and help with his sugar levels.  I also warned him against the risk of his surgery getting canceled if his sugar levels were found to be elevated on the day of surgery  Orders:  - Hemoglobin A1C [E]; Future        The patient indicates understanding of these issues and agrees to the plan.  No follow-ups on file.        This note was prepared using Dragon [employer] voice recognition dictation software. As a result errors may occur. When identified these errors have been corrected. While every attempt is made to correct errors during dictation discrepancies may still exist.    Cat Lee MD

## 2024-01-06 ENCOUNTER — HOSPITAL ENCOUNTER (OUTPATIENT)
Dept: GENERAL RADIOLOGY | Age: 59
Discharge: HOME | End: 2024-01-06
Attending: INTERNAL MEDICINE
Payer: COMMERCIAL

## 2024-01-06 DIAGNOSIS — Z01.818 PREOP EXAMINATION: ICD-10-CM

## 2024-01-06 PROCEDURE — 71046 X-RAY EXAM CHEST 2 VIEWS: CPT | Performed by: INTERNAL MEDICINE

## 2024-01-10 ENCOUNTER — PATIENT MESSAGE (OUTPATIENT)
Dept: INTERNAL MEDICINE CLINIC | Facility: CLINIC | Age: 59
End: 2024-01-10

## 2024-01-11 ENCOUNTER — LAB ENCOUNTER (OUTPATIENT)
Dept: LAB | Facility: HOSPITAL | Age: 59
End: 2024-01-11
Attending: INTERNAL MEDICINE
Payer: COMMERCIAL

## 2024-01-11 DIAGNOSIS — Z01.818 PREOP EXAMINATION: ICD-10-CM

## 2024-01-11 DIAGNOSIS — E11.9 TYPE 2 DIABETES MELLITUS WITHOUT COMPLICATION, WITHOUT LONG-TERM CURRENT USE OF INSULIN (HCC): ICD-10-CM

## 2024-01-11 DIAGNOSIS — T14.8XXA BRUISING: ICD-10-CM

## 2024-01-11 LAB
ALBUMIN SERPL-MCNC: 4.7 G/DL (ref 3.2–4.8)
ALBUMIN/GLOB SERPL: 1.8 {RATIO} (ref 1–2)
ALP LIVER SERPL-CCNC: 90 U/L
ALT SERPL-CCNC: 23 U/L
ANION GAP SERPL CALC-SCNC: 9 MMOL/L (ref 0–18)
APTT PPP: 26.7 SECONDS (ref 23.3–35.6)
AST SERPL-CCNC: 17 U/L (ref ?–34)
ATRIAL RATE: 83 BPM
BASOPHILS # BLD AUTO: 0.03 X10(3) UL (ref 0–0.2)
BASOPHILS NFR BLD AUTO: 0.5 %
BILIRUB SERPL-MCNC: 0.4 MG/DL (ref 0.3–1.2)
BILIRUB UR QL: NEGATIVE
BUN BLD-MCNC: 17 MG/DL (ref 9–23)
BUN/CREAT SERPL: 18.3 (ref 10–20)
CALCIUM BLD-MCNC: 9.6 MG/DL (ref 8.7–10.4)
CHLORIDE SERPL-SCNC: 106 MMOL/L (ref 98–112)
CLARITY UR: CLEAR
CO2 SERPL-SCNC: 25 MMOL/L (ref 21–32)
CREAT BLD-MCNC: 0.93 MG/DL
DEPRECATED RDW RBC AUTO: 42.5 FL (ref 35.1–46.3)
EGFRCR SERPLBLD CKD-EPI 2021: 96 ML/MIN/1.73M2 (ref 60–?)
EOSINOPHIL # BLD AUTO: 0.13 X10(3) UL (ref 0–0.7)
EOSINOPHIL NFR BLD AUTO: 2.2 %
ERYTHROCYTE [DISTWIDTH] IN BLOOD BY AUTOMATED COUNT: 13 % (ref 11–15)
EST. AVERAGE GLUCOSE BLD GHB EST-MCNC: 220 MG/DL (ref 68–126)
FASTING STATUS PATIENT QL REPORTED: NO
GLOBULIN PLAS-MCNC: 2.6 G/DL (ref 2.8–4.4)
GLUCOSE BLD-MCNC: 243 MG/DL (ref 70–99)
GLUCOSE UR-MCNC: >1000 MG/DL
HBA1C MFR BLD: 9.3 % (ref ?–5.7)
HCT VFR BLD AUTO: 42.9 %
HGB BLD-MCNC: 15.5 G/DL
HGB UR QL STRIP.AUTO: NEGATIVE
IMM GRANULOCYTES # BLD AUTO: 0.02 X10(3) UL (ref 0–1)
IMM GRANULOCYTES NFR BLD: 0.3 %
INR BLD: 0.89 (ref 0.8–1.2)
LEUKOCYTE ESTERASE UR QL STRIP.AUTO: NEGATIVE
LYMPHOCYTES # BLD AUTO: 1.72 X10(3) UL (ref 1–4)
LYMPHOCYTES NFR BLD AUTO: 28.5 %
MCH RBC QN AUTO: 32.4 PG (ref 26–34)
MCHC RBC AUTO-ENTMCNC: 36.1 G/DL (ref 31–37)
MCV RBC AUTO: 89.6 FL
MONOCYTES # BLD AUTO: 0.42 X10(3) UL (ref 0.1–1)
MONOCYTES NFR BLD AUTO: 7 %
NEUTROPHILS # BLD AUTO: 3.71 X10 (3) UL (ref 1.5–7.7)
NEUTROPHILS # BLD AUTO: 3.71 X10(3) UL (ref 1.5–7.7)
NEUTROPHILS NFR BLD AUTO: 61.5 %
NITRITE UR QL STRIP.AUTO: NEGATIVE
OSMOLALITY SERPL CALC.SUM OF ELEC: 300 MOSM/KG (ref 275–295)
P AXIS: 22 DEGREES
P-R INTERVAL: 166 MS
PH UR: 5.5 [PH] (ref 5–8)
PLATELET # BLD AUTO: 186 10(3)UL (ref 150–450)
POTASSIUM SERPL-SCNC: 4 MMOL/L (ref 3.5–5.1)
PROT SERPL-MCNC: 7.3 G/DL (ref 5.7–8.2)
PROT UR-MCNC: NEGATIVE MG/DL
PROTHROMBIN TIME: 12.6 SECONDS (ref 11.6–14.8)
Q-T INTERVAL: 374 MS
QRS DURATION: 96 MS
QTC CALCULATION (BEZET): 439 MS
R AXIS: 14 DEGREES
RBC # BLD AUTO: 4.79 X10(6)UL
SODIUM SERPL-SCNC: 140 MMOL/L (ref 136–145)
SP GR UR STRIP: >1.03 (ref 1–1.03)
T AXIS: 23 DEGREES
UROBILINOGEN UR STRIP-ACNC: NORMAL
VENTRICULAR RATE: 83 BPM
WBC # BLD AUTO: 6 X10(3) UL (ref 4–11)

## 2024-01-11 PROCEDURE — 93005 ELECTROCARDIOGRAM TRACING: CPT

## 2024-01-11 PROCEDURE — 83036 HEMOGLOBIN GLYCOSYLATED A1C: CPT

## 2024-01-11 PROCEDURE — 80053 COMPREHEN METABOLIC PANEL: CPT

## 2024-01-11 PROCEDURE — 36415 COLL VENOUS BLD VENIPUNCTURE: CPT

## 2024-01-11 PROCEDURE — 81003 URINALYSIS AUTO W/O SCOPE: CPT | Performed by: INTERNAL MEDICINE

## 2024-01-11 PROCEDURE — 85730 THROMBOPLASTIN TIME PARTIAL: CPT

## 2024-01-11 PROCEDURE — 85610 PROTHROMBIN TIME: CPT

## 2024-01-11 PROCEDURE — 87081 CULTURE SCREEN ONLY: CPT

## 2024-01-11 PROCEDURE — 85025 COMPLETE CBC W/AUTO DIFF WBC: CPT

## 2024-01-11 PROCEDURE — 93010 ELECTROCARDIOGRAM REPORT: CPT | Performed by: INTERNAL MEDICINE

## 2024-01-18 ENCOUNTER — PATIENT MESSAGE (OUTPATIENT)
Dept: INTERNAL MEDICINE CLINIC | Facility: CLINIC | Age: 59
End: 2024-01-18

## 2024-01-24 NOTE — TELEPHONE ENCOUNTER
Please advise:    Routing for Dr. Lee    Thanks DrBrayden      When I didn't hear back from you, I began taking 25 units of Lantus at night. My WBS were 175, and then 125 this a.m.  I continue to limit carbs each day and exercise.     Should I stick with 25?  Or drop to 15?     I'll take only amlodipine this Friday morning for the surgery.  Serafin

## 2024-02-15 ENCOUNTER — PATIENT MESSAGE (OUTPATIENT)
Dept: INTERNAL MEDICINE CLINIC | Facility: CLINIC | Age: 59
End: 2024-02-15

## 2024-02-15 DIAGNOSIS — E11.65 TYPE 2 DIABETES MELLITUS WITH HYPERGLYCEMIA, WITHOUT LONG-TERM CURRENT USE OF INSULIN (HCC): Primary | ICD-10-CM

## 2024-02-16 NOTE — TELEPHONE ENCOUNTER
[facility] message sent for clarification.       From: Luis Miguel Shelton  To: Cat Lee  Sent: 2/15/2024  1:01 PM CST  Subject: Blood Glucose Monitor System    Javier Richardson,    Can you help me obtain one of those BGMs?  I think it would help me eat better and manage my A1C.    I'm back on Lantus and metformin, but I'm struggling to lose weight.  Although I think my A1C is improving.    What do I need to do to get a BGM?    Serafin

## 2024-02-16 NOTE — TELEPHONE ENCOUNTER
Dr. Lee, please see Whatever messages and advise. RX pended. Thanks.    Future Appointments   Date Time Provider Department Center   6/13/2024  4:15 PM Efrain Palma MD [facility]

## 2024-02-20 ENCOUNTER — TELEPHONE (OUTPATIENT)
Dept: INTERNAL MEDICINE CLINIC | Facility: CLINIC | Age: 59
End: 2024-02-20

## 2024-02-20 NOTE — TELEPHONE ENCOUNTER
Glucose monitor    Patient must meet ALL the criteria's listed below     -Must be on insulin  -Product must be used continuous long-term monitoring for poorly controlled insulin dependent diabetes  -HgbA1C must be greater than or equal to 7.0%  -Patient must have Type 2 diabetes  -Patient must be on a glucose monitoring regimen of 4 or more a day   or  -Prescriber must be a specialist. Example of a specialist is an endocrinologist.

## 2024-03-18 ENCOUNTER — PATIENT MESSAGE (OUTPATIENT)
Dept: INTERNAL MEDICINE CLINIC | Facility: CLINIC | Age: 59
End: 2024-03-18

## 2024-03-18 DIAGNOSIS — Z00.00 WELLNESS EXAMINATION: ICD-10-CM

## 2024-03-18 DIAGNOSIS — E55.9 VITAMIN D DEFICIENCY: Primary | ICD-10-CM

## 2024-03-18 DIAGNOSIS — E11.65 TYPE 2 DIABETES MELLITUS WITH HYPERGLYCEMIA, WITHOUT LONG-TERM CURRENT USE OF INSULIN (HCC): ICD-10-CM

## 2024-03-18 NOTE — TELEPHONE ENCOUNTER
From: Luis Miguel Shelton  To: Cat Lee  Sent: 3/18/2024 10:00 AM CDT  Subject: Upcoming Visit on May 6    Good morning ,    I’ve got a visit scheduled with you the afternoon of May 6. I’d like to have blood and urine tests done to make sure my A1C is dropping and my liver and kidneys are doing ok.     Can you schedule an open order for these tests? I’ll have them done a week before the visit.     Also, thank you for the CGM Rx. I’ve been using it for two weeks now with great success. The lian shows my approximate A1C at 7.0!    I’ve been careful to avoid eating lots of carbs and I’ve increased my daily exercise. In fact I’ve lowered my Lantus units to 8. if I take more than that my CGM awakens me at night with low glucose warnings.     Despite the reductions in Lantus and increased exercise I’m still struggling to lose weight. That’s very strange as I’m not eating a lot.     I’ll stay the course!    Ps: my surgery was a great success. No pain.     Serafin

## 2024-03-22 RX ORDER — GLUCOSAM/CHON-MSM1/C/MANG/BOSW 500-416.6
TABLET ORAL
Qty: 300 EACH | Refills: 3 | Status: SHIPPED | OUTPATIENT
Start: 2024-03-22

## 2024-03-22 NOTE — TELEPHONE ENCOUNTER
Refill passed per [facility] protocol.  Requested Prescriptions   Pending Prescriptions Disp Refills    TRUEPLUS LANCETS 30G Does not apply Misc [Pharmacy Med Name: LANCET TRUEPLUS/STERILE 30G] 300 each 3     Sig: USE 1 LANCET TO CHECK BLOOD  SUGAR 3 TIMES DAILY       Diabetic Supplies Protocol Passed - 3/22/2024  5:47 AM        Passed - In person appointment or virtual visit in the past 12 mos or appointment in next 3 mos     Recent Outpatient Visits              2 months ago Preop examination    [facility] Cat Lee MD    Office Visit    5 months ago Neck pain    [facility] Miracle Nova APRN    Office Visit    7 months ago Other infective acute otitis externa of left ear    [facility] Osman Merida MD    Office Visit    8 months ago Type 2 diabetes mellitus without complication, with long-term current use of insulin ([facility])    [facility] Kemal Montoya MD    Office Visit    9 months ago Type 2 diabetes mellitus without complication, without long-term current use of insulin ([facility])    [facility] Efrain Palma MD    Office Visit          Future Appointments         Provider Department Appt Notes    In 1 month Cat Lee MD [facility] Check A1C and blood, kidney and liver panels.    In 2 months Efrain Palma MD [facility] EP EE/pt conf    In 8 months Mirna Wong MD [facility] Discussion about diabetes management

## 2024-04-13 ENCOUNTER — PATIENT MESSAGE (OUTPATIENT)
Dept: INTERNAL MEDICINE CLINIC | Facility: CLINIC | Age: 59
End: 2024-04-13

## 2024-04-14 NOTE — TELEPHONE ENCOUNTER
From: Luis Miguel Shelton  To: Cat Lee  Sent: 4/13/2024 8:09 AM CDT  Subject: Metformin Refill    Hi ,    I ran out of Metformin and it's no longer showing as a refillable Rx in my profile.    I've been taking it since January when you told me to re-start taking it. I had stopped in May 2023.    Can you please send in an Rx refill request?    I use [facility] home delivery.    Thank you.    Serafin

## 2024-04-14 NOTE — TELEPHONE ENCOUNTER
LOV\ [id number]    1. Preop examination  -Reviewed blood work.  Discussed with the patient his elevated sugar levels.  He will restart his insulin and metformin    Future Appointments   Date Time Provider Department Center   5/6/2024  4:40 PM aCt Lee MD [facility]   6/13/2024  4:15 PM [facility]   12/16/2024  9:15 AM Mirna Wong MD [facility]

## 2024-04-16 DIAGNOSIS — E11.9 TYPE 2 DIABETES MELLITUS WITHOUT COMPLICATION, WITHOUT LONG-TERM CURRENT USE OF INSULIN (HCC): ICD-10-CM

## 2024-04-17 NOTE — TELEPHONE ENCOUNTER
Please Review. Protocol Failed; No Protocol   Lab Results   Component Value Date     A1C 9.3 (H) 01/11/2024       Requested Prescriptions   Pending Prescriptions Disp Refills    LANTUS SOLOSTAR 100 UNIT/ML Subcutaneous Solution Pen-injector [Pharmacy Med Name: Lantus SoloStar 100 UNIT/ML Subcutaneous Solution Pen-injector] 30 mL 3     Sig: INJECT SUBCUTANEOUSLY 30 UNITS  EVERY NIGHT       Diabetes Medication Protocol Failed - 4/16/2024  5:47 AM        Failed - Last A1C < 7.5 and within past 6 months     Lab Results   Component Value Date    A1C 9.3 (H) 01/11/2024             Passed - In person appointment or virtual visit in the past 6 mos or appointment in next 3 mos     Recent Outpatient Visits              3 months ago Preop examination    [facility] Cat Lee MD    Office Visit    6 months ago Neck pain    [facility] Miracle Nova APRN    Office Visit    7 months ago Other infective acute otitis externa of left ear    [facility] Osman Merida MD    Office Visit    9 months ago Type 2 diabetes mellitus without complication, with long-term current use of insulin ([facility])    [facility] Kemal Montoya MD    Office Visit    10 months ago Type 2 diabetes mellitus without complication, without long-term current use of insulin ([facility])    [facility] Efrain Palma MD    Office Visit          Future Appointments         Provider Department Appt Notes    In 2 weeks Cat Lee MD [facility] Check A1C and blood, kidney and liver panels.    In 1 month Efrain Palma MD [facility] EP EE/pt conf    In 8 months Mirna Wong MD [facility] Discussion about  diabetes management                    Passed - Microalbumin procedure in past 12 months or taking ACE/ARB        Passed - EGFRCR or GFRNAA > 50     GFR Evaluation  EGFRCR: 96 , resulted on 1/11/2024          Passed - GFR in the past 12 months               Future Appointments         Provider Department Appt Notes    In 2 weeks Cat Lee MD [facility] Check A1C and blood, kidney and liver panels.    In 1 month Efrain Palma MD [facility] EP EE/pt conf    In 8 months Mirna Wong MD [facility] Discussion about diabetes management          Recent Outpatient Visits              3 months ago Preop examination    [facility] Cat Lee MD    Office Visit    6 months ago Neck pain    [facility] Miracle Nova APRN    Office Visit    7 months ago Other infective acute otitis externa of left ear    [facility] Osman Merida MD    Office Visit    9 months ago Type 2 diabetes mellitus without complication, with long-term current use of insulin ([facility])    [facility] Kemal Montoya MD    Office Visit    10 months ago Type 2 diabetes mellitus without complication, without long-term current use of insulin ([facility])    [facility] Efrain Palma MD    Office Visit

## 2024-04-18 ENCOUNTER — OFFICE VISIT (OUTPATIENT)
Dept: INTERNAL MEDICINE CLINIC | Facility: CLINIC | Age: 59
End: 2024-04-18
Payer: COMMERCIAL

## 2024-04-18 VITALS
WEIGHT: 220 LBS | BODY MASS INDEX: 29.8 KG/M2 | HEART RATE: 80 BPM | OXYGEN SATURATION: 99 % | DIASTOLIC BLOOD PRESSURE: 84 MMHG | SYSTOLIC BLOOD PRESSURE: 146 MMHG | HEIGHT: 72 IN

## 2024-04-18 DIAGNOSIS — E11.9 TYPE 2 DIABETES MELLITUS WITHOUT COMPLICATION, WITHOUT LONG-TERM CURRENT USE OF INSULIN (HCC): ICD-10-CM

## 2024-04-18 DIAGNOSIS — R31.0 GROSS HEMATURIA: Primary | ICD-10-CM

## 2024-04-18 PROCEDURE — 3079F DIAST BP 80-89 MM HG: CPT | Performed by: INTERNAL MEDICINE

## 2024-04-18 PROCEDURE — 3008F BODY MASS INDEX DOCD: CPT | Performed by: INTERNAL MEDICINE

## 2024-04-18 PROCEDURE — 3046F HEMOGLOBIN A1C LEVEL >9.0%: CPT | Performed by: INTERNAL MEDICINE

## 2024-04-18 PROCEDURE — 99214 OFFICE O/P EST MOD 30 MIN: CPT | Performed by: INTERNAL MEDICINE

## 2024-04-18 PROCEDURE — 3077F SYST BP >= 140 MM HG: CPT | Performed by: INTERNAL MEDICINE

## 2024-04-18 RX ORDER — SIMVASTATIN 40 MG
40 TABLET ORAL DAILY
COMMUNITY

## 2024-04-18 RX ORDER — INSULIN GLARGINE 100 [IU]/ML
10 INJECTION, SOLUTION SUBCUTANEOUS NIGHTLY
Qty: 30 ML | Refills: 11 | Status: SHIPPED | OUTPATIENT
Start: 2024-04-18

## 2024-04-18 NOTE — PROGRESS NOTES
Outpatient Office Note    HPI:     Luis Miguel Shelton is a 58 year old male.  Chief Complaint   Patient presents with    Flank Pain    Urinary Symptoms     Pt presents with blood in urine         Pleasant 58-year-old gentleman with past medical history of type II DM, HTN, hypertriglyceridemia, obesity, colon polyps, BPH who is here for right flank pain and gross hematuria    Patient reports that yesterday he felt sudden pain on the right flank and this was followed by an episode of gross hematuria.  Today urine has cleared but patient continues to have episodic feeling of tension and pressure in the right flank.  Reports he had similar symptoms in February 2023.  No fever, chills, dysuria.  Has hesitancy.    Patient CT urogram showed bilateral cysts at that time and prostatomegaly per chart review.  Does not take tamsulosin as he is worried about side effects.  He did not follow-up with urology.    Of note patient's diabetes have been well-controlled.  He has been exercising and taking good care of his diet.  Reports good compliance with his insulin and metformin.  Has been off of metformin for 2 days and noticed his sugar levels got elevated to the 180s because of that.      Wt Readings from Last 6 Encounters:   04/18/24 220 lb (99.8 kg)   01/05/24 218 lb (98.9 kg)   09/26/23 215 lb (97.5 kg)   08/25/23 219 lb (99.3 kg)   07/12/23 219 lb (99.3 kg)   02/08/23 229 lb (103.9 kg)             Current Outpatient Medications   Medication Sig Dispense Refill    simvastatin 40 MG Oral Tab Take 1 tablet (40 mg total) by mouth daily.      metFORMIN 500 MG Oral Tab Take 2 tablets (1,000 mg total) by mouth 2 (two) times daily with meals. 360 tablet 3    TRUEplus Lancets 30G Does not apply Misc 1 lancet to check glucose three times daily 300 each 3    Continuous Blood Gluc Sensor (FREESTYLE DARIA 3 SENSOR) Does not apply Misc 1 each every 14 (fourteen) days. Type 2 diabetes mellitus without complication, without long-term  current use of insulin ([facility]) E11.9 6 each 3    insulin glargine (LANTUS SOLOSTAR) 100 UNIT/ML Subcutaneous Solution Pen-injector Inject 10 Units into the skin nightly. 15 mL 0    amLODIPine 5 MG Oral Tab Take 1 tablet (5 mg total) by mouth daily. 90 tablet 1    lisinopril 40 MG Oral Tab Take 1 tablet (40 mg total) by mouth daily. 90 tablet 1    Glucose Blood (CONTOUR NEXT TEST) In Vitro Strip Use 1 (one) new strip three times daily for blood glucose monitoring 300 strip 1    Insulin Pen Needle (PEN NEEDLES) 32G X 4 MM Does not apply Misc Inject 1 Needle as directed nightly. For use with Lantus / insulin glargine 100 each 3    Continuous Blood Gluc Sensor (FREESTYLE DARIA 2 SENSOR) Does not apply Misc 1 each every 14 (fourteen) days. 6 each 3    BABY ASPIRIN OR Take 81 mg by mouth. (Patient not taking: Reported on 4/18/2024)        Past Medical History:    Chest pain    normal stress nuclear; normal stress echo    Colon polyps    Hyperplastic, repeat in 10 years    Diabetes ([facility])    diet controlled w/ no BDR - 2008    Disorder of eye, unspecified    increased C/D ratio - OU    Lipid screening    MGD (meibomian gland dysfunction)    OU    Obesity (BMI 30-39.9)    Other and unspecified hyperlipidemia    Type II or unspecified type diabetes mellitus without mention of complication, not stated as uncontrolled    Unspecified essential hypertension      Past Surgical History:   Procedure Laterality Date    Colonoscopy N/A 11/15/2016    Procedure: COLONOSCOPY;  Surgeon: GISEL Ramirez MD;  Location: [facility] ENDOSCOPY    Shoulder arthroscopy Left 04/28/2020      Social History:  Social History     Socioeconomic History    Marital status:    Tobacco Use    Smoking status: Never    Smokeless tobacco: Never   Vaping Use    Vaping status: Never Used   Substance and Sexual Activity    Alcohol use: Not Currently     Alcohol/week: 0.0 standard drinks of alcohol    Drug use: No    Sexual activity: Yes     Partners: Female    Other Topics Concern    Caffeine Concern Yes     Comment: coffee/tea, >6cups/day      Family History   Problem Relation Age of Onset    Gastro-Intestinal Disorder Brother         celiac sprue    Cancer Paternal Grandfather         Colon Ca    Cancer Other         liver cancer - close relative    Diabetes Neg     Glaucoma Neg     Macular degeneration Neg       Allergies   Allergen Reactions    Sulfa Antibiotics UNKNOWN    Sulfanilamide      Other reaction(s): Hives        REVIEW OF SYSTEMS:   Review of Systems   Review of Systems   Constitutional: Negative for activity change, appetite change and fever.   HENT: Negative for congestion and voice change.    Respiratory: Negative for cough and shortness of breath.    Cardiovascular: Negative for chest pain.   Gastrointestinal: Negative for abdominal distention, abdominal pain and vomiting.   Skin: Negative for wound.   Psychiatric/Behavioral: Negative for behavioral problems.   Wt Readings from Last 5 Encounters:   04/18/24 220 lb (99.8 kg)   01/05/24 218 lb (98.9 kg)   09/26/23 215 lb (97.5 kg)   08/25/23 219 lb (99.3 kg)   07/12/23 219 lb (99.3 kg)     Body mass index is 29.84 kg/m².      EXAM:   /84   Pulse 80   Ht 6' (1.829 m)   Wt 220 lb (99.8 kg)   SpO2 99%   BMI 29.84 kg/m²   Physical Exam   Constitutional:       Appearance: Normal appearance.   HENT:      Head: Normocephalic.   Eyes:      Conjunctiva/sclera: Conjunctivae normal.   Cardiovascular:      Rate and Rhythm: Normal rate   Pulmonary:      Effort: Pulmonary effort is normal.   Musculoskeletal:      Cervical back: Neck supple.      Right lower leg: No edema.      Left lower leg: No edema.   Skin:     General: Skin is warm and dry.   Neurological:      General: No focal deficit present.      Mental Status: He is alert and oriented to person, place, and time. Mental status is at baseline.   Psychiatric:         Mood and Affect: Mood normal.         Behavior: Behavior normal.       Health  Maintenence:     Health Maintenance Topics with due status: Overdue       Topic Date Due    Diabetes Care Foot Exam Never done    Annual Physical Never done    Pneumococcal Vaccine: Birth to 64yrs Never done    DTaP,Tdap,and Td Vaccines Never done    Zoster Vaccines Never done    COVID-19 Vaccine Never done    HTN: BP Follow-Up 02/05/2024    Diabetes Care A1C 04/11/2024     Health Maintenance Topics with due status: Due Soon       Topic Date Due    Diabetes Care Dilated Eye Exam 06/06/2024            ASSESSMENT AND PLAN:   1. Gross hematuria  - CT UROGRAM (W+WO) (CPT=74178); Future  - Urology Referral - In Network  - UA/M WITH CULTURE REFLEX [3020]  - PSA (Screening) [E]; Future    2. Type 2 diabetes mellitus without complication, without long-term current use of insulin (HCC)  - metFORMIN 500 MG Oral Tab; Take 2 tablets (1,000 mg total) by mouth 2 (two) times daily with meals.  Dispense: 360 tablet; Refill: 3          The patient indicates understanding of these issues and agrees to the plan.  No follow-ups on file.        This note was prepared using Dragon Medical voice recognition dictation software. As a result errors may occur. When identified these errors have been corrected. While every attempt is made to correct errors during dictation discrepancies may still exist.    Cat Lee MD

## 2024-04-19 ENCOUNTER — PATIENT MESSAGE (OUTPATIENT)
Dept: INTERNAL MEDICINE CLINIC | Facility: CLINIC | Age: 59
End: 2024-04-19

## 2024-05-24 RX ORDER — LISINOPRIL 40 MG/1
40 TABLET ORAL DAILY
Qty: 90 TABLET | Refills: 0 | Status: SHIPPED | OUTPATIENT
Start: 2024-05-24

## 2024-05-24 RX ORDER — AMLODIPINE BESYLATE 5 MG/1
5 TABLET ORAL DAILY
Qty: 90 TABLET | Refills: 0 | Status: SHIPPED | OUTPATIENT
Start: 2024-05-24

## 2024-05-24 NOTE — TELEPHONE ENCOUNTER
Please review. Protocol Failed; No Protocol    Requested Prescriptions   Pending Prescriptions Disp Refills    AMLODIPINE 5 MG Oral Tab [Pharmacy Med Name: amLODIPine Besylate 5 MG Oral Tablet] 90 tablet 3     Sig: TAKE 1 TABLET BY MOUTH DAILY       Hypertension Medications Protocol Failed - 5/22/2024  6:06 AM        Failed - Last BP reading less than 140/90     BP Readings from Last 1 Encounters:   04/18/24 146/84               Passed - CMP or BMP in past 12 months        Passed - In person appointment or virtual visit in the past 12 mos or appointment in next 3 mos     Recent Outpatient Visits              1 month ago Gross hematuria    [facility] Cat Lee MD    Office Visit    4 months ago Preop examination    [facility]    Office Visit    8 months ago Neck pain    [facility] Miracle Nova APRN    Office Visit    9 months ago Other infective acute otitis externa of left ear    [facility] Osman Merida MD    Office Visit    10 months ago Type 2 diabetes mellitus without complication, with long-term current use of insulin ([facility])    [facility] Kemal Montoya MD    Office Visit          Future Appointments         Provider Department Appt Notes    In 2 weeks Efrain Palma MD [facility] EP EE/pt conf    In 6 months Mirna Wong MD [facility] Discussion about diabetes management                    Passed - EGFRCR or GFRNAA > 50     GFR Evaluation  EGFRCR: 96 , resulted on 1/11/2024            LISINOPRIL 40 MG Oral Tab [Pharmacy Med Name: Lisinopril 40 MG Oral Tablet] 90 tablet 3     Sig: TAKE 1 TABLET BY MOUTH DAILY       Hypertension Medications Protocol Failed - 5/22/2024   6:06 AM        Failed - Last BP reading less than 140/90     BP Readings from Last 1 Encounters:   04/18/24 146/84               Passed - CMP or BMP in past 12 months        Passed - In person appointment or virtual visit in the past 12 mos or appointment in next 3 mos     Recent Outpatient Visits              1 month ago Gross hematuria    [facility] Cat Mendoza MD    Office Visit    4 months ago Preop examination    [facility] Cat Mendoza MD    Office Visit    8 months ago [facility]    Office Visit    9 months ago Other infective acute otitis externa of left ear    [facility]    Office Visit    10 months ago Type 2 diabetes mellitus without complication, with long-term current use of insulin ([facility])    [facility] Kemal Montoya MD    Office Visit          Future Appointments         Provider Department Appt Notes    In 2 weeks Efrain Palma MD [facility] EP EE/pt conf    In 6 months Mirna Wong MD [facility] Discussion about diabetes management                    Passed - EGFRCR or GFRNAA > 50     GFR Evaluation  EGFRCR: 96 , resulted on 1/11/2024                 Future Appointments         Provider Department Appt Notes    In 2 weeks Efrain Palma MD [facility] EP EE/pt conf    In 6 months Mirna Wong MD [facility] Discussion about diabetes management          Recent Outpatient Visits              1 month ago Gross hematuria    [facility]    [facility]    4 months ago Preop  examination    [facility] Cat Lee MD    Office Visit    8 months ago Neck pain    [facility] Miracle Nova APRN    Office Visit    9 months ago Other infective acute otitis externa of left ear    [facility]    Office Visit    10 months ago Type 2 diabetes mellitus without complication, with long-term current use of insulin ([facility])    [facility] Kemal Montoya MD    Office Visit

## 2024-05-31 RX ORDER — BLOOD-GLUCOSE SENSOR
1 EACH MISCELLANEOUS
Qty: 6 EACH | Refills: 3 | Status: SHIPPED | OUTPATIENT
Start: 2024-05-31

## 2024-05-31 NOTE — TELEPHONE ENCOUNTER
Refill passed per [facility] protocol.    Only has been written by Dr. Cat Lee- has not been signed off.     Patient also requesting mail order instead of his local pharmacy. -pended for mailorder.     Requested Prescriptions   Pending Prescriptions Disp Refills    Continuous Glucose Sensor (FREESTYLE DARIA 3 SENSOR) Does not apply Misc 6 each 3     Si each every 14 (fourteen) days. Type 2 diabetes mellitus without complication, without long-term current use of insulin ([facility]) E11.9       Diabetic Supplies Protocol Passed - 2024  7:47 AM        Passed - In person appointment or virtual visit in the past 12 mos or appointment in next 3 mos     Recent Outpatient Visits              1 month ago Gross hematuria    [facility] Cat Lee MD    Office Visit    4 months ago Preop examination    [facility]    Office Visit    8 months ago Neck pain    [facility] Miracle Nova APRN    Office Visit    9 months ago Other infective acute otitis externa of left ear    [facility] Osman Merida MD    Office Visit    10 months ago Type 2 diabetes mellitus without complication, with long-term current use of insulin ([facility])    [facility] Kemal Montoya MD    Office Visit          Future Appointments         Provider Department Appt Notes    In 1 week Efrain Palma MD [facility] EP EE/pt conf    In 6 months Mirna Wong MD [facility] Discussion about diabetes management                       Future Appointments         Provider Department Appt Notes    In 1 week Efrain Palma MD [facility] EP EE/pt conf    In 6 months Judith  MD Mirna [facility] Discussion about diabetes management          Recent Outpatient Visits              1 month ago Gross hematuria    [facility]    Office Visit    4 months ago Preop examination    [facility]    Office Visit    8 months ago Neck pain    [facility] Miracle Nova APRN    Office Visit    9 months ago Other infective acute otitis externa of left ear    [facility] Osman Merida MD    Office Visit    10 months ago Type 2 diabetes mellitus without complication, with long-term current use of insulin ([facility])    [facility] Kemal Montoya MD    Office Visit

## 2024-06-04 RX ORDER — PROCHLORPERAZINE 25 MG/1
1 SUPPOSITORY RECTAL
Qty: 9 EACH | Refills: 1 | Status: SHIPPED | OUTPATIENT
Start: 2024-06-04 | End: 2024-06-05

## 2024-06-04 NOTE — TELEPHONE ENCOUNTER
Received fax from pharmacy requesting alternative not covered under insurance plan       Current Outpatient Medications:     (FREESTYLE DARIA 2 SENSOR) Does not apply Misc, 1 each every 14 (fourteen) days., Disp: 6 each, Rfl: 3    Preferred alternative   Dexcom

## 2024-06-04 NOTE — TELEPHONE ENCOUNTER
Dr. Montoya, Continuous Glucose Sensor (FREESTYLE DARIA 3 SENSOR) Does not apply Misc is not covered under patient insurance. Please advise.     Pharmacy recommendation: Dexcom

## 2024-06-05 ENCOUNTER — PATIENT MESSAGE (OUTPATIENT)
Facility: CLINIC | Age: 59
End: 2024-06-05

## 2024-06-05 DIAGNOSIS — E11.65 TYPE 2 DIABETES MELLITUS WITH HYPERGLYCEMIA, WITHOUT LONG-TERM CURRENT USE OF INSULIN (HCC): ICD-10-CM

## 2024-06-05 NOTE — TELEPHONE ENCOUNTER
From: Luis Miguel Shelton  To: Kemal Montoya  Sent: 6/5/2024 2:14 PM CDT  Subject: Continuous Glucose Monitor Refill     This is getting frustrating.    Dr Lee prescribed the Freestyle Buzz 3 CGM for me earlier this year. I went to [facility] with the prescription and they filled it. They gave me 6 sensors for $150 if I recall correctly.    I'm days away from the sixth sensor ending. I need a refill.     I requested a refill via [employer] and asked that it be sent to [facility], my company's mail order Rx service because I thought I'd save some money.    [facility] says my insurance won't cover the Buzz. They apparently got someone at [facility] to approve the Dexcom alternative.    I don't want Dexcom. I want Buzz.    Please re-send the Rx to [facility] for fulfillment.    I only have a few days left before my sensor ends. Please expedite this.    I have lowered my A1C to 6.7 (from 9.2 in Jan) because of this technology.     Thank you.    Serafin

## 2024-06-05 NOTE — TELEPHONE ENCOUNTER
Refill passed per [facility] protocols.    Requested Prescriptions   Pending Prescriptions Disp Refills    Continuous Glucose Sensor (FREESTYLE DARIA 2 SENSOR) Does not apply Misc 6 each 3     Si each every 14 (fourteen) days.       Diabetic Supplies Protocol Passed - 2024  5:03 PM        Passed - In person appointment or virtual visit in the past 12 mos or appointment in next 3 mos     Recent Outpatient Visits              1 month ago Gross hematuria    [facility]    Office Visit    5 months ago [facility]op examination    [facility]    Office Visit    8 months ago Neck pain    [facility] Miracle Nova APRN    Office Visit    9 months ago Other infective acute otitis externa of left ear    [facility] Osman Merida MD    Office Visit    10 months ago Type 2 diabetes mellitus without complication, with long-term current use of insulin (HCC)    [facility] Kemal Montoya MD    Office Visit          Future Appointments         Provider Department Appt Notes    In 1 week Efrain Palma MD [facility] EP EE/pt conf    In 2 months Kemal Montoya MD [facility] Review blood labs and A1C    In 6 months Mirna Wong MD [facility] Discussion about diabetes management

## 2024-06-06 ENCOUNTER — TELEPHONE (OUTPATIENT)
Dept: INTERNAL MEDICINE CLINIC | Facility: CLINIC | Age: 59
End: 2024-06-06

## 2024-06-06 DIAGNOSIS — E11.65 TYPE 2 DIABETES MELLITUS WITH HYPERGLYCEMIA, WITHOUT LONG-TERM CURRENT USE OF INSULIN (HCC): ICD-10-CM

## 2024-06-06 NOTE — TELEPHONE ENCOUNTER
Denied    Note from payer: Request Reference Number: [id number].  FREESTY LIBR KIT 2 SENSOR is denied for not meeting the prior authorization requirement(s).  Details of this decision are in the notice attached below or have been faxed to you.  Payer: [employer] Case ID: [id number]  Electronic appeal: Not supported  Appeal instructions: Appeals are not supported through ePA. Please refer to the fax case notice for appeals information and instructions.  View History    Freestyle elina denied    Patient must meet ALL the criteria's listed below     -Must be on insulin  -Product must be used continuous long-term monitoring for poorly controlled insulin dependent diabetes  -HgbA1C must be greater than or equal to 7.0%  -Patient must have Type 2 diabetes  -Patient must be on a glucose monitoring regimen of 4 or more a day   or  -Prescriber must be a specialist. Example of a specialist is an endocrinologist.

## 2024-06-07 RX ORDER — BLOOD-GLUCOSE SENSOR
1 EACH MISCELLANEOUS
Qty: 6 EACH | Refills: 3 | Status: SHIPPED | OUTPATIENT
Start: 2024-06-07

## 2024-06-07 NOTE — TELEPHONE ENCOUNTER
REFILL PASSED PER [facility] PROTOCOLS    Requested Prescriptions   Pending Prescriptions Disp Refills    Continuous Glucose Sensor (FREESTYLE DARIA 3 SENSOR) Does not apply Misc 6 each 3     Si each every 14 (fourteen) days. Type 2 diabetes mellitus without complication, without long-term current use of insulin ([facility]) E11.9       Diabetic Supplies Protocol Passed - 2024 11:47 AM        Passed - In person appointment or virtual visit in the past 12 mos or appointment in next 3 mos     Recent Outpatient Visits              1 month ago Gross hematuria    [facility] Cat Lee MD    Office Visit    5 months ago [facility]op examination    [facility]    Office Visit    8 months ago Neck pain    [facility] Miracle Nova APRN    Office Visit    9 months ago Other infective acute otitis externa of left ear    [facility] Osman Merida MD    Office Visit    11 months ago Type 2 diabetes mellitus without complication, with long-term current use of insulin ([facility])    [facility] Kemal Montoya MD    Office Visit          Future Appointments         Provider Department Appt Notes    In 6 days Efrain Palma MD [facility] EP EE/pt conf    In 2 months Kemal Montoya MD [facility] Review blood labs and A1C    In 6 months Mirna Wong MD [facility] Discussion about diabetes management                         Future Appointments         Provider Department Appt Notes    In 6 days Efrain Palma MD [facility] EP EE/pt conf    In 2 months Kemal Montoya MD [facility] Review blood labs and A1C    In 6 months Mirna Wong MD [facility] Discussion about diabetes management          Recent Outpatient Visits              1 month ago [facility]uria    [facility]    [facility] Visit    5 months ago Preop examination    [facility] Cat Lee MD    Office Visit    8 months ago Neck pain    [facility] Miracle Nova APRN    Office Visit    9 months ago Other infective acute otitis externa of left ear    [facility]    Office Visit    11 months ago Type 2 diabetes mellitus without complication, with long-term current use of insulin (HCC)    [facility] Kemal Montoya MD    Office Visit

## 2024-06-07 NOTE — TELEPHONE ENCOUNTER
Spoke with patient and advised prior auth was denied.  Patient spoke with insurance who advised they will cover Dexcom but patient is unsure he wants to use that.  Patient has office visit with endocrinologist and will also see if she prescribes it if they will cover since she is a specialist.  Advised patient can also check with [employer] and [email or web address] to see if coupon available.  Patient is pleased with Freestyle and how much it has helped him to lower his blood sugar.     Patient will call back if needs further assistance.

## 2024-06-13 ENCOUNTER — OFFICE VISIT (OUTPATIENT)
Dept: OPHTHALMOLOGY | Facility: CLINIC | Age: 59
End: 2024-06-13
Payer: COMMERCIAL

## 2024-06-13 DIAGNOSIS — E11.9 TYPE 2 DIABETES MELLITUS WITHOUT COMPLICATION, WITHOUT LONG-TERM CURRENT USE OF INSULIN (HCC): Primary | ICD-10-CM

## 2024-06-13 DIAGNOSIS — H43.393 VITREOUS FLOATERS OF BOTH EYES: ICD-10-CM

## 2024-06-13 DIAGNOSIS — H25.13 AGE-RELATED NUCLEAR CATARACT OF BOTH EYES: ICD-10-CM

## 2024-06-13 PROCEDURE — 2023F DILAT RTA XM W/O RTNOPTHY: CPT | Performed by: OPHTHALMOLOGY

## 2024-06-13 PROCEDURE — 92014 COMPRE OPH EXAM EST PT 1/>: CPT | Performed by: OPHTHALMOLOGY

## 2024-06-13 PROCEDURE — 3072F LOW RISK FOR RETINOPATHY: CPT | Performed by: OPHTHALMOLOGY

## 2024-06-13 NOTE — PROGRESS NOTES
Luis Miguel Shelton is a 58 year old male.    HPI:     HPI    Pt here for a DM eye exam.  No specific vision complaints or concerns.   Wears glasses just for night driving.     Pt has been a diabetic for 15-16 years       Pt's diabetes is currently controlled by pills and insulin.   Pt BS monitored regularly.    Pt's last blood sugar was  125 mg/dl  Last HA1C was 9.3 on 01/11/24  Endocrinologist: Judith.        Last edited by Rola Chowdary OT on 6/13/2024  4:23 PM.        Patient History:  Past Medical History:    Chest pain    normal stress nuclear; normal stress echo    Colon polyps    Hyperplastic, repeat in 10 years    Diabetes (HCC)    diet controlled w/ no BDR - 2008    Disorder of eye, unspecified    increased C/D ratio - OU    Lipid screening    MGD (meibomian gland dysfunction)    OU    Obesity (BMI 30-39.9)    Other and unspecified hyperlipidemia    Type II or unspecified type diabetes mellitus without mention of complication, not stated as uncontrolled    Unspecified essential hypertension       Surgical History: Luis Miguel Shelton has a past surgical history that includes colonoscopy (N/A, 11/15/2016) (Procedure: COLONOSCOPY;  Surgeon: GISEL Ramirez MD;  Location: [facility] ENDOSCOPY) and shoulder arthroscopy (Left, 04/28/2020).    Family History   Problem Relation Age of Onset    Gastro-Intestinal Disorder Brother         celiac sprue    Cancer Paternal Grandfather         Colon Ca    Cancer Other         liver cancer - close relative    Diabetes Neg     Glaucoma Neg     Macular degeneration Neg        Social History:   Social History     Socioeconomic History    Marital status:    Tobacco Use    Smoking status: Never    Smokeless tobacco: Never   Vaping Use    Vaping status: Never Used   Substance and Sexual Activity    Alcohol use: Not Currently     Alcohol/week: 0.0 standard drinks of alcohol    Drug use: No    Sexual activity: Yes     Partners: Female   Other Topics Concern     Caffeine Concern Yes     Comment: coffee/tea, >6cups/day       Medications:  Current Outpatient Medications   Medication Sig Dispense Refill    Continuous Glucose Sensor (FREESTYLE DARIA 3 SENSOR) Does not apply Misc 1 each every 14 (fourteen) days. Type 2 diabetes mellitus without complication, without long-term current use of insulin (HCC) E11.9 6 each 3    amLODIPine 5 MG Oral Tab Take 1 tablet (5 mg total) by mouth daily. 90 tablet 0    lisinopril 40 MG Oral Tab Take 1 tablet (40 mg total) by mouth daily. 90 tablet 0    insulin glargine (LANTUS SOLOSTAR) 100 UNIT/ML Subcutaneous Solution Pen-injector Inject 10 Units into the skin nightly. 30 mL 11    simvastatin 40 MG Oral Tab Take 1 tablet (40 mg total) by mouth daily.      metFORMIN 500 MG Oral Tab Take 2 tablets (1,000 mg total) by mouth 2 (two) times daily with meals. 360 tablet 3    TRUEplus Lancets 30G Does not apply Misc 1 lancet to check glucose three times daily 300 each 3    BABY ASPIRIN OR Take 81 mg by mouth.      Glucose Blood (CONTOUR NEXT TEST) In Vitro Strip Use 1 (one) new strip three times daily for blood glucose monitoring 300 strip 1    Insulin Pen Needle (PEN NEEDLES) 32G X 4 MM Does not apply Misc Inject 1 Needle as directed nightly. For use with Lantus / insulin glargine 100 each 3    Continuous Glucose Sensor (FREESTYLE DARIA 2 SENSOR) Does not apply Misc 1 each every 14 (fourteen) days. 6 each 4       Allergies:  Allergies   Allergen Reactions    Sulfa Antibiotics UNKNOWN    Sulfanilamide      Other reaction(s): Hives       ROS:       PHYSICAL EXAM:     Base Eye Exam       Visual Acuity (Snellen - Linear)         Right Left    Dist cc 20/20 20/20    Near sc 20/20 20/20   DVA with the last glasses Rx in the phoropter   NVA - Pt prefers reading without glasses.              Tonometry (Icare, 4:30 PM)         Right Left    Pressure 19 18              Pupils         Pupils    Right PERRL    Left PERRL              Visual Fields         Left  Right     Full Full              Extraocular Movement         Right Left     Full Full              Dilation       Both eyes: 1.0% Mydriacyl and 2.5% Bhupendra Synephrine @ 4:33 PM                  Slit Lamp and Fundus Exam       Slit Lamp Exam         Right Left    Lids/Lashes Dermatochalasis, Meibomian gland dysfunction Dermatochalasis, Meibomian gland dysfunction    Conjunctiva/Sclera Normal Normal    Cornea Clear Clear    Anterior Chamber Deep and quiet Deep and quiet    Iris Normal Normal    Lens Trace Nuclear sclerosis Trace Nuclear sclerosis    Vitreous Vitreous floaters Vitreous floaters              Fundus Exam         Right Left    Disc Good rim, Temporal crescent Good rim, Temporal crescent    C/D Ratio 0.5 0.5    Macula Normal-no BDR Normal-no BDR    Vessels Normal Normal    Periphery Normal Normal                  Refraction       Wearing Rx         Sphere Cylinder Axis    Right -1.50 +0.75 060    Left -1.25 +0.75 135      Type: Forgot Distance only              Manifest Refraction    Declines refraction, states have glasses that helps.                     ASSESSMENT/PLAN:     Diagnoses and Plan:     Type 2 diabetes mellitus without complication, without long-term current use of insulin (HCC)  Diabetes type II: no background of retinopathy, no signs of neovascularization noted.  Discussed ocular and systemic benefits of blood sugar control.  Diagnosis and treatment discussed in detail with patient.    Will see patient back in 1 year for a diabetic eye exam.    Age-related nuclear cataract of both eyes  Discussed early cataracts with patient. Told patient that cataracts are age appropriate and they are not surgical at this time.  No treatment recommended at this time.       Vitreous floaters of both eyes   There is no evidence of retinal pathology.  All signs and symptoms of retinal detachment/tears explained in detail.    Patient instructed to call the office if they experience increase in floaters, increase  in flashes of light, loss of vision or curtain or veil effect.       No orders of the defined types were placed in this encounter.      Meds This Visit:  Requested Prescriptions      No prescriptions requested or ordered in this encounter        Follow up instructions:  Return in about 1 year (around 6/13/2025) for Diabetic eye exam.    6/13/2024  Scribed by: Efrain Palma MD

## 2024-06-13 NOTE — PATIENT INSTRUCTIONS
Type 2 diabetes mellitus without complication, without long-term current use of insulin (HCC)  Diabetes type II: no background of retinopathy, no signs of neovascularization noted.  Discussed ocular and systemic benefits of blood sugar control.  Diagnosis and treatment discussed in detail with patient.    Will see patient back in 1 year for a diabetic eye exam.    Age-related nuclear cataract of both eyes  Discussed early cataracts with patient. Told patient that cataracts are age appropriate and they are not surgical at this time.  No treatment recommended at this time.       Vitreous floaters of both eyes   There is no evidence of retinal pathology.  All signs and symptoms of retinal detachment/tears explained in detail.    Patient instructed to call the office if they experience increase in floaters, increase in flashes of light, loss of vision or curtain or veil effect.

## 2024-06-13 NOTE — ASSESSMENT & PLAN NOTE
Diabetes type II: no background of retinopathy, no signs of neovascularization noted.  Discussed ocular and systemic benefits of blood sugar control.  Diagnosis and treatment discussed in detail with patient.    Will see patient back in 1 year for a diabetic eye exam.

## 2024-07-03 ENCOUNTER — TELEPHONE (OUTPATIENT)
Dept: INTERNAL MEDICINE CLINIC | Facility: CLINIC | Age: 59
End: 2024-07-03

## 2024-07-03 RX ORDER — SIMVASTATIN 40 MG
40 TABLET ORAL DAILY
Qty: 90 TABLET | Refills: 1 | Status: SHIPPED | OUTPATIENT
Start: 2024-07-03

## 2024-07-03 NOTE — TELEPHONE ENCOUNTER
Please review.     Please advise Medication is listed as patient reported.     Requested Prescriptions   Pending Prescriptions Disp Refills    simvastatin 40 MG Oral Tab  0     Sig: Take 1 tablet (40 mg total) by mouth daily.       Cholesterol Medication Protocol Passed - 6/29/2024 11:24 AM        Passed - ALT < 80     Lab Results   Component Value Date    ALT 23 01/11/2024             Passed - ALT resulted within past year        Passed - Lipid panel within past 12 months     Lab Results   Component Value Date    CHOLEST 238 (H) 10/21/2023    TRIG 132 10/21/2023    HDL 70 (H) 10/21/2023     (H) 10/21/2023    VLDL 25 10/21/2023    NONHDLC 168 (H) 10/21/2023             Passed - In person appointment or virtual visit in the past 12 mos or appointment in next 3 mos     Recent Outpatient Visits              2 weeks ago Type 2 diabetes mellitus without complication, without long-term current use of insulin (HCC)    [facility] Efrain Palma MD    Office Visit    2 months ago Gross hematuria    [facility]    Office Visit    6 months ago Preop examination    [facility]    Office Visit    9 months ago Neck pain    [facility] Miracle Nova APRN    Office Visit    10 months ago Other infective acute otitis externa of left ear    [facility] Osman Merida MD    Office Visit          Future Appointments         Provider Department Appt Notes    In 1 month Kemal Montoya MD [facility] Review blood labs and A1C    In 5 months Mirna Wong MD [facility] Discussion about diabetes management                           Future Appointments         Provider Department Appt  Notes    In 1 month Kemal Montoya MD [facility] Review blood labs and A1C    In 5 months Mirna Wong MD [facility] Discussion about diabetes management          Recent Outpatient Visits              2 weeks ago Type 2 diabetes mellitus without complication, without long-term current use of insulin (HCC)    [facility] Efrain Palma [facility]    Office Visit    2 months ago Gross hematuria    [facility] Cat Lee MD    Office Visit    6 months ago Preop examination    [facility]    Office Visit    9 months ago Neck pain    [facility] Miracle Nova APRN    Office Visit    10 months ago Other infective acute otitis externa of left ear    [facility] Osman Merida MD    Office Visit

## 2024-07-03 NOTE — TELEPHONE ENCOUNTER
Patient due for his annual physical exam, HGA1C, diabetic foot exam and blood pressure check follow-up. Patient last seen in office on 4/18/24 and has scheduled follow up visit with Dr Montoya on 8/7/24. Care Gap letter generated and sent to patient via [employer].

## 2024-07-27 DIAGNOSIS — E11.9 TYPE 2 DIABETES MELLITUS WITHOUT COMPLICATION, WITHOUT LONG-TERM CURRENT USE OF INSULIN (HCC): ICD-10-CM

## 2024-07-31 RX ORDER — INSULIN GLARGINE 100 [IU]/ML
10 INJECTION, SOLUTION SUBCUTANEOUS NIGHTLY
Qty: 9 ML | Refills: 1 | Status: SHIPPED | OUTPATIENT
Start: 2024-07-31

## 2024-07-31 RX ORDER — LISINOPRIL 40 MG/1
40 TABLET ORAL DAILY
Qty: 90 TABLET | Refills: 1 | Status: SHIPPED | OUTPATIENT
Start: 2024-07-31

## 2024-07-31 RX ORDER — AMLODIPINE BESYLATE 5 MG/1
5 TABLET ORAL DAILY
Qty: 90 TABLET | Refills: 1 | Status: SHIPPED | OUTPATIENT
Start: 2024-07-31

## 2024-07-31 NOTE — TELEPHONE ENCOUNTER
Please review; protocol failed/ has no protocol      Message sent for patient to make an appointment.     Requested Prescriptions   Pending Prescriptions Disp Refills    insulin glargine (LANTUS SOLOSTAR) 100 UNIT/ML Subcutaneous Solution Pen-injector 30 mL 11     Sig: Inject 10 Units into the skin nightly.       Diabetes Medication Protocol Failed - 7/27/2024  5:55 PM        Failed - Last A1C < 7.5 and within past 6 months     Lab Results   Component Value Date    A1C 9.3 (H) 01/11/2024             Passed - In person appointment or virtual visit in the past 6 mos or appointment in next 3 mos     Recent Outpatient Visits              1 month ago Type 2 diabetes mellitus without complication, without long-term current use of insulin (HCC)    [facility] Efrain Palma MD    Office Visit    3 months ago Gross hematuria    [facility] Cat Lee MD    Office Visit    6 months ago Preop examination    [facility]    Office Visit    10 months ago Neck pain    [facility] Miracle Nova APRN    Office Visit    11 months ago Other infective acute otitis externa of left ear    [facility] Osman Merida MD    Office Visit          Future Appointments         Provider Department Appt Notes    In 4 months Mirna Wong MD [facility] Discussion about diabetes management                    Passed - Microalbumin procedure in past 12 months or taking ACE/ARB        Passed - EGFRCR or GFRNAA > 50     GFR Evaluation  EGFRCR: 96 , resulted on 1/11/2024          Passed - GFR in the past 12 months          amLODIPine 5 MG Oral Tab 90 tablet 0     Sig: Take 1 tablet (5 mg total) by mouth daily.       Hypertension Medications Protocol Failed -  7/27/2024  5:55 PM        Failed - Last BP reading less than 140/90     BP Readings from Last 1 Encounters:   04/18/24 146/84               Passed - CMP or BMP in past 12 months        Passed - In person appointment or virtual visit in the past 12 mos or appointment in next 3 mos     Recent Outpatient Visits              1 month ago Type 2 diabetes mellitus without complication, without long-term current use of insulin (HCC)    [facility] Efrain Palma MD    Office Visit    3 months ago Gross hematuria    [facility]    Office Visit    6 months ago [facility]op examination    [facility]    Office Visit    10 months ago Neck pain    [facility] Miracle Nova APRN    Office Visit    11 months ago Other infective acute otitis externa of left ear    [facility] Osman Merida MD    Office Visit          Future Appointments         Provider Department Appt Notes    In 4 months Mirna Wong MD [facility] Discussion about diabetes management                    Passed - EGFRCR or GFRNAA > 50     GFR Evaluation  EGFRCR: 96 , resulted on 1/11/2024            lisinopril 40 MG Oral Tab 90 tablet 0     Sig: Take 1 tablet (40 mg total) by mouth daily.       Hypertension Medications Protocol Failed - 7/27/2024  5:55 PM        Failed - Last BP reading less than 140/90     BP Readings from Last 1 Encounters:   04/18/24 146/84               Passed - CMP or BMP in past 12 months        Passed - In person appointment or virtual visit in the past 12 mos or appointment in next 3 mos     Recent Outpatient Visits              1 month ago Type 2 diabetes mellitus without complication, without long-term current use of insulin (HCC)     [facility] Efrain Steward MD    Office Visit    3 months ago Gross hematuria    [facility] Cat Mendoza MD    Office Visit    6 months ago Preop examination    [facility] Cat Mendoza MD    Office Visit    10 months ago Neck pain    [facility]    Office Visit    11 months ago Other infective acute otitis externa of left ear    [facility] Osman Salomon MD    Office Visit          Future Appointments         Provider Department Appt Notes    In 4 months [facility] MD [facility] Discussion about [facility] management                    Passed - EGFRCR or GFRNAA > 50     GFR Evaluation  EGFRCR: 96 , resulted on 1/11/2024             Recent Outpatient Visits              1 month ago Type 2 diabetes mellitus without complication, without long-term current use of insulin ([facility])    [facility]    Office Visit    3 months ago Gross hematuria    [facility] Cat Mendoza MD    Office Visit    6 months ago Preop examination    [facility] Cat Mendoza MD    Office Visit    10 months ago Neck pain    [facility]    Office Visit    11 months ago Other infective acute otitis externa of left ear    [facility]    [facility] Visit          Future Appointments         Provider Department Appt Notes    In 4 months [facility] MD [facility] Discussion about diabetes management

## 2024-08-01 DIAGNOSIS — E11.9 DIABETES MELLITUS WITHOUT COMPLICATION (HCC): Primary | ICD-10-CM

## 2024-10-14 NOTE — TELEPHONE ENCOUNTER
simvastatin 40 MG Oral Tab, Take 1 tablet (40 mg total) by mouth daily., Disp: 90 tablet, Rfl: 1

## 2024-10-14 NOTE — TELEPHONE ENCOUNTER
Simvastatin 40m month supply sent to [facility] in [place] on 2024    [employer] requesting

## 2024-10-16 ENCOUNTER — PATIENT MESSAGE (OUTPATIENT)
Dept: INTERNAL MEDICINE CLINIC | Facility: CLINIC | Age: 59
End: 2024-10-16

## 2024-10-16 RX ORDER — SIMVASTATIN 40 MG
40 TABLET ORAL DAILY
Qty: 90 TABLET | Refills: 3 | Status: SHIPPED | OUTPATIENT
Start: 2024-10-16

## 2024-10-16 NOTE — TELEPHONE ENCOUNTER
Refill passed per [facility] protocol.  Requested Prescriptions   Pending Prescriptions Disp Refills    simvastatin 40 MG Oral Tab 90 tablet 1     Sig: Take 1 tablet (40 mg total) by mouth daily.       Cholesterol Medication Protocol Passed - 10/16/2024 12:19 PM        Passed - ALT < 80     Lab Results   Component Value Date    ALT 23 01/11/2024             Passed - ALT resulted within past year        Passed - Lipid panel within past 12 months     Lab Results   Component Value Date    CHOLEST 238 (H) 10/21/2023    TRIG 132 10/21/2023    HDL 70 (H) 10/21/2023     (H) 10/21/2023    VLDL 25 10/21/2023    NONHDLC 168 (H) 10/21/2023             Passed - In person appointment or virtual visit in the past 12 mos or appointment in next 3 mos     Recent Outpatient Visits              4 months ago Type 2 diabetes mellitus without complication, without long-term current use of insulin ([facility])    [facility] Efrain [facility]    Office Visit    6 months ago Gross hematuria    [facility] Cat Lee MD    Office Visit    9 months ago Preop examination    [facility]    Office Visit    1 year ago Neck pain    [facility] Miracle Nova APRN    Office Visit    1 year ago Other infective acute otitis externa of left ear    [facility] Osman Merida MD    Office Visit          Future Appointments         Provider Department Appt Notes    In 2 months Mirna Wong MD [facility] Discussion about diabetes management                       Recent Outpatient Visits              4 months ago Type 2 diabetes mellitus without complication, without long-term current use of insulin (HCC)    [facility]  Efrain Palma MD    Office Visit    6 months ago Gross hematuria    [facility] Cat Lee MD    Office Visit    9 months ago Preop examination    [facility] Cat Lee MD    Office Visit    1 year ago Neck pain    [facility] Miracle Nova APRN    Office Visit    1 year ago Other infective acute otitis externa of left ear    [facility] Osman Merida MD    Office Visit          Future Appointments         Provider Department Appt Notes    In 2 months Mirna Wong MD [facility] Discussion about diabetes management

## 2024-11-10 DIAGNOSIS — E11.9 TYPE 2 DIABETES MELLITUS WITHOUT COMPLICATION, WITH LONG-TERM CURRENT USE OF INSULIN (HCC): ICD-10-CM

## 2024-11-10 DIAGNOSIS — Z79.4 TYPE 2 DIABETES MELLITUS WITHOUT COMPLICATION, WITH LONG-TERM CURRENT USE OF INSULIN (HCC): ICD-10-CM

## 2024-11-13 DIAGNOSIS — Z79.4 TYPE 2 DIABETES MELLITUS WITHOUT COMPLICATION, WITH LONG-TERM CURRENT USE OF INSULIN (HCC): ICD-10-CM

## 2024-11-13 DIAGNOSIS — E11.9 TYPE 2 DIABETES MELLITUS WITHOUT COMPLICATION, WITH LONG-TERM CURRENT USE OF INSULIN (HCC): ICD-10-CM

## 2024-11-13 RX ORDER — PEN NEEDLE, DIABETIC 32GX 5/32"
NEEDLE, DISPOSABLE MISCELLANEOUS
Qty: 90 EACH | Refills: 0 | OUTPATIENT
Start: 2024-11-13

## 2024-11-13 NOTE — TELEPHONE ENCOUNTER
Patient is scheduled on 12/2/2024.     Patient advised Dr. Montoya has been his PCP for the last 5 years however has a hard time scheduling an appointment.

## 2024-11-13 NOTE — TELEPHONE ENCOUNTER
Please Review. Protocol Failed; No Protocol   Please Advise. Patient is a previous Patient of Dr. Lee     Requested Prescriptions   Pending Prescriptions Disp Refills    BD PEN NEEDLE SHANTELL 2ND GEN 32G X 4 MM Does not apply Misc [Pharmacy Med Name: [id number]] 90 each 0     Sig: USE 1 NIGHTLY AS DIRECTED WITH  LANTUS       Diabetic Supplies Protocol Passed - 11/13/2024  6:51 AM        Passed - In person appointment or virtual visit in the past 12 mos or appointment in next 3 mos     Recent Outpatient Visits              5 months ago Type 2 diabetes mellitus without complication, without long-term current use of insulin ([facility])    [facility] Efrain Palma MD    Office Visit    6 months ago Gross hematuria    [facility]    Office Visit    10 months ago Preop examination    [facility]    Office Visit    1 year ago Neck pain    [facility] Miracle Nova APRN    Office Visit    1 year ago Other infective acute otitis externa of left ear    [facility] Osman Merida MD    Office Visit          Future Appointments         Provider Department Appt Notes    In 1 month Mirna Wong MD [facility] Discussion about diabetes management                           Future Appointments         Provider Department Appt Notes    In 1 month Mirna Wong MD [facility] Discussion about diabetes management          Recent Outpatient Visits              5 months ago Type 2 diabetes mellitus without complication, without long-term current use of insulin (HCC)    [facility]    Office Visit    6 months ago  Gross hematuria    [facility]    Office Visit    10 months ago Preop examination    [facility]    Office Visit    1 year ago Neck pain    [facility] Miracle Nova APRN    Office Visit    1 year ago Other infective acute otitis externa of left ear    [facility]    Office Visit

## 2024-11-13 NOTE — TELEPHONE ENCOUNTER
See previous refill request from 11/10.  Patient was advised to schedule with new PCP, although he has not done so.  Of note, he does have visit scheduled with endocrine on 12/16.  Please advise if you can provide refill

## 2024-11-14 RX ORDER — PEN NEEDLE, DIABETIC 30 GX3/16"
1 NEEDLE, DISPOSABLE MISCELLANEOUS NIGHTLY
Qty: 100 EACH | Refills: 0 | Status: SHIPPED | OUTPATIENT
Start: 2024-11-14

## 2024-11-21 DIAGNOSIS — E11.9 TYPE 2 DIABETES MELLITUS WITHOUT COMPLICATION, WITHOUT LONG-TERM CURRENT USE OF INSULIN (HCC): ICD-10-CM

## 2024-11-26 RX ORDER — INSULIN GLARGINE 100 [IU]/ML
10 INJECTION, SOLUTION SUBCUTANEOUS NIGHTLY
Qty: 3 ML | Refills: 0 | Status: SHIPPED | OUTPATIENT
Start: 2024-11-26

## 2024-11-26 RX ORDER — ACYCLOVIR 800 MG/1
1 TABLET ORAL
Qty: 6 EACH | Refills: 3 | OUTPATIENT
Start: 2024-11-26

## 2024-11-26 NOTE — TELEPHONE ENCOUNTER
Please review; protocol failed/ has no protocol      Please see message below for upcoming appointment.    Future Appointments   Date Time Provider Department Center   12/2/2024  2:20 PM Kemal Montoya MD [facility]              Requested Prescriptions   Pending Prescriptions Disp Refills    LANTUS SOLOSTAR 100 UNIT/ML Subcutaneous Solution Pen-injector [Pharmacy Med Name: Lantus SoloStar 100 UNIT/ML Subcutaneous Solution Pen-injector] 15 mL 3     Sig: INJECT SUBCUTANEOUSLY 10 UNITS  NIGHTLY       Diabetes Medication Protocol Failed - 11/26/2024  8:06 AM        Failed - Last A1C < 7.5 and within past 6 months     Lab Results   Component Value Date    A1C 9.3 (H) 01/11/2024             Passed - In person appointment or virtual visit in the past 6 mos or appointment in next 3 mos     Recent Outpatient Visits              5 months ago Type 2 diabetes mellitus without complication, without long-term current use of insulin (HCC)    [facility]    Office Visit    7 months ago Gross hematuria    [facility]    Office Visit    10 months ago Preop examination    [facility]    Office Visit    1 year ago Neck pain    [facility] Miracle Nova APRN    Office Visit    1 year ago Other infective acute otitis externa of left ear    [facility] Merida MD    Office Visit          Future Appointments         Provider Department Appt Notes    In 6 days [facility] MD [facility]    In 2 weeks Mirna Wong MD [facility] Discussion about diabetes management                    Passed - Microalbumin procedure in past 12 months or  taking ACE/ARB        Passed - EGFRCR or GFRNAA > 50     GFR Evaluation  EGFRCR: 96 , resulted on 1/11/2024          Passed - GFR in the past 12 months           Recent Outpatient Visits              5 months ago Type 2 diabetes mellitus without complication, without long-term current use of insulin (HCC)    [facility] Efrain Palma MD    Office Visit    7 months ago Gross hematuria    [facility] Cat Lee MD    Office Visit    10 months ago Preop examination    [facility]    Office Visit    1 year ago Neck pain    [facility] Miracle Nova APRN    Office Visit    1 year ago Other infective acute otitis externa of left ear    [facility] Osman Merida MD    Office Visit          Future Appointments         Provider Department Appt Notes    In 6 days Kemal Montoya MD [facility] Medication refill    In 2 weeks Mirna Wong MD [facility] Discussion about diabetes management

## 2024-11-27 ENCOUNTER — LAB ENCOUNTER (OUTPATIENT)
Dept: LAB | Facility: HOSPITAL | Age: 59
End: 2024-11-27
Attending: INTERNAL MEDICINE
Payer: COMMERCIAL

## 2024-11-27 DIAGNOSIS — E11.9 DIABETES MELLITUS WITHOUT COMPLICATION (HCC): ICD-10-CM

## 2024-11-27 DIAGNOSIS — E55.9 VITAMIN D DEFICIENCY: ICD-10-CM

## 2024-11-27 DIAGNOSIS — E11.65 TYPE 2 DIABETES MELLITUS WITH HYPERGLYCEMIA, WITHOUT LONG-TERM CURRENT USE OF INSULIN (HCC): ICD-10-CM

## 2024-11-27 DIAGNOSIS — R31.0 GROSS HEMATURIA: ICD-10-CM

## 2024-11-27 DIAGNOSIS — Z00.00 WELLNESS EXAMINATION: ICD-10-CM

## 2024-11-27 LAB
ALBUMIN SERPL-MCNC: 4.8 G/DL (ref 3.2–4.8)
ALBUMIN/GLOB SERPL: 2.2 {RATIO} (ref 1–2)
ALP LIVER SERPL-CCNC: 66 U/L
ALT SERPL-CCNC: 18 U/L
ANION GAP SERPL CALC-SCNC: 8 MMOL/L (ref 0–18)
AST SERPL-CCNC: 18 U/L (ref ?–34)
BASOPHILS # BLD AUTO: 0.06 X10(3) UL (ref 0–0.2)
BASOPHILS NFR BLD AUTO: 1 %
BILIRUB SERPL-MCNC: 0.6 MG/DL (ref 0.3–1.2)
BILIRUB UR QL: NEGATIVE
BUN BLD-MCNC: 18 MG/DL (ref 9–23)
BUN/CREAT SERPL: 23.4 (ref 10–20)
CALCIUM BLD-MCNC: 9.8 MG/DL (ref 8.7–10.4)
CHLORIDE SERPL-SCNC: 108 MMOL/L (ref 98–112)
CHOLEST SERPL-MCNC: 147 MG/DL (ref ?–200)
CLARITY UR: CLEAR
CO2 SERPL-SCNC: 25 MMOL/L (ref 21–32)
COMPLEXED PSA SERPL-MCNC: 4.89 NG/ML (ref ?–4)
CREAT BLD-MCNC: 0.77 MG/DL
CREAT UR-SCNC: 88.7 MG/DL
DEPRECATED RDW RBC AUTO: 41.6 FL (ref 35.1–46.3)
EGFRCR SERPLBLD CKD-EPI 2021: 104 ML/MIN/1.73M2 (ref 60–?)
EOSINOPHIL # BLD AUTO: 0.15 X10(3) UL (ref 0–0.7)
EOSINOPHIL NFR BLD AUTO: 2.6 %
ERYTHROCYTE [DISTWIDTH] IN BLOOD BY AUTOMATED COUNT: 12.9 % (ref 11–15)
EST. AVERAGE GLUCOSE BLD GHB EST-MCNC: 154 MG/DL (ref 68–126)
FASTING PATIENT LIPID ANSWER: YES
FASTING STATUS PATIENT QL REPORTED: YES
GLOBULIN PLAS-MCNC: 2.2 G/DL (ref 2–3.5)
GLUCOSE BLD-MCNC: 151 MG/DL (ref 70–99)
GLUCOSE UR-MCNC: NORMAL MG/DL
HBA1C MFR BLD: 7 % (ref ?–5.7)
HCT VFR BLD AUTO: 41.6 %
HDLC SERPL-MCNC: 65 MG/DL (ref 40–59)
HGB BLD-MCNC: 14.9 G/DL
HGB UR QL STRIP.AUTO: NEGATIVE
IMM GRANULOCYTES # BLD AUTO: 0.01 X10(3) UL (ref 0–1)
IMM GRANULOCYTES NFR BLD: 0.2 %
KETONES UR-MCNC: NEGATIVE MG/DL
LDLC SERPL CALC-MCNC: 67 MG/DL (ref ?–100)
LEUKOCYTE ESTERASE UR QL STRIP.AUTO: 25
LYMPHOCYTES # BLD AUTO: 1.86 X10(3) UL (ref 1–4)
LYMPHOCYTES NFR BLD AUTO: 32.4 %
MCH RBC QN AUTO: 32.4 PG (ref 26–34)
MCHC RBC AUTO-ENTMCNC: 35.8 G/DL (ref 31–37)
MCV RBC AUTO: 90.4 FL
MICROALBUMIN UR-MCNC: <0.3 MG/DL
MONOCYTES # BLD AUTO: 0.44 X10(3) UL (ref 0.1–1)
MONOCYTES NFR BLD AUTO: 7.7 %
NEUTROPHILS # BLD AUTO: 3.22 X10 (3) UL (ref 1.5–7.7)
NEUTROPHILS # BLD AUTO: 3.22 X10(3) UL (ref 1.5–7.7)
NEUTROPHILS NFR BLD AUTO: 56.1 %
NITRITE UR QL STRIP.AUTO: NEGATIVE
NONHDLC SERPL-MCNC: 82 MG/DL (ref ?–130)
OSMOLALITY SERPL CALC.SUM OF ELEC: 297 MOSM/KG (ref 275–295)
PH UR: 5.5 [PH] (ref 5–8)
PLATELET # BLD AUTO: 185 10(3)UL (ref 150–450)
POTASSIUM SERPL-SCNC: 4.2 MMOL/L (ref 3.5–5.1)
PROT SERPL-MCNC: 7 G/DL (ref 5.7–8.2)
PROT UR-MCNC: NEGATIVE MG/DL
RBC # BLD AUTO: 4.6 X10(6)UL
SODIUM SERPL-SCNC: 141 MMOL/L (ref 136–145)
SP GR UR STRIP: 1.03 (ref 1–1.03)
T4 FREE SERPL-MCNC: 1 NG/DL (ref 0.8–1.7)
TRIGL SERPL-MCNC: 78 MG/DL (ref 30–149)
TSI SER-ACNC: 5.35 UIU/ML (ref 0.55–4.78)
UROBILINOGEN UR STRIP-ACNC: NORMAL
VIT D+METAB SERPL-MCNC: 29.9 NG/ML (ref 30–100)
VLDLC SERPL CALC-MCNC: 12 MG/DL (ref 0–30)
WBC # BLD AUTO: 5.7 X10(3) UL (ref 4–11)

## 2024-11-27 PROCEDURE — 81001 URINALYSIS AUTO W/SCOPE: CPT | Performed by: INTERNAL MEDICINE

## 2024-11-27 PROCEDURE — 82570 ASSAY OF URINE CREATININE: CPT

## 2024-11-27 PROCEDURE — 82043 UR ALBUMIN QUANTITATIVE: CPT

## 2024-11-27 PROCEDURE — 85025 COMPLETE CBC W/AUTO DIFF WBC: CPT

## 2024-11-27 PROCEDURE — 36415 COLL VENOUS BLD VENIPUNCTURE: CPT

## 2024-11-27 PROCEDURE — 80053 COMPREHEN METABOLIC PANEL: CPT

## 2024-11-27 PROCEDURE — 84443 ASSAY THYROID STIM HORMONE: CPT

## 2024-11-27 PROCEDURE — 83036 HEMOGLOBIN GLYCOSYLATED A1C: CPT

## 2024-11-27 PROCEDURE — 80061 LIPID PANEL: CPT

## 2024-11-27 PROCEDURE — 84439 ASSAY OF FREE THYROXINE: CPT

## 2024-11-27 PROCEDURE — 87086 URINE CULTURE/COLONY COUNT: CPT | Performed by: INTERNAL MEDICINE

## 2024-11-27 PROCEDURE — 82306 VITAMIN D 25 HYDROXY: CPT

## 2024-12-02 ENCOUNTER — OFFICE VISIT (OUTPATIENT)
Facility: CLINIC | Age: 59
End: 2024-12-02
Payer: COMMERCIAL

## 2024-12-02 VITALS
HEIGHT: 72 IN | TEMPERATURE: 97 F | WEIGHT: 216 LBS | DIASTOLIC BLOOD PRESSURE: 84 MMHG | HEART RATE: 82 BPM | SYSTOLIC BLOOD PRESSURE: 136 MMHG | RESPIRATION RATE: 20 BRPM | BODY MASS INDEX: 29.26 KG/M2

## 2024-12-02 DIAGNOSIS — M50.90 CERVICAL DISC DISEASE: ICD-10-CM

## 2024-12-02 DIAGNOSIS — E78.5 HYPERLIPIDEMIA LDL GOAL <100: ICD-10-CM

## 2024-12-02 DIAGNOSIS — E11.9 TYPE 2 DIABETES MELLITUS WITHOUT COMPLICATION, WITHOUT LONG-TERM CURRENT USE OF INSULIN (HCC): Primary | ICD-10-CM

## 2024-12-02 DIAGNOSIS — I10 ESSENTIAL HYPERTENSION: ICD-10-CM

## 2024-12-02 PROCEDURE — 3075F SYST BP GE 130 - 139MM HG: CPT | Performed by: INTERNAL MEDICINE

## 2024-12-02 PROCEDURE — 3079F DIAST BP 80-89 MM HG: CPT | Performed by: INTERNAL MEDICINE

## 2024-12-02 PROCEDURE — 99214 OFFICE O/P EST MOD 30 MIN: CPT | Performed by: INTERNAL MEDICINE

## 2024-12-02 PROCEDURE — 3008F BODY MASS INDEX DOCD: CPT | Performed by: INTERNAL MEDICINE

## 2024-12-02 PROCEDURE — 3061F NEG MICROALBUMINURIA REV: CPT | Performed by: INTERNAL MEDICINE

## 2024-12-02 PROCEDURE — G2211 COMPLEX E/M VISIT ADD ON: HCPCS | Performed by: INTERNAL MEDICINE

## 2024-12-02 PROCEDURE — 3051F HG A1C>EQUAL 7.0%<8.0%: CPT | Performed by: INTERNAL MEDICINE

## 2024-12-02 NOTE — PROGRESS NOTES
HPI:    Patient ID: Luis Miguel Shelton is a 59 year old male.    HPI    Patient returns to the office today to discuss chronic medical issues as listed on the active problem list below.  Patient last seen in the office by me on July 2023.  A1c at that time 7.3.  He was working on his diet with intermittent fasting, low-carb diet.  He was off his statin medication at that time.  During the last visit, the following changes were made: None.  Since the last visit, the patient has seen the following doctors: Other practitioners in the internal medicine department, ENT, orthopedics, podiatry, neurosurgeon, eye doctor.  Most significantly, patient underwent C6-7 anterior cervical discectomy and fusion in January of this year. Everything went well with immediately improvement of pain.     Full blood work done on November 27.  PSA borderline at 4.89.  A1c 7.0.  No microalbumin in the urine.  Normal lipid profile, CMP, CBC.  TSH borderline with normal free T4.    Today, the patient offers the following complaints: He feels pretty good. He is using a [employer] CGM; it has helped him a lot by training him how to eat and exercise.  Patient describes diet as [place] in general. Very low carbs.   For exercise, the patient is walking the dog for 40 minutes daily.   Tobacco and alcohol use reviewed.   Current medications reviewed. He tried to cut back on metformin dose, but the sugar started to trend higher. Just started the vitamin D supplement.  Health maintenance issues reviewed.    Wt Readings from Last 6 Encounters:   12/02/24 216 lb (98 kg)   04/18/24 220 lb (99.8 kg)   01/05/24 218 lb (98.9 kg)   09/26/23 215 lb (97.5 kg)   08/25/23 219 lb (99.3 kg)   07/12/23 219 lb (99.3 kg)       Patient Active Problem List   Diagnosis    Type 2 diabetes mellitus without complication, without long-term current use of insulin (HCC)    Hypertriglyceridemia    Obesity    HTN (hypertension)    Myopia    Colon polyps    Acute pain of right  shoulder    Superior glenoid labrum lesion of right shoulder    Disorder of rotator cuff syndrome of right shoulder and allied disorder    BPH with obstruction/lower urinary tract symptoms    Obesity (BMI 30-39.9)    Open fracture of head of left humerus, initial encounter    Right upper quadrant pain    Glaucoma suspect, bilateral    Age-related nuclear cataract of both eyes    Vitreous floaters of both eyes    Neck pain        HISTORY:  Past Medical History:    Chest pain    normal stress nuclear; normal stress echo    Colon polyps    Hyperplastic, repeat in 10 years    Diabetes (HCC)    diet controlled w/ no BDR - 2008    Disorder of eye, unspecified    increased C/D ratio - OU    Lipid screening    MGD (meibomian gland dysfunction)    OU    Obesity (BMI 30-39.9)    Other and unspecified hyperlipidemia    Type II or unspecified type diabetes mellitus without mention of complication, not stated as uncontrolled    Unspecified essential hypertension      Past Surgical History:   Procedure Laterality Date    Colonoscopy N/A 11/15/2016    Procedure: COLONOSCOPY;  Surgeon: GISEL Ramirez MD;  Location: [facility] ENDOSCOPY    Shoulder arthroscopy Left 04/28/2020      Family History   Problem Relation Age of Onset    Gastro-Intestinal Disorder Brother         celiac sprue    Cancer Paternal Grandfather         Colon Ca    Cancer Other         liver cancer - close relative    Diabetes Neg     Glaucoma Neg     Macular degeneration Neg       Social History     Socioeconomic History    Marital status:    Tobacco Use    Smoking status: Never    Smokeless tobacco: Never   Vaping Use    Vaping status: Never Used   Substance and Sexual Activity    Alcohol use: Not Currently     Alcohol/week: 0.0 standard drinks of alcohol    Drug use: No    Sexual activity: Yes     Partners: Female   Other Topics Concern    Caffeine Concern Yes     Comment: coffee/tea, >6cups/day          Review of Systems          Current Outpatient  Medications   Medication Sig Dispense Refill    Cholecalciferol 125 MCG (5000 UT) Oral Tab Take 1 tablet (5,000 Units total) by mouth daily.      insulin glargine (LANTUS SOLOSTAR) 100 UNIT/ML Subcutaneous Solution Pen-injector Inject 10 Units into the skin nightly. 3 mL 0    Insulin Pen Needle (PEN NEEDLES) 32G X 4 MM Does not apply Misc Inject 1 Needle as directed nightly. For use with Lantus / insulin glargine 100 each 0    simvastatin 40 MG Oral Tab Take 1 tablet (40 mg total) by mouth daily. 90 tablet 3    amLODIPine 5 MG Oral Tab Take 1 tablet (5 mg total) by mouth daily. 90 tablet 1    lisinopril 40 MG Oral Tab Take 1 tablet (40 mg total) by mouth daily. 90 tablet 1    Continuous Glucose Sensor (FREESTYLE DARIA 3 SENSOR) Does not apply Misc 1 each every 14 (fourteen) days. Type 2 diabetes mellitus without complication, without long-term current use of insulin ([facility]) E11.9 6 each 3    metFORMIN 500 MG Oral Tab Take 2 tablets (1,000 mg total) by mouth 2 (two) times daily with meals. 360 tablet 3    TRUEplus Lancets 30G Does not apply Misc 1 lancet to check glucose three times daily 300 each 3    BABY ASPIRIN OR Take 81 mg by mouth.      Glucose Blood (CONTOUR NEXT TEST) In Vitro Strip Use 1 (one) new strip three times daily for blood glucose monitoring 300 strip 1     Allergies:Allergies[1]     PHYSICAL EXAM:   /84   Pulse 82   Temp 97.4 °F (36.3 °C) (Other)   Resp 20   Ht 6' (1.829 m)   Wt 216 lb (98 kg)   BMI 29.29 kg/m²      Physical Exam  Constitutional:       Appearance: Normal appearance. He is well-developed.   HENT:      Nose: Nose normal.      Mouth/Throat:      Pharynx: No oropharyngeal exudate or posterior oropharyngeal erythema.   Eyes:      Conjunctiva/sclera: Conjunctivae normal.   Neck:      Vascular: No carotid bruit.   Cardiovascular:      Rate and Rhythm: Normal rate and regular rhythm.      Pulses: Normal pulses.      Heart sounds: Normal heart sounds. No murmur heard.  Pulmonary:       Effort: Pulmonary effort is normal.      Breath sounds: Normal breath sounds. No wheezing or rales.   Abdominal:      General: Bowel sounds are normal.      Palpations: Abdomen is soft. There is no mass.      Tenderness: There is no abdominal tenderness.   Musculoskeletal:      Right lower leg: No edema.      Left lower leg: No edema.   Lymphadenopathy:      Cervical: No cervical adenopathy.   Skin:     General: Skin is warm and dry.      Findings: No rash.   Neurological:      General: No focal deficit present.      Mental Status: He is alert.   Psychiatric:         Mood and Affect: Mood normal.         Behavior: Behavior normal.         Thought Content: Thought content normal.      Bilateral barefoot skin diabetic exam is normal, visualized feet and the appearance is normal.  Bilateral monofilament/sensation of both feet is normal.  Pulsation pedal pulse exam of both lower legs/feet is normal as well.               ASSESSMENT/PLAN:   1. Type 2 diabetes mellitus without complication, without long-term current use of insulin (HCC)  Recent blood work showed reasonable control with A1c of 7.0.  The continuous glucose monitor has given him data that has caused him to change his behavior patterns.  Things are better controlled right now.  We will continue the same medication.  Strongly encouraged to continue working on diet and exercise.  Follow-up in 6 months.    2. Essential hypertension  Reasonably controlled.  Could be somewhat better.  We will not change anything right now.  Follow-up in 6 months.    3. Cervical disc disease  Patient with anterior cervical discectomy and fusion earlier in the year.  He did remarkably well with that he is very happy with the results.  He is asymptomatic.    4. Hyperlipidemia LDL goal <100  He is back on the statin medication.  LDL is at target.  Continue current treatment.         Meds This Visit:  Requested Prescriptions      No prescriptions requested or ordered in this  encounter       Imaging & Referrals:  None         Kemal Montoya MD        [1]   Allergies  Allergen Reactions    Sulfa Antibiotics UNKNOWN    Sulfanilamide      Other reaction(s): Hives

## 2025-01-02 RX ORDER — LISINOPRIL 40 MG/1
40 TABLET ORAL DAILY
Qty: 90 TABLET | Refills: 3 | Status: SHIPPED | OUTPATIENT
Start: 2025-01-02

## 2025-01-02 RX ORDER — AMLODIPINE BESYLATE 5 MG/1
5 TABLET ORAL DAILY
Qty: 90 TABLET | Refills: 3 | Status: SHIPPED | OUTPATIENT
Start: 2025-01-02

## 2025-01-02 NOTE — TELEPHONE ENCOUNTER
Refill passed per [facility] protocols.    Requested Prescriptions   Pending Prescriptions Disp Refills    AMLODIPINE 5 MG Oral Tab [Pharmacy Med Name: amLODIPine Besylate 5 MG Oral Tablet] 90 tablet 3     Sig: TAKE 1 TABLET BY MOUTH DAILY       Hypertension Medications Protocol Passed - 1/2/2025  9:58 AM        Passed - CMP or BMP in past 12 months        Passed - Last BP reading less than 140/90     BP Readings from Last 1 Encounters:   12/02/24 136/84               Passed - In person appointment or virtual visit in the past 12 mos or appointment in next 3 mos     Recent Outpatient Visits              1 month ago Type 2 diabetes mellitus without complication, without long-term current use of insulin ([facility])    [facility] Kemal Montoya MD    Office Visit    6 months ago Type 2 diabetes mellitus without complication, without long-term current use of insulin ([facility])    [facility]    Office Visit    8 months ago Gross hematuria    [facility]    Office Visit    12 months ago [facility]op examination    [facility]    Office Visit    1 year ago Neck pain    [facility]    Office Visit                      Passed - EGFRCR or GFRNAA > 50     GFR Evaluation  EGFRCR: 104 , resulted on 11/27/2024            LISINOPRIL 40 MG Oral Tab [Pharmacy Med Name: Lisinopril 40 MG Oral Tablet] 90 tablet 3     Sig: TAKE 1 TABLET BY MOUTH DAILY       Hypertension Medications Protocol Passed - 1/2/2025  9:58 AM        Passed - CMP or BMP in past 12 months        Passed - Last BP reading less than 140/90     BP Readings from Last 1 Encounters:   12/02/24 136/84               Passed - In person appointment or virtual visit in the past 12 mos or  appointment in next 3 mos     Recent Outpatient Visits              1 month ago Type 2 diabetes mellitus without complication, without long-term current use of insulin ([facility])    [facility] [place] Kemal Montoya MD    Office Visit    6 months ago Type 2 diabetes mellitus without complication, without long-term current use of insulin ([facility])    [facility] Efrain Steward MD    Office Visit    8 months ago Gross hematuria    [facility] Cat Mendoza MD    Office Visit    12 months ago Preop examination    [facility] Cat Mendoza MD    Office Visit    1 year ago Neck pain    [facility] APR[facility]    Office Visit                      Passed - EGFRCR or GFRNAA > 50     GFR Evaluation  EGFRCR: 104 , resulted on 11/27/2024

## 2025-01-13 DIAGNOSIS — E11.9 TYPE 2 DIABETES MELLITUS WITHOUT COMPLICATION, WITH LONG-TERM CURRENT USE OF INSULIN (HCC): ICD-10-CM

## 2025-01-13 DIAGNOSIS — Z79.4 TYPE 2 DIABETES MELLITUS WITHOUT COMPLICATION, WITH LONG-TERM CURRENT USE OF INSULIN (HCC): ICD-10-CM

## 2025-01-16 RX ORDER — PEN NEEDLE, DIABETIC 32GX 5/32"
NEEDLE, DISPOSABLE MISCELLANEOUS
Qty: 90 EACH | Refills: 0 | Status: SHIPPED | OUTPATIENT
Start: 2025-01-16

## 2025-01-16 NOTE — TELEPHONE ENCOUNTER
REFILL PASSED PER [facility] PROTOCOLS    Requested Prescriptions   Pending Prescriptions Disp Refills    BD PEN NEEDLE SHANTELL U/F 32G X 4 MM Does not apply Misc [Pharmacy Med Name: [id number]] 90 each 0     Sig: INJECT 1 NEEDLE AS DIRECTED  EVERY NIGHT FOR USE WITH LANTUS  / INSULIN GLARGINE       Diabetic Supplies Protocol Passed - 1/16/2025 12:18 PM        Passed - In person appointment or virtual visit in the past 12 mos or appointment in next 3 mos     Recent Outpatient Visits              1 month ago Type 2 diabetes mellitus without complication, without long-term current use of insulin (HCC)    [facility] Kemal Hampton MD    Office Visit    7 months ago Type 2 diabetes mellitus without complication, without long-term current use of insulin ([facility])    [facility] Efrain Steward MD    Office Visit    9 months ago Gross hematuria    [facility]    Office Visit    1 year ago Preop examination    [facility] Cat Mendoza MD    Office Visit    1 year ago Neck pain    [facility] [place] [facility]    Office Visit                      Passed - Medication is active on med list               Recent Outpatient Visits              1 month ago Type 2 diabetes mellitus without complication, without long-term current use of insulin (HCC)    [facility] Kemal Hampton MD    Office Visit    7 months ago Type 2 diabetes mellitus without complication, without long-term current use of insulin ([facility])    [facility] Efrain Steward MD    Office Visit    9 months ago Gross hematuria    [facility] Cat Mendoza MD    Office Visit     1 year ago Preop examination    [facility] [place] Cat Lee MD    Office Visit    1 year ago Neck pain    [facility] [place] [facility]    Office Visit

## 2025-03-12 ENCOUNTER — LAB ENCOUNTER (OUTPATIENT)
Dept: LAB | Age: 60
End: 2025-03-12
Attending: INTERNAL MEDICINE
Payer: COMMERCIAL

## 2025-03-12 ENCOUNTER — OFFICE VISIT (OUTPATIENT)
Dept: INTERNAL MEDICINE CLINIC | Facility: CLINIC | Age: 60
End: 2025-03-12
Payer: COMMERCIAL

## 2025-03-12 VITALS
WEIGHT: 212 LBS | RESPIRATION RATE: 18 BRPM | SYSTOLIC BLOOD PRESSURE: 158 MMHG | DIASTOLIC BLOOD PRESSURE: 78 MMHG | TEMPERATURE: 98 F | OXYGEN SATURATION: 98 % | HEIGHT: 72 IN | HEART RATE: 88 BPM | BODY MASS INDEX: 28.71 KG/M2

## 2025-03-12 DIAGNOSIS — E11.9 TYPE 2 DIABETES MELLITUS WITHOUT COMPLICATION, WITH LONG-TERM CURRENT USE OF INSULIN (HCC): ICD-10-CM

## 2025-03-12 DIAGNOSIS — R97.20 ELEVATED PSA: ICD-10-CM

## 2025-03-12 DIAGNOSIS — Z79.4 TYPE 2 DIABETES MELLITUS WITHOUT COMPLICATION, WITH LONG-TERM CURRENT USE OF INSULIN (HCC): ICD-10-CM

## 2025-03-12 DIAGNOSIS — N39.44 NOCTURNAL ENURESIS: Primary | ICD-10-CM

## 2025-03-12 DIAGNOSIS — I10 ESSENTIAL HYPERTENSION: ICD-10-CM

## 2025-03-12 LAB
BILIRUB UR QL: NEGATIVE
CLARITY UR: CLEAR
COLOR UR: COLORLESS
GLUCOSE UR-MCNC: NORMAL MG/DL
HGB UR QL STRIP.AUTO: NEGATIVE
KETONES UR-MCNC: NEGATIVE MG/DL
LEUKOCYTE ESTERASE UR QL STRIP.AUTO: 25
NITRITE UR QL STRIP.AUTO: NEGATIVE
PH UR: 5 [PH] (ref 5–8)
PROT UR-MCNC: NEGATIVE MG/DL
PSA SERPL-MCNC: 8.64 NG/ML (ref ?–4)
SP GR UR STRIP: 1.01 (ref 1–1.03)
UROBILINOGEN UR STRIP-ACNC: NORMAL

## 2025-03-12 PROCEDURE — 81001 URINALYSIS AUTO W/SCOPE: CPT | Performed by: INTERNAL MEDICINE

## 2025-03-12 PROCEDURE — G2211 COMPLEX E/M VISIT ADD ON: HCPCS | Performed by: INTERNAL MEDICINE

## 2025-03-12 PROCEDURE — 99214 OFFICE O/P EST MOD 30 MIN: CPT | Performed by: INTERNAL MEDICINE

## 2025-03-12 PROCEDURE — 3077F SYST BP >= 140 MM HG: CPT | Performed by: INTERNAL MEDICINE

## 2025-03-12 PROCEDURE — 36415 COLL VENOUS BLD VENIPUNCTURE: CPT

## 2025-03-12 PROCEDURE — 3078F DIAST BP <80 MM HG: CPT | Performed by: INTERNAL MEDICINE

## 2025-03-12 PROCEDURE — 84153 ASSAY OF PSA TOTAL: CPT

## 2025-03-12 PROCEDURE — 87086 URINE CULTURE/COLONY COUNT: CPT | Performed by: INTERNAL MEDICINE

## 2025-03-12 PROCEDURE — 3008F BODY MASS INDEX DOCD: CPT | Performed by: INTERNAL MEDICINE

## 2025-03-12 NOTE — PROGRESS NOTES
HPI:    Patient ID: Luis Miguel Shelton is a 59 year old male.    HPI    Patient returns to the office today with concerns about urinary incontinence.  To discuss chronic medical issues as listed on the active problem list below.  Patient last seen in the office by me on December 2.  Full blood work was done at that time.  A1c was 7.0.  He had lost weight.  He was following an Atkins diet.  He was using a continuous glucose monitor to help guide his lifestyle habits.  During the last visit, the following changes were made: None.  Since the last visit, the patient has seen the following doctors: Spine surgeon for follow-up on anterior cervical discectomy and fusion done last year.    Today, the patient offers the following complaints:   2.5 weeks ago, he started with sma volume nocturnal enuresis. THen started getting worse. He does not feel it. It does wake him up. He does drink a lot of water. He has tried to decreased the amount recently; had no effect.  He has bladder urgency. During the day, he needs to go frequently; no incontinence.   2 months ago, started having change in his stools. They became more soft. Sometimes he feels like stool is leaking out of his rectum. Has some hemorrhoids.   No low back pain. No numbness or weakness.   Sugar levels are erratic. Stress levels are high and they make the sugar worse.   Patient describes diet as mahmood diet with a lot of meat.  Tobacco and alcohol use reviewed.   Current medications reviewed.   Health maintenance issues reviewed.    Wt Readings from Last 6 Encounters:   03/12/25 212 lb (96.2 kg)   12/02/24 216 lb (98 kg)   04/18/24 220 lb (99.8 kg)   01/05/24 218 lb (98.9 kg)   09/26/23 215 lb (97.5 kg)   08/25/23 219 lb (99.3 kg)       Patient Active Problem List   Diagnosis    Type 2 diabetes mellitus without complication, without long-term current use of insulin (HCC)    Hypertriglyceridemia    Obesity    HTN (hypertension)    Myopia    Colon polyps     Acute pain of right shoulder    Superior glenoid labrum lesion of right shoulder    Disorder of rotator cuff syndrome of right shoulder and allied disorder    BPH with obstruction/lower urinary tract symptoms    Obesity (BMI 30-39.9)    Open fracture of head of left humerus, initial encounter    Right upper quadrant pain    Glaucoma suspect, bilateral    Age-related nuclear cataract of both eyes    Vitreous floaters of both eyes    Neck pain        HISTORY:  Past Medical History:    Chest pain    normal stress nuclear; normal stress echo    Colon polyps    Hyperplastic, repeat in 10 years    Diabetes (HCC)    diet controlled w/ no BDR - 2008    Disorder of eye, unspecified    increased C/D ratio - OU    Lipid screening    MGD (meibomian gland dysfunction)    OU    Obesity (BMI 30-39.9)    Other and unspecified hyperlipidemia    Type II or unspecified type diabetes mellitus without mention of complication, not stated as uncontrolled    Unspecified essential hypertension      Past Surgical History:   Procedure Laterality Date    Colonoscopy N/A 11/15/2016    Procedure: COLONOSCOPY;  Surgeon: GISEL Ramirez MD;  Location: [facility] ENDOSCOPY    Shoulder arthroscopy Left 04/28/2020      Family History   Problem Relation Age of Onset    Gastro-Intestinal Disorder Brother         celiac sprue    Cancer Paternal Grandfather         Colon Ca    Cancer Other         liver cancer - close relative    Diabetes Neg     Glaucoma Neg     Macular degeneration Neg       Social History     Socioeconomic History    Marital status:    Tobacco Use    Smoking status: Never    Smokeless tobacco: Never   Vaping Use    Vaping status: Never Used   Substance and Sexual Activity    Alcohol use: Not Currently     Alcohol/week: 0.0 standard drinks of alcohol    Drug use: No    Sexual activity: Yes     Partners: Female   Other Topics Concern    Caffeine Concern Yes     Comment: coffee/tea, >6cups/day     Social Drivers of Health     Food  Insecurity: No Food Insecurity (3/12/2025)    NCSS - Food Insecurity     Worried About Running Out of Food in the Last Year: No     Ran Out of Food in the Last Year: No   Transportation Needs: No Transportation Needs (3/12/2025)    NCSS - Transportation     Lack of Transportation: No   Housing Stability: Not At Risk (3/12/2025)    NCSS - Housing/Utilities     Has Housing: Yes     Worried About Losing Housing: No     Unable to Get Utilities: No          Review of Systems          Current Outpatient Medications   Medication Sig Dispense Refill    Insulin Pen Needle (BD PEN NEEDLE SHANTELL U/F) 32G X 4 MM Does not apply Misc INJECT 1 NEEDLE AS DIRECTED  EVERY NIGHT FOR USE WITH LANTUS  / INSULIN GLARGINE 90 each 0    amLODIPine 5 MG Oral Tab Take 1 tablet (5 mg total) by mouth daily. 90 tablet 3    lisinopril 40 MG Oral Tab Take 1 tablet (40 mg total) by mouth daily. 90 tablet 3    Cholecalciferol 125 MCG (5000 UT) Oral Tab Take 1 tablet (5,000 Units total) by mouth daily.      insulin glargine (LANTUS SOLOSTAR) 100 UNIT/ML Subcutaneous Solution Pen-injector Inject 10 Units into the skin nightly. 3 mL 0    simvastatin 40 MG Oral Tab Take 1 tablet (40 mg total) by mouth daily. 90 tablet 3    Continuous Glucose Sensor (FREESTYLE DARIA 3 SENSOR) Does not apply Misc 1 each every 14 (fourteen) days. Type 2 diabetes mellitus without complication, without long-term current use of insulin (HCC) E11.9 6 each 3    metFORMIN 500 MG Oral Tab Take 2 tablets (1,000 mg total) by mouth 2 (two) times daily with meals. 360 tablet 3    TRUEplus Lancets 30G Does not apply Misc 1 lancet to check glucose three times daily 300 each 3    BABY ASPIRIN OR Take 81 mg by mouth.      Glucose Blood (CONTOUR NEXT TEST) In Vitro Strip Use 1 (one) new strip three times daily for blood glucose monitoring 300 strip 1     Allergies:Allergies[1]     PHYSICAL EXAM:   /78 (BP Location: Right arm, Patient Position: Sitting, Cuff Size: large)   Pulse 88    Temp 97.8 °F (36.6 °C) (Other)   Resp 18   Ht 6' (1.829 m)   Wt 212 lb (96.2 kg)   SpO2 98%   BMI 28.75 kg/m²      Physical Exam  Constitutional:       Appearance: Normal appearance. He is well-developed.   HENT:      Nose: Nose normal.      Mouth/Throat:      Pharynx: No oropharyngeal exudate or posterior oropharyngeal erythema.   Eyes:      Conjunctiva/sclera: Conjunctivae normal.   Neck:      Vascular: No carotid bruit.   Cardiovascular:      Rate and Rhythm: Normal rate and regular rhythm.      Pulses: Normal pulses.      Heart sounds: Normal heart sounds. No murmur heard.  Pulmonary:      Effort: Pulmonary effort is normal.      Breath sounds: Normal breath sounds. No wheezing or rales.   Abdominal:      General: Bowel sounds are normal.      Palpations: Abdomen is soft. There is no mass.      Tenderness: There is no abdominal tenderness.   Genitourinary:     Penis: Normal.       Testes: Normal.      Comments: Normal rectal tone. Enlarged prostate  Musculoskeletal:      Right lower leg: No edema.      Left lower leg: No edema.   Lymphadenopathy:      Cervical: No cervical adenopathy.   Skin:     General: Skin is warm and dry.      Findings: No rash.   Neurological:      General: No focal deficit present.      Mental Status: He is alert.   Psychiatric:         Mood and Affect: Mood normal.         Behavior: Behavior normal.         Thought Content: Thought content normal.                 ASSESSMENT/PLAN:   1. Nocturnal enuresis patient with  Patient with atypical presentation.  Episodes of small-volume nocturnal enuresis.  Nothing during the day.  Exam shows enlarged prostate.  Normal rectal tone.  Check urine specimen and PSA.  PSA slightly elevated when last checked.  Referred to urology for additional evaluation.  Also with some questionable leakiness of stool.  Also soft stool up to today where now it has been normal.  Continue to monitor.  - Urinalysis with Culture Reflex  - UROLOGY - INTERNAL    2.  Elevated PSA  Recheck PSA.  Recently well-controlled.  Continue current treatment.  Advised that he may want to stop  - PSA - Diagnostic [E]; Future  - UROLOGY - INTERNAL    3. Type 2 diabetes mellitus without complication, with long-term current use of insulin (HCC)  The metformin for a week or so to see if that helps with the bowels.  While he does that, he can increase the dose of the Lantus insulin to help cover for the metformin.    4. Essential hypertension  Elevated but we will not change anything right now.  We need to get through this acute evaluation.        Meds This Visit:  Requested Prescriptions      No prescriptions requested or ordered in this encounter       Imaging & Referrals:  None         Kemal Montoya MD        [1]   Allergies  Allergen Reactions    Sulfa Antibiotics UNKNOWN    Sulfanilamide      Other reaction(s): Hives

## 2025-03-25 ENCOUNTER — OFFICE VISIT (OUTPATIENT)
Dept: SURGERY | Facility: CLINIC | Age: 60
End: 2025-03-25
Payer: COMMERCIAL

## 2025-03-25 ENCOUNTER — TELEPHONE (OUTPATIENT)
Dept: SURGERY | Facility: CLINIC | Age: 60
End: 2025-03-25

## 2025-03-25 DIAGNOSIS — N39.44 NOCTURNAL ENURESIS: ICD-10-CM

## 2025-03-25 DIAGNOSIS — N13.8 BPH WITH OBSTRUCTION/LOWER URINARY TRACT SYMPTOMS: Primary | ICD-10-CM

## 2025-03-25 DIAGNOSIS — N40.1 BPH WITH OBSTRUCTION/LOWER URINARY TRACT SYMPTOMS: Primary | ICD-10-CM

## 2025-03-25 PROCEDURE — 99205 OFFICE O/P NEW HI 60 MIN: CPT | Performed by: UROLOGY

## 2025-03-25 RX ORDER — DUTASTERIDE 0.5 MG/1
0.5 CAPSULE, LIQUID FILLED ORAL DAILY
Qty: 90 CAPSULE | Refills: 3 | Status: SHIPPED | OUTPATIENT
Start: 2025-03-25

## 2025-03-25 RX ORDER — TAMSULOSIN HYDROCHLORIDE 0.4 MG/1
0.4 CAPSULE ORAL DAILY
Qty: 90 CAPSULE | Refills: 3 | Status: SHIPPED | OUTPATIENT
Start: 2025-03-25 | End: 2026-03-20

## 2025-03-25 NOTE — TELEPHONE ENCOUNTER
Called patient, verified name and . Patient says he got home and he noticed the urine in the bag was pink and just now he thinks it's getting a little darker pink. I let patient know it is normal to see blood after catheter insertion and he should keep an eye on the urine and if the urine gets to be bright red and thick with clots he should go tot ER. Also he should let us know if the urine doesn't improve in a few days. Encouraged patient to ensure to consume enough fluids.   Patient agreed, verbalized understandings and has no further questions.

## 2025-03-25 NOTE — PROGRESS NOTES
SUBJECTIVE:  Luis Miguel Shelton is a 59 year old male who presents for a consultation at the request of, and a copy of this note will be sent to, Kemal Yañez, for evaluation of  benign prostatic hyperplasia and nocturnal enuresis. He states that the problem is unchanged. Symptoms include 2 to 3-month history of progressive urinary incontinence and nocturnal enuresis requiring use of depends undergarments.  Has a history of diabetes, reportedly well-controlled.  Denies constipation, excessive caffeine or alcohol intake.  Urinalysis performed through his primary care physician's office March 12, 2025 unremarkable.. He denies any   .  Has a chronic history of elevated serum PSA for which I seen the patient back in 2018.  PSA normalized on follow-up testing but over the last few months has been increasing.  February 2023 PSA 3.33.  November 27, 2024 PSA 4.89.  PSA March 12, 2025 8.64.  He denies any known family history of prostate cancer.  IPSS score today is 12 quality-of-life index 4 is 3  Allergies: Allergies[1]    History:  Past Medical History:    Chest pain    normal stress nuclear; normal stress echo    Colon polyps    Hyperplastic, repeat in 10 years    Diabetes (HCC)    diet controlled w/ no BDR - 2008    Disorder of eye, unspecified    increased C/D ratio - OU    Lipid screening    MGD (meibomian gland dysfunction)    OU    Obesity (BMI 30-39.9)    Other and unspecified hyperlipidemia    Type II or unspecified type diabetes mellitus without mention of complication, not stated as uncontrolled    Unspecified essential hypertension      Past Surgical History:   Procedure Laterality Date    Colonoscopy N/A 11/15/2016    Procedure: COLONOSCOPY;  Surgeon: GISEL Ramirez MD;  Location: [facility] ENDOSCOPY    Shoulder arthroscopy Left 04/28/2020      Family History   Problem Relation Age of Onset    Gastro-Intestinal Disorder Brother         celiac sprue    Cancer Paternal Grandfather         Colon Ca     Cancer Other         liver cancer - close relative    Diabetes Neg     Glaucoma Neg     Macular degeneration Neg       Social History:   Social History     Socioeconomic History    Marital status:    Tobacco Use    Smoking status: Never    Smokeless tobacco: Never   Vaping Use    Vaping status: Never Used   Substance and Sexual Activity    Alcohol use: Not Currently     Alcohol/week: 0.0 standard drinks of alcohol    Drug use: No    Sexual activity: Yes     Partners: Female   Other Topics Concern    Caffeine Concern Yes     Comment: coffee/tea, >6cups/day     Social Drivers of Health     Food Insecurity: No Food Insecurity (3/12/2025)    NCSS - Food Insecurity     Worried About Running Out of Food in the Last Year: No     Ran Out of Food in the Last Year: No   Transportation Needs: No Transportation Needs (3/12/2025)    NCSS - Transportation     Lack of Transportation: No   Housing Stability: Not At Risk (3/12/2025)    NCSS - Housing/Utilities     Has Housing: Yes     Worried About Losing Housing: No     Unable to Get Utilities: No            REVIEW OF SYSTEMS:  RESPIRATORY:  Negative for chest tightness, wheezing, cough, shortness of breath,  sputum production,chest pain or pleurisy.  CARDIOVASCULAR:  Negative for pain or chest discomfort, dizziness or fainting, palpitations, leg swelling, nocturia, or claudication.  GASTROINTESTINAL:  Negative for nausea, vomiting, diarrhea, constipation, heartburn or indigestion, abdominal pains, bloody or tarry stools.  GENERAL: Denies:  weight gain, weight loss, fever, night sweats, bone pain, malaise, and fatigue. Positive for:  none.  All other review of systems reviewed and otherwise negative    OBJECTIVE:  There were no vitals taken for this visit.  He appears well, in no apparent distress.  Alert and oriented times three, pleasant and cooperative.  CARDIOVASCULAR:normal apical impulse  RESPIRATORY: no chest wall deformities or tenderness  ABDOMEN: abdomen is soft  without significant tenderness, masses, organomegaly or guarding  GENITOURINARY:      Penis: no penile lesions or discharge. Meatus normal location and size.      Scrotum: normal in appearance      Right Epididymis and Vas: both are palpably normal.      Right Testis: normal        Left Epididymis and Vas: both are palpably normal.      Left Testis: normal        Inguinal Lymph Nodes: non-palpable both      Prostate: prostate smooth and symmetric without tenderness or nodules, enlarged, 80 grams       Rectal: normal tone, no masses  Skin exam grossly normal  Intact neurologically grossly    Bladder scan for postvoid residual volume 1505 mL  ASSESSMENT/PLAN  Encounter Diagnoses   Name Primary?    BPH with obstruction/lower urinary tract symptoms Yes    Nocturnal enuresis    Reviewed findings at length with patient.  Discussed options for management.  A Murphy catheter will be placed to decompress his collecting system today.  He will return on Friday for follow-up, review of his renal bladder ultrasound to rule out upper tract dilation and for CIC teaching with nursing staff.  Will start the patient on maximal medical therapy with tamsulosin and dutasteride.  Side effects reviewed.  Will likely require cystoscopy and urodynamics in anticipation of surgical therapy depending on results given history of diabetes and massive bladder distention noted today.  PSA elevation likely related to ongoing issues with urinary retention given normal PSA February 2023.  Will reassess once stabilized.    No orders of the defined types were placed in this encounter.      Meds This Visit:  Requested Prescriptions      No prescriptions requested or ordered in this encounter       Imaging & Referrals:  None                        [1]   Allergies  Allergen Reactions    Sulfa Antibiotics UNKNOWN    Sulfanilamide      Other reaction(s): Hives

## 2025-03-25 NOTE — TELEPHONE ENCOUNTER
Patient calling stating he would like to speak to a nurse regarding the mckay catheter per patient he is noticing blood in his urine.Please advise

## 2025-03-26 ENCOUNTER — TELEPHONE (OUTPATIENT)
Dept: SURGERY | Facility: CLINIC | Age: 60
End: 2025-03-26

## 2025-03-26 NOTE — TELEPHONE ENCOUNTER
I s/w Van at the [facility] and explained that if there was a pop up for an interaction with the pt's Sulfa allergy and Tamsulosin and the doctor sent the med and overrided it then he obviously felt that there was no concern. If they do not want to dispense it then I can send the doc a msg asking him to prescribe an alternative. He stated he will fill the script.

## 2025-03-26 NOTE — TELEPHONE ENCOUNTER
Patient states that he is allergic to Sulfa, so [facility] not able to fill prescription for the Tamsulosin, so pharmacy is requesting for our office to contact them at phone # [phone].

## 2025-03-27 ENCOUNTER — HOSPITAL ENCOUNTER (OUTPATIENT)
Dept: ULTRASOUND IMAGING | Age: 60
Discharge: HOME | End: 2025-03-27
Attending: UROLOGY
Payer: COMMERCIAL

## 2025-03-27 DIAGNOSIS — N39.44 NOCTURNAL ENURESIS: ICD-10-CM

## 2025-03-27 DIAGNOSIS — N13.8 BPH WITH OBSTRUCTION/LOWER URINARY TRACT SYMPTOMS: ICD-10-CM

## 2025-03-27 DIAGNOSIS — N40.1 BPH WITH OBSTRUCTION/LOWER URINARY TRACT SYMPTOMS: ICD-10-CM

## 2025-03-27 PROCEDURE — 76775 US EXAM ABDO BACK WALL LIM: CPT | Performed by: UROLOGY

## 2025-03-28 ENCOUNTER — LAB ENCOUNTER (OUTPATIENT)
Dept: LAB | Facility: HOSPITAL | Age: 60
End: 2025-03-28
Attending: UROLOGY
Payer: COMMERCIAL

## 2025-03-28 ENCOUNTER — OFFICE VISIT (OUTPATIENT)
Dept: SURGERY | Facility: CLINIC | Age: 60
End: 2025-03-28
Payer: COMMERCIAL

## 2025-03-28 ENCOUNTER — TELEPHONE (OUTPATIENT)
Dept: SURGERY | Facility: CLINIC | Age: 60
End: 2025-03-28

## 2025-03-28 DIAGNOSIS — R33.9 URINARY RETENTION: Primary | ICD-10-CM

## 2025-03-28 DIAGNOSIS — R33.9 URINARY RETENTION: ICD-10-CM

## 2025-03-28 LAB
ANION GAP SERPL CALC-SCNC: 10 MMOL/L (ref 0–18)
BUN BLD-MCNC: 19 MG/DL (ref 9–23)
BUN/CREAT SERPL: 19.2 (ref 10–20)
CALCIUM BLD-MCNC: 8.7 MG/DL (ref 8.7–10.4)
CHLORIDE SERPL-SCNC: 106 MMOL/L (ref 98–112)
CO2 SERPL-SCNC: 24 MMOL/L (ref 21–32)
CREAT BLD-MCNC: 0.99 MG/DL
EGFRCR SERPLBLD CKD-EPI 2021: 88 ML/MIN/1.73M2 (ref 60–?)
FASTING STATUS PATIENT QL REPORTED: NO
GLUCOSE BLD-MCNC: 171 MG/DL (ref 70–99)
OSMOLALITY SERPL CALC.SUM OF ELEC: 296 MOSM/KG (ref 275–295)
POTASSIUM SERPL-SCNC: 4 MMOL/L (ref 3.5–5.1)
SODIUM SERPL-SCNC: 140 MMOL/L (ref 136–145)

## 2025-03-28 PROCEDURE — 80048 BASIC METABOLIC PNL TOTAL CA: CPT

## 2025-03-28 PROCEDURE — 99214 OFFICE O/P EST MOD 30 MIN: CPT | Performed by: UROLOGY

## 2025-03-28 PROCEDURE — 36415 COLL VENOUS BLD VENIPUNCTURE: CPT

## 2025-03-28 NOTE — PROGRESS NOTES
- Pt presents for CIC instruction with wife, [place]alia; I introduced myself and verified pt name and .      - I began teaching by showing pt samples from [employer] and I explained in detail how to use each one.  I also used the booklet with pictures of how to perform CIC and I explained in detail what pt will need to do and I used body language to explain this also. I then answered all of pt's questions.     - Pt has catheter so began by removing catheter after deflating balloon and instilling bladder with 50cc of saline in order to simulate urine in the bladder for self catheterization.  I then asked pt to perform a demonstration of self catheterization process and pt agreed.      CIC instruction as follows:   1. Wash hands; 2. Assemble supplies (14 Fr straight lubricated catheter) on clean surface; 3. Clean penis tip;  4. Remove catheter from package without touching the tip; 5. Hold catheter approx 3-4\" from tip  6. Hold penis in non-dominant hand; 7. With the dominant hand introduce catheter to urethral opening and slowly advance till a urine stream is achieved; 8. Slowly remove catheter and discard supplies; 9. Redress and wash hands.       -  Per Dr. Hernandez's progress note from  today (3/28/25), he recommends catheterization 3-4 x/day with 14 fr straight catheters indefinitely for treatment of urinary retention.  Pt acknowledged understanding and agreed to the plan.       -[facility] referral form completed online and clinicals sent to [facility]   - encounter complete

## 2025-03-28 NOTE — TELEPHONE ENCOUNTER
- s/w pt; pt identity verified with name and   - pt attempted to self catheterize first time following teaching, and no urine only some blood ('like a core sample\"), waited a little bit, and then tried again, this time he measured urine output of 340ml with only a little blood.    - explained to pt that most likely he had a clot that was removed by the catheter, and that is not uncommon, especially since he came to us this am with some blood in the urine.    - pt takes aspirin 81mg daily for heart health purposes, not ordered to do so by , suggested that he might wish to try holding it for a few days and see if the bleeding diminishs, and to ensure that he is adequately hydrating.    - pt acknowledged understanding and denies further questions.  - encounter complete

## 2025-03-28 NOTE — TELEPHONE ENCOUNTER
Per patient states self cath is failing, states there is blood, requesting to speak to RN. Please call thank you.

## 2025-03-28 NOTE — PROGRESS NOTES
Luis Miguel Shelton is a 59 year old male.    HPI:     Chief Complaint   Patient presents with    Urinary Retention     CIC teaching and follow up per PAYTON       59-year-old male diagnosed March 25, 2025 with urinary retention, chronic status post Murphy catheter insertion in the office.  He states the urine initially was clear but became kind of bloody over the last day or 2 but has been clearing up over the last 24 hours.  He had some small clots but no obstructive issues.  Presented today for clean intermittent catheterization teaching as a bridge treatment.  Has a history of diabetes, elevated serum PSA likely related to his chronic urinary retention.  Feels well.  No problems or concerns.  Renal ultrasound was performed yesterday and radiology the results of which are still pending at this time.      HISTORY:  Past Medical History:    Chest pain    normal stress nuclear; normal stress echo    Colon polyps    Hyperplastic, repeat in 10 years    Diabetes (HCC)    diet controlled w/ no BDR - 2008    Disorder of eye, unspecified    increased C/D ratio - OU    Lipid screening    MGD (meibomian gland dysfunction)    OU    Obesity (BMI 30-39.9)    Other and unspecified hyperlipidemia    Type II or unspecified type diabetes mellitus without mention of complication, not stated as uncontrolled    Unspecified essential hypertension      Past Surgical History:   Procedure Laterality Date    Colonoscopy N/A 11/15/2016    Procedure: COLONOSCOPY;  Surgeon: GISEL Ramirez MD;  Location: [facility] ENDOSCOPY    Shoulder arthroscopy Left 04/28/2020      Family History   Problem Relation Age of Onset    Gastro-Intestinal Disorder Brother         celiac sprue    Cancer Paternal Grandfather         Colon Ca    Cancer Other         liver cancer - close relative    Diabetes Neg     Glaucoma Neg     Macular degeneration Neg       Social History:   Social History     Socioeconomic History    Marital status:    Tobacco Use     Smoking status: Never    Smokeless tobacco: Never   Vaping Use    Vaping status: Never Used   Substance and Sexual Activity    Alcohol use: Not Currently     Alcohol/week: 0.0 standard drinks of alcohol    Drug use: No    Sexual activity: Yes     Partners: Female   Other Topics Concern    Caffeine Concern Yes     Comment: coffee/tea, >6cups/day     Social Drivers of Health     Food Insecurity: No Food Insecurity (3/12/2025)    NCSS - Food Insecurity     Worried About Running Out of Food in the Last Year: No     Ran Out of Food in the Last Year: No   Transportation Needs: No Transportation Needs (3/12/2025)    NCSS - Transportation     Lack of Transportation: No   Housing Stability: Not At Risk (3/12/2025)    NCSS - Housing/Utilities     Has Housing: Yes     Worried About Losing Housing: No     Unable to Get Utilities: No        Medications (Active prior to today's visit):  Current Outpatient Medications   Medication Sig Dispense Refill    tamsulosin 0.4 MG Oral Cap Take 1 capsule (0.4 mg total) by mouth daily. Take 1/2 hour following the same meal each day 90 capsule 3    dutasteride 0.5 MG Oral Cap Take 1 capsule (0.5 mg total) by mouth daily. 90 capsule 3    Insulin Pen Needle (BD PEN NEEDLE SHANTELL U/F) 32G X 4 MM Does not apply Misc INJECT 1 NEEDLE AS DIRECTED  EVERY NIGHT FOR USE WITH LANTUS  / INSULIN GLARGINE 90 each 0    amLODIPine 5 MG Oral Tab Take 1 tablet (5 mg total) by mouth daily. 90 tablet 3    lisinopril 40 MG Oral Tab Take 1 tablet (40 mg total) by mouth daily. 90 tablet 3    Cholecalciferol 125 MCG (5000 UT) Oral Tab Take 1 tablet (5,000 Units total) by mouth daily.      insulin glargine (LANTUS SOLOSTAR) 100 UNIT/ML Subcutaneous Solution Pen-injector Inject 10 Units into the skin nightly. 3 mL 0    simvastatin 40 MG Oral Tab Take 1 tablet (40 mg total) by mouth daily. 90 tablet 3    Continuous Glucose Sensor (FREESTYLE DARIA 3 SENSOR) Does not apply Misc 1 each every 14 (fourteen) days. Type 2  diabetes mellitus without complication, without long-term current use of insulin (HCC) E11.9 6 each 3    metFORMIN 500 MG Oral Tab Take 2 tablets (1,000 mg total) by mouth 2 (two) times daily with meals. 360 tablet 3    TRUEplus Lancets 30G Does not apply Misc 1 lancet to check glucose three times daily 300 each 3    BABY ASPIRIN OR Take 81 mg by mouth.      Glucose Blood (CONTOUR NEXT TEST) In Vitro Strip Use 1 (one) new strip three times daily for blood glucose monitoring 300 strip 1       Allergies:  Allergies[1]      ROS:       PHYSICAL EXAM:        ASSESSMENT/PLAN:   Assessment   Encounter Diagnosis   Name Primary?    Urinary retention Yes       Recommend:  - Clean intermittent catheterization using 14 Guyanese straight catheter 3-4 times per day.  I told the patient that if he begins to urinate spontaneously he can decrease the frequency of catheterizations but at the onset we will plan on doing it at least 3-4 times per day.  I asked him to follow-up in 6 weeks.  If he continues to have ongoing retention issues consider urodynamics with cystoscopy in anticipation of surgical therapy depending on results.  Continue on tamsulosin and dutasteride in the meantime.  I spent a total of 25 minutes with patient more than half the time in face-to-face discussion.       Orders This Visit:  Orders Placed This Encounter   Procedures    Basic Metabolic Panel (8)       Meds This Visit:  Requested Prescriptions      No prescriptions requested or ordered in this encounter       Imaging & Referrals:  None     3/28/2025  David Hernandez MD               [1]   Allergies  Allergen Reactions    Sulfa Antibiotics UNKNOWN    Sulfanilamide      Other reaction(s): Hives

## 2025-03-29 ENCOUNTER — HOSPITAL ENCOUNTER (EMERGENCY)
Facility: HOSPITAL | Age: 60
Discharge: HOME | End: 2025-03-29
Attending: EMERGENCY MEDICINE
Payer: COMMERCIAL

## 2025-03-29 VITALS
OXYGEN SATURATION: 98 % | WEIGHT: 210 LBS | HEART RATE: 88 BPM | TEMPERATURE: 98 F | BODY MASS INDEX: 28.44 KG/M2 | RESPIRATION RATE: 16 BRPM | HEIGHT: 72 IN | DIASTOLIC BLOOD PRESSURE: 78 MMHG | SYSTOLIC BLOOD PRESSURE: 124 MMHG

## 2025-03-29 DIAGNOSIS — R33.9 URINARY RETENTION: Primary | ICD-10-CM

## 2025-03-29 PROCEDURE — 99283 EMERGENCY DEPT VISIT LOW MDM: CPT

## 2025-03-29 RX ORDER — LIDOCAINE HYDROCHLORIDE 20 MG/ML
10 JELLY TOPICAL ONCE
Status: COMPLETED | OUTPATIENT
Start: 2025-03-29 | End: 2025-03-29

## 2025-03-29 NOTE — ED INITIAL ASSESSMENT (HPI)
Patient to ED from home for complaint of  urine retention. Pt states he had a mckay for over a week and it was taken out yesterday by urologist and pt was taught to self cath and the 1 st insertion he didn't get anything but blood and the 2nd time insertion he would get urine. Pt stated this happened every four hours, it would take him twice just to get urine, but this morning patient has tried about 8 times and unable to urinate. Pt  is AO 4 .

## 2025-03-29 NOTE — ED PROVIDER NOTES
Patient Seen in: [facility] Emergency Department    History     Chief Complaint   Patient presents with    Urinary Symptoms     Stated Complaint: urinary problems    HPI    Patient had Murphy catheter removed yesterday.  Was taught how to intermittently self cath.  States that he did it initially he would have to do it twice the first time he put catheter and it would not come out some blood work, then the second time we will put him urine will come out but then this morning tried 5 times could get any urine out.  No fever no chills.  Complains of fullness in the bladder.  Past Medical History:    Chest pain    normal stress nuclear; normal stress echo    Colon polyps    Hyperplastic, repeat in 10 years    Diabetes (HCC)    diet controlled w/ no BDR - 2008    Disorder of eye, unspecified    increased C/D ratio - OU    Lipid screening    MGD (meibomian gland dysfunction)    OU    Obesity (BMI 30-39.9)    Other and unspecified hyperlipidemia    Type II or unspecified type diabetes mellitus without mention of complication, not stated as uncontrolled    Unspecified essential hypertension       Past Surgical History:   Procedure Laterality Date    Colonoscopy N/A 11/15/2016    Procedure: COLONOSCOPY;  Surgeon: GISEL Ramirez MD;  Location: [facility] ENDOSCOPY    Shoulder arthroscopy Left 04/28/2020            Family History   Problem Relation Age of Onset    Gastro-Intestinal Disorder Brother         celiac sprue    Cancer Paternal Grandfather         Colon Ca    Cancer Other         liver cancer - close relative    Diabetes Neg     Glaucoma Neg     Macular degeneration Neg        Social History     Socioeconomic History    Marital status:    Tobacco Use    Smoking status: Never    Smokeless tobacco: Never   Vaping Use    Vaping status: Never Used   Substance and Sexual Activity    Alcohol use: Not Currently     Alcohol/week: 0.0 standard drinks of alcohol    Drug use: No    Sexual activity: Yes     Partners:  Female   Other Topics Concern    Caffeine Concern Yes     Comment: coffee/tea, >6cups/day     Social Drivers of Health     Food Insecurity: No Food Insecurity (3/12/2025)    NCSS - Food Insecurity     Worried About Running Out of Food in the Last Year: No     Ran Out of Food in the Last Year: No   Transportation Needs: No Transportation Needs (3/12/2025)    NCSS - Transportation     Lack of Transportation: No   Housing Stability: Not At Risk (3/12/2025)    NCSS - Housing/Utilities     Has Housing: Yes     Worried About Losing Housing: No     Unable to Get Utilities: No       Review of Systems    Positive for stated complaint: urinary problems  Other systems are as noted in HPI.  Constitutional and vital signs reviewed.      All other systems reviewed and negative except as noted above.    PSFH elements reviewed from today and agreed except as otherwise stated in HPI.    Physical Exam     ED Triage Vitals [03/29/25 0606]   /78   Pulse 88   Resp 16   Temp 97.8 °F (36.6 °C)   Temp src Oral   SpO2 98 %   O2 Device        Current:/78   Pulse 88   Temp 97.8 °F (36.6 °C) (Oral)   Resp 16   Ht 182.9 cm (6')   Wt 95.3 kg   SpO2 98%   BMI 28.48 kg/m²   GENERAL: well developed, well nourished, well hydrated, no distress  SKIN: good skin turgor, no rashes  HEENT: atraumatic, normocephalic, ears and throat are clear  EYES: sclera non icteric, conjunctiva non-injected  NECK: supple, no adenopathy, no thyromegaly  LUNGS:no resp distress  CARDIO:regular rate  EXTREMITIES: no cyanosis, clubbing or edema  BACK: no CVA tenderness  GI: soft, mild suprapubic tenderness, no guarding    Ddx: enlarged prostate       ED Course   Labs Reviewed - No data to display    MDM       Medical Decision Making  Murphy catheter placed 800 cc urine out.  Discussed with patient options whether to keep catheter in versus trying to go home.  He is really motivated to make the straight cath self cath at home work.  Educated on irrigating  the catheter provided with a supplies to do this.  If he is unable to complete this at home he can return for placement of leg bag and catheter.    Problems Addressed:  Urinary retention: acute illness or injury          Disposition and Plan     Clinical Impression:  1. Urinary retention        Disposition:  Discharge    Follow-up:  David Hernandez MD  [address] [place] [id number]  [phone]    Follow up        Medications Prescribed:  Discharge Medication List as of 3/29/2025  7:39 AM

## 2025-03-30 ENCOUNTER — HOSPITAL ENCOUNTER (EMERGENCY)
Facility: HOSPITAL | Age: 60
Discharge: HOME | End: 2025-03-31
Attending: EMERGENCY MEDICINE
Payer: COMMERCIAL

## 2025-03-30 VITALS
RESPIRATION RATE: 18 BRPM | HEART RATE: 82 BPM | DIASTOLIC BLOOD PRESSURE: 65 MMHG | WEIGHT: 210 LBS | TEMPERATURE: 98 F | BODY MASS INDEX: 28.44 KG/M2 | OXYGEN SATURATION: 98 % | HEIGHT: 72 IN | SYSTOLIC BLOOD PRESSURE: 132 MMHG

## 2025-03-30 DIAGNOSIS — R33.9 URINARY RETENTION: Primary | ICD-10-CM

## 2025-03-30 LAB
BILIRUB UR QL: NEGATIVE
CLARITY UR: CLEAR
GLUCOSE UR-MCNC: NORMAL MG/DL
KETONES UR-MCNC: NEGATIVE MG/DL
LEUKOCYTE ESTERASE UR QL STRIP.AUTO: 25
NITRITE UR QL STRIP.AUTO: NEGATIVE
PH UR: 5.5 [PH] (ref 5–8)
PROT UR-MCNC: 70 MG/DL
RBC #/AREA URNS AUTO: >10 /HPF
SP GR UR STRIP: 1.02 (ref 1–1.03)
UROBILINOGEN UR STRIP-ACNC: NORMAL

## 2025-03-30 PROCEDURE — 87086 URINE CULTURE/COLONY COUNT: CPT | Performed by: EMERGENCY MEDICINE

## 2025-03-30 PROCEDURE — 99283 EMERGENCY DEPT VISIT LOW MDM: CPT

## 2025-03-30 PROCEDURE — 51702 INSERT TEMP BLADDER CATH: CPT

## 2025-03-30 PROCEDURE — 81001 URINALYSIS AUTO W/SCOPE: CPT | Performed by: EMERGENCY MEDICINE

## 2025-03-30 RX ORDER — LIDOCAINE HYDROCHLORIDE 20 MG/ML
10 JELLY TOPICAL ONCE
Status: COMPLETED | OUTPATIENT
Start: 2025-03-30 | End: 2025-03-30

## 2025-03-31 NOTE — ED PROVIDER NOTES
Patient Seen in: [facility] Emergency Department      History     Chief Complaint   Patient presents with    Urinary Symptoms            Stated Complaint: eval-G    Subjective:   HPI      Patient is a 59-year-old male who presents with urinary retention.  Patient normally self catheterizes but was unable to do so today x 7 tries.  No fevers, vomiting or constipation.  He did feel like there was some blood clots and tried to irrigate but unable to pass a catheter.    Objective:     Past Medical History:    Chest pain    normal stress nuclear; normal stress echo    Colon polyps    Hyperplastic, repeat in 10 years    Diabetes (HCC)    diet controlled w/ no BDR - 2008    Disorder of eye, unspecified    increased C/D ratio - OU    Lipid screening    MGD (meibomian gland dysfunction)    OU    Obesity (BMI 30-39.9)    Other and unspecified hyperlipidemia    Type II or unspecified type diabetes mellitus without mention of complication, not stated as uncontrolled    Unspecified essential hypertension              Past Surgical History:   Procedure Laterality Date    Colonoscopy N/A 11/15/2016    Procedure: COLONOSCOPY;  Surgeon: GISEL Ramirez MD;  Location: [facility] ENDOSCOPY    Shoulder arthroscopy Left 04/28/2020                Social History     Socioeconomic History    Marital status:    Tobacco Use    Smoking status: Never    Smokeless tobacco: Never   Vaping Use    Vaping status: Never Used   Substance and Sexual Activity    Alcohol use: Not Currently     Alcohol/week: 0.0 standard drinks of alcohol    Drug use: No    Sexual activity: Yes     Partners: Female   Other Topics Concern    Caffeine Concern Yes     Comment: coffee/tea, >6cups/day     Social Drivers of Health     Food Insecurity: No Food Insecurity (3/12/2025)    NCSS - Food Insecurity     Worried About Running Out of Food in the Last Year: No     Ran Out of Food in the Last Year: No   Transportation Needs: No Transportation Needs (3/12/2025)     NCSS - Transportation     Lack of Transportation: No   Housing Stability: Not At Risk (3/12/2025)    NCSS - Housing/Utilities     Has Housing: Yes     Worried About Losing Housing: No     Unable to Get Utilities: No                  Physical Exam     ED Triage Vitals [03/30/25 2149]   /72   Pulse 94   Resp 18   Temp 98.2 °F (36.8 °C)   Temp src Oral   SpO2 97 %   O2 Device None (Room air)       Current Vitals:   Vital Signs  BP: 142/72  Pulse: 94  Resp: 18  Temp: 98.2 °F (36.8 °C)  Temp src: Oral    Oxygen Therapy  SpO2: 97 %  O2 Device: None (Room air)        Physical Exam  GENERAL: some distress, awake and alert  HEENT: EOMI, PERRL  Neck: supple  CV: RRR, no murmurs  Resp: CTAB, no wheezes or retractions  Ab: soft, nontender, +suprapubic fullness  Extremities: FROM of all extremities  Neuro: CN intact, normal speech, normal gait, 5/5 motor strength in all extremities, no focal deficits  SKIN: warm, dry, no rashes      ED Course     Labs Reviewed   URINALYSIS WITH CULTURE REFLEX - Abnormal; Notable for the following components:       Result Value    Blood Urine 3+ (*)     Protein Urine 70 (*)     Leukocyte Esterase Urine 25 (*)     WBC Urine 6-10 (*)     RBC Urine >10 (*)     Bacteria Urine Rare (*)     All other components within normal limits   URINE CULTURE, ROUTINE          MDM              Medical Decision Making  Bladder scan >800 ml urine  Able to pass 14 Fr coude catheter with good UOP and improvement in symptoms. Pt now agreeable to going home with catheter.   Will f/u with urology      Amount and/or Complexity of Data Reviewed  External Data Reviewed: labs and notes.     Details: Labs, urology notes from this week reviewed  Labs: ordered.        Disposition and Plan     Clinical Impression:  1. Urinary retention         Disposition:  Discharge  3/30/2025 11:34 pm    Follow-up:  David Hernandez MD  [address] [place] [id number]  [phone]    Schedule an appointment as soon as  possible for a visit            Medications Prescribed:  Current Discharge Medication List              Supplementary Documentation:

## 2025-03-31 NOTE — ED INITIAL ASSESSMENT (HPI)
Pt ambulatory through triage c/o urinary retention. Pt had mckay removed last week and has been self-cathing for last week. Pt states clots were coming out when he was here last. Today unable to drain x7 attempts.

## 2025-03-31 NOTE — ED QUICK NOTES
Attempted catheter insertion x3 w/ 20f 3way, 16f, and 14f. Urethra was not big enough to get 20f or 16f in, 14f was able to go all the way in but no urine return occurred, balloon was not inflated and catheter was removed, blood came out at the tip of the catheter after being removed.

## 2025-04-09 DIAGNOSIS — Z79.4 TYPE 2 DIABETES MELLITUS WITHOUT COMPLICATION, WITH LONG-TERM CURRENT USE OF INSULIN (HCC): ICD-10-CM

## 2025-04-09 DIAGNOSIS — E11.9 TYPE 2 DIABETES MELLITUS WITHOUT COMPLICATION, WITH LONG-TERM CURRENT USE OF INSULIN (HCC): ICD-10-CM

## 2025-04-10 ENCOUNTER — APPOINTMENT (OUTPATIENT)
Dept: CT IMAGING | Facility: HOSPITAL | Age: 60
End: 2025-04-10
Attending: EMERGENCY MEDICINE
Payer: COMMERCIAL

## 2025-04-10 ENCOUNTER — TELEPHONE (OUTPATIENT)
Dept: INTERNAL MEDICINE CLINIC | Facility: CLINIC | Age: 60
End: 2025-04-10

## 2025-04-10 ENCOUNTER — HOSPITAL ENCOUNTER (INPATIENT)
Facility: HOSPITAL | Age: 60
LOS: 5 days | Discharge: HOME | End: 2025-04-15
Attending: EMERGENCY MEDICINE | Admitting: HOSPITALIST
Payer: COMMERCIAL

## 2025-04-10 ENCOUNTER — TELEPHONE (OUTPATIENT)
Dept: SURGERY | Facility: CLINIC | Age: 60
End: 2025-04-10

## 2025-04-10 DIAGNOSIS — A41.9 SEPSIS, DUE TO UNSPECIFIED ORGANISM, UNSPECIFIED WHETHER ACUTE ORGAN DYSFUNCTION PRESENT (HCC): ICD-10-CM

## 2025-04-10 DIAGNOSIS — R33.9 URINARY RETENTION: Primary | ICD-10-CM

## 2025-04-10 PROBLEM — N39.0 UTI (URINARY TRACT INFECTION): Status: ACTIVE | Noted: 2025-04-10

## 2025-04-10 LAB
ANION GAP SERPL CALC-SCNC: 12 MMOL/L (ref 0–18)
BASOPHILS # BLD AUTO: 0.04 X10(3) UL (ref 0–0.2)
BASOPHILS NFR BLD AUTO: 0.3 %
BILIRUB UR QL: NEGATIVE
BUN BLD-MCNC: 15 MG/DL (ref 9–23)
CALCIUM BLD-MCNC: 9 MG/DL (ref 8.7–10.4)
CHLORIDE SERPL-SCNC: 102 MMOL/L (ref 98–112)
CO2 SERPL-SCNC: 22 MMOL/L (ref 21–32)
CREAT BLD-MCNC: 1.14 MG/DL (ref 0.7–1.3)
DEPRECATED RDW RBC AUTO: 43.1 FL (ref 35.1–46.3)
EGFRCR SERPLBLD CKD-EPI 2021: 75 ML/MIN/1.73M2 (ref 60–?)
EOSINOPHIL # BLD AUTO: 0.02 X10(3) UL (ref 0–0.7)
EOSINOPHIL NFR BLD AUTO: 0.1 %
ERYTHROCYTE [DISTWIDTH] IN BLOOD BY AUTOMATED COUNT: 13.2 % (ref 11–15)
EST. AVERAGE GLUCOSE BLD GHB EST-MCNC: 160 MG/DL (ref 68–126)
GLUCOSE BLD-MCNC: 272 MG/DL (ref 70–99)
GLUCOSE BLDC GLUCOMTR-MCNC: 148 MG/DL (ref 70–99)
GLUCOSE BLDC GLUCOMTR-MCNC: 155 MG/DL (ref 70–99)
GLUCOSE BLDC GLUCOMTR-MCNC: 159 MG/DL (ref 70–99)
GLUCOSE BLDC GLUCOMTR-MCNC: 207 MG/DL (ref 70–99)
GLUCOSE UR-MCNC: NORMAL MG/DL
HBA1C MFR BLD: 7.2 % (ref ?–5.7)
HCT VFR BLD AUTO: 36.9 % (ref 39–53)
HGB BLD-MCNC: 12.8 G/DL (ref 13–17.5)
IMM GRANULOCYTES # BLD AUTO: 0.07 X10(3) UL (ref 0–1)
IMM GRANULOCYTES NFR BLD: 0.4 %
KETONES UR-MCNC: NEGATIVE MG/DL
LACTATE SERPL-SCNC: 0.9 MMOL/L (ref 0.5–2)
LACTATE SERPL-SCNC: 1.1 MMOL/L (ref 0.5–2)
LEUKOCYTE ESTERASE UR QL STRIP.AUTO: 500
LYMPHOCYTES # BLD AUTO: 0.8 X10(3) UL (ref 1–4)
LYMPHOCYTES NFR BLD AUTO: 5 %
MCH RBC QN AUTO: 31.1 PG (ref 26–34)
MCHC RBC AUTO-ENTMCNC: 34.7 G/DL (ref 31–37)
MCV RBC AUTO: 89.6 FL (ref 80–100)
MONOCYTES # BLD AUTO: 1.31 X10(3) UL (ref 0.1–1)
MONOCYTES NFR BLD AUTO: 8.2 %
NEUTROPHILS # BLD AUTO: 13.65 X10 (3) UL (ref 1.5–7.7)
NEUTROPHILS # BLD AUTO: 13.65 X10(3) UL (ref 1.5–7.7)
NEUTROPHILS NFR BLD AUTO: 86 %
NITRITE UR QL STRIP.AUTO: NEGATIVE
PH UR: 6 [PH] (ref 5–8)
PLATELET # BLD AUTO: 176 10(3)UL (ref 150–450)
PLATELETS.RETICULATED NFR BLD AUTO: 4.5 % (ref 0–7)
POTASSIUM SERPL-SCNC: 3.6 MMOL/L (ref 3.5–5.1)
PROT UR-MCNC: 50 MG/DL
RBC # BLD AUTO: 4.12 X10(6)UL (ref 4.3–5.7)
SODIUM SERPL-SCNC: 136 MMOL/L (ref 136–145)
SP GR UR STRIP: 1.01 (ref 1–1.03)
UROBILINOGEN UR STRIP-ACNC: NORMAL
WBC # BLD AUTO: 15.9 X10(3) UL (ref 4–11)
WBC #/AREA URNS AUTO: >50 /HPF

## 2025-04-10 PROCEDURE — 99223 1ST HOSP IP/OBS HIGH 75: CPT | Performed by: HOSPITALIST

## 2025-04-10 PROCEDURE — 74177 CT ABD & PELVIS W/CONTRAST: CPT | Performed by: EMERGENCY MEDICINE

## 2025-04-10 PROCEDURE — 99223 1ST HOSP IP/OBS HIGH 75: CPT | Performed by: PHYSICIAN ASSISTANT

## 2025-04-10 PROCEDURE — 99221 1ST HOSP IP/OBS SF/LOW 40: CPT | Performed by: UROLOGY

## 2025-04-10 RX ORDER — ONDANSETRON 2 MG/ML
4 INJECTION INTRAMUSCULAR; INTRAVENOUS EVERY 6 HOURS PRN
Status: DISCONTINUED | OUTPATIENT
Start: 2025-04-10 | End: 2025-04-15

## 2025-04-10 RX ORDER — AMLODIPINE BESYLATE 5 MG/1
5 TABLET ORAL DAILY
Status: DISCONTINUED | OUTPATIENT
Start: 2025-04-10 | End: 2025-04-15

## 2025-04-10 RX ORDER — LIDOCAINE HYDROCHLORIDE 20 MG/ML
10 JELLY TOPICAL ONCE
Status: COMPLETED | OUTPATIENT
Start: 2025-04-10 | End: 2025-04-10

## 2025-04-10 RX ORDER — DEXTROSE MONOHYDRATE 25 G/50ML
50 INJECTION, SOLUTION INTRAVENOUS
Status: DISCONTINUED | OUTPATIENT
Start: 2025-04-10 | End: 2025-04-15

## 2025-04-10 RX ORDER — TAMSULOSIN HYDROCHLORIDE 0.4 MG/1
0.4 CAPSULE ORAL EVERY EVENING
Status: DISCONTINUED | OUTPATIENT
Start: 2025-04-10 | End: 2025-04-15

## 2025-04-10 RX ORDER — SODIUM CHLORIDE 9 MG/ML
INJECTION, SOLUTION INTRAVENOUS CONTINUOUS
Status: DISCONTINUED | OUTPATIENT
Start: 2025-04-10 | End: 2025-04-12

## 2025-04-10 RX ORDER — INSULIN DEGLUDEC 100 U/ML
8 INJECTION, SOLUTION SUBCUTANEOUS NIGHTLY
Status: DISCONTINUED | OUTPATIENT
Start: 2025-04-10 | End: 2025-04-15

## 2025-04-10 RX ORDER — NICOTINE POLACRILEX 4 MG
15 LOZENGE BUCCAL
Status: DISCONTINUED | OUTPATIENT
Start: 2025-04-10 | End: 2025-04-15

## 2025-04-10 RX ORDER — TAMSULOSIN HYDROCHLORIDE 0.4 MG/1
0.4 CAPSULE ORAL DAILY
Status: DISCONTINUED | OUTPATIENT
Start: 2025-04-10 | End: 2025-04-10

## 2025-04-10 RX ORDER — ACETAMINOPHEN 500 MG
1000 TABLET ORAL ONCE
Status: COMPLETED | OUTPATIENT
Start: 2025-04-10 | End: 2025-04-10

## 2025-04-10 RX ORDER — LIDOCAINE HYDROCHLORIDE 20 MG/ML
6 JELLY TOPICAL AS NEEDED
Status: DISCONTINUED | OUTPATIENT
Start: 2025-04-10 | End: 2025-04-15

## 2025-04-10 RX ORDER — HEPARIN SODIUM 5000 [USP'U]/ML
5000 INJECTION, SOLUTION INTRAVENOUS; SUBCUTANEOUS EVERY 12 HOURS SCHEDULED
Status: DISCONTINUED | OUTPATIENT
Start: 2025-04-10 | End: 2025-04-15

## 2025-04-10 RX ORDER — IBUPROFEN 400 MG/1
400 TABLET, FILM COATED ORAL EVERY 6 HOURS PRN
Status: DISCONTINUED | OUTPATIENT
Start: 2025-04-10 | End: 2025-04-15

## 2025-04-10 RX ORDER — ACETAMINOPHEN 500 MG
1000 TABLET ORAL EVERY 6 HOURS PRN
Status: DISCONTINUED | OUTPATIENT
Start: 2025-04-10 | End: 2025-04-15

## 2025-04-10 RX ORDER — NICOTINE POLACRILEX 4 MG
30 LOZENGE BUCCAL
Status: DISCONTINUED | OUTPATIENT
Start: 2025-04-10 | End: 2025-04-15

## 2025-04-10 RX ORDER — ATORVASTATIN CALCIUM 20 MG/1
20 TABLET, FILM COATED ORAL NIGHTLY
Status: DISCONTINUED | OUTPATIENT
Start: 2025-04-10 | End: 2025-04-15

## 2025-04-10 NOTE — PLAN OF CARE
Pt is A&Ox4. RA. Heparin for dvt prophylaxis. Murphy in place. Last BM was 4/10. PRN tylenol and ibuprofen for pain management. Up self. ACHS. CGM in place. NS @75. Carb controlled diet. IV rocephin. Plan for home when clear.     Problem: Patient Centered Care  Goal: Patient preferences are identified and integrated in the patient's plan of care  Description: Interventions:- What would you like us to know as we care for you? - Provide timely, complete, and accurate information to patient/family- Incorporate patient and family knowledge, values, beliefs, and cultural backgrounds into the planning and delivery of care- Encourage patient/family to participate in care and decision-making at the level they choose- Honor patient and family perspectives and choices  Outcome: Progressing     Problem: Patient/Family Goals  Goal: Patient/Family Long Term Goal  Description: Patient's Long Term Goal: Interventions:- - See additional Care Plan goals for specific interventions  Outcome: Progressing  Goal: Patient/Family Short Term Goal  Description: Patient's Short Term Goal: Interventions: -- See additional Care Plan goals for specific interventions  Outcome: Progressing     Problem: Diabetes/Glucose Control  Goal: Glucose maintained within prescribed range  Description: INTERVENTIONS:- Monitor Blood Glucose as ordered- Assess for signs and symptoms of hyperglycemia and hypoglycemia- Administer ordered medications to maintain glucose within target range- Assess barriers to adequate nutritional intake and initiate nutrition consult as needed- Instruct patient on self management of diabetes  Outcome: Progressing

## 2025-04-10 NOTE — DISCHARGE INSTRUCTIONS
Please follow-up with your urologist regarding your recurrent urinary retention.  Return to ER for fevers of 100.4F, worsening abdominal pain, flank pain      The Emergency Department is not intended to be a substitute for an effort to provide complete medical care. The imaging, if any, have often been interpreted on a preliminary basis pending final reading by the radiologist. If your blood pressure was greater than 140/90, please have this blood pressure rechecked by your primary care provider lidiain the next few days. You will benefit from a further discussion of lifestyle modifications that include Weight Reduction - Dietary Sodium Restriction - Increased Physical Activity and Moderation in alcohol (ETOH) Consumption. If possible check your pressure at home and keep a blood pressure log to bring to your physician.

## 2025-04-10 NOTE — TELEPHONE ENCOUNTER
Pharmacy recommending SEMGLEE-YFGN as Lantus Solostar 100U/ML 5x3ML is not covered.    Please send new rx if approved.    Medications - Current[1]    No medications on file       [1] No current outpatient medications on file.

## 2025-04-10 NOTE — CONSULTS
[facility]  part of [facility]    Urology Consult Note    History of Present Illness:   Patient is a 59 year old male with PMHx of urinary retention, DM, and HTN who presented to the ER this morning with c/o urinary retention. The patient has issues with chronic urinary retention and was recently taught to perform CIC x3-4 per day at home, however has been having difficulty lately; had been performing x6-7/day but on the last time was unable to drain his bladder. He presented ot the ER stating he was unable to perform CIC at home, bladder scan showed >600 mL and a mckay was placed. His UA is suspicious for infection, TMax 99.7 in the ER with elevated WBC and mild tachycardia. The patient was admitted for urinary retention and sepsis, and urology was placed on consult.     The patient is resting comfortably in bed. Afebrile, VSS. Mckay is draining yellow urine.    Cr 1.14  WBC 15.9  Lactic acid 1.1    UA showed 2+ blood, 500 leukocytes, >50 WBC, and 3-5 RBC  Urine and blood cultures pending    CT showed urinary bladder has circumferential wall thickening, mucosal level hyperemia, and perivesicular fat stranding.  The appearance is compatible with a nonspecific cystitis.  The base the urinary bladder is deformed by the prostate and the prostate is   enlarged.  Mckay catheter is present within the bladder in satisfactory position.  Mild bilateral hydroureteronephrosis.  Bilateral urothelial thickening and enhancement with periureteral fat stranding, suggesting ascending urinary tract infection.    Suspect these findings are related to outlet obstruction/prostate enlargement. Hepatic and renal cysts.  Granulomatous calcifications in the liver. Colonic diverticulosis.       HISTORY:  Past Medical History[1]   Past Surgical History[2]   Family History[3]   Social History: Short Social Hx on File[4]     Allergies  Allergies[5]    Review of Systems:   A 10-point review of systems was completed and is  negative other than as noted above.    Physical Exam:   /67 (BP Location: Right arm)   Pulse 96   Temp 99.3 °F (37.4 °C) (Oral)   Resp 18   Ht 6' (1.829 m)   Wt 213 lb (96.6 kg)   SpO2 99%   BMI 28.89 kg/m²     GENERAL APPEARANCE: well developed, well nourished, in no acute distress  NEUROLOGIC: no localizing neurologic signs, alert and oriented x 3, converses appropriately  HEAD: atraumatic, normocephalic  EYES: sclera non-icteric  ORAL CAVITY: mucosa moist  NECK/THYROID: no obvious masses or goiter  LUNGS: non-labored breathing  ABDOMEN: soft, nontender, nondistended  EXTREMITIES: warm, well-perfused. No clubbing, cyanosis or edema.  GENITOURINARY: Murphy in place, urine quality corresponds with #0Y below without sediment/clots.      SKIN: no obvious rashes    Results:     Laboratory Data:  Lab Results   Component Value Date    WBC 15.9 (H) 04/10/2025    HGB 12.8 (L) 04/10/2025    .0 04/10/2025     Lab Results   Component Value Date     04/10/2025    K 3.6 04/10/2025     04/10/2025    CO2 22.0 04/10/2025    BUN 15 04/10/2025     (H) 04/10/2025    GFRAA 115 05/06/2022    AST 18 11/27/2024    ALT 18 11/27/2024    TP 7.0 11/27/2024    ALB 4.8 11/27/2024    CA 9.0 04/10/2025       Urinalysis Results (last three years):  Recent Labs     02/08/23  1422 01/11/24  1231 11/27/24  0744 03/12/25  1632 03/30/25  2314 04/10/25  0427   COLORUR  --  Light-Yellow Light-Yellow Colorless* Light-Yellow Light-Yellow   CLARITY  --  Clear Clear Clear Clear Turbid*   SPECGRAVITY 1.020 >1.030* 1.027 1.011 1.016 1.011   PHURINE 5.0 5.5 5.5 5.0 5.5 6.0   PROUR  --  Negative Negative Negative 70* 50*   GLUUR  --  >1000* Normal Normal Normal Normal   KETUR  --  Trace* Negative Negative Negative Negative   BILUR  --  Negative Negative Negative Negative Negative   BLOODURINE  --  Negative Negative Negative 3+* 2+*   NITRITE Negative Negative Negative Negative Negative Negative   UROBILINOGEN  --  Normal  Normal Normal Normal Normal   LEUUR  --  Negative 25* 25* 25* 500*   WBCUR  --   --  1-5 1-5 6-10* >50*   RBCUR  --   --  0-2 0-2 >10* 3-5*   BACUR  --   --  None Seen None Seen Rare* None Seen       Urine Culture Results (last three years):  Lab Results   Component Value Date    URINECUL No Growth at 18-24 hrs. 03/30/2025    URINECUL No Growth at 18-24 hrs. 03/12/2025    URINECUL No Growth at 18-24 hrs. 11/27/2024       Imaging  CT ABDOMEN+PELVIS(CONTRAST ONLY)(CPT=74177)  Result Date: 4/10/2025  PROCEDURE: CT ABDOMEN + PELVIS (CONTRAST ONLY) (CPT=74177)  COMPARISON: [facility], CT PF RENAL STONE ABD/P WO CON, 10/10/2013, 10:11 AM.  INDICATIONS: Generalized abdominal pain, urinary symptoms, urinary retention.  TECHNIQUE: CT images of the abdomen and pelvis were obtained with non-ionic intravenous contrast material.  Automated exposure control for dose reduction was used. Adjustment of the mA and/or kV was done based on the patient's size. Use of iterative reconstruction technique for dose reduction was used.  Dose information is transmitted to the ACR (American College of Radiology) NRDR (National Radiology Data Registry) which includes the Dose Index Registry.  FINDINGS:  LIVER: Scattered round low-density foci within the liver, most likely representing cysts however they are too small to definitively characterize.  Scattered calcified granulomas in the left hepatic lobe. BILIARY: The gallbladder is present.  No intra- or extrahepatic biliary ductal dilatation. SPLEEN: No enlargement or focal lesion.  STOMACH: No gastric obstruction.  Duodenum is unremarkable PANCREAS: No lesion, fluid collection, ductal dilatation, or atrophy.  ADRENALS: No nodule or enlargement KIDNEYS: Subcentimeter cortical based low density foci which are too small to characterize but likely represent cysts.  Mild bilateral hydro ureteral nephrosis extending from the renal calices to the urinary bladder.  There is urothelial  thickening and enhancement of the ureters.  Mild fat stranding around both ureters. AORTA/VASCULAR:   Atherosclerosis of the abdominal aorta.  No aneurysm or dissection. RETROPERITONEUM: No mass or enlarged adenopathy.  BOWEL/MESENTERY:  There is no small or large bowel obstruction. The terminal ileum and appendix (series 3, image 93) are within normal limits. There is descending and sigmoid colonic diverticulosis. There is no free air, free fluid, or lymphadenopathy in  the abdomen or pelvis. ABDOMINAL WALL: No suspicious mass lesion or significant hernia. URINARY BLADDER: Urinary bladder contains a Murphy catheter with balloon.  There is circumferential wall thickening of the urinary bladder, which measures up to 26 mm in diameter.  At there is perivesicular fat stranding.  Nonspecific mucosal enhancement of the bladder.  Trace urine and gas in the bladder lumen.  The base of the urinary bladder is deformed by the prostate. PELVIC NODES: No enlarged mass or adenopathy.   PELVIC ORGANS: Prostate is enlarged measuring 71 mm in transverse diameter. BONES:   Mild degenerative endplate change and disc disease within the spine. LUNG BASES: Scattered linear bands of atelectasis/scar in the lung bases.  The visualized heart has normal size.  No visible coronary artery calcifications OTHER: Negative.          CONCLUSION:  1.  Urinary bladder has circumferential wall thickening, mucosal level hyperemia, and perivesicular fat stranding.  The appearance is compatible with a nonspecific cystitis.  The base the urinary bladder is deformed by the prostate and the prostate is enlarged.  Murphy catheter is present within the bladder in satisfactory position.  Mild bilateral hydroureteronephrosis.  Bilateral urothelial thickening and enhancement with periureteral fat stranding, suggesting ascending urinary tract infection.  Suspect these findings are related to outlet obstruction/prostate enlargement. 2.  Hepatic and renal cysts.   Granulomatous calcifications in the liver. 3.  Colonic diverticulosis.   Dictated by (CST): Warner Nunez MD on 4/10/2025 at 6:45 AM     Finalized by (CST): Warner Nunez MD on 4/10/2025 at 6:50 AM          US KIDNEYS (CPT=76775)  Result Date: 4/2/2025  PROCEDURE: US KIDNEYS (CPT=76775)  COMPARISON: [facility], CT UROGRAM(W+WO) W/3D SH(CPT=74178/77962), 2/21/2023, 9:15 AM.  [facility], US KIDNEY, 4/04/2007, 9:28 AM.  INDICATIONS: BPH with obstruction/lower urinary tract symptoms  TECHNIQUE: Ultrasound examination was performed to visualize the kidneys and bladder.   FINDINGS:  RIGHT KIDNEY: 1.9 cm septated cyst within the right upper pole.  No increased internal vascularity.  Right kidney length is 12.4 cm.  Normal echotexture.  No hydronephrosis, mass, calculus or perirenal fluid.  LEFT KIDNEY: 0.8 cm simple cyst within the posterior right upper pole.  Left kidney length is 12 cm.  Normal echotexture.  No hydronephrosis, mass, calculus or perirenal fluid.  BLADDER: The bladder is decompressed with a Mckay catheter.  Bladder wall thickening may be secondary to a under distention.  CONCLUSION:   No hydronephrosis.  Mckay catheter in place.   Septated right upper pole renal cyst is unchanged.     DICTATED BY (CST): NIKITA NEWTON MD ON 4/02/2025 AT 11:16 AM     FINALIZED BY (CST): NIKITA NEWTON MD ON 4/02/2025 AT 11:20 AM                 Impression:     Patient is a 59 year old male admitted with urinary retention and sepsis. Patient was to perform CIC 3-4x per day at home but has been having difficulty. Bladder scan showed >600 mL prior to mckay placement. UA suspicious for infection, awaiting cultures. Normal creatinine and lactic acid, elevated WBC.    Recommendations:  - Maintain mckay for 7-10 days  - Antibiotics per IM. Awaiting cultures  - Continue Flomax  - Follow-up outpatient for cystoscopy and urodynamics with Dr Hernandez     Thank you very much for this consult. Please  call if there are any questions or concerns. Urology will sign off.    Ag Mosley PA-C      Date: 4/10/2025          [1]   Past Medical History:   Chest pain    normal stress nuclear; normal stress echo    Colon polyps    Hyperplastic, repeat in 10 years    Diabetes (HCC)    diet controlled w/ no BDR - 2008    Disorder of eye, unspecified    increased C/D ratio - OU    Lipid screening    MGD (meibomian gland dysfunction)    OU    Obesity (BMI 30-39.9)    Other and unspecified hyperlipidemia    Type II or unspecified type diabetes mellitus without mention of complication, not stated as uncontrolled    Unspecified essential hypertension   [2]   Past Surgical History:  Procedure Laterality Date    Colonoscopy N/A 11/15/2016    Procedure: COLONOSCOPY;  Surgeon: GISEL Ramirez MD;  Location: [facility] ENDOSCOPY    Shoulder arthroscopy Left 04/28/2020   [3]   Family History  Problem Relation Age of Onset    Gastro-Intestinal Disorder Brother         celiac sprue    Cancer Paternal Grandfather         Colon Ca    Cancer Other         liver cancer - close relative    Diabetes Neg     Glaucoma Neg     Macular degeneration Neg    [4]   Social History  Socioeconomic History    Marital status:    Tobacco Use    Smoking status: Never    Smokeless tobacco: Never   Vaping Use    Vaping status: Never Used   Substance and Sexual Activity    Alcohol use: Not Currently     Alcohol/week: 0.0 standard drinks of alcohol    Drug use: No    Sexual activity: Yes     Partners: Female   Other Topics Concern    Caffeine Concern Yes     Comment: coffee/tea, >6cups/day     Social Drivers of Health     Food Insecurity: No Food Insecurity (4/10/2025)    NCSS - Food Insecurity     Worried About Running Out of Food in the Last Year: No     Ran Out of Food in the Last Year: No   Transportation Needs: No Transportation Needs (4/10/2025)    NCSS - Transportation     Lack of Transportation: No   Housing Stability: Not At Risk (4/10/2025)    NCSS  - Housing/Utilities     Has Housing: Yes     Worried About Losing Housing: No     Unable to Get Utilities: No   [5]   Allergies  Allergen Reactions    Azithromycin DIARRHEA and NAUSEA AND VOMITING    Sulfa Antibiotics UNKNOWN    Sulfanilamide      Other reaction(s): Hives

## 2025-04-10 NOTE — ED QUICK NOTES
Orders for admission, patient is aware of plan and ready to go upstairs. Any questions, please call ED TAO Molina at extension [phone].     Patient Covid vaccination status: Unvaccinated     COVID Test Ordered in ED: None    COVID Suspicion at Admission: N/A    Running Infusions:  see EMAR    Mental Status/LOC at time of transport: AOx4    Other pertinent information:   CIWA score: N/A   NIH score:  N/A

## 2025-04-10 NOTE — ED INITIAL ASSESSMENT (HPI)
Pt arrived to ED with urinary retention. Pt had a mckay placed one week ago. Mckay removed on Tuesday by urologist. Pt has been able to self-catheter but has been unable to. States that he self-catherized at 2300 and removed 500 mL. Currently reporting 8/10 abd pain.

## 2025-04-10 NOTE — ED QUICK NOTES
Assumed care of patient at this time. Presents with urinary retention, C/o abd and back pain. States he was unable to straight cath himself at home.   Bed is locked and in lowest position. Call light within reach.

## 2025-04-10 NOTE — TELEPHONE ENCOUNTER
Can we set patient up for cystoscopy / UDS in 7-10 days with appointment afterward with Dr. Hernandez to discuss management moving forward and bridge CIC vs mckay maintenance.

## 2025-04-10 NOTE — ED PROVIDER NOTES
Patient Seen in: [facility]         EMERGENCY DEPARTMENT NOTE    Dictated. Voice Transcription software has been utilized for this dictation (the reader should be aware that typographical errors are possible with voice transcription software and to please contact the dictating physician if there are questions.)         History     Chief Complaint   Patient presents with    Urinary Retention       There may be discrepancies from triage note.     HPI    History provided by patient and patient's son.  59-year-old male with history of chronic indwelling Murphy catheter with recent removal on Tuesday complaining of urinary retention.  States that he recently learned how to self catheterize and was able to express urine at home however was unable to in the last several hours.  Reports suprapubic abdominal fullness.    No fevers, chills, nausea, vomiting, diarrhea, constipation, cough, cold symptoms  No chest pain, shortness of breath  No headache, neck pain, neck stiffness, incontinence.  No changes in mentation, no changes in vision, no total/new extremity weakness, no total/new extremity paresthesia, no difficulty speaking.  No alleviating or exacerbating factors.  Denies orthopnea, pnd, change in exercise tolerance limited by chest pain/sob , lower extremity edema/asymmetry.         History reviewed. Past Medical History[1]    History reviewed. Past Surgical History[2]      Medications :  Prescriptions Prior to Admission[3]     Family History[4]    Smoking Status: Social Hx on file[5]    Review of Systems   Constitutional: Negative.    HENT: Negative.     Eyes: Negative.    Respiratory: Negative.     Cardiovascular: Negative.    Gastrointestinal:  Positive for abdominal pain.   Genitourinary: Negative.    Musculoskeletal: Negative.    Skin: Negative.    Neurological: Negative.    Endo/Heme/Allergies: Negative.    Psychiatric/Behavioral: Negative.     All other systems reviewed and are negative.    Pertinent  positives as listed.  All other organ systems are reviewed and are negative.    Constitutional and vital signs reviewed.      Social History and Family History elements reviewed from today, pertinent positives to the presenting problem noted.    Physical Exam     ED Triage Vitals [04/10/25 0338]   /67   Pulse 100   Resp 24   Temp 97.8 °F (36.6 °C)   Temp src Temporal   SpO2 100 %   O2 Device None (Room air)       All measures to prevent infection transmission during my interaction with the patient were taken. The patient was already wearing a droplet mask on my arrival to the room. Personal protective equipment including droplet mask, eye protection, and gloves were worn throughout the duration of the exam.  Handwashing was performed prior to and after the exam.  Stethoscope and any equipment used during my examination was cleaned with super sani-cloth germicidal wipes following the exam.     Physical Exam  Vitals and nursing note reviewed.   Constitutional:       General: He is not in acute distress.     Appearance: He is not ill-appearing or toxic-appearing.   Cardiovascular:      Rate and Rhythm: Normal rate and regular rhythm.      Comments: Dorsalis pedis pulses 2+ bilaterally    Pulmonary:      Effort: Pulmonary effort is normal. No respiratory distress.   Abdominal:      General: There is no distension.      Palpations: Abdomen is soft.      Tenderness: There is no abdominal tenderness. There is no right CVA tenderness, left CVA tenderness, guarding or rebound.      Comments: Negative Bonner sign, negative McBurney's point tenderness     Musculoskeletal:      Right lower leg: No edema.      Left lower leg: No edema.   Skin:     Capillary Refill: Capillary refill takes less than 2 seconds.      Coloration: Skin is not jaundiced or pale.      Findings: No bruising, erythema, lesion or rash.   Neurological:      Mental Status: He is alert.      Comments: Gross motor and sensory function intact  symmetrically and bilaterally to upper extremities and lower extremities.   Psychiatric:         Mood and Affect: Mood normal.         Behavior: Behavior normal.           Review of prior notes in Care everywhere/Epic performed by myself:  - Patient saw urology March 28, 2025.  History of chronic Murphy catheter insertion.  Saw urology for clean intermittent catheterization teaching as bridging treatment.  History of elevated serum PSA  - Patient had a ultrasound of his kidneys completed March 27, 2025.  No hydronephrosis.  Right upper pole renal cyst unchanged in size.    Creatinine was normal March 28, 2020  ED Course     If labs obtained, they are personally reviewed by myself:     Labs Reviewed   BASIC METABOLIC PANEL (8) - Abnormal; Notable for the following components:       Result Value    Glucose 272 (*)     All other components within normal limits   CBC WITH DIFFERENTIAL WITH PLATELET - Abnormal; Notable for the following components:    WBC 15.9 (*)     RBC 4.12 (*)     HGB 12.8 (*)     HCT 36.9 (*)     Neutrophil Absolute Prelim 13.65 (*)     Neutrophil Absolute 13.65 (*)     Lymphocyte Absolute 0.80 (*)     Monocyte Absolute 1.31 (*)     All other components within normal limits   URINALYSIS, ROUTINE - Abnormal; Notable for the following components:    Clarity Urine Turbid (*)     Blood Urine 2+ (*)     Protein Urine 50 (*)     Leukocyte Esterase Urine 500 (*)     WBC Urine >50 (*)     RBC Urine 3-5 (*)     All other components within normal limits   REDRAW BMP - Normal   SCAN SLIDE   LACTIC ACID, PLASMA   URINE CULTURE, ROUTINE   BLOOD CULTURE   BLOOD CULTURE       If radiologic studies ordered during today's ER visit, my independent interpretation are seen directly below.  This is awaiting the radiologist's final interpretation.  CT abdomen/pelvis, independent interpretation completed by myself, awaiting final radiology interpretation: No perforation      Imaging Results read by radiology in ED     CT  ABDOMEN+PELVIS(CONTRAST ONLY)(CPT=74177)  Result Date: 4/10/2025  CONCLUSION:  1.  Urinary bladder has circumferential wall thickening, mucosal level hyperemia, and perivesicular fat stranding.  The appearance is compatible with a nonspecific cystitis.  The base the urinary bladder is deformed by the prostate and the prostate is enlarged.  Murphy catheter is present within the bladder in satisfactory position.  Mild bilateral hydroureteronephrosis.  Bilateral urothelial thickening and enhancement with periureteral fat stranding, suggesting ascending urinary tract infection.  Suspect these findings are related to outlet obstruction/prostate enlargement. 2.  Hepatic and renal cysts.  Granulomatous calcifications in the liver. 3.  Colonic diverticulosis.   Dictated by (CST): Warner Nunez MD on 4/10/2025 at 6:45 AM     Finalized by (CST): Warner Nunez MD on 4/10/2025 at 6:50 AM                  ED Medications Administered:   Medications   sodium chloride 0.9 % IV bolus 2,328 mL (2,328 mL Intravenous New Bag 4/10/25 0608)   lidocaine (Urojet) 2 % urethral jelly 10 mL (10 mL Urethral Given 4/10/25 0423)   acetaminophen (Tylenol Extra Strength) tab 1,000 mg (1,000 mg Oral Given 4/10/25 0609)   cefTRIAXone (Rocephin) 2 g in sodium chloride 0.9% 100 mL IVPB-ADDV (0 g Intravenous Stopped 4/10/25 0636)   iopamidol 76% (ISOVUE-370) injection for power injector (80 mL Intravenous Given 4/10/25 0633)           Vitals:    04/10/25 0623 04/10/25 0638 04/10/25 0645 04/10/25 0653   BP:    130/73   Pulse: 96 93 96 97   Resp: 24 14 21 20   Temp:       TempSrc:       SpO2: 98% 94% 95% 95%   Weight:       Height:         *I personally reviewed and interpreted all ED vitals.    Pulse Ox interpretation by myself: 100%, Room air, Normal     Monitor Interpretation by myself:   normal sinus rhythm    If Ekg obtained during today's visit, it is independently interpreted by myself directly below:      Medical Record Review: I personally reviewed  available prior medical records for any recent pertinent discharge summaries, testing, and procedures and reviewed those reports.      [facility]     Medical decision making/ED Course:   59-year-old male who presents to the emergency department for urinary retention.  Reports suprapubic abdominal fullness.  White blood cell count of 16,000.  Urinalysis suggestive of UTI.  His symptoms seem most consistent with urosepsis.  He arrives to the emergency department meeting SIRS criteria.  Sepsis protocol initiated.  Ceftriaxone ordered.  Creatinine thankfully normal.  Glucose elevated at 272-patient reports a history of diabetes.  No signs of DKA/HHS.  Normal anion gap.  Low-grade temperature, Tylenol given.     Blood culture sent.  Lactic acid within normal limits.  Hemoglobin appears stable at 12.8.  He denies bleeding from any orifice.  Scan revealing greater than 600 mL of urine in the bladder.  Status post Murphy catheter placement, patient reports feeling better.      Given third visit to the emergency department in the last 3 weeks, CT abdomen/pelvis ordered to evaluate for abscess/acute pathology..  Case discussed with hospitalist who agrees with ER management and who will kindly follow with CT results            0615: Sepsis reevaluation complete.  Good cap refill, nontoxic in appearance.       Differential Diagnosis:  as listed above in medical decision making.   *Please note that in the presenting to the emergency department, illness/injury that poses a threat to life or function is considered during this patient's initial evaluation.    The complexity of this visit is therefore inherently more complex given the need to consider life threatening pathology prior to any other etiology for this patient's visit.    The differential diagnosis and medical decision above exemplify this rationale.       Medical Decision Making  Problems Addressed:  Urinary retention: acute illness or injury    Amount and/or Complexity of Data  Reviewed  External Data Reviewed: notes.  Labs: ordered. Decision-making details documented in ED Course.  Radiology: ordered and independent interpretation performed. Decision-making details documented in ED Course.  Discussion of management or test interpretation with external provider(s): Hospitalist      Risk  Decision regarding hospitalization.    Critical Care  Total time providing critical care: 32 minutes          ED Course as of 04/10/25 0710  ------------------------------------------------------------  Time: 04/10 0500  Comment: Normotensive, heart rate of  100  ------------------------------------------------------------  Time: 04/10 0604  Comment: Heart rate of 106, patient reports feeling better.  Reports denies back pain at this time.  No CVA tenderness. Normotensive     ------------------------------------------------------------  Time: 04/10 0608  Comment: Temperature of 99.7, low grade.  Tylenol ordered .  Heart rate of 101 sinus on the monitor  ------------------------------------------------------------  Time: 04/10 0710  Comment: Patient remains hemodynamically stable, normotensive, heart rate less than 100       Vitals:    04/10/25 0623 04/10/25 0638 04/10/25 0645 04/10/25 0653   BP:    130/73   Pulse: 96 93 96 97   Resp: 24 14 21 20   Temp:       TempSrc:       SpO2: 98% 94% 95% 95%   Weight:       Height:                 Complicating Factors: Significant medical problems that contribute to the complexity of this emergency room evaluation is listed above.    Condition upon leaving the department: Stable    Disposition and Plan     Clinical Impression:  1. Urinary retention    2. Sepsis, due to unspecified organism, unspecified whether acute organ dysfunction present (HCC)        Disposition:  Admit    Medications Prescribed:  Current Discharge Medication List              Hospital Problems       Present on Admission  Date Reviewed: 3/28/2025          ICD-10-CM Noted POA    * (Principal)  Urinary retention R33.9 4/10/2025 Unknown                       [1]   Past Medical History:   Chest pain    normal stress nuclear; normal stress echo    Colon polyps    Hyperplastic, repeat in 10 years    Diabetes (HCC)    diet controlled w/ no BDR - 2008    Disorder of eye, unspecified    increased C/D ratio - OU    Lipid screening    MGD (meibomian gland dysfunction)    OU    Obesity (BMI 30-39.9)    Other and unspecified hyperlipidemia    Type II or unspecified type diabetes mellitus without mention of complication, not stated as uncontrolled    Unspecified essential hypertension   [2]   Past Surgical History:  Procedure Laterality Date    Colonoscopy N/A 11/15/2016    Procedure: COLONOSCOPY;  Surgeon: GISEL Ramirez MD;  Location: [facility] ENDOSCOPY    Shoulder arthroscopy Left 04/28/2020   [3] (Not in a hospital admission)   [4]   Family History  Problem Relation Age of Onset    Gastro-Intestinal Disorder Brother         celiac sprue    Cancer Paternal Grandfather         Colon Ca    Cancer Other         liver cancer - close relative    Diabetes Neg     Glaucoma Neg     Macular degeneration Neg    [5]   Social History  Socioeconomic History    Marital status:    Tobacco Use    Smoking status: Never    Smokeless tobacco: Never   Vaping Use    Vaping status: Never Used   Substance and Sexual Activity    Alcohol use: Not Currently     Alcohol/week: 0.0 standard drinks of alcohol    Drug use: No    Sexual activity: Yes     Partners: Female   Other Topics Concern    Caffeine Concern Yes     Comment: coffee/tea, >6cups/day

## 2025-04-10 NOTE — TELEPHONE ENCOUNTER
Pharmacy requesting clarification     Lantus Solostar 100 u/ml 5x3ml is not covered     Covered alternative    Semglee- YFGN     Also needs PA

## 2025-04-10 NOTE — H&P
[facility]  part of [facility]    History & Physical    Luis Miguel Shelton Patient Status:  Observation    1965 MRN [id number]   Location [facility] 4W/SW/SE Attending Juan Yang MD   Hosp Day # 0 PCP Kemal Montoya MD     Date:  4/10/2025  Date of Admission:  4/10/2025    History provided by:patient  Chief Complaint:     Chief Complaint   Patient presents with    Urinary Retention       HPI:   Luis Miguel Shelton is a(n) 59 year old male with a history of urinary retention, HTN, DM2 who presents with urinary retention.     Pt has issues with chronic urinary retention and was straight cathing himself at home.    Last night he was unable to successfully straight cath himself.   The catheter was going in but no urine was coming out.    Pt had a brief syncopal episode in bathroom while trying to straight cath.   Pt did not hit head.    Pt denies fevers but has had chills.   Some discomfort in penile area.   No CP or SOB.  No n/v/c/d.    History   Past Medical History[1]  Past Surgical History[2]  Family History[3]  Social History:  Short Social Hx on File[4]  Allergies/Medications:   Allergies: Allergies[5]    Home medications  Please see med rec.    Review of Systems:     The remainder of the ROS is negative except as noted in the HPI.    Physical Exam:   Vital Signs:  Blood pressure 117/67, pulse 96, temperature 99.3 °F (37.4 °C), temperature source Oral, resp. rate 18, height 72\", weight 213 lb (96.6 kg), SpO2 99%.     Gen:   NAD.   A and O x 3  Eyes:  PERRL  Moist mucus membranes.    CV:    RRR.  No m/g/r  Pulm:   CTA bilat  Abd:   +bs, soft, NT, ND  LE:   No c/c/e  Neuro:   nonfocal  Skin:  No rash    Results:     Lab Results   Component Value Date    WBC 15.9 (H) 04/10/2025    HGB 12.8 (L) 04/10/2025    HCT 36.9 (L) 04/10/2025    .0 04/10/2025    CREATSERUM 1.14 04/10/2025    BUN 15 04/10/2025     04/10/2025    K 3.6 04/10/2025      04/10/2025    CO2 22.0 04/10/2025     (H) 04/10/2025    CA 9.0 04/10/2025    ALB 4.8 11/27/2024    ALKPHO 66 11/27/2024    BILT 0.6 11/27/2024    TP 7.0 11/27/2024    AST 18 11/27/2024    ALT 18 11/27/2024    PTT 26.7 01/11/2024    INR 0.89 01/11/2024    T4F 1.0 11/27/2024    TSH 5.348 (H) 11/27/2024    CASI 39 01/04/2021     01/04/2021    PSA 8.64 (H) 03/12/2025    DDIMER 0.27 01/04/2021    TROP <0.045 01/04/2021       CT ABDOMEN+PELVIS(CONTRAST ONLY)(CPT=74177)  Result Date: 4/10/2025  CONCLUSION:  1.  Urinary bladder has circumferential wall thickening, mucosal level hyperemia, and perivesicular fat stranding.  The appearance is compatible with a nonspecific cystitis.  The base the urinary bladder is deformed by the prostate and the prostate is enlarged.  Mckay catheter is present within the bladder in satisfactory position.  Mild bilateral hydroureteronephrosis.  Bilateral urothelial thickening and enhancement with periureteral fat stranding, suggesting ascending urinary tract infection.  Suspect these findings are related to outlet obstruction/prostate enlargement. 2.  Hepatic and renal cysts.  Granulomatous calcifications in the liver. 3.  Colonic diverticulosis.   Dictated by (CST): Warner Nunez MD on 4/10/2025 at 6:45 AM     Finalized by (CST): Warner Nunez MD on 4/10/2025 at 6:50 AM                Assessment/Plan:     Urinary retention  UTI  - cont mckay  - urology on consult  - cont ceftriaxone  - f/u urine cx  - IVF  - cont flomax    Hx of HTN  - cont norvasc  - hold lisinopril    Hx of HL  - lipitor    Hx of DM2  - ISS  - tresiba    dvt proph - heparin    Code status - Full    Juan Thomas MD  4/10/2025        [1]   Past Medical History:   Chest pain    normal stress nuclear; normal stress echo    Colon polyps    Hyperplastic, repeat in 10 years    Diabetes (HCC)    diet controlled w/ no BDR - 2008    Disorder of eye, unspecified    increased C/D ratio - OU    Lipid screening    MGD  (meibomian gland dysfunction)    OU    Obesity (BMI 30-39.9)    Other and unspecified hyperlipidemia    Type II or unspecified type diabetes mellitus without mention of complication, not stated as uncontrolled    Unspecified essential hypertension   [2]   Past Surgical History:  Procedure Laterality Date    Colonoscopy N/A 11/15/2016    Procedure: COLONOSCOPY;  Surgeon: GISEL Ramirez MD;  Location: [facility] ENDOSCOPY    Shoulder arthroscopy Left 04/28/2020   [3]   Family History  Problem Relation Age of Onset    Gastro-Intestinal Disorder Brother         celiac sprue    Cancer Paternal Grandfather         Colon Ca    Cancer Other         liver cancer - close relative    Diabetes Neg     Glaucoma Neg     Macular degeneration Neg    [4]   Social History  Socioeconomic History    Marital status:    Tobacco Use    Smoking status: Never    Smokeless tobacco: Never   Vaping Use    Vaping status: Never Used   Substance and Sexual Activity    Alcohol use: Not Currently     Alcohol/week: 0.0 standard drinks of alcohol    Drug use: No    Sexual activity: Yes     Partners: Female   Other Topics Concern    Caffeine Concern Yes     Comment: coffee/tea, >6cups/day     Social Drivers of Health     Food Insecurity: No Food Insecurity (4/10/2025)    NCSS - Food Insecurity     Worried About Running Out of Food in the Last Year: No     Ran Out of Food in the Last Year: No   Transportation Needs: No Transportation Needs (4/10/2025)    NCSS - Transportation     Lack of Transportation: No   Housing Stability: Not At Risk (4/10/2025)    NCSS - Housing/Utilities     Has Housing: Yes     Worried About Losing Housing: No     Unable to Get Utilities: No   [5]   Allergies  Allergen Reactions    Azithromycin DIARRHEA and NAUSEA AND VOMITING    Sulfa Antibiotics UNKNOWN    Sulfanilamide      Other reaction(s): Hives

## 2025-04-11 ENCOUNTER — APPOINTMENT (OUTPATIENT)
Dept: ULTRASOUND IMAGING | Facility: HOSPITAL | Age: 60
End: 2025-04-11
Payer: COMMERCIAL

## 2025-04-11 LAB
ANION GAP SERPL CALC-SCNC: 7 MMOL/L (ref 0–18)
BASOPHILS # BLD AUTO: 0.05 X10(3) UL (ref 0–0.2)
BASOPHILS NFR BLD AUTO: 0.4 %
BUN BLD-MCNC: 13 MG/DL (ref 9–23)
BUN/CREAT SERPL: 16 (ref 10–20)
CALCIUM BLD-MCNC: 8.2 MG/DL (ref 8.7–10.4)
CHLORIDE SERPL-SCNC: 110 MMOL/L (ref 98–112)
CO2 SERPL-SCNC: 27 MMOL/L (ref 21–32)
CREAT BLD-MCNC: 0.81 MG/DL (ref 0.7–1.3)
DEPRECATED RDW RBC AUTO: 46.5 FL (ref 35.1–46.3)
EGFRCR SERPLBLD CKD-EPI 2021: 102 ML/MIN/1.73M2 (ref 60–?)
EOSINOPHIL # BLD AUTO: 0.21 X10(3) UL (ref 0–0.7)
EOSINOPHIL NFR BLD AUTO: 1.9 %
ERYTHROCYTE [DISTWIDTH] IN BLOOD BY AUTOMATED COUNT: 13.5 % (ref 11–15)
GLUCOSE BLD-MCNC: 141 MG/DL (ref 70–99)
GLUCOSE BLDC GLUCOMTR-MCNC: 123 MG/DL (ref 70–99)
GLUCOSE BLDC GLUCOMTR-MCNC: 144 MG/DL (ref 70–99)
GLUCOSE BLDC GLUCOMTR-MCNC: 146 MG/DL (ref 70–99)
GLUCOSE BLDC GLUCOMTR-MCNC: 148 MG/DL (ref 70–99)
HCT VFR BLD AUTO: 33.4 % (ref 39–53)
HGB BLD-MCNC: 11.3 G/DL (ref 13–17.5)
IMM GRANULOCYTES # BLD AUTO: 0.03 X10(3) UL (ref 0–1)
IMM GRANULOCYTES NFR BLD: 0.3 %
LYMPHOCYTES # BLD AUTO: 1.26 X10(3) UL (ref 1–4)
LYMPHOCYTES NFR BLD AUTO: 11.2 %
MCH RBC QN AUTO: 31.8 PG (ref 26–34)
MCHC RBC AUTO-ENTMCNC: 33.8 G/DL (ref 31–37)
MCV RBC AUTO: 94.1 FL (ref 80–100)
MONOCYTES # BLD AUTO: 0.84 X10(3) UL (ref 0.1–1)
MONOCYTES NFR BLD AUTO: 7.5 %
NEUTROPHILS # BLD AUTO: 8.82 X10 (3) UL (ref 1.5–7.7)
NEUTROPHILS # BLD AUTO: 8.82 X10(3) UL (ref 1.5–7.7)
NEUTROPHILS NFR BLD AUTO: 78.7 %
OSMOLALITY SERPL CALC.SUM OF ELEC: 300 MOSM/KG (ref 275–295)
PLATELET # BLD AUTO: 127 10(3)UL (ref 150–450)
POTASSIUM SERPL-SCNC: 4.3 MMOL/L (ref 3.5–5.1)
RBC # BLD AUTO: 3.55 X10(6)UL (ref 4.3–5.7)
SODIUM SERPL-SCNC: 144 MMOL/L (ref 136–145)
WBC # BLD AUTO: 11.2 X10(3) UL (ref 4–11)

## 2025-04-11 PROCEDURE — 99233 SBSQ HOSP IP/OBS HIGH 50: CPT | Performed by: HOSPITALIST

## 2025-04-11 PROCEDURE — 76770 US EXAM ABDO BACK WALL COMP: CPT

## 2025-04-11 RX ORDER — DOCUSATE SODIUM 100 MG/1
100 CAPSULE, LIQUID FILLED ORAL 2 TIMES DAILY
Status: DISCONTINUED | OUTPATIENT
Start: 2025-04-11 | End: 2025-04-15

## 2025-04-11 RX ORDER — LISINOPRIL 20 MG/1
40 TABLET ORAL DAILY
Status: DISCONTINUED | OUTPATIENT
Start: 2025-04-11 | End: 2025-04-15

## 2025-04-11 RX ORDER — POLYETHYLENE GLYCOL 3350 17 G/17G
17 POWDER, FOR SOLUTION ORAL 2 TIMES DAILY PRN
Status: DISCONTINUED | OUTPATIENT
Start: 2025-04-11 | End: 2025-04-15

## 2025-04-11 RX ORDER — PEN NEEDLE, DIABETIC 32GX 5/32"
NEEDLE, DISPOSABLE MISCELLANEOUS
Qty: 90 EACH | Refills: 3 | Status: SHIPPED | OUTPATIENT
Start: 2025-04-11

## 2025-04-11 RX ORDER — FINASTERIDE 5 MG/1
5 TABLET, FILM COATED ORAL DAILY
Status: DISCONTINUED | OUTPATIENT
Start: 2025-04-11 | End: 2025-04-15

## 2025-04-11 NOTE — PLAN OF CARE
Patient alert & oriented x4. On room air. Fever overnight, ibuprofen & NS bolus given with good effect. Lactic normal. IVF infusing. IV Rocephin. Murphy in place with good output. Up independently. Will continue to monitor.       Problem: Patient Centered Care  Goal: Patient preferences are identified and integrated in the patient's plan of care  Description: Interventions:- What would you like us to know as we care for you? - Provide timely, complete, and accurate information to patient/family- Incorporate patient and family knowledge, values, beliefs, and cultural backgrounds into the planning and delivery of care- Encourage patient/family to participate in care and decision-making at the level they choose- Honor patient and family perspectives and choices  Outcome: Progressing     Problem: Patient/Family Goals  Goal: Patient/Family Long Term Goal  Description: Patient's Long Term Goal: Interventions:- - See additional Care Plan goals for specific interventions  Outcome: Progressing  Goal: Patient/Family Short Term Goal  Description: Patient's Short Term Goal: Interventions: -- See additional Care Plan goals for specific interventions  Outcome: Progressing     Problem: Diabetes/Glucose Control  Goal: Glucose maintained within prescribed range  Description: INTERVENTIONS:- Monitor Blood Glucose as ordered- Assess for signs and symptoms of hyperglycemia and hypoglycemia- Administer ordered medications to maintain glucose within target range- Assess barriers to adequate nutritional intake and initiate nutrition consult as needed- Instruct patient on self management of diabetes  Outcome: Progressing

## 2025-04-11 NOTE — PLAN OF CARE
Serafin is alert and oriented x 4. He is ambulating independently in the room. Patient is voiding via mckay. Patient has had 3 BM today. He is tolerating meals. Ultrasound of the kidney and bladder done today. Patient had a fever of 101.9 at noon. Went down to 99.2 after 1000 mg of tylenol were given. Patient had a fever of 102.5 at 1700, motrin given for fever and chills MD notified. Call light within reach.  Problem: Patient Centered Care  Goal: Patient preferences are identified and integrated in the patient's plan of care  Description: Interventions:- What would you like us to know as we care for you? - Provide timely, complete, and accurate information to patient/family- Incorporate patient and family knowledge, values, beliefs, and cultural backgrounds into the planning and delivery of care- Encourage patient/family to participate in care and decision-making at the level they choose- Honor patient and family perspectives and choices  Outcome: Progressing     Problem: Patient/Family Goals  Goal: Patient/Family Long Term Goal  Description: Patient's Long Term Goal: Interventions:- - See additional Care Plan goals for specific interventions  Outcome: Progressing  Goal: Patient/Family Short Term Goal  Description: Patient's Short Term Goal: Interventions: - - See additional Care Plan goals for specific interventions  Outcome: Progressing     Problem: Diabetes/Glucose Control  Goal: Glucose maintained within prescribed range  Description: INTERVENTIONS:- Monitor Blood Glucose as ordered- Assess for signs and symptoms of hyperglycemia and hypoglycemia- Administer ordered medications to maintain glucose within target range- Assess barriers to adequate nutritional intake and initiate nutrition consult as needed- Instruct patient on self management of diabetes  Outcome: Progressing

## 2025-04-11 NOTE — PROGRESS NOTES
[facility]  part of [facility]    Progress Note    Luis Miguel Shelton Patient Status:  Inpatient    1965 MRN [id number]   Location [facility] 4W/SW/SE Attending Juan Yang MD   Hosp Day # 1 PCP Kemal Montoya MD       Subjective:     Pt still feeling somewhat generally unwell.      Objective:   Blood pressure 151/73, pulse 87, temperature (!) 101.9 °F (38.8 °C), temperature source Oral, resp. rate 16, height 72\", weight 213 lb (96.6 kg), SpO2 100%.    Gen:   NAD.   A and O x 3  Eyes:  PERRL  Moist mucus membranes.    CV:    RRR.  No m/g/r  Pulm:   CTA bilat  Abd:   +bs, soft, NT, ND  LE:   No c/c/e  Neuro:   nonfocal  Skin:  No rash    Results:     Lab Results   Component Value Date    WBC 11.2 (H) 2025    HGB 11.3 (L) 2025    HCT 33.4 (L) 2025    .0 (L) 2025    CREATSERUM 0.81 2025    BUN 13 2025     2025    K 4.3 2025     2025    CO2 27.0 2025     (H) 2025    CA 8.2 (L) 2025    ALB 4.8 2024    ALKPHO 66 2024    BILT 0.6 2024    TP 7.0 2024    AST 18 2024    ALT 18 2024    PTT 26.7 2024    INR 0.89 2024    T4F 1.0 2024    TSH 5.348 (H) 2024    CASI 39 2021     2021    PSA 8.64 (H) 2025    DDIMER 0.27 2021    TROP <0.045 2021       CT ABDOMEN+PELVIS(CONTRAST ONLY)(CPT=74177)  Result Date: 4/10/2025  CONCLUSION:  1.  Urinary bladder has circumferential wall thickening, mucosal level hyperemia, and perivesicular fat stranding.  The appearance is compatible with a nonspecific cystitis.  The base the urinary bladder is deformed by the prostate and the prostate is enlarged.  Murphy catheter is present within the bladder in satisfactory position.  Mild bilateral hydroureteronephrosis.  Bilateral urothelial thickening and enhancement with periureteral fat stranding, suggesting  ascending urinary tract infection.  Suspect these findings are related to outlet obstruction/prostate enlargement. 2.  Hepatic and renal cysts.  Granulomatous calcifications in the liver. 3.  Colonic diverticulosis.   Dictated by (CST): Warner Nunez MD on 4/10/2025 at 6:45 AM     Finalized by (CST): Warner Nunez MD on 4/10/2025 at 6:50 AM                Assessment and Plan:     Urinary retention  UTI  Still having fevers.  Urine cx growing E coli  - cont mckay  - urology on consult  - change ceftriaxone to meropenem  - f/u susceptibilities of the E coli  - reduce IVF  - cont flomax     Hx of HTN  - cont norvasc  - resume lisinopril     Hx of HL  - lipitor     Hx of DM2  - ISS  - tresiba     dvt proph - heparin     Code status - Full    MDM:    High Juan Thomas MD  4/11/2025

## 2025-04-11 NOTE — TELEPHONE ENCOUNTER
I called patient to offer a couple appointments, I also verified with RUSLAN Luong and TAO Florian before scheduling some clarification on how to schedule patient since Dr. Hernandez does UDS on tuesdays. Patient stated he is not comfortable with doing CIC. Pre and post procedure instructions given to patient, additionally I sent message below with instructions again on cystoscopy. Patient verbally agreed and call ended.     Appointment #1: 4/17/2025 Thursday at 8:20 am with nurses for a Voiding Trial.     Appointment #2: 05/06/2025 Tuesday at 9:00 am with the Nurses to complete a Urodynamics/CMG test.   *Following up for a procedure Cystoscopy with Dr. Hernandez at 10:30 am same day 5/6/2025.

## 2025-04-11 NOTE — SPIRITUAL CARE NOTE
Spiritual Care Visit Note    Patient Name: Luis Miguel Shelton Date of Spiritual Care Visit: 25   : 1965 Primary Dx: Urinary retention       Referred By: Referral From: Patient    Spiritual Care Taxonomy:    Intended Effects: Aligning care plan with patient's values    Methods: Assist with spiritual/Roman Catholic practices, Collaborate with care team member    Interventions: Incorporate cultural and Roman Catholic needs in plan of care    Visit Type/Summary:     - Spiritual Care: Responded to a request via the on call phone Consulted with RN prior to visit. Provided support for patient spiritual/Roman Catholic requests. Coordinated Christian Communion and verified NPO status. Provided Communion and verified NPO status.  remains available for follow up.    Spiritual Care support can be requested via an Epic consult. For urgent/immediate needs, please contact the On Call  at: [place]: ext [phone]    Chaplain Resident, Zunilda Nelson, PhD

## 2025-04-11 NOTE — PAYOR COMM NOTE
--------------  ADMISSION REVIEW     Payor: [facility]FF[facility]  Subscriber #:  [id number]  Authorization Number: [id number]    Admit date: 4/10/25  Admit time:  9:06 AM       REVIEW DOCUMENTATION:     ED Provider Notes        ED Provider Notes signed by Gay Cortez MD at 4/11/2025 12:01 AM        Patient Seen in: [facility]         History     Chief Complaint   Patient presents with    Urinary Retention       There may be discrepancies from triage note.     HPI    History provided by patient and patient's son.  59-year-old male with history of chronic indwelling Murphy catheter with recent removal on Tuesday complaining of urinary retention.  States that he recently learned how to self catheterize and was able to express urine at home however was unable to in the last several hours.  Reports suprapubic abdominal fullness.    No fevers, chills, nausea, vomiting, diarrhea, constipation, cough, cold symptoms  No chest pain, shortness of breath  No headache, neck pain, neck stiffness, incontinence.  No changes in mentation, no changes in vision, no total/new extremity weakness, no total/new extremity paresthesia, no difficulty speaking.  No alleviating or exacerbating factors.  Denies orthopnea, pnd, change in exercise tolerance limited by chest pain/sob , lower extremity edema/asymmetry.         History reviewed. Past Medical History[1]    History reviewed. Past Surgical History[2]      Medications :  Prescriptions Prior to Admission[3]     Family History[4]    Smoking Status: Social Hx on file[5]    Review of Systems   Constitutional: Negative.    HENT: Negative.     Eyes: Negative.    Respiratory: Negative.     Cardiovascular: Negative.    Gastrointestinal:  Positive for abdominal pain.   Genitourinary: Negative.    Musculoskeletal: Negative.    Skin: Negative.    Neurological: Negative.    Endo/Heme/Allergies: Negative.    Psychiatric/Behavioral: Negative.     All other systems reviewed and are  negative.    Pertinent positives as listed.  All other organ systems are reviewed and are negative.    Constitutional and vital signs reviewed.      Social History and Family History elements reviewed from today, pertinent positives to the presenting problem noted.    Physical Exam     ED Triage Vitals [04/10/25 0338]   /67   Pulse 100   Resp 24   Temp 97.8 °F (36.6 °C)   Temp src Temporal   SpO2 100 %   O2 Device None (Room air)       All measures to prevent infection transmission during my interaction with the patient were taken. The patient was already wearing a droplet mask on my arrival to the room. Personal protective equipment including droplet mask, eye protection, and gloves were worn throughout the duration of the exam.  Handwashing was performed prior to and after the exam.  Stethoscope and any equipment used during my examination was cleaned with super sani-cloth germicidal wipes following the exam.     Physical Exam  Vitals and nursing note reviewed.   Constitutional:       General: He is not in acute distress.     Appearance: He is not ill-appearing or toxic-appearing.   Cardiovascular:      Rate and Rhythm: Normal rate and regular rhythm.      Comments: Dorsalis pedis pulses 2+ bilaterally    Pulmonary:      Effort: Pulmonary effort is normal. No respiratory distress.   Abdominal:      General: There is no distension.      Palpations: Abdomen is soft.      Tenderness: There is no abdominal tenderness. There is no right CVA tenderness, left CVA tenderness, guarding or rebound.      Comments: Negative Bonner sign, negative McBurney's point tenderness     Musculoskeletal:      Right lower leg: No edema.      Left lower leg: No edema.   Skin:     Capillary Refill: Capillary refill takes less than 2 seconds.      Coloration: Skin is not jaundiced or pale.      Findings: No bruising, erythema, lesion or rash.   Neurological:      Mental Status: He is alert.      Comments: Gross motor and sensory  function intact symmetrically and bilaterally to upper extremities and lower extremities.   Psychiatric:         Mood and Affect: Mood normal.         Behavior: Behavior normal.           Review of prior notes in Care everywhere/Epic performed by myself:  - Patient saw urology March 28, 2025.  History of chronic Murphy catheter insertion.  Saw urology for clean intermittent catheterization teaching as bridging treatment.  History of elevated serum PSA  - Patient had a ultrasound of his kidneys completed March 27, 2025.  No hydronephrosis.  Right upper pole renal cyst unchanged in size.    Creatinine was normal March 28, 2020  ED Course     If labs obtained, they are personally reviewed by myself:     Labs Reviewed   BASIC METABOLIC PANEL (8) - Abnormal; Notable for the following components:       Result Value    Glucose 272 (*)     All other components within normal limits   CBC WITH DIFFERENTIAL WITH PLATELET - Abnormal; Notable for the following components:    WBC 15.9 (*)     RBC 4.12 (*)     HGB 12.8 (*)     HCT 36.9 (*)     Neutrophil Absolute Prelim 13.65 (*)     Neutrophil Absolute 13.65 (*)     Lymphocyte Absolute 0.80 (*)     Monocyte Absolute 1.31 (*)     All other components within normal limits   URINALYSIS, ROUTINE - Abnormal; Notable for the following components:    Clarity Urine Turbid (*)     Blood Urine 2+ (*)     Protein Urine 50 (*)     Leukocyte Esterase Urine 500 (*)     WBC Urine >50 (*)     RBC Urine 3-5 (*)     All other components within normal limits   REDRAW BMP - Normal   SCAN SLIDE   LACTIC ACID, PLASMA   URINE CULTURE, ROUTINE   BLOOD CULTURE   BLOOD CULTURE       If radiologic studies ordered during today's ER visit, my independent interpretation are seen directly below.  This is awaiting the radiologist's final interpretation.  CT abdomen/pelvis, independent interpretation completed by myself, awaiting final radiology interpretation: No perforation      Imaging Results read by  radiology in ED     CT ABDOMEN+PELVIS(CONTRAST ONLY)(CPT=74177)  Result Date: 4/10/2025  CONCLUSION:  1.  Urinary bladder has circumferential wall thickening, mucosal level hyperemia, and perivesicular fat stranding.  The appearance is compatible with a nonspecific cystitis.  The base the urinary bladder is deformed by the prostate and the prostate is enlarged.  Murphy catheter is present within the bladder in satisfactory position.  Mild bilateral hydroureteronephrosis.  Bilateral urothelial thickening and enhancement with periureteral fat stranding, suggesting ascending urinary tract infection.  Suspect these findings are related to outlet obstruction/prostate enlargement. 2.  Hepatic and renal cysts.  Granulomatous calcifications in the liver. 3.  Colonic diverticulosis.   Dictated by (CST): Warner Nunez MD on 4/10/2025 at 6:45 AM     Finalized by (CST): Warner Nunez MD on 4/10/2025 at 6:50 AM          ED Medications Administered:   Medications   sodium chloride 0.9 % IV bolus 2,328 mL (2,328 mL Intravenous New Bag 4/10/25 0608)   lidocaine (Urojet) 2 % urethral jelly 10 mL (10 mL Urethral Given 4/10/25 0423)   acetaminophen (Tylenol Extra Strength) tab 1,000 mg (1,000 mg Oral Given 4/10/25 0609)   cefTRIAXone (Rocephin) 2 g in sodium chloride 0.9% 100 mL IVPB-ADDV (0 g Intravenous Stopped 4/10/25 0636)   iopamidol 76% (ISOVUE-370) injection for power injector (80 mL Intravenous Given 4/10/25 0633)           Vitals:    04/10/25 0623 04/10/25 0638 04/10/25 0645 04/10/25 0653   BP:    130/73   Pulse: 96 93 96 97   Resp: 24 14 21 20   Temp:       TempSrc:       SpO2: 98% 94% 95% 95%   Weight:       Height:         *I personally reviewed and interpreted all ED vitals.    Pulse Ox interpretation by myself: 100%, Room air, Normal     Monitor Interpretation by myself:   normal sinus rhythm    If Ekg obtained during today's visit, it is independently interpreted by myself directly below:      Medical Record Review: I  personally reviewed available prior medical records for any recent pertinent discharge summaries, testing, and procedures and reviewed those reports.      [facility]     Medical decision making/ED Course:   59-year-old male who presents to the emergency department for urinary retention.  Reports suprapubic abdominal fullness.  White blood cell count of 16,000.  Urinalysis suggestive of UTI.  His symptoms seem most consistent with urosepsis.  He arrives to the emergency department meeting SIRS criteria.  Sepsis protocol initiated.  Ceftriaxone ordered.  Creatinine thankfully normal.  Glucose elevated at 272-patient reports a history of diabetes.  No signs of DKA/HHS.  Normal anion gap.  Low-grade temperature, Tylenol given.     Blood culture sent.  Lactic acid within normal limits.  Hemoglobin appears stable at 12.8.  He denies bleeding from any orifice.  Scan revealing greater than 600 mL of urine in the bladder.  Status post Murphy catheter placement, patient reports feeling better.      Given third visit to the emergency department in the last 3 weeks, CT abdomen/pelvis ordered to evaluate for abscess/acute pathology..  Case discussed with hospitalist who agrees with ER management and who will kindly follow with CT results            0615: Sepsis reevaluation complete.  Good cap refill, nontoxic in appearance.       Differential Diagnosis:  as listed above in medical decision making.   *Please note that in the presenting to the emergency department, illness/injury that poses a threat to life or function is considered during this patient's initial evaluation.    The complexity of this visit is therefore inherently more complex given the need to consider life threatening pathology prior to any other etiology for this patient's visit.    The differential diagnosis and medical decision above exemplify this rationale.       Medical Decision Making  Problems Addressed:  Urinary retention: acute illness or injury    Amount  and/or Complexity of Data Reviewed  External Data Reviewed: notes.  Labs: ordered. Decision-making details documented in ED Course.  Radiology: ordered and independent interpretation performed. Decision-making details documented in ED Course.  Discussion of management or test interpretation with external provider(s): Hospitalist      Risk  Decision regarding hospitalization.    Critical Care  Total time providing critical care: 32 minutes          ED Course as of 04/10/25 0710  ------------------------------------------------------------  Time: 04/10 0500  Comment: Normotensive, heart rate of  100  ------------------------------------------------------------  Time: 04/10 0604  Comment: Heart rate of 106, patient reports feeling better.  Reports denies back pain at this time.  No CVA tenderness. Normotensive     ------------------------------------------------------------  Time: 04/10 0608  Comment: Temperature of 99.7, low grade.  Tylenol ordered .  Heart rate of 101 sinus on the monitor  ------------------------------------------------------------  Time: 04/10 0710  Comment: Patient remains hemodynamically stable, normotensive, heart rate less than 100       Vitals:    04/10/25 0623 04/10/25 0638 04/10/25 0645 04/10/25 0653   BP:    130/73   Pulse: 96 93 96 97   Resp: 24 14 21 20   Temp:       TempSrc:       SpO2: 98% 94% 95% 95%   Weight:       Height:                 Complicating Factors: Significant medical problems that contribute to the complexity of this emergency room evaluation is listed above.    Condition upon leaving the department: Stable    Disposition and Plan     Clinical Impression:  1. Urinary retention    2. Sepsis, due to unspecified organism, unspecified whether acute organ dysfunction present (HCC)        Hospital Problems       Present on Admission  Date Reviewed: 3/28/2025          ICD-10-CM Noted POA    * (Principal) Urinary retention R33.9 4/10/2025 Unknown            Signed by Diego  MD Gay on 4/11/2025 12:01 AM         History and Physical    H&P signed by Juan Yang MD at 4/10/2025 12:45 PM    [facility]  part of [facility]     History & Physical        Date:  4/10/2025  Date of Admission:  4/10/2025     History provided by:patient  Chief Complaint:          Chief Complaint   Patient presents with    Urinary Retention         HPI:   Luis Miguel Shelton is a(n) 59 year old male with a history of urinary retention, HTN, DM2 who presents with urinary retention.     Pt has issues with chronic urinary retention and was straight cathing himself at home.    Last night he was unable to successfully straight cath himself.   The catheter was going in but no urine was coming out.    Pt had a brief syncopal episode in bathroom while trying to straight cath.   Pt did not hit head.    Pt denies fevers but has had chills.   Some discomfort in penile area.   No CP or SOB.  No n/v/c/d.    CT ABDOMEN+PELVIS(CONTRAST ONLY)(CPT=74177)  Result Date: 4/10/2025  CONCLUSION:  1.  Urinary bladder has circumferential wall thickening, mucosal level hyperemia, and perivesicular fat stranding.  The appearance is compatible with a nonspecific cystitis.  The base the urinary bladder is deformed by the prostate and the prostate is enlarged.  Mckay catheter is present within the bladder in satisfactory position.  Mild bilateral hydroureteronephrosis.  Bilateral urothelial thickening and enhancement with periureteral fat stranding, suggesting ascending urinary tract infection.  Suspect these findings are related to outlet obstruction/prostate enlargement. 2.  Hepatic and renal cysts.  Granulomatous calcifications in the liver. 3.  Colonic diverticulosis.   Dictated by (CST): Warner Nunez MD on 4/10/2025 at 6:45 AM     Finalized by (CST): Warner Nunez MD on 4/10/2025 at 6:50 AM                  Assessment/Plan:      Urinary retention  UTI  - cont mckay  - urology on consult  - cont  ceftriaxone  - f/u urine cx  - IVF  - cont flomax     Hx of HTN  - cont norvasc  - hold lisinopril     Hx of HL  - lipitor     Hx of DM2  - ISS  - tresiba     dvt proph - heparin     Code status - Full     Juan Thomas MD  4/10/2025       Signed by Juan Yang MD on 4/10/2025 12:45 PM               CONSULT  Impression:      Patient is a 59 year old male admitted with urinary retention and sepsis. Patient was to perform CIC 3-4x per day at home but has been having difficulty. Bladder scan showed >600 mL prior to mckay placement. UA suspicious for infection, awaiting cultures. Normal creatinine and lactic acid, elevated WBC.     Recommendations:  - Maintain mckay for 7-10 days  - Antibiotics per IM. Awaiting cultures  - Continue Flomax  - Follow-up outpatient for cystoscopy and urodynamics with Dr Mary Mosley PA-C        Date: 4/10/2025              Attestation signed by Vicky Rowan MD at 4/10/2025  3:41 PM     Urology Attending Attestation  I reviewed the chart and the plan with the HERB. I agree with the attached note, with changes/additions as below.     Physical Exam:     Vital Signs:  Blood pressure 121/74, pulse 94, temperature 100 °F (37.8 °C), temperature source Oral, resp. rate 18, height 6' (1.829 m), weight 213 lb (96.6 kg), SpO2 98%.           Assessment and Plan:  59-year-old man who was admitted with urinary retention for which urology was consulted.  Please note that he has seen Dr. Hernandez last on 3/28/2025 for urinary retention.  At that time he had recommended CIC with the possibility of urodynamics and cystoscopy to determine if he is a surgical candidate.  He was asked to continue on tamsulosin and dutasteride.  Most recently the patient had difficulty straight catheterizing and therefore he went to the emergency room.  In the ER he had a creatinine of 1.14, white blood cell count of 15.9, urine analysis with negative nitrates, positive leukocyte esterase.  He had  a lactate of 1.1.  CT abdomen pelvis completed demonstrated circumferential bladder wall thickening with hydroureteronephrosis down to the level of the bladder.     Agree with Murphy catheter placement, avoidance of retention inducing medications and behaviors (antimuscarinics, narcotics, constipation etc.), continuation of tamsulosin and dutasteride, Murphy catheter for 7 to 10 days, urine culture follow-up and empiric antibiotics.  Will need outpatient cystoscopy and urodynamics as planned with Dr. Hernandez.  Please trend creatinine and white blood cell count. RBUs in 48 hrs to eval resolution of hydro.      MDM Level: Moderate  Problems Addressed:  Urinary retention: Undiagnosed new problem  Amount/Complexity of Data:  Labs reviewed   Notes reviewed  Imaging independently interpreted     Risk:  OTC medications recommended  Prescription medications recommended              Is this a shared or split note between Advanced Practice Provider and Physician? Yes9     Vicky Rowan MD  Staff Urologist                  MEDICATIONS ADMINISTERED IN LAST 1 DAY:  acetaminophen (Tylenol Extra Strength) tab 1,000 mg       Date Action Dose Route User    4/10/2025 1850 Given 1,000 mg Oral Raffaele Myers RN    4/10/2025 1301 Given 1,000 mg Oral Raffaele Myers RN          amLODIPine (Norvasc) tab 5 mg       Date Action Dose Route User    4/11/2025 0902 Given 5 mg Oral Anup Eldridge RN          atorvastatin (Lipitor) tab 20 mg       Date Action Dose Route User    4/10/2025 2144 Given 20 mg Oral Bubba Corral RN          cefTRIAXone (Rocephin) 1 g in sodium chloride 0.9% 100 mL IVPB-ADDV       Date Action Dose Route User    4/11/2025 0525 New Bag 1 g Intravenous Bubba Corral RN          docusate sodium (Colace) cap 100 mg       Date Action Dose Route User    4/11/2025 1056 Given 100 mg Oral Carmen Scott RN          ibuprofen (Motrin) tab 400 mg       Date Action Dose Route User    4/10/2025 2144 Given 400 mg Oral Ellis  TAO Mac          insulin degludec (Tresiba) 100 units/mL flextouch 8 Units       Date Action Dose Route User    4/10/2025 2144 Given 8 Units Subcutaneous (Right Lower Abdomen) Bubba Corral RN          lidocaine 2% topical sterile jelly 6 mL       Date Action Dose Route User    4/10/2025 1654 Given 6 mL Topical Raffaele Myers RN          sodium chloride 0.9% infusion       Date Action Dose Route User    4/10/2025 2145 New Bag (none) Intravenous Bubba Corral RN    4/10/2025 1258 Rate/Dose Change (none) Intravenous Raffaele Myers RN          sodium chloride 0.9 % IV bolus 500 mL       Date Action Dose Route User    4/10/2025 2225 New Bag 500 mL Intravenous Bubba Corral RN          tamsulosin (Flomax) cap 0.4 mg       Date Action Dose Route User    4/10/2025 1654 Given 0.4 mg Oral Raffaele Myers RN            Vitals (last day)       Date/Time Temp Pulse Resp BP SpO2 Weight O2 Device O2 Flow Rate (L/min) [facility]    04/11/25 1211 101.9 °F (38.8 °C) 87 16 151/73 100 % -- None (Room air) -- EP    04/11/25 0859 98.8 °F (37.1 °C) 83 18 134/75 98 % -- None (Room air) -- KD    04/11/25 0417 98.2 °F (36.8 °C) 77 16 116/67 100 % -- None (Room air) -- DA    04/10/25 2204 100 °F (37.8 °C) -- -- -- -- -- -- -- DA    04/10/25 2142 101 °F (38.3 °C) -- -- -- -- -- -- -- IG    04/10/25 2116 100.1 °F (37.8 °C) 93 16 117/66 96 % -- None (Room air) -- DA    04/10/25 1845 101.8 °F (38.8 °C) -- -- -- -- -- -- -- PS    04/10/25 1456 100 °F (37.8 °C) -- -- -- -- -- -- -- PS    04/10/25 1405 102 °F (38.9 °C) -- -- -- -- -- -- -- CV    04/10/25 1252 101.7 °F (38.7 °C) 94 18 121/74 98 % -- None (Room air) -- CV    04/10/25 0840 -- -- -- -- -- 213 lb (96.6 kg) -- -- PS    04/10/25 0825 99.3 °F (37.4 °C) 96 18 117/67 99 % 213 lb 11.2 oz (96.9 kg) None (Room air) -- CV    04/10/25 0730 98.7 °F (37.1 °C) 102 21 133/75 97 % -- None (Room air) -- SW    04/10/25 0653 -- 97 20 130/73 95 % -- -- -- PK    04/10/25 0645 -- 96 21 -- 95 % --  None (Room air) -- AW    04/10/25 0638 -- 93 14 -- 94 % -- -- -- PK    04/10/25 0623 -- 96 24 -- 98 % -- -- -- PK    04/10/25 0614 -- -- -- -- -- -- None (Room air) -- AW    04/10/25 0608 -- 95 18 131/76 96 % -- -- -- PK    04/10/25 0600 -- 101 23 130/79 95 % -- None (Room air) -- AW    04/10/25 0545 -- 101 14 125/74 95 % -- None (Room air) -- AW    04/10/25 0338 97.8 °F (36.6 °C) 100 24 130/67 100 % 212 lb (96.2 kg) None (Room air) -- SH                     [1]   Past Medical History:   Chest pain    normal stress nuclear; normal stress echo    Colon polyps    Hyperplastic, repeat in 10 years    Diabetes (HCC)    diet controlled w/ no BDR - 2008    Disorder of eye, unspecified    increased C/D ratio - OU    Lipid screening    MGD (meibomian gland dysfunction)    OU    Obesity (BMI 30-39.9)    Other and unspecified hyperlipidemia    Type II or unspecified type diabetes mellitus without mention of complication, not stated as uncontrolled    Unspecified essential hypertension   [2]   Past Surgical History:  Procedure Laterality Date    Colonoscopy N/A 11/15/2016    Procedure: COLONOSCOPY;  Surgeon: GISEL Ramirez MD;  Location: [facility]COPY    Shoulder arthroscopy Left 04/28/2020   [3] (Not in a hospital admission)   [4]   Family History  Problem Relation Age of Onset    Gastro-Intestinal Disorder Brother         celiac sprue    Cancer Paternal Grandfather         Colon Ca    Cancer Other         liver cancer - close relative    Diabetes Neg     Glaucoma Neg     Macular degeneration Neg    [5]   Social History  Socioeconomic History    Marital status:    Tobacco Use    Smoking status: Never    Smokeless tobacco: Never   Vaping Use    Vaping status: Never Used   Substance and Sexual Activity    Alcohol use: Not Currently     Alcohol/week: 0.0 standard drinks of alcohol    Drug use: No    Sexual activity: Yes     Partners: Female   Other Topics Concern    Caffeine Concern Yes     Comment: coffee/tea,  >6cups/day

## 2025-04-11 NOTE — TELEPHONE ENCOUNTER
Refill passed per [facility] protocol.  Requested Prescriptions   Pending Prescriptions Disp Refills    BD PEN NEEDLE SHANTELL U/F 32G X 4 MM Does not apply Misc [Pharmacy Med Name: [id number]] 90 each 0     Sig: INJECT 1 NEEDLE AS DIRECTED  EVERY NIGHT FOR USE WITH LANTUS  / INSULIN GLARGINE       Diabetic Supplies Protocol Passed - 4/11/2025 12:11 PM        Passed - In person appointment or virtual visit in the past 12 mos or appointment in next 3 mos     Recent Outpatient Visits              2 weeks ago Urinary retention    [facility] David Hernandez MD    Office Visit    2 weeks ago BPH with obstruction/lower urinary tract symptoms    [facility] David Hernandez MD    Office Visit    1 month ago [facility]    [facility]    4 months ago Type 2 diabetes mellitus without complication, without long-term current use of insulin ([facility])    [facility] Kemal Montoya MD    Office Visit    10 months ago Type 2 diabetes mellitus without complication, without long-term current use of insulin ([facility])    [facility] Efrain Palma MD    Office Visit          Future Appointments         Provider Department Appt Notes    In 6 days [facility] UROLOGY NURSE [facility] Decath/ Voiding Trial.    In 3 weeks [facility] UROLOGY NURSE [facility] Cysto/UDS *ask [facility] if 5/9/2025 apt needed*    In 3 weeks David Hernandez MD [facility] Cysto/UDS *ask [facility] if 5/9/2025 apt needed*    In 4 weeks David Hernandez MD [facility] follow up                    Passed - Medication is active on med list

## 2025-04-12 DIAGNOSIS — E11.9 TYPE 2 DIABETES MELLITUS WITHOUT COMPLICATION, WITHOUT LONG-TERM CURRENT USE OF INSULIN (HCC): ICD-10-CM

## 2025-04-12 LAB
ANION GAP SERPL CALC-SCNC: 11 MMOL/L (ref 0–18)
BASOPHILS # BLD AUTO: 0.04 X10(3) UL (ref 0–0.2)
BASOPHILS NFR BLD AUTO: 0.5 %
BUN BLD-MCNC: 11 MG/DL (ref 9–23)
BUN/CREAT SERPL: 15.3 (ref 10–20)
C DIFF TOX B STL QL: NEGATIVE
CALCIUM BLD-MCNC: 8 MG/DL (ref 8.7–10.4)
CHLORIDE SERPL-SCNC: 104 MMOL/L (ref 98–112)
CO2 SERPL-SCNC: 21 MMOL/L (ref 21–32)
CREAT BLD-MCNC: 0.72 MG/DL (ref 0.7–1.3)
DEPRECATED RDW RBC AUTO: 44 FL (ref 35.1–46.3)
EGFRCR SERPLBLD CKD-EPI 2021: 106 ML/MIN/1.73M2 (ref 60–?)
EOSINOPHIL # BLD AUTO: 0.1 X10(3) UL (ref 0–0.7)
EOSINOPHIL NFR BLD AUTO: 1.3 %
ERYTHROCYTE [DISTWIDTH] IN BLOOD BY AUTOMATED COUNT: 13.2 % (ref 11–15)
GLUCOSE BLD-MCNC: 135 MG/DL (ref 70–99)
GLUCOSE BLDC GLUCOMTR-MCNC: 133 MG/DL (ref 70–99)
GLUCOSE BLDC GLUCOMTR-MCNC: 143 MG/DL (ref 70–99)
GLUCOSE BLDC GLUCOMTR-MCNC: 154 MG/DL (ref 70–99)
GLUCOSE BLDC GLUCOMTR-MCNC: 186 MG/DL (ref 70–99)
GLUCOSE BLDC GLUCOMTR-MCNC: 189 MG/DL (ref 70–99)
HCT VFR BLD AUTO: 29.8 % (ref 39–53)
HGB BLD-MCNC: 10.7 G/DL (ref 13–17.5)
IMM GRANULOCYTES # BLD AUTO: 0.04 X10(3) UL (ref 0–1)
IMM GRANULOCYTES NFR BLD: 0.5 %
LYMPHOCYTES # BLD AUTO: 0.65 X10(3) UL (ref 1–4)
LYMPHOCYTES NFR BLD AUTO: 8.2 %
MCH RBC QN AUTO: 32.4 PG (ref 26–34)
MCHC RBC AUTO-ENTMCNC: 35.9 G/DL (ref 31–37)
MCV RBC AUTO: 90.3 FL (ref 80–100)
MONOCYTES # BLD AUTO: 0.66 X10(3) UL (ref 0.1–1)
MONOCYTES NFR BLD AUTO: 8.4 %
NEUTROPHILS # BLD AUTO: 6.4 X10 (3) UL (ref 1.5–7.7)
NEUTROPHILS # BLD AUTO: 6.4 X10(3) UL (ref 1.5–7.7)
NEUTROPHILS NFR BLD AUTO: 81.1 %
OSMOLALITY SERPL CALC.SUM OF ELEC: 283 MOSM/KG (ref 275–295)
PLATELET # BLD AUTO: 125 10(3)UL (ref 150–450)
POTASSIUM SERPL-SCNC: 3.4 MMOL/L (ref 3.5–5.1)
RBC # BLD AUTO: 3.3 X10(6)UL (ref 4.3–5.7)
SODIUM SERPL-SCNC: 136 MMOL/L (ref 136–145)
WBC # BLD AUTO: 7.9 X10(3) UL (ref 4–11)

## 2025-04-12 PROCEDURE — 99233 SBSQ HOSP IP/OBS HIGH 50: CPT | Performed by: HOSPITALIST

## 2025-04-12 RX ORDER — POTASSIUM CHLORIDE 1500 MG/1
40 TABLET, EXTENDED RELEASE ORAL ONCE
Status: COMPLETED | OUTPATIENT
Start: 2025-04-12 | End: 2025-04-12

## 2025-04-12 NOTE — PLAN OF CARE
Problem: Patient Centered Care  Goal: Patient preferences are identified and integrated in the patient's plan of care  Description: Interventions:- What would you like us to know as we care for you? Pt from home with family. - Provide timely, complete, and accurate information to patient/family- Incorporate patient and family knowledge, values, beliefs, and cultural backgrounds into the planning and delivery of care- Encourage patient/family to participate in care and decision-making at the level they choose- Honor patient and family perspectives and choices  Outcome: Progressing     Problem: Patient/Family Goals  Goal: Patient/Family Long Term Goal  Description: Patient's Long Term Goal: Interventions:- teach pt how to care for his mckay cath, will be discharge home with it.- See additional Care Plan goals for specific interventions  Outcome: Progressing  Goal: Patient/Family Short Term Goal  Description: Patient's Short Term Goal: Interventions: - to discharge home- See additional Care Plan goals for specific interventions  Outcome: Progressing     Problem: Diabetes/Glucose Control  Goal: Glucose maintained within prescribed range  Description: INTERVENTIONS:- Monitor Blood Glucose as ordered- Assess for signs and symptoms of hyperglycemia and hypoglycemia- Administer ordered medications to maintain glucose within target range- Assess barriers to adequate nutritional intake and initiate nutrition consult as needed- Instruct patient on self management of diabetes  INTERVENTIONS:- Monitor Blood Glucose as ordered- Assess for signs and symptoms of hyperglycemia and hypoglycemia- Administer ordered medications to maintain glucose within target range- Assess barriers to adequate nutritional intake and initiate nutrition consult as needed- Instruct patient on self management of diabetes  Outcome: Progressing     Problem: PAIN - ADULT  Goal: Verbalizes/displays adequate comfort level or patient's stated pain  goal  Description: INTERVENTIONS:- Encourage pt to monitor pain and request assistance- Assess pain using appropriate pain scale- Administer analgesics based on type and severity of pain and evaluate response- Implement non-pharmacological measures as appropriate and evaluate response- Consider cultural and social influences on pain and pain management- Manage/alleviate anxiety- Utilize distraction and/or relaxation techniques- Monitor for opioid side effects- Notify MD/LIP if interventions unsuccessful or patient reports new pain- Anticipate increased pain with activity and pre-medicate as appropriate  Outcome: Progressing     Problem: SAFETY ADULT - FALL  Goal: Free from fall injury  Description: INTERVENTIONS:- Assess pt frequently for physical needs- Identify cognitive and physical deficits and behaviors that affect risk of falls.- [place] fall precautions as indicated by assessment.- Educate pt/family on patient safety including physical limitations- Instruct pt to call for assistance with activity based on assessment- Modify environment to reduce risk of injury- Provide assistive devices as appropriate- Consider OT/PT consult to assist with strengthening/mobility- Encourage toileting schedule  Outcome: Progressing     Problem: DISCHARGE PLANNING  Goal: Discharge to home or other facility with appropriate resources  Description: INTERVENTIONS:- Identify barriers to discharge w/pt and caregiver- Include patient/family/discharge partner in discharge planning- Arrange for needed discharge resources and transportation as appropriate- Identify discharge learning needs (meds, wound care, etc)- Arrange for interpreters to assist at discharge as needed- Consider post-discharge preferences of patient/family/discharge partner- Complete POLST form as appropriate- Assess patient's ability to be responsible for managing their own health- Refer to Case Management Department for coordinating discharge planning if the  patient needs post-hospital services based on physician/LIP order or complex needs related to functional status, cognitive ability or social support system  Instruct pt on Murphy care.  Outcome: Progressing     Problem: GENITOURINARY - ADULT  Goal: Absence of urinary retention  Description: INTERVENTIONS:- Assess patient’s ability to void and empty bladder- Monitor intake/output and perform bladder scan as needed- Follow urinary retention protocol/standard of care- Consider collaborating with pharmacy to review patient's medication profile- Implement strategies to promote bladder emptying  Outcome: Progressing     Problem: METABOLIC/FLUID AND ELECTROLYTES - ADULT  Goal: Glucose maintained within prescribed range  Description: INTERVENTIONS:- Monitor Blood Glucose as ordered- Assess for signs and symptoms of hyperglycemia and hypoglycemia- Administer ordered medications to maintain glucose within target range- Assess barriers to adequate nutritional intake and initiate nutrition consult as needed- Instruct patient on self management of diabetes  INTERVENTIONS:- Monitor Blood Glucose as ordered- Assess for signs and symptoms of hyperglycemia and hypoglycemia- Administer ordered medications to maintain glucose within target range- Assess barriers to adequate nutritional intake and initiate nutrition consult as needed- Instruct patient on self management of diabetes  Outcome: Progressing  Goal: Electrolytes maintained within normal limits  Description: INTERVENTIONS:- Monitor labs and rhythm and assess patient for signs and symptoms of electrolyte imbalances- Administer electrolyte replacement as ordered- Monitor response to electrolyte replacements, including rhythm and repeat lab results as appropriate- Fluid restriction as ordered- Instruct patient on fluid and nutrition restrictions as appropriate  Outcome: Progressing  Goal: Hemodynamic stability and optimal renal function maintained  Description: INTERVENTIONS:-  Monitor labs and assess for signs and symptoms of volume excess or deficit- Monitor intake, output and patient weight- Monitor urine specific gravity, serum osmolarity and serum sodium as indicated or ordered- Monitor response to interventions for patient's volume status, including labs, urine output, blood pressure (other measures as available)- Encourage oral intake as appropriate- Instruct patient on fluid and nutrition restrictions as appropriate  Outcome: Progressing

## 2025-04-12 NOTE — PROGRESS NOTES
Patient endorsed from Yuli for last 4 hours of shift. No acute changes. Ancef initiated. Continuing lidocaine as needed for pain. Murphy in place draining freely.  Call light in reach. Frequent rounding performed.

## 2025-04-12 NOTE — TELEPHONE ENCOUNTER
Former patient of Dr Lee         Medication Quantity Refills Start End   metFORMIN 500 MG Oral Tab 360 tablet 3 4/18/2024 --   Sig:   Take 2 tablets (1,000 mg total) by mouth 2 (two) times daily with meals.

## 2025-04-12 NOTE — PROGRESS NOTES
[facility]  part of [facility]    Progress Note    Luis Miguel Shelton Patient Status:  Inpatient    1965 MRN [id number]   Location [facility] 4W/SW/SE Attending Juan Yang MD   Hosp Day # 2 PCP Kemal Montoya MD       Subjective:     Pt still having fevers.  No pain.    Objective:   Blood pressure 156/75, pulse 106, temperature (!) 102.8 °F (39.3 °C), temperature source Oral, resp. rate 16, height 72\", weight 213 lb (96.6 kg), SpO2 100%.    Gen:   NAD.  A and O x 3  CV:   RRR, no m/g/r  Pulm:   CTA bilat  Abd:   +bs, soft, NT, ND  LE:   No c/c/e  Neuro:   nonfocal    Results:     Lab Results   Component Value Date    WBC 7.9 2025    HGB 10.7 (L) 2025    HCT 29.8 (L) 2025    .0 (L) 2025    CREATSERUM 0.72 2025    BUN 11 2025     2025    K 3.4 (L) 2025     2025    CO2 21.0 2025     (H) 2025    CA 8.0 (L) 2025    ALB 4.8 2024    ALKPHO 66 2024    BILT 0.6 2024    TP 7.0 2024    AST 18 2024    ALT 18 2024    PTT 26.7 2024    INR 0.89 2024    T4F 1.0 2024    TSH 5.348 (H) 2024    CASI 39 2021     2021    PSA 8.64 (H) 2025    DDIMER 0.27 2021    TROP <0.045 2021       No results found.        Assessment and Plan:     Urinary retention  UTI  Still having fevers.  Urine cx growing pansensitive E coli  - cont mckay  - urology on consult  - cont meropenem  - stop IVF  - cont flomax     Hx of HTN  - cont norvasc  - resume lisinopril     Hx of HL  - lipitor     Hx of DM2  - ISS  - tresiba     dvt proph - heparin     Code status - Full    MDM:    High Juan Thomas MD  2025

## 2025-04-12 NOTE — CONSULTS
[facility]  part of [facility] ID CONSULT NOTE    Luis Miguel Shelton Patient Status:  Inpatient    1965 MRN [id number]   Location [facility] 4W/SW/SE Attending Juan Yang MD   Hosp Day # 2 PCP Kemal Montoya MD       Reason for Consultation:  Fevers    ASSESSMENT:    Antibiotics:   Ancef  (Toney)  (CTX)    #Sepsis 2/2 below  #Complicated UTI   -Pan-S E coli   -perivesicular fat stranding, urethritis, ascending UTI  #Fevers  #leukocytosis  #Urinary rentention with intermittent straight cath at home  #Known prostatomegaly       PLAN:    -Change Toney to Ancef  -If fevers don't dissipate in the next 24-48 hours, may need to obtain MRI pelvis to evaluate for prostatitis or prostate abscess.   -Follow fever curve, wbc  -Reviewed labs, micro, imaging reports, available old records  -Discussed with primary attending and RN    History of Present Illness:  Luis Miguel Shelton is a a(n) 59 year old male with history of DM, BPH, and chronic urinary retention requiring intermittent straight catheterization at home who presents to the ED with urinary retention and unsuccessful straight cath at home.    In the ED, patient was febrile and tachycardic. Labs with WBC was 15.9 and U/a with >50 WBCs. CT AP with circumferential wall thickening of the bladder with perivesicular fat stranding as well as bilateral urothelial thickening w/ fat stranding suggesting ascending UTI    Patient was started on CTX, but transitioned to Meropenem when continued to fever.     Patient had another fever on Meropenem prompting ID consultation.     History:  Past Medical History[1]  Past Surgical History[2]  Family History[3]   reports that he has never smoked. He has never used smokeless tobacco. He reports that he does not currently use alcohol. He reports that he does not use drugs.    Allergies:  Allergies[4]    Medications:  Current Hospital Medications[5]      Physical Exam:  Vital  signs: Blood pressure 156/75, pulse 106, temperature (!) 102.8 °F (39.3 °C), temperature source Oral, resp. rate 16, height 182.9 cm (6'), weight 213 lb (96.6 kg), SpO2 100%.    Physical Exam  Constitutional:       General: He is not in acute distress.  Eyes:      Pupils: Pupils are equal, round, and reactive to light.   Cardiovascular:      Rate and Rhythm: Normal rate.   Pulmonary:      Effort: No respiratory distress.   Abdominal:      Palpations: Abdomen is soft.      Tenderness: There is no abdominal tenderness.   Musculoskeletal:         General: No swelling.   Neurological:      Mental Status: He is alert.         Laboratory Data: Reviewed in EMR    Microbiology: Reviewed in EMR    Radiology: Reviewed    Thank you for allowing us to participate in the care of this patient. Please do not hesitate to call if you have any questions.     We will continue to follow with you and will make further recommendations based on patient's progress.    Efrain Winston MD   [facility]  [phone]  4/12/2025         [1]   Past Medical History:   Chest pain    normal stress nuclear; normal stress echo    Colon polyps    Hyperplastic, repeat in 10 years    Diabetes (HCC)    diet controlled w/ no BDR - 2008    Disorder of eye, unspecified    increased C/D ratio - OU    Lipid screening    MGD (meibomian gland dysfunction)    OU    Obesity (BMI 30-39.9)    Other and unspecified hyperlipidemia    Type II or unspecified type diabetes mellitus without mention of complication, not stated as uncontrolled    Unspecified essential hypertension   [2]   Past Surgical History:  Procedure Laterality Date    Colonoscopy N/A 11/15/2016    Procedure: COLONOSCOPY;  Surgeon: GISEL Ramirez MD;  Location: [facility] ENDOSCOPY    Shoulder arthroscopy Left 04/28/2020   [3]   Family History  Problem Relation Age of Onset    Gastro-Intestinal Disorder Brother         celiac sprue    Cancer Paternal Grandfather         Colon Ca     Cancer Other         liver cancer - close relative    Diabetes Neg     Glaucoma Neg     Macular degeneration Neg    [4]   Allergies  Allergen Reactions    Azithromycin DIARRHEA and NAUSEA AND VOMITING    Sulfa Antibiotics UNKNOWN    Sulfanilamide      Other reaction(s): Hives   [5]   Current Facility-Administered Medications:     finasteride (Proscar) tab 5 mg, 5 mg, Oral, Daily    docusate sodium (Colace) cap 100 mg, 100 mg, Oral, BID    polyethylene glycol (PEG 3350) (Miralax) 17 g oral packet 17 g, 17 g, Oral, BID PRN    meropenem (Merrem) 500 mg in sodium chloride 0.9% 100mL IVPB-NIXON, 500 mg, Intravenous, Q8H    lisinopril (Prinivil; Zestril) tab 40 mg, 40 mg, Oral, Daily    heparin (Porcine) 5000 UNIT/ML injection 5,000 Units, 5,000 Units, Subcutaneous, 2 times per day    acetaminophen (Tylenol Extra Strength) tab 1,000 mg, 1,000 mg, Oral, Q6H PRN    ondansetron (Zofran) 4 MG/2ML injection 4 mg, 4 mg, Intravenous, Q6H PRN    atorvastatin (Lipitor) tab 20 mg, 20 mg, Oral, Nightly    insulin degludec (Tresiba) 100 units/mL flextouch 8 Units, 8 Units, Subcutaneous, Nightly    glucose (Dex4) 15 GM/59ML oral liquid 15 g, 15 g, Oral, Q15 Min PRN **OR** glucose (Glutose) 40% oral gel 15 g, 15 g, Oral, Q15 Min PRN **OR** glucose-vitamin C (Dex-4) chewable tab 4 tablet, 4 tablet, Oral, Q15 Min PRN **OR** dextrose 50% injection 50 mL, 50 mL, Intravenous, Q15 Min PRN **OR** glucose (Dex4) 15 GM/59ML oral liquid 30 g, 30 g, Oral, Q15 Min PRN **OR** glucose (Glutose) 40% oral gel 30 g, 30 g, Oral, Q15 Min PRN **OR** glucose-vitamin C (Dex-4) chewable tab 8 tablet, 8 tablet, Oral, Q15 Min PRN    insulin aspart (NovoLOG) 100 Units/mL FlexPen 1-5 Units, 1-5 Units, Subcutaneous, TID CC    amLODIPine (Norvasc) tab 5 mg, 5 mg, Oral, Daily    tamsulosin (Flomax) cap 0.4 mg, 0.4 mg, Oral, QPM    ibuprofen (Motrin) tab 400 mg, 400 mg, Oral, Q6H PRN    lidocaine 2% topical sterile jelly 6 mL, 6 mL, Topical, PRN

## 2025-04-12 NOTE — PLAN OF CARE
Patient alert and oriented X4, vitals stable; febrile this early afternoon, tylenol given and ID placed on consult. Continues with IV abx. Patient having loose stools, c-diff negative. Murphy in place and draining appropriately. Independent in room. Showered today. Call light within reach, calls appropriately for assistance.     Problem: Patient Centered Care  Goal: Patient preferences are identified and integrated in the patient's plan of care  Description: Interventions:- What would you like us to know as we care for you? - Provide timely, complete, and accurate information to patient/family- Incorporate patient and family knowledge, values, beliefs, and cultural backgrounds into the planning and delivery of care- Encourage patient/family to participate in care and decision-making at the level they choose- Honor patient and family perspectives and choices  Outcome: Progressing      Problem: Diabetes/Glucose Control  Goal: Glucose maintained within prescribed range  Description: INTERVENTIONS:- Monitor Blood Glucose as ordered- Assess for signs and symptoms of hyperglycemia and hypoglycemia- Administer ordered medications to maintain glucose within target range- Assess barriers to adequate nutritional intake and initiate nutrition consult as needed- Instruct patient on self management of diabetes  INTERVENTIONS:- Monitor Blood Glucose as ordered- Assess for signs and symptoms of hyperglycemia and hypoglycemia- Administer ordered medications to maintain glucose within target range- Assess barriers to adequate nutritional intake and initiate nutrition consult as needed- Instruct patient on self management of diabetes  Outcome: Progressing     Problem: PAIN - ADULT  Goal: Verbalizes/displays adequate comfort level or patient's stated pain goal  Description: INTERVENTIONS:- Encourage pt to monitor pain and request assistance- Assess pain using appropriate pain scale- Administer analgesics based on type and severity of  pain and evaluate response- Implement non-pharmacological measures as appropriate and evaluate response- Consider cultural and social influences on pain and pain management- Manage/alleviate anxiety- Utilize distraction and/or relaxation techniques- Monitor for opioid side effects- Notify MD/LIP if interventions unsuccessful or patient reports new pain- Anticipate increased pain with activity and pre-medicate as appropriate  Outcome: Progressing     Problem: SAFETY ADULT - FALL  Goal: Free from fall injury  Description: INTERVENTIONS:- Assess pt frequently for physical needs- Identify cognitive and physical deficits and behaviors that affect risk of falls.- [place] fall precautions as indicated by assessment.- Educate pt/family on patient safety including physical limitations- Instruct pt to call for assistance with activity based on assessment- Modify environment to reduce risk of injury- Provide assistive devices as appropriate- Consider OT/PT consult to assist with strengthening/mobility- Encourage toileting schedule  Outcome: Progressing     Problem: DISCHARGE PLANNING  Goal: Discharge to home or other facility with appropriate resources  Description: INTERVENTIONS:- Identify barriers to discharge w/pt and caregiver- Include patient/family/discharge partner in discharge planning- Arrange for needed discharge resources and transportation as appropriate- Identify discharge learning needs (meds, wound care, etc)- Arrange for interpreters to assist at discharge as needed- Consider post-discharge preferences of patient/family/discharge partner- Complete POLST form as appropriate- Assess patient's ability to be responsible for managing their own health- Refer to Case Management Department for coordinating discharge planning if the patient needs post-hospital services based on physician/LIP order or complex needs related to functional status, cognitive ability or social support system  Outcome: Progressing      Problem: GENITOURINARY - ADULT  Goal: Absence of urinary retention  Description: INTERVENTIONS:- Assess patient’s ability to void and empty bladder- Monitor intake/output and perform bladder scan as needed- Follow urinary retention protocol/standard of care- Consider collaborating with pharmacy to review patient's medication profile- Implement strategies to promote bladder emptying  Outcome: Progressing     Problem: METABOLIC/FLUID AND ELECTROLYTES - ADULT  Goal: Glucose maintained within prescribed range  Description: INTERVENTIONS:- Monitor Blood Glucose as ordered- Assess for signs and symptoms of hyperglycemia and hypoglycemia- Administer ordered medications to maintain glucose within target range- Assess barriers to adequate nutritional intake and initiate nutrition consult as needed- Instruct patient on self management of diabetes  INTERVENTIONS:- Monitor Blood Glucose as ordered- Assess for signs and symptoms of hyperglycemia and hypoglycemia- Administer ordered medications to maintain glucose within target range- Assess barriers to adequate nutritional intake and initiate nutrition consult as needed- Instruct patient on self management of diabetes  Outcome: Progressing  Goal: Electrolytes maintained within normal limits  Description: INTERVENTIONS:- Monitor labs and rhythm and assess patient for signs and symptoms of electrolyte imbalances- Administer electrolyte replacement as ordered- Monitor response to electrolyte replacements, including rhythm and repeat lab results as appropriate- Fluid restriction as ordered- Instruct patient on fluid and nutrition restrictions as appropriate  Outcome: Progressing  Goal: Hemodynamic stability and optimal renal function maintained  Description: INTERVENTIONS:- Monitor labs and assess for signs and symptoms of volume excess or deficit- Monitor intake, output and patient weight- Monitor urine specific gravity, serum osmolarity and serum sodium as indicated or ordered-  Monitor response to interventions for patient's volume status, including labs, urine output, blood pressure (other measures as available)- Encourage oral intake as appropriate- Instruct patient on fluid and nutrition restrictions as appropriate  Outcome: Progressing

## 2025-04-13 LAB
GLUCOSE BLDC GLUCOMTR-MCNC: 140 MG/DL (ref 70–99)
GLUCOSE BLDC GLUCOMTR-MCNC: 166 MG/DL (ref 70–99)
GLUCOSE BLDC GLUCOMTR-MCNC: 168 MG/DL (ref 70–99)
GLUCOSE BLDC GLUCOMTR-MCNC: 177 MG/DL (ref 70–99)
POTASSIUM SERPL-SCNC: 3.9 MMOL/L (ref 3.5–5.1)

## 2025-04-13 PROCEDURE — 99233 SBSQ HOSP IP/OBS HIGH 50: CPT | Performed by: HOSPITALIST

## 2025-04-13 NOTE — PLAN OF CARE
Serafin is ambulatory in room with steady gait. Murphy patent and draining clear yellow urine now, it was darker earlier tonight possible some irritation from the catheter. Elevated temp earlier tonight which responded well to tylenol.  Problem: Patient Centered Care  Goal: Patient preferences are identified and integrated in the patient's plan of care  Description: Interventions:- What would you like us to know as we care for you? - Provide timely, complete, and accurate information to patient/family- Incorporate patient and family knowledge, values, beliefs, and cultural backgrounds into the planning and delivery of care- Encourage patient/family to participate in care and decision-making at the level they choose- Honor patient and family perspectives and choices  Outcome: Progressing     Problem: Patient/Family Goals  Goal: Patient/Family Long Term Goal  Description: Patient's Long Term Goal: Interventions:- - See additional Care Plan goals for specific interventions  Outcome: Progressing  Goal: Patient/Family Short Term Goal  Description: Patient's Short Term Goal: Interventions: - - See additional Care Plan goals for specific interventions  Outcome: Progressing     Problem: Diabetes/Glucose Control  Goal: Glucose maintained within prescribed range  Description: INTERVENTIONS:- Monitor Blood Glucose as ordered- Assess for signs and symptoms of hyperglycemia and hypoglycemia- Administer ordered medications to maintain glucose within target range- Assess barriers to adequate nutritional intake and initiate nutrition consult as needed- Instruct patient on self management of diabetes  INTERVENTIONS:- Monitor Blood Glucose as ordered- Assess for signs and symptoms of hyperglycemia and hypoglycemia- Administer ordered medications to maintain glucose within target range- Assess barriers to adequate nutritional intake and initiate nutrition consult as needed- Instruct patient on self management of diabetes  Outcome:  Progressing     Problem: PAIN - ADULT  Goal: Verbalizes/displays adequate comfort level or patient's stated pain goal  Description: INTERVENTIONS:- Encourage pt to monitor pain and request assistance- Assess pain using appropriate pain scale- Administer analgesics based on type and severity of pain and evaluate response- Implement non-pharmacological measures as appropriate and evaluate response- Consider cultural and social influences on pain and pain management- Manage/alleviate anxiety- Utilize distraction and/or relaxation techniques- Monitor for opioid side effects- Notify MD/LIP if interventions unsuccessful or patient reports new pain- Anticipate increased pain with activity and pre-medicate as appropriate  Outcome: Progressing     Problem: SAFETY ADULT - FALL  Goal: Free from fall injury  Description: INTERVENTIONS:- Assess pt frequently for physical needs- Identify cognitive and physical deficits and behaviors that affect risk of falls.- [place] fall precautions as indicated by assessment.- Educate pt/family on patient safety including physical limitations- Instruct pt to call for assistance with activity based on assessment- Modify environment to reduce risk of injury- Provide assistive devices as appropriate- Consider OT/PT consult to assist with strengthening/mobility- Encourage toileting schedule  Outcome: Progressing     Problem: DISCHARGE PLANNING  Goal: Discharge to home or other facility with appropriate resources  Description: INTERVENTIONS:- Identify barriers to discharge w/pt and caregiver- Include patient/family/discharge partner in discharge planning- Arrange for needed discharge resources and transportation as appropriate- Identify discharge learning needs (meds, wound care, etc)- Arrange for interpreters to assist at discharge as needed- Consider post-discharge preferences of patient/family/discharge partner- Complete POLST form as appropriate- Assess patient's ability to be responsible for  managing their own health- Refer to Case Management Department for coordinating discharge planning if the patient needs post-hospital services based on physician/LIP order or complex needs related to functional status, cognitive ability or social support system  Outcome: Progressing     Problem: GENITOURINARY - ADULT  Goal: Absence of urinary retention  Description: INTERVENTIONS:- Assess patient’s ability to void and empty bladder- Monitor intake/output and perform bladder scan as needed- Follow urinary retention protocol/standard of care- Consider collaborating with pharmacy to review patient's medication profile- Implement strategies to promote bladder emptying  Outcome: Progressing     Problem: METABOLIC/FLUID AND ELECTROLYTES - ADULT  Goal: Glucose maintained within prescribed range  Description: INTERVENTIONS:- Monitor Blood Glucose as ordered- Assess for signs and symptoms of hyperglycemia and hypoglycemia- Administer ordered medications to maintain glucose within target range- Assess barriers to adequate nutritional intake and initiate nutrition consult as needed- Instruct patient on self management of diabetes  INTERVENTIONS:- Monitor Blood Glucose as ordered- Assess for signs and symptoms of hyperglycemia and hypoglycemia- Administer ordered medications to maintain glucose within target range- Assess barriers to adequate nutritional intake and initiate nutrition consult as needed- Instruct patient on self management of diabetes  Outcome: Progressing  Goal: Electrolytes maintained within normal limits  Description: INTERVENTIONS:- Monitor labs and rhythm and assess patient for signs and symptoms of electrolyte imbalances- Administer electrolyte replacement as ordered- Monitor response to electrolyte replacements, including rhythm and repeat lab results as appropriate- Fluid restriction as ordered- Instruct patient on fluid and nutrition restrictions as appropriate  Outcome: Progressing  Goal: Hemodynamic  stability and optimal renal function maintained  Description: INTERVENTIONS:- Monitor labs and assess for signs and symptoms of volume excess or deficit- Monitor intake, output and patient weight- Monitor urine specific gravity, serum osmolarity and serum sodium as indicated or ordered- Monitor response to interventions for patient's volume status, including labs, urine output, blood pressure (other measures as available)- Encourage oral intake as appropriate- Instruct patient on fluid and nutrition restrictions as appropriate  Outcome: Progressing

## 2025-04-13 NOTE — PLAN OF CARE
Serafin is alert and oriented x 4. He is up self in the room. Patient denies pain other than mild discomfort due to mckay. Patient voiding clear yellow urine from mckay. Two Bms reported today. Patient tolerating meals. No fevers today. Call light within reach.  Problem: Patient Centered Care  Goal: Patient preferences are identified and integrated in the patient's plan of care  Description: Interventions:- What would you like us to know as we care for you? - Provide timely, complete, and accurate information to patient/family- Incorporate patient and family knowledge, values, beliefs, and cultural backgrounds into the planning and delivery of care- Encourage patient/family to participate in care and decision-making at the level they choose- Honor patient and family perspectives and choices  Outcome: Progressing     Problem: Patient/Family Goals  Goal: Patient/Family Long Term Goal  Description: Patient's Long Term Goal: Interventions:- - See additional Care Plan goals for specific interventions  Outcome: Progressing  Goal: Patient/Family Short Term Goal  Description: Patient's Short Term Goal: Interventions: - - See additional Care Plan goals for specific interventions  Outcome: Progressing     Problem: Diabetes/Glucose Control  Goal: Glucose maintained within prescribed range  Description: INTERVENTIONS:- Monitor Blood Glucose as ordered- Assess for signs and symptoms of hyperglycemia and hypoglycemia- Administer ordered medications to maintain glucose within target range- Assess barriers to adequate nutritional intake and initiate nutrition consult as needed- Instruct patient on self management of diabetes  INTERVENTIONS:- Monitor Blood Glucose as ordered- Assess for signs and symptoms of hyperglycemia and hypoglycemia- Administer ordered medications to maintain glucose within target range- Assess barriers to adequate nutritional intake and initiate nutrition consult as needed- Instruct patient on self management of  diabetes  Outcome: Progressing     Problem: PAIN - ADULT  Goal: Verbalizes/displays adequate comfort level or patient's stated pain goal  Description: INTERVENTIONS:- Encourage pt to monitor pain and request assistance- Assess pain using appropriate pain scale- Administer analgesics based on type and severity of pain and evaluate response- Implement non-pharmacological measures as appropriate and evaluate response- Consider cultural and social influences on pain and pain management- Manage/alleviate anxiety- Utilize distraction and/or relaxation techniques- Monitor for opioid side effects- Notify MD/LIP if interventions unsuccessful or patient reports new pain- Anticipate increased pain with activity and pre-medicate as appropriate  Outcome: Progressing     Problem: SAFETY ADULT - FALL  Goal: Free from fall injury  Description: INTERVENTIONS:- Assess pt frequently for physical needs- Identify cognitive and physical deficits and behaviors that affect risk of falls.- [place] fall precautions as indicated by assessment.- Educate pt/family on patient safety including physical limitations- Instruct pt to call for assistance with activity based on assessment- Modify environment to reduce risk of injury- Provide assistive devices as appropriate- Consider OT/PT consult to assist with strengthening/mobility- Encourage toileting schedule  Outcome: Progressing     Problem: DISCHARGE PLANNING  Goal: Discharge to home or other facility with appropriate resources  Description: INTERVENTIONS:- Identify barriers to discharge w/pt and caregiver- Include patient/family/discharge partner in discharge planning- Arrange for needed discharge resources and transportation as appropriate- Identify discharge learning needs (meds, wound care, etc)- Arrange for interpreters to assist at discharge as needed- Consider post-discharge preferences of patient/family/discharge partner- Complete POLST form as appropriate- Assess patient's ability to  be responsible for managing their own health- Refer to Case Management Department for coordinating discharge planning if the patient needs post-hospital services based on physician/LIP order or complex needs related to functional status, cognitive ability or social support system  Outcome: Progressing     Problem: GENITOURINARY - ADULT  Goal: Absence of urinary retention  Description: INTERVENTIONS:- Assess patient’s ability to void and empty bladder- Monitor intake/output and perform bladder scan as needed- Follow urinary retention protocol/standard of care- Consider collaborating with pharmacy to review patient's medication profile- Implement strategies to promote bladder emptying  Outcome: Progressing     Problem: METABOLIC/FLUID AND ELECTROLYTES - ADULT  Goal: Glucose maintained within prescribed range  Description: INTERVENTIONS:- Monitor Blood Glucose as ordered- Assess for signs and symptoms of hyperglycemia and hypoglycemia- Administer ordered medications to maintain glucose within target range- Assess barriers to adequate nutritional intake and initiate nutrition consult as needed- Instruct patient on self management of diabetes  INTERVENTIONS:- Monitor Blood Glucose as ordered- Assess for signs and symptoms of hyperglycemia and hypoglycemia- Administer ordered medications to maintain glucose within target range- Assess barriers to adequate nutritional intake and initiate nutrition consult as needed- Instruct patient on self management of diabetes  Outcome: Progressing  Goal: Electrolytes maintained within normal limits  Description: INTERVENTIONS:- Monitor labs and rhythm and assess patient for signs and symptoms of electrolyte imbalances- Administer electrolyte replacement as ordered- Monitor response to electrolyte replacements, including rhythm and repeat lab results as appropriate- Fluid restriction as ordered- Instruct patient on fluid and nutrition restrictions as appropriate  Outcome:  Progressing  Goal: Hemodynamic stability and optimal renal function maintained  Description: INTERVENTIONS:- Monitor labs and assess for signs and symptoms of volume excess or deficit- Monitor intake, output and patient weight- Monitor urine specific gravity, serum osmolarity and serum sodium as indicated or ordered- Monitor response to interventions for patient's volume status, including labs, urine output, blood pressure (other measures as available)- Encourage oral intake as appropriate- Instruct patient on fluid and nutrition restrictions as appropriate  Outcome: Progressing

## 2025-04-13 NOTE — PROGRESS NOTES
[facility] ID PROGRESS NOTE    Luis Miguel Shelton Patient Status:  Inpatient    1965 MRN [id number]   Location [facility] 4W/SW/SE Attending Juan Yang MD   Hosp Day # 3 PCP Kemal Montoya MD     SUBJECTIVE  ROS done. Still with fevers up to 102.9 o/n. Awake and sitting up in bedside chair. HA earlier. Overall feeling better.    ASSESSMENT:     Antibiotics:   Ancef  (Toney)  (CTX)     #Sepsis 2/2 below  #Complicated UTI               -Pan-S E coli               -perivesicular fat stranding, urethritis, ascending UTI  #Fevers  #leukocytosis  #Urinary rentention with intermittent straight cath at home  #Known prostatomegaly      PLAN:     -Continue ancef   -Given ongoing fevers, will order MRI pelvis to evaluate for prostatitis or prostate abscess.   -Follow fever curve, wbc  -Reviewed labs, micro, imaging reports  -Discussed with patient, RN      History of Present Illness:  Luis Miguel Shelton is a a(n) 59 year old male with history of DM, BPH, and chronic urinary retention requiring intermittent straight catheterization at home who presents to the ED with urinary retention and unsuccessful straight cath at home.     In the ED, patient was febrile and tachycardic. Labs with WBC was 15.9 and U/a with >50 WBCs. CT AP with circumferential wall thickening of the bladder with perivesicular fat stranding as well as bilateral urothelial thickening w/ fat stranding suggesting ascending UTI     Patient was started on CTX, but transitioned to Meropenem when continued to fever.      Patient had another fever on Meropenem prompting ID consultation.     OBJECTIVE    /76 (BP Location: Right arm)   Pulse 91   Temp (!) 101.4 °F (38.6 °C) (Oral)   Resp 18   Ht 6' (1.829 m)   Wt 213 lb (96.6 kg)   SpO2 99%   BMI 28.89 kg/m²     Gen:   NAD, awake, alert  HEENT:  PERRLA/EOMI, neck supple  CV/lungs:  RRR, CTAB  Abdom:  Soft, NT/ND, +BS  Skin/extrem:  No  nikolai, no c/c/e  :   Murphy w/pale yellow uop    Laboratory Data: Reviewed     Microbiology: Reviewed     Radiology: Reviewed    [facility]  [phone]

## 2025-04-13 NOTE — PROGRESS NOTES
[facility]  part of [facility]    Progress Note    Luis Miguel Shelton Patient Status:  Inpatient    1965 MRN [id number]   Location [facility] 4W/SW/SE Attending Juan Yang MD   Hosp Day # 3 PCP Kemal Montoya MD       Subjective:     Continued fevers.    Objective:   Blood pressure 119/59, pulse 76, temperature 98.1 °F (36.7 °C), temperature source Oral, resp. rate 18, height 72\", weight 213 lb (96.6 kg), SpO2 99%.    Gen:   NAD.  A and O x 3  CV:   RRR, no m/g/r  Pulm:   CTA bilat  Abd:   +bs, soft, NT, ND  LE:   No c/c/e  Neuro:   nonfocal    Results:     Lab Results   Component Value Date    WBC 7.9 2025    HGB 10.7 (L) 2025    HCT 29.8 (L) 2025    .0 (L) 2025    CREATSERUM 0.72 2025    BUN 11 2025     2025    K 3.9 2025     2025    CO2 21.0 2025     (H) 2025    CA 8.0 (L) 2025    ALB 4.8 2024    ALKPHO 66 2024    BILT 0.6 2024    TP 7.0 2024    AST 18 2024    ALT 18 2024    PTT 26.7 2024    INR 0.89 2024    T4F 1.0 2024    TSH 5.348 (H) 2024    CASI 39 2021     2021    PSA 8.64 (H) 2025    DDIMER 0.27 2021    TROP <0.045 2021       No results found.        Assessment and Plan:     Urinary retention  UTI  Still having fevers.  Urine cx growing pansensitive E coli  Abnormal appearance of prostate on CT.  - MRI pelvis ordered  - cont mckay  - urology on consult  - ID on consult  - cont meropenem  - cont flomax     Hx of HTN  - cont norvasc  - cont lisinopril     Hx of HL  - lipitor     Hx of DM2  - ISS  - tresiba     dvt proph - heparin     Code status - Full    MDM:    High Juan Thomas MD  2025

## 2025-04-14 ENCOUNTER — APPOINTMENT (OUTPATIENT)
Dept: MRI IMAGING | Facility: HOSPITAL | Age: 60
End: 2025-04-14
Attending: PHYSICIAN ASSISTANT
Payer: COMMERCIAL

## 2025-04-14 LAB
ANION GAP SERPL CALC-SCNC: 7 MMOL/L (ref 0–18)
BASOPHILS # BLD AUTO: 0.04 X10(3) UL (ref 0–0.2)
BASOPHILS NFR BLD AUTO: 0.6 %
BUN BLD-MCNC: 14 MG/DL (ref 9–23)
BUN/CREAT SERPL: 18.2 (ref 10–20)
CALCIUM BLD-MCNC: 8.5 MG/DL (ref 8.7–10.4)
CHLORIDE SERPL-SCNC: 107 MMOL/L (ref 98–112)
CO2 SERPL-SCNC: 26 MMOL/L (ref 21–32)
CREAT BLD-MCNC: 0.77 MG/DL (ref 0.7–1.3)
DEPRECATED RDW RBC AUTO: 42.7 FL (ref 35.1–46.3)
EGFRCR SERPLBLD CKD-EPI 2021: 104 ML/MIN/1.73M2 (ref 60–?)
EOSINOPHIL # BLD AUTO: 0.2 X10(3) UL (ref 0–0.7)
EOSINOPHIL NFR BLD AUTO: 3.2 %
ERYTHROCYTE [DISTWIDTH] IN BLOOD BY AUTOMATED COUNT: 13 % (ref 11–15)
GLUCOSE BLD-MCNC: 162 MG/DL (ref 70–99)
GLUCOSE BLDC GLUCOMTR-MCNC: 143 MG/DL (ref 70–99)
GLUCOSE BLDC GLUCOMTR-MCNC: 159 MG/DL (ref 70–99)
GLUCOSE BLDC GLUCOMTR-MCNC: 184 MG/DL (ref 70–99)
GLUCOSE BLDC GLUCOMTR-MCNC: 186 MG/DL (ref 70–99)
HCT VFR BLD AUTO: 34.8 % (ref 39–53)
HGB BLD-MCNC: 11.7 G/DL (ref 13–17.5)
IMM GRANULOCYTES # BLD AUTO: 0.02 X10(3) UL (ref 0–1)
IMM GRANULOCYTES NFR BLD: 0.3 %
LYMPHOCYTES # BLD AUTO: 1.41 X10(3) UL (ref 1–4)
LYMPHOCYTES NFR BLD AUTO: 22.7 %
MCH RBC QN AUTO: 30.5 PG (ref 26–34)
MCHC RBC AUTO-ENTMCNC: 33.6 G/DL (ref 31–37)
MCV RBC AUTO: 90.6 FL (ref 80–100)
MONOCYTES # BLD AUTO: 0.8 X10(3) UL (ref 0.1–1)
MONOCYTES NFR BLD AUTO: 12.9 %
NEUTROPHILS # BLD AUTO: 3.74 X10 (3) UL (ref 1.5–7.7)
NEUTROPHILS # BLD AUTO: 3.74 X10(3) UL (ref 1.5–7.7)
NEUTROPHILS NFR BLD AUTO: 60.3 %
OSMOLALITY SERPL CALC.SUM OF ELEC: 294 MOSM/KG (ref 275–295)
PLATELET # BLD AUTO: 160 10(3)UL (ref 150–450)
POTASSIUM SERPL-SCNC: 4.1 MMOL/L (ref 3.5–5.1)
RBC # BLD AUTO: 3.84 X10(6)UL (ref 4.3–5.7)
SODIUM SERPL-SCNC: 140 MMOL/L (ref 136–145)
WBC # BLD AUTO: 6.2 X10(3) UL (ref 4–11)

## 2025-04-14 PROCEDURE — 72197 MRI PELVIS W/O & W/DYE: CPT | Performed by: PHYSICIAN ASSISTANT

## 2025-04-14 PROCEDURE — 99233 SBSQ HOSP IP/OBS HIGH 50: CPT | Performed by: HOSPITALIST

## 2025-04-14 RX ORDER — GADOTERATE MEGLUMINE 376.9 MG/ML
20 INJECTION INTRAVENOUS
Status: COMPLETED | OUTPATIENT
Start: 2025-04-14 | End: 2025-04-14

## 2025-04-14 RX ORDER — INSULIN GLARGINE-YFGN 100 [IU]/ML
10 INJECTION, SOLUTION SUBCUTANEOUS DAILY
Qty: 3 ML | Refills: 5 | Status: SHIPPED | OUTPATIENT
Start: 2025-04-14 | End: 2025-04-15

## 2025-04-14 NOTE — PROGRESS NOTES
[facility] ID PROGRESS NOTE    Luis Miguel Shelton Patient Status:  Inpatient    1965 MRN [id number]   Location [facility] 4W/SW/SE Attending Juan Yang MD   Hosp Day # 4 PCP Kemal Montoya MD     SUBJECTIVE  ROS done. Fevers improved. Afebrile. Having some looser Bms but still formed.    ASSESSMENT:     Antibiotics: Ancef  (Toney)  (CTX)     #Sepsis 2/2 below  #Complicated UTI ?prostatitis               -Pan-S E coli               -perivesicular fat stranding, urethritis, ascending UTI  #Fevers  #leukocytosis  #Urinary rentention with intermittent straight cath at home  #Known prostatomegaly      PLAN:  -Continue ancef. FU MRI prostate. Anticipate likely DC on PO bactrim.  -Follow fever curve, wbc  -Reviewed labs, micro, imaging reports  -Discussed with patient, RN      History of Present Illness:  Luis Miguel Shelton is a a(n) 59 year old male with history of DM, BPH, and chronic urinary retention requiring intermittent straight catheterization at home who presents to the ED with urinary retention and unsuccessful straight cath at home.     In the ED, patient was febrile and tachycardic. Labs with WBC was 15.9 and U/a with >50 WBCs. CT AP with circumferential wall thickening of the bladder with perivesicular fat stranding as well as bilateral urothelial thickening w/ fat stranding suggesting ascending UTI     Patient was started on CTX, but transitioned to Meropenem when continued to fever.      Patient had another fever on Meropenem prompting ID consultation.     OBJECTIVE    /72 (BP Location: Right arm)   Pulse 67   Temp 98.2 °F (36.8 °C) (Oral)   Resp 17   Ht 6' (1.829 m)   Wt 213 lb (96.6 kg)   SpO2 99%   BMI 28.89 kg/m²     Gen:   Awake, in bed  HEENT:  EOMI, neck supple  CV/lungs:  RRR, CTAB  Abdom:  Soft, no TTP  Skin/extrem:  No rashes, no c/c/e  :   Murphy w/pale yellow uop    Laboratory Data: Reviewed     Microbiology:  Reviewed     Radiology: Reviewed    [facility]NALISE [facility]  [phone]

## 2025-04-14 NOTE — PLAN OF CARE
VSS, afebrile. Lidocaine gel at bedside for irritation at mckay insertion site. Mckay to gravity. No pain medication needed. Ancef administered. Good appetite. Sent for MRI of prostate. Plan is ongoing, probable home tomorrow. Safety plan in place, calling appropriately for assistance.     Problem: Patient Centered Care  Goal: Patient preferences are identified and integrated in the patient's plan of care  Description: Interventions:- What would you like us to know as we care for you?   - Provide timely, complete, and accurate information to patient/family- Incorporate patient and family knowledge, values, beliefs, and cultural backgrounds into the planning and delivery of care- Encourage patient/family to participate in care and decision-making at the level they choose- Honor patient and family perspectives and choices  Outcome: Progressing     Problem: Diabetes/Glucose Control  Goal: Glucose maintained within prescribed range  Description: INTERVENTIONS:- Monitor Blood Glucose as ordered- Assess for signs and symptoms of hyperglycemia and hypoglycemia- Administer ordered medications to maintain glucose within target range- Assess barriers to adequate nutritional intake and initiate nutrition consult as needed- Instruct patient on self management of diabetes  INTERVENTIONS:- Monitor Blood Glucose as ordered- Assess for signs and symptoms of hyperglycemia and hypoglycemia- Administer ordered medications to maintain glucose within target range- Assess barriers to adequate nutritional intake and initiate nutrition consult as needed- Instruct patient on self management of diabetes  Outcome: Progressing     Problem: PAIN - ADULT  Goal: Verbalizes/displays adequate comfort level or patient's stated pain goal  Description: INTERVENTIONS:- Encourage pt to monitor pain and request assistance- Assess pain using appropriate pain scale- Administer analgesics based on type and severity of pain and evaluate response- Implement  non-pharmacological measures as appropriate and evaluate response- Consider cultural and social influences on pain and pain management- Manage/alleviate anxiety- Utilize distraction and/or relaxation techniques- Monitor for opioid side effects- Notify MD/LIP if interventions unsuccessful or patient reports new pain- Anticipate increased pain with activity and pre-medicate as appropriate  Outcome: Progressing     Problem: SAFETY ADULT - FALL  Goal: Free from fall injury  Description: INTERVENTIONS:- Assess pt frequently for physical needs- Identify cognitive and physical deficits and behaviors that affect risk of falls.- [place] fall precautions as indicated by assessment.- Educate pt/family on patient safety including physical limitations- Instruct pt to call for assistance with activity based on assessment- Modify environment to reduce risk of injury- Provide assistive devices as appropriate- Consider OT/PT consult to assist with strengthening/mobility- Encourage toileting schedule  Outcome: Progressing     Problem: DISCHARGE PLANNING  Goal: Discharge to home or other facility with appropriate resources  Description: INTERVENTIONS:- Identify barriers to discharge w/pt and caregiver- Include patient/family/discharge partner in discharge planning- Arrange for needed discharge resources and transportation as appropriate- Identify discharge learning needs (meds, wound care, etc)- Arrange for interpreters to assist at discharge as needed- Consider post-discharge preferences of patient/family/discharge partner- Complete POLST form as appropriate- Assess patient's ability to be responsible for managing their own health- Refer to Case Management Department for coordinating discharge planning if the patient needs post-hospital services based on physician/LIP order or complex needs related to functional status, cognitive ability or social support system  Outcome: Progressing     Problem: GENITOURINARY - ADULT  Goal:  Absence of urinary retention  Description: INTERVENTIONS:- Assess patient’s ability to void and empty bladder- Monitor intake/output and perform bladder scan as needed- Follow urinary retention protocol/standard of care- Consider collaborating with pharmacy to review patient's medication profile- Implement strategies to promote bladder emptying  Outcome: Progressing     Problem: METABOLIC/FLUID AND ELECTROLYTES - ADULT  Goal: Glucose maintained within prescribed range  Description: INTERVENTIONS:- Monitor Blood Glucose as ordered- Assess for signs and symptoms of hyperglycemia and hypoglycemia- Administer ordered medications to maintain glucose within target range- Assess barriers to adequate nutritional intake and initiate nutrition consult as needed- Instruct patient on self management of diabetes  INTERVENTIONS:- Monitor Blood Glucose as ordered- Assess for signs and symptoms of hyperglycemia and hypoglycemia- Administer ordered medications to maintain glucose within target range- Assess barriers to adequate nutritional intake and initiate nutrition consult as needed- Instruct patient on self management of diabetes  Outcome: Progressing  Goal: Hemodynamic stability and optimal renal function maintained  Description: INTERVENTIONS:- Monitor labs and assess for signs and symptoms of volume excess or deficit- Monitor intake, output and patient weight- Monitor urine specific gravity, serum osmolarity and serum sodium as indicated or ordered- Monitor response to interventions for patient's volume status, including labs, urine output, blood pressure (other measures as available)- Encourage oral intake as appropriate- Instruct patient on fluid and nutrition restrictions as appropriate  Outcome: Progressing

## 2025-04-14 NOTE — PROGRESS NOTES
[facility]  part of [facility]    Progress Note    Luis Miguel Shelton Patient Status:  Inpatient    1965 MRN [id number]   Location [facility] 4W/SW/SE Attending Juan Yang MD   Hosp Day # 4 PCP Kemal Montoya MD       Subjective:     No pain.  Awaits MRI.    Objective:   Blood pressure 123/72, pulse 67, temperature 98.2 °F (36.8 °C), temperature source Oral, resp. rate 17, height 72\", weight 213 lb (96.6 kg), SpO2 99%.    Gen:   NAD.  A and O x 3  CV:   RRR, no m/g/r  Pulm:   CTA bilat  Abd:   +bs, soft, NT, ND  LE:   No c/c/e  Neuro:   nonfocal    Results:     Lab Results   Component Value Date    WBC 6.2 2025    HGB 11.7 (L) 2025    HCT 34.8 (L) 2025    .0 2025    CREATSERUM 0.77 2025    BUN 14 2025     2025    K 4.1 2025     2025    CO2 26.0 2025     (H) 2025    CA 8.5 (L) 2025    ALB 4.8 2024    ALKPHO 66 2024    BILT 0.6 2024    TP 7.0 2024    AST 18 2024    ALT 18 2024    PTT 26.7 2024    INR 0.89 2024    T4F 1.0 2024    TSH 5.348 (H) 2024    CASI 39 2021     2021    PSA 8.64 (H) 2025    DDIMER 0.27 2021    TROP <0.045 2021       No results found.        Assessment and Plan:     Urinary retention  UTI  Fevers - resolved  Urine cx growing pansensitive E coli  Abnormal appearance of prostate on CT.  - MRI prostate today  - cont mckay  - urology on consult - outpt f/u  - ID on consult  - cont meropenem  - cont flomax     Hx of HTN  - cont norvasc  - cont lisinopril     Hx of HL  - lipitor     Hx of DM2  - ISS  - tresiba     dvt proph - heparin     Code status - Full    MDM:    High Juan Thomas MD  2025

## 2025-04-14 NOTE — TELEPHONE ENCOUNTER
DR Montoya ==the previous prescription was for Lantus, but the pharmacy is requesting for SEMGLEE (Toujeo.) Pending order. Thank        Upon chart review, the patient is currently an inpatient at [facility].  ED to Hosp-Admission  Current  4/10/2025 - present  Care Timeline    04/10   Admitted from ED (Observation) 0803      Admitted 0906

## 2025-04-14 NOTE — PAYOR COMM NOTE
--------------  CONTINUED STAY REVIEW----REQUESTING ADDITIONAL DAYS 4/12 4/13 4/14      Payor: TIFFANIE GONZALEZ  Subscriber #:  [id number]  Authorization Number: [id number]    Admit date: 4/10/25  Admit time:  9:06 AM    4/12           Date of Service: 4/12/2025 12:39 PM     Signed         [facility]  part of [facility]     Progress Note          Subjective:      Pt still having fevers.  No pain.     Objective:   Blood pressure 156/75, pulse 106, temperature (!) 102.8 °F (39.3 °C), temperature source Oral, resp. rate 16, height 72\", weight 213 lb (96.6 kg), SpO2 100%.     Gen:   NAD.  A and O x 3  CV:   RRR, no m/g/r  Pulm:   CTA bilat  Abd:   +bs, soft, NT, ND  LE:   No c/c/e  Neuro:   nonfocal     Results:            Lab Results   Component Value Date     WBC 7.9 04/12/2025     HGB 10.7 (L) 04/12/2025     HCT 29.8 (L) 04/12/2025     .0 (L) 04/12/2025     CREATSERUM 0.72 04/12/2025     BUN 11 04/12/2025      04/12/2025     K 3.4 (L) 04/12/2025      04/12/2025     CO2 21.0 04/12/2025      (H) 04/12/2025     CA 8.0 (L) 04/12/2025     ALB 4.8 11/27/2024     ALKPHO 66 11/27/2024     BILT 0.6 11/27/2024     TP 7.0 11/27/2024     AST 18 11/27/2024     ALT 18 11/27/2024     PTT 26.7 01/11/2024     INR 0.89 01/11/2024     T4F 1.0 11/27/2024     TSH 5.348 (H) 11/27/2024     CASI 39 01/04/2021      01/04/2021     PSA 8.64 (H) 03/12/2025     DDIMER 0.27 01/04/2021     TROP <0.045 01/04/2021         No results found.         Assessment and Plan:      Urinary retention  UTI  Still having fevers.  Urine cx growing pansensitive E coli  - cont mckay  - urology on consult  - cont meropenem  - stop IVF  - cont flomax     Hx of HTN  - cont norvasc  - resume lisinopril     Hx of HL  - lipitor     Hx of DM2  - ISS  - tresiba     dvt proph - heparin     Code status - Full     MDM:    High Juan Thomas MD  4/12/2025 4/13           Date of Service: 4/13/2025 12:38 PM      Signed         [facility]  part of [facility]     Progress Note     Subjective:      Continued fevers.     Objective:   Blood pressure 119/59, pulse 76, temperature 98.1 °F (36.7 °C), temperature source Oral, resp. rate 18, height 72\", weight 213 lb (96.6 kg), SpO2 99%.     Gen:   NAD.  A and O x 3  CV:   RRR, no m/g/r  Pulm:   CTA bilat  Abd:   +bs, soft, NT, ND  LE:   No c/c/e  Neuro:   nonfocal     Results:            Lab Results   Component Value Date     WBC 7.9 04/12/2025     HGB 10.7 (L) 04/12/2025     HCT 29.8 (L) 04/12/2025     .0 (L) 04/12/2025     CREATSERUM 0.72 04/12/2025     BUN 11 04/12/2025      04/12/2025     K 3.9 04/13/2025      04/12/2025     CO2 21.0 04/12/2025      (H) 04/12/2025     CA 8.0 (L) 04/12/2025     ALB 4.8 11/27/2024     ALKPHO 66 11/27/2024     BILT 0.6 11/27/2024     TP 7.0 11/27/2024     AST 18 11/27/2024     ALT 18 11/27/2024     PTT 26.7 01/11/2024     INR 0.89 01/11/2024     T4F 1.0 11/27/2024     TSH 5.348 (H) 11/27/2024     CASI 39 01/04/2021      01/04/2021     PSA 8.64 (H) 03/12/2025     DDIMER 0.27 01/04/2021     TROP <0.045 01/04/2021         No results found.         Assessment and Plan:      Urinary retention  UTI  Still having fevers.  Urine cx growing pansensitive E coli  Abnormal appearance of prostate on CT.  - MRI pelvis ordered  - cont mckay  - urology on consult  - ID on consult  - cont meropenem  - cont flomax     Hx of HTN  - cont norvasc  - cont lisinopril     Hx of HL  - lipitor     Hx of DM2  - ISS  - tresiba     dvt proph - heparin     Code status - Full     MDM:    High Juan Thomas MD  4/13/2025 4/14          Date of Service: 4/14/2025 12:03 PM     Signed         [facility]  part of [facility]     Progress Note           Subjective:      No pain.  Awaits MRI.     Objective:   Blood pressure 123/72, pulse 67, temperature 98.2 °F (36.8 °C), temperature source  Oral, resp. rate 17, height 72\", weight 213 lb (96.6 kg), SpO2 99%.     Gen:   NAD.  A and O x 3  CV:   RRR, no m/g/r  Pulm:   CTA bilat  Abd:   +bs, soft, NT, ND  LE:   No c/c/e  Neuro:   nonfocal     Results:            Lab Results   Component Value Date     WBC 6.2 04/14/2025     HGB 11.7 (L) 04/14/2025     HCT 34.8 (L) 04/14/2025     .0 04/14/2025     CREATSERUM 0.77 04/14/2025     BUN 14 04/14/2025      04/14/2025     K 4.1 04/14/2025      04/14/2025     CO2 26.0 04/14/2025      (H) 04/14/2025     CA 8.5 (L) 04/14/2025     ALB 4.8 11/27/2024     ALKPHO 66 11/27/2024     BILT 0.6 11/27/2024     TP 7.0 11/27/2024     AST 18 11/27/2024     ALT 18 11/27/2024     PTT 26.7 01/11/2024     INR 0.89 01/11/2024     T4F 1.0 11/27/2024     TSH 5.348 (H) 11/27/2024     CASI 39 01/04/2021      01/04/2021     PSA 8.64 (H) 03/12/2025     DDIMER 0.27 01/04/2021     TROP <0.045 01/04/2021         No results found.         Assessment and Plan:      Urinary retention  UTI  Fevers - resolved  Urine cx growing pansensitive E coli  Abnormal appearance of prostate on CT.  - MRI prostate today  - cont mckay  - urology on consult - outpt f/u  - ID on consult  - cont meropenem  - cont flomax     Hx of HTN  - cont norvasc  - cont lisinopril     Hx of HL  - lipitor     Hx of DM2  - ISS  - tresiba     dvt proph - heparin     Code status - Full     MDM:    High Juan Thomas MD  4/14/2025                    MEDICATIONS ADMINISTERED IN LAST 1 DAY:  acetaminophen (Tylenol Extra Strength) tab 1,000 mg       Date Action Dose Route User    4/14/2025 0137 Given 1,000 mg Oral Savi Yañez RN          amLODIPine (Norvasc) tab 5 mg       Date Action Dose Route User    4/14/2025 0900 Given 5 mg Oral Heather Montano RN          atorvastatin (Lipitor) tab 20 mg       Date Action Dose Route User    4/13/2025 2207 Given 20 mg Oral Savi Yañez RN          ceFAZolin (Ancef) 2g in 10mL IV syringe premix        Date Action Dose Route User    4/14/2025 1457 New Bag 2 g Intravenous Heather Montano RN    4/14/2025 0745 New Bag 2 g Intravenous Heather Montano RN    4/14/2025 0135 New Bag 2 g Intravenous Savi Yañez RN          gadoterate meglumine (Dotarem) 10 MMOL/20ML injection 20 mL       Date Action Dose Route User    4/14/2025 1315 Given 20 mL Intravenous Dieppa, Galina          insulin aspart (NovoLOG) 100 Units/mL FlexPen 1-5 Units       Date Action Dose Route User    4/14/2025 1124 Given 1 Units Subcutaneous (Right Upper Arm) Heather Montano RN    4/13/2025 1615 Given 1 Units Subcutaneous (Right Upper Arm) Anup Eldridge RN          insulin degludec (Tresiba) 100 units/mL flextouch 8 Units       Date Action Dose Route User    4/13/2025 2210 Given 8 Units Subcutaneous (Right Upper Arm) Savi Yañez RN          lisinopril (Prinivil; Zestril) tab 40 mg       Date Action Dose Route User    4/14/2025 0900 Given 40 mg Oral Heather Montano RN          tamsulosin (Flomax) cap 0.4 mg       Date Action Dose Route User    4/13/2025 1822 Given 0.4 mg Oral Anup Eldridge RN            Vitals (last day)       Date/Time Temp Pulse Resp BP SpO2 Weight O2 Device O2 Flow Rate (L/min) [facility]    04/14/25 0435 98.2 °F (36.8 °C) 67 17 123/72 99 % -- None (Room air) -- KT    04/13/25 1949 99.3 °F (37.4 °C) 87 17 142/80 98 % -- None (Room air) -- KT    04/13/25 1323 98.6 °F (37 °C) -- -- -- -- -- -- -- EM    04/13/25 1012 98.1 °F (36.7 °C) 76 18 119/59 99 % -- None (Room air) -- EM    04/13/25 0902 97.7 °F (36.5 °C) 82 18 110/72 100 % -- None (Room air) -- KD    04/13/25 0513 101.4 °F (38.6 °C) 91 18 134/76 99 % -- None (Room air) -- BL

## 2025-04-15 VITALS
BODY MASS INDEX: 28.85 KG/M2 | SYSTOLIC BLOOD PRESSURE: 123 MMHG | HEIGHT: 72 IN | HEART RATE: 84 BPM | WEIGHT: 213 LBS | DIASTOLIC BLOOD PRESSURE: 70 MMHG | OXYGEN SATURATION: 99 % | TEMPERATURE: 98 F | RESPIRATION RATE: 18 BRPM

## 2025-04-15 LAB
ANION GAP SERPL CALC-SCNC: 7 MMOL/L (ref 0–18)
BASOPHILS # BLD AUTO: 0.04 X10(3) UL (ref 0–0.2)
BASOPHILS NFR BLD AUTO: 0.6 %
BUN BLD-MCNC: 18 MG/DL (ref 9–23)
BUN/CREAT SERPL: 21.2 (ref 10–20)
CALCIUM BLD-MCNC: 8.8 MG/DL (ref 8.7–10.4)
CHLORIDE SERPL-SCNC: 106 MMOL/L (ref 98–112)
CO2 SERPL-SCNC: 26 MMOL/L (ref 21–32)
CREAT BLD-MCNC: 0.85 MG/DL (ref 0.7–1.3)
DEPRECATED RDW RBC AUTO: 43 FL (ref 35.1–46.3)
EGFRCR SERPLBLD CKD-EPI 2021: 101 ML/MIN/1.73M2 (ref 60–?)
EOSINOPHIL # BLD AUTO: 0.23 X10(3) UL (ref 0–0.7)
EOSINOPHIL NFR BLD AUTO: 3.5 %
ERYTHROCYTE [DISTWIDTH] IN BLOOD BY AUTOMATED COUNT: 13 % (ref 11–15)
GLUCOSE BLD-MCNC: 180 MG/DL (ref 70–99)
GLUCOSE BLDC GLUCOMTR-MCNC: 181 MG/DL (ref 70–99)
GLUCOSE BLDC GLUCOMTR-MCNC: 190 MG/DL (ref 70–99)
HCT VFR BLD AUTO: 34.1 % (ref 39–53)
HGB BLD-MCNC: 11.8 G/DL (ref 13–17.5)
IMM GRANULOCYTES # BLD AUTO: 0.05 X10(3) UL (ref 0–1)
IMM GRANULOCYTES NFR BLD: 0.8 %
LYMPHOCYTES # BLD AUTO: 1.54 X10(3) UL (ref 1–4)
LYMPHOCYTES NFR BLD AUTO: 23.5 %
MCH RBC QN AUTO: 31.5 PG (ref 26–34)
MCHC RBC AUTO-ENTMCNC: 34.6 G/DL (ref 31–37)
MCV RBC AUTO: 90.9 FL (ref 80–100)
MONOCYTES # BLD AUTO: 0.86 X10(3) UL (ref 0.1–1)
MONOCYTES NFR BLD AUTO: 13.1 %
NEUTROPHILS # BLD AUTO: 3.83 X10 (3) UL (ref 1.5–7.7)
NEUTROPHILS # BLD AUTO: 3.83 X10(3) UL (ref 1.5–7.7)
NEUTROPHILS NFR BLD AUTO: 58.5 %
OSMOLALITY SERPL CALC.SUM OF ELEC: 294 MOSM/KG (ref 275–295)
PLATELET # BLD AUTO: 178 10(3)UL (ref 150–450)
POTASSIUM SERPL-SCNC: 4.4 MMOL/L (ref 3.5–5.1)
RBC # BLD AUTO: 3.75 X10(6)UL (ref 4.3–5.7)
SODIUM SERPL-SCNC: 139 MMOL/L (ref 136–145)
WBC # BLD AUTO: 6.6 X10(3) UL (ref 4–11)

## 2025-04-15 PROCEDURE — 99239 HOSP IP/OBS DSCHRG MGMT >30: CPT | Performed by: HOSPITALIST

## 2025-04-15 RX ORDER — INSULIN GLARGINE-YFGN 100 [IU]/ML
10 INJECTION, SOLUTION SUBCUTANEOUS DAILY
Qty: 3 ML | Refills: 5 | Status: SHIPPED | OUTPATIENT
Start: 2025-04-15

## 2025-04-15 RX ORDER — CIPROFLOXACIN 500 MG/1
500 TABLET, FILM COATED ORAL 2 TIMES DAILY
Qty: 28 TABLET | Refills: 0 | Status: SHIPPED | OUTPATIENT
Start: 2025-04-15 | End: 2025-04-29

## 2025-04-15 NOTE — PROGRESS NOTES
[facility] ID PROGRESS NOTE    Luis Miguel Shelton Patient Status:  Inpatient    1965 MRN [id number]   Location [facility] 4W/SW/SE Attending Juan Yang MD   Hosp Day # 5 PCP Kemal Montoya MD     SUBJECTIVE  ROS done. Feels much better.    ASSESSMENT:     Antibiotics: Ancef  (Toney)  (CTX)     #Sepsis 2/2 below  #Complicated UTI ?prostatitis               -Pan-S E coli               -perivesicular fat stranding, urethritis, ascending UTI   -MRI prostate without abscess  #Fevers  #leukocytosis  #Urinary rentention with intermittent straight cath at home  #Known prostatomegaly      PLAN:  -Currently on ancef. Will DC on PO ciprofloxacin x 2 weeks on DC.  -Follow fever curve, wbc  -Reviewed labs, micro, imaging reports  -Discussed with patient, RN      History of Present Illness:  Luis Miguel Shelton is a a(n) 59 year old male with history of DM, BPH, and chronic urinary retention requiring intermittent straight catheterization at home who presents to the ED with urinary retention and unsuccessful straight cath at home.     In the ED, patient was febrile and tachycardic. Labs with WBC was 15.9 and U/a with >50 WBCs. CT AP with circumferential wall thickening of the bladder with perivesicular fat stranding as well as bilateral urothelial thickening w/ fat stranding suggesting ascending UTI     Patient was started on CTX, but transitioned to Meropenem when continued to fever.      Patient had another fever on Meropenem prompting ID consultation.     OBJECTIVE    /70 (BP Location: Right arm)   Pulse 84   Temp 98.1 °F (36.7 °C) (Oral)   Resp 18   Ht 6' (1.829 m)   Wt 213 lb (96.6 kg)   SpO2 99%   BMI 28.89 kg/m²     Gen:   Awake, in bed  HEENT:  EOMI, neck supple  CV/lungs:  RRR, CTAB  Abdom:  Soft, no TTP  Skin/extrem:  No rashes, no c/c/e  :   Murphy w/ yellow urine    Laboratory Data: Reviewed     Microbiology: Reviewed     Radiology:  Reviewed    [facility]  [phone]

## 2025-04-15 NOTE — DISCHARGE SUMMARY
[facility]  part of [facility]    Discharge Summary    Luis Miguel Shelton Patient Status:  Inpatient    1965 MRN [id number]   Location [facility] 4W/SW/SE Attending Juan Yang MD   Hosp Day # 5 PCP Kemal Montoya MD     Date of Admission: 4/10/2025 Disposition:   Home or Self Care     Date of Discharge:   4/15/2025     Admitting Diagnosis:   Urinary retention [R33.9]  Sepsis, due to unspecified organism, unspecified whether acute organ dysfunction present (HCC) [A41.9]    Hospital Discharge Diagnoses:    Urinary retention  Pansensitive E coli UTI  Fevers    BPH  Hx of HTN  Hx of HL  Hx of DM2       Problem List:     Problem List[1]    Physical Exam:      /70 (BP Location: Right arm)   Pulse 84   Temp 98.1 °F (36.7 °C) (Oral)   Resp 18   Ht 72\"   Wt 213 lb (96.6 kg)   SpO2 99%   BMI 28.89 kg/m²     Gen:  NAD.  A and O x  3  CV:   RRR.  No m/g/r  Pulm:   CTA bilat  Abd:   +bs, soft, NT, ND  LE:  No c/c/e  Neuro:  nonfocal      Reason for Admission:   Urinary retention    History of Present Illness:   Luis Miguel Shelton is a(n) 59 year old male with a history of urinary retention, HTN, DM2 who presents with urinary retention.     Pt has issues with chronic urinary retention and was straight cathing himself at home.    Last night he was unable to successfully straight cath himself.   The catheter was going in but no urine was coming out.    Pt had a brief syncopal episode in bathroom while trying to straight cath.   Pt did not hit head.    Pt denies fevers but has had chills.   Some discomfort in penile area.   No CP or SOB.  No n/v/c/d.    Hospital Course:     Urinary retention  UTI  Fevers - resolved  Urine cx growing pansensitive E coli  Abnormal appearance of prostate on CT.  MRI prostate shows prostatomegaly, no abscess.  - Mckay inserted here.  Pt to dc with mckay.   - urology was on consult - outpt f/u  - ID was on consult  - Was given  ceftriaxone, then meropenem, then Ancef.  Home on PO cipro for 2 wks.  - cont proscar, flomax     Hx of HTN  - cont norvasc  - cont lisinopril     Hx of HL  - lipitor     Hx of DM2  Resume home regimen.     dvt proph - heparin     Code status - Full       Consultations:   UTI    Discharge Condition:  Good    Discharge Medications:      Discharge Medications        START taking these medications        Instructions Prescription details   ciprofloxacin 500 MG Tabs  Commonly known as: Cipro      Take 1 tablet (500 mg total) by mouth 2 (two) times daily for 14 days.   Stop taking on: April 29, 2025  Quantity: 28 tablet  Refills: 0     insulin glargine 100 UNIT/ML Sopn      Inject 10 Units into the skin nightly.   Quantity: 3 mL  Refills: 3     Insulin Glargine-yfgn 100 UNIT/ML Sopn  Commonly known as: Semglee (yfgn)      Inject 10 Units into the skin daily.   Quantity: 3 mL  Refills: 5            CONTINUE taking these medications        Instructions Prescription details   amLODIPine 5 MG Tabs  Commonly known as: Norvasc      Take 1 tablet (5 mg total) by mouth daily.   Quantity: 90 tablet  Refills: 3     BABY ASPIRIN OR      Take 81 mg by mouth.   Refills: 0     BD Pen Needle Adriana U/F 32G X 4 MM Misc  Generic drug: Insulin Pen Needle      INJECT 1 NEEDLE AS DIRECTED  EVERY NIGHT FOR USE WITH LANTUS  / INSULIN GLARGINE   Quantity: 90 each  Refills: 3     Cholecalciferol 125 MCG (5000 UT) Tabs  Commonly known as: VITAMIN D-3      Take 1 tablet (5,000 Units total) by mouth in the morning.   Refills: 0     Contour Next Test Strp  Generic drug: Glucose Blood      Use 1 (one) new strip three times daily for blood glucose monitoring   Quantity: 300 strip  Refills: 1     dutasteride 0.5 MG Caps  Commonly known as: Avodart      Take 1 capsule (0.5 mg total) by mouth daily.   Quantity: 90 capsule  Refills: 3     FreeStyle Buzz 3 Sensor Misc      1 each every 14 (fourteen) days. Type 2 diabetes mellitus without complication,  without long-term current use of insulin (HCC) E11.9   Quantity: 6 each  Refills: 3     lisinopril 40 MG Tabs  Commonly known as: Prinivil; Zestril      Take 1 tablet (40 mg total) by mouth daily.   Quantity: 90 tablet  Refills: 3     metFORMIN 500 MG Tabs  Commonly known as: Glucophage      Take 2 tablets (1,000 mg total) by mouth 2 (two) times daily with meals.   Quantity: 360 tablet  Refills: 3     simvastatin 40 MG Tabs  Commonly known as: Zocor      Take 1 tablet (40 mg total) by mouth daily.   Quantity: 90 tablet  Refills: 3     tamsulosin 0.4 MG Caps  Commonly known as: Flomax      Take 1 capsule (0.4 mg total) by mouth daily. Take 1/2 hour following the same meal each day   Quantity: 90 capsule  Refills: 3     TRUEplus Lancets 30G Misc      1 lancet to check glucose three times daily   Quantity: 300 each  Refills: 3               Where to Get Your Medications        These medications were sent to [facility] [address] [phone], [phone]  [address] [place] [id number]      Phone: [phone]   BD Pen Needle Adriana U/F 32G X 4 MM Misc  insulin glargine 100 UNIT/ML Sopn  Insulin Glargine-yfgn 100 UNIT/ML Sopn       These medications were sent to [id number] [place] [id number] AT [facility] [address], [phone], [phone]  [address], [facility] [address]      Phone: [phone]   ciprofloxacin 500 MG Tabs         Follow up Visits:     Follow-up Information       Kemal Montoya MD. Schedule an appointment as soon as possible for a visit in 1 day(s).    Specialty: Internal Medicine  Contact information:  [address] [facility] [address] [phone]               David Hernandez MD. Call today.    Specialty: UROLOGY  Contact information:  [address] [facility] [address]  [facility] [address] [phone]                             Hospital Discharge Diagnoses:  UTI    Lace+ Score: 51  59-90 High  Risk  29-58 Medium Risk  0-28   Low Risk.    TCM Follow-Up Recommendation:  LACE 29-58: Moderate Risk of readmission after discharge from the hospital.    >35 minutes spent preparing this discharge.    Juan Thomas MD  4/15/2025  1:07 PM        [1]   Patient Active Problem List  Diagnosis    Type 2 diabetes mellitus without complication, without long-term current use of insulin (HCC)    Hypertriglyceridemia    Obesity    HTN (hypertension)    Myopia    Colon polyps    Acute pain of right shoulder    Superior glenoid labrum lesion of right shoulder    Disorder of rotator cuff syndrome of right shoulder and allied disorder    BPH with obstruction/lower urinary tract symptoms    Obesity (BMI 30-39.9)    Open fracture of head of left humerus, initial encounter    Right upper quadrant pain    Glaucoma suspect, bilateral    Age-related nuclear cataract of both eyes    Vitreous floaters of both eyes    Neck pain    Urinary retention    Sepsis, due to unspecified organism, unspecified whether acute organ dysfunction present (HCC)    UTI (urinary tract infection)

## 2025-04-15 NOTE — PLAN OF CARE
Serafin is alert and oriented x 4. He is ambulating independently in the room. He is tolerating meals. Voiding freely via mckay. 1 BM today. No complaints of pain nausea or vomiting. No fevers today. Patient switched to a leg bag. Mckay education complete. IV removed. Patient adequate for discharge.  Problem: Patient Centered Care  Goal: Patient preferences are identified and integrated in the patient's plan of care  Description: Interventions:- What would you like us to know as we care for you? - Provide timely, complete, and accurate information to patient/family- Incorporate patient and family knowledge, values, beliefs, and cultural backgrounds into the planning and delivery of care- Encourage patient/family to participate in care and decision-making at the level they choose- Honor patient and family perspectives and choices  Outcome: Adequate for Discharge     Problem: Patient/Family Goals  Goal: Patient/Family Long Term Goal  Description: Patient's Long Term Goal: Interventions:- - See additional Care Plan goals for specific interventions  Outcome: Adequate for Discharge  Goal: Patient/Family Short Term Goal  Description: Patient's Short Term Goal: Interventions: - - See additional Care Plan goals for specific interventions  Outcome: Adequate for Discharge     Problem: Diabetes/Glucose Control  Goal: Glucose maintained within prescribed range  Description: INTERVENTIONS:- Monitor Blood Glucose as ordered- Assess for signs and symptoms of hyperglycemia and hypoglycemia- Administer ordered medications to maintain glucose within target range- Assess barriers to adequate nutritional intake and initiate nutrition consult as needed- Instruct patient on self management of diabetes  INTERVENTIONS:- Monitor Blood Glucose as ordered- Assess for signs and symptoms of hyperglycemia and hypoglycemia- Administer ordered medications to maintain glucose within target range- Assess barriers to adequate nutritional intake and  initiate nutrition consult as needed- Instruct patient on self management of diabetes  Outcome: Adequate for Discharge     Problem: PAIN - ADULT  Goal: Verbalizes/displays adequate comfort level or patient's stated pain goal  Description: INTERVENTIONS:- Encourage pt to monitor pain and request assistance- Assess pain using appropriate pain scale- Administer analgesics based on type and severity of pain and evaluate response- Implement non-pharmacological measures as appropriate and evaluate response- Consider cultural and social influences on pain and pain management- Manage/alleviate anxiety- Utilize distraction and/or relaxation techniques- Monitor for opioid side effects- Notify MD/LIP if interventions unsuccessful or patient reports new pain- Anticipate increased pain with activity and pre-medicate as appropriate  Outcome: Adequate for Discharge     Problem: SAFETY ADULT - FALL  Goal: Free from fall injury  Description: INTERVENTIONS:- Assess pt frequently for physical needs- Identify cognitive and physical deficits and behaviors that affect risk of falls.- [place] fall precautions as indicated by assessment.- Educate pt/family on patient safety including physical limitations- Instruct pt to call for assistance with activity based on assessment- Modify environment to reduce risk of injury- Provide assistive devices as appropriate- Consider OT/PT consult to assist with strengthening/mobility- Encourage toileting schedule  Outcome: Adequate for Discharge     Problem: DISCHARGE PLANNING  Goal: Discharge to home or other facility with appropriate resources  Description: INTERVENTIONS:- Identify barriers to discharge w/pt and caregiver- Include patient/family/discharge partner in discharge planning- Arrange for needed discharge resources and transportation as appropriate- Identify discharge learning needs (meds, wound care, etc)- Arrange for interpreters to assist at discharge as needed- Consider post-discharge  preferences of patient/family/discharge partner- Complete POLST form as appropriate- Assess patient's ability to be responsible for managing their own health- Refer to Case Management Department for coordinating discharge planning if the patient needs post-hospital services based on physician/LIP order or complex needs related to functional status, cognitive ability or social support system  Outcome: Adequate for Discharge     Problem: GENITOURINARY - ADULT  Goal: Absence of urinary retention  Description: INTERVENTIONS:- Assess patient’s ability to void and empty bladder- Monitor intake/output and perform bladder scan as needed- Follow urinary retention protocol/standard of care- Consider collaborating with pharmacy to review patient's medication profile- Implement strategies to promote bladder emptying  Outcome: Adequate for Discharge     Problem: METABOLIC/FLUID AND ELECTROLYTES - ADULT  Goal: Glucose maintained within prescribed range  Description: INTERVENTIONS:- Monitor Blood Glucose as ordered- Assess for signs and symptoms of hyperglycemia and hypoglycemia- Administer ordered medications to maintain glucose within target range- Assess barriers to adequate nutritional intake and initiate nutrition consult as needed- Instruct patient on self management of diabetes  INTERVENTIONS:- Monitor Blood Glucose as ordered- Assess for signs and symptoms of hyperglycemia and hypoglycemia- Administer ordered medications to maintain glucose within target range- Assess barriers to adequate nutritional intake and initiate nutrition consult as needed- Instruct patient on self management of diabetes  Outcome: Adequate for Discharge  Goal: Electrolytes maintained within normal limits  Description: INTERVENTIONS:- Monitor labs and rhythm and assess patient for signs and symptoms of electrolyte imbalances- Administer electrolyte replacement as ordered- Monitor response to electrolyte replacements, including rhythm and repeat lab  results as appropriate- Fluid restriction as ordered- Instruct patient on fluid and nutrition restrictions as appropriate  Outcome: Adequate for Discharge  Goal: Hemodynamic stability and optimal renal function maintained  Description: INTERVENTIONS:- Monitor labs and assess for signs and symptoms of volume excess or deficit- Monitor intake, output and patient weight- Monitor urine specific gravity, serum osmolarity and serum sodium as indicated or ordered- Monitor response to interventions for patient's volume status, including labs, urine output, blood pressure (other measures as available)- Encourage oral intake as appropriate- Instruct patient on fluid and nutrition restrictions as appropriate  Outcome: Adequate for Discharge

## 2025-04-15 NOTE — PLAN OF CARE
No acute changes over night. Pt is alert and oriented on RA. Murphy in place. Pt denies pain. Lidocaine gel at bedside. Pt ambulating independently. IV abx given as ordered. Plan for possible discharge today.    Problem: Diabetes/Glucose Control  Goal: Glucose maintained within prescribed range  Description: INTERVENTIONS:- Monitor Blood Glucose as ordered- Assess for signs and symptoms of hyperglycemia and hypoglycemia- Administer ordered medications to maintain glucose within target range- Assess barriers to adequate nutritional intake and initiate nutrition consult as needed- Instruct patient on self management of diabetes  INTERVENTIONS:- Monitor Blood Glucose as ordered- Assess for signs and symptoms of hyperglycemia and hypoglycemia- Administer ordered medications to maintain glucose within target range- Assess barriers to adequate nutritional intake and initiate nutrition consult as needed- Instruct patient on self management of diabetes  Outcome: Progressing     Problem: PAIN - ADULT  Goal: Verbalizes/displays adequate comfort level or patient's stated pain goal  Description: INTERVENTIONS:- Encourage pt to monitor pain and request assistance- Assess pain using appropriate pain scale- Administer analgesics based on type and severity of pain and evaluate response- Implement non-pharmacological measures as appropriate and evaluate response- Consider cultural and social influences on pain and pain management- Manage/alleviate anxiety- Utilize distraction and/or relaxation techniques- Monitor for opioid side effects- Notify MD/LIP if interventions unsuccessful or patient reports new pain- Anticipate increased pain with activity and pre-medicate as appropriate  Outcome: Progressing     Problem: SAFETY ADULT - FALL  Goal: Free from fall injury  Description: INTERVENTIONS:- Assess pt frequently for physical needs- Identify cognitive and physical deficits and behaviors that affect risk of falls.- [place] fall  precautions as indicated by assessment.- Educate pt/family on patient safety including physical limitations- Instruct pt to call for assistance with activity based on assessment- Modify environment to reduce risk of injury- Provide assistive devices as appropriate- Consider OT/PT consult to assist with strengthening/mobility- Encourage toileting schedule  Outcome: Progressing     Problem: DISCHARGE PLANNING  Goal: Discharge to home or other facility with appropriate resources  Description: INTERVENTIONS:- Identify barriers to discharge w/pt and caregiver- Include patient/family/discharge partner in discharge planning- Arrange for needed discharge resources and transportation as appropriate- Identify discharge learning needs (meds, wound care, etc)- Arrange for interpreters to assist at discharge as needed- Consider post-discharge preferences of patient/family/discharge partner- Complete POLST form as appropriate- Assess patient's ability to be responsible for managing their own health- Refer to Case Management Department for coordinating discharge planning if the patient needs post-hospital services based on physician/LIP order or complex needs related to functional status, cognitive ability or social support system  Outcome: Progressing     Problem: GENITOURINARY - ADULT  Goal: Absence of urinary retention  Description: INTERVENTIONS:- Assess patient’s ability to void and empty bladder- Monitor intake/output and perform bladder scan as needed- Follow urinary retention protocol/standard of care- Consider collaborating with pharmacy to review patient's medication profile- Implement strategies to promote bladder emptying  Outcome: Progressing     Problem: METABOLIC/FLUID AND ELECTROLYTES - ADULT  Goal: Glucose maintained within prescribed range  Description: INTERVENTIONS:- Monitor Blood Glucose as ordered- Assess for signs and symptoms of hyperglycemia and hypoglycemia- Administer ordered medications to maintain  glucose within target range- Assess barriers to adequate nutritional intake and initiate nutrition consult as needed- Instruct patient on self management of diabetes  INTERVENTIONS:- Monitor Blood Glucose as ordered- Assess for signs and symptoms of hyperglycemia and hypoglycemia- Administer ordered medications to maintain glucose within target range- Assess barriers to adequate nutritional intake and initiate nutrition consult as needed- Instruct patient on self management of diabetes  Outcome: Progressing  Goal: Electrolytes maintained within normal limits  Description: INTERVENTIONS:- Monitor labs and rhythm and assess patient for signs and symptoms of electrolyte imbalances- Administer electrolyte replacement as ordered- Monitor response to electrolyte replacements, including rhythm and repeat lab results as appropriate- Fluid restriction as ordered- Instruct patient on fluid and nutrition restrictions as appropriate  Outcome: Progressing  Goal: Hemodynamic stability and optimal renal function maintained  Description: INTERVENTIONS:- Monitor labs and assess for signs and symptoms of volume excess or deficit- Monitor intake, output and patient weight- Monitor urine specific gravity, serum osmolarity and serum sodium as indicated or ordered- Monitor response to interventions for patient's volume status, including labs, urine output, blood pressure (other measures as available)- Encourage oral intake as appropriate- Instruct patient on fluid and nutrition restrictions as appropriate  Outcome: Progressing

## 2025-04-16 ENCOUNTER — TELEPHONE (OUTPATIENT)
Dept: INTERNAL MEDICINE CLINIC | Facility: CLINIC | Age: 60
End: 2025-04-16

## 2025-04-16 NOTE — PAYOR COMM NOTE
--------------  DISCHARGE REVIEW    Payor: [facility]  Subscriber #:  [id number]  Authorization Number: [id number]    Admit date: 4/10/25  Admit time:   9:06 AM  Discharge Date: 4/15/2025  5:03 PM     Admitting Physician: Juan Yang MD  Attending Physician:  No att. providers found  Primary Care Physician: Kemal Montoya MD          Discharge Summary Notes        Discharge Summary signed by Juan Yang MD at 4/15/2025  1:12 PM       Author: Juan Yang MD Specialty: HOSPITALIST, HOSPITALIST Author Type: Physician    Filed: 4/15/2025  1:12 PM Date of Service: 4/15/2025  1:07 PM Status: Signed    : Juan Yang MD (Physician)           [facility]  part of [facility]    Discharge Summary    Luis Miguel Shelton Patient Status:  Inpatient    1965 MRN [id number]   Location [facility] 4W//SE Attending Juan Yang MD   Hosp Day # 5 PCP Kemal Montoya MD     Date of Admission: 4/10/2025 Disposition:   Home or Self Care     Date of Discharge:   4/15/2025     Admitting Diagnosis:   Urinary retention [R33.9]  Sepsis, due to unspecified organism, unspecified whether acute organ dysfunction present (HCC) [A41.9]    Hospital Discharge Diagnoses:    Urinary retention  Pansensitive E coli UTI  Fevers    BPH  Hx of HTN  Hx of HL  Hx of DM2       Problem List:     Problem List[1]    Physical Exam:      /70 (BP Location: Right arm)   Pulse 84   Temp 98.1 °F (36.7 °C) (Oral)   Resp 18   Ht 72\"   Wt 213 lb (96.6 kg)   SpO2 99%   BMI 28.89 kg/m²     Gen:  NAD.  A and O x  3  CV:   RRR.  No m/g/r  Pulm:   CTA bilat  Abd:   +bs, soft, NT, ND  LE:  No c/c/e  Neuro:  nonfocal      Reason for Admission:   Urinary retention    History of Present Illness:   Luis Miguel Shelton is a(n) 59 year old male with a history of urinary retention, HTN, DM2 who presents with urinary retention.     Pt has issues with chronic urinary retention and was straight  cathing himself at home.    Last night he was unable to successfully straight cath himself.   The catheter was going in but no urine was coming out.    Pt had a brief syncopal episode in bathroom while trying to straight cath.   Pt did not hit head.    Pt denies fevers but has had chills.   Some discomfort in penile area.   No CP or SOB.  No n/v/c/d.    Hospital Course:     Urinary retention  UTI  Fevers - resolved  Urine cx growing pansensitive E coli  Abnormal appearance of prostate on CT.  MRI prostate shows prostatomegaly, no abscess.  - Mckay inserted here.  Pt to dc with mckay.   - urology was on consult - outpt f/u  - ID was on consult  - Was given ceftriaxone, then meropenem, then Ancef.  Home on PO cipro for 2 wks.  - cont proscar, flomax     Hx of HTN  - cont norvasc  - cont lisinopril     Hx of HL  - lipitor     Hx of DM2  Resume home regimen.     dvt proph - heparin     Code status - Full       Consultations:   UTI    Discharge Condition:  Good    Discharge Medications:      Discharge Medications        START taking these medications        Instructions Prescription details   ciprofloxacin 500 MG Tabs  Commonly known as: Cipro      Take 1 tablet (500 mg total) by mouth 2 (two) times daily for 14 days.   Stop taking on: April 29, 2025  Quantity: 28 tablet  Refills: 0     insulin glargine 100 UNIT/ML Sopn      Inject 10 Units into the skin nightly.   Quantity: 3 mL  Refills: 3     Insulin Glargine-yfgn 100 UNIT/ML Sopn  Commonly known as: Semglee (yfgn)      Inject 10 Units into the skin daily.   Quantity: 3 mL  Refills: 5            CONTINUE taking these medications        Instructions Prescription details   amLODIPine 5 MG Tabs  Commonly known as: Norvasc      Take 1 tablet (5 mg total) by mouth daily.   Quantity: 90 tablet  Refills: 3     BABY ASPIRIN OR      Take 81 mg by mouth.   Refills: 0     BD Pen Needle Adriana U/F 32G X 4 MM Misc  Generic drug: Insulin Pen Needle      INJECT 1 NEEDLE AS DIRECTED   EVERY NIGHT FOR USE WITH LANTUS  / INSULIN GLARGINE   Quantity: 90 each  Refills: 3     Cholecalciferol 125 MCG (5000 UT) Tabs  Commonly known as: VITAMIN D-3      Take 1 tablet (5,000 Units total) by mouth in the morning.   Refills: 0     Contour Next Test Strp  Generic drug: Glucose Blood      Use 1 (one) new strip three times daily for blood glucose monitoring   Quantity: 300 strip  Refills: 1     dutasteride 0.5 MG Caps  Commonly known as: Avodart      Take 1 capsule (0.5 mg total) by mouth daily.   Quantity: 90 capsule  Refills: 3     FreeStyle Buzz 3 Sensor Misc      1 each every 14 (fourteen) days. Type 2 diabetes mellitus without complication, without long-term current use of insulin (HCC) E11.9   Quantity: 6 each  Refills: 3     lisinopril 40 MG Tabs  Commonly known as: Prinivil; Zestril      Take 1 tablet (40 mg total) by mouth daily.   Quantity: 90 tablet  Refills: 3     metFORMIN 500 MG Tabs  Commonly known as: Glucophage      Take 2 tablets (1,000 mg total) by mouth 2 (two) times daily with meals.   Quantity: 360 tablet  Refills: 3     simvastatin 40 MG Tabs  Commonly known as: Zocor      Take 1 tablet (40 mg total) by mouth daily.   Quantity: 90 tablet  Refills: 3     tamsulosin 0.4 MG Caps  Commonly known as: Flomax      Take 1 capsule (0.4 mg total) by mouth daily. Take 1/2 hour following the same meal each day   Quantity: 90 capsule  Refills: 3     TRUEplus Lancets 30G Misc      1 lancet to check glucose three times daily   Quantity: 300 each  Refills: 3               Where to Get Your Medications        These medications were sent to [facility] - [place], [place] - [address] [phone], [phone]  [address] [address], [facility] [address]      Phone: [phone]   BD Pen Needle Adriana U/F 32G X 4 MM Misc  insulin glargine 100 UNIT/ML Sopn  Insulin Glargine-yfgn 100 UNIT/ML Sopn       These medications were sent to [id number] - [place], [place] -  [address] AT [facility] [address], [phone], [phone]  [address], [facility] [address]      Phone: [phone]   ciprofloxacin 500 MG Tabs         Follow up Visits:     Follow-up Information       Kemal Montoya MD. Schedule an appointment as soon as possible for a visit in 1 day(s).    Specialty: Internal Medicine  Contact information:  [address] [facility] [address] [phone]               David Hernandez MD. Call today.    Specialty: UROLOGY  Contact information:  [address] [facility]  [address]  [facility] [id number] [phone]                             Hospital Discharge Diagnoses:  UTI    Lace+ Score: 51  59-90 High Risk  29-58 Medium Risk  0-28   Low Risk.    TCM Follow-Up Recommendation:  LACE 29-58: Moderate Risk of readmission after discharge from the hospital.    >35 minutes spent preparing this discharge.    Juan Thomas MD  4/15/2025  1:07 PM       Electronically signed by Juan Yang MD on 4/15/2025  1:12 PM         REVIEWER COMMENTS         [1]   Patient Active Problem List  Diagnosis    Type 2 diabetes mellitus without complication, without long-term current use of insulin (HCC)    Hypertriglyceridemia    Obesity    HTN (hypertension)    Myopia    Colon polyps    Acute pain of right shoulder    Superior glenoid labrum lesion of right shoulder    Disorder of rotator cuff syndrome of right shoulder and allied disorder    BPH with obstruction/lower urinary tract symptoms    Obesity (BMI 30-39.9)    Open fracture of head of left humerus, initial encounter    Right upper quadrant pain    Glaucoma suspect, bilateral    Age-related nuclear cataract of both eyes    Vitreous floaters of both eyes    Neck pain    Urinary retention    Sepsis, due to unspecified organism, unspecified whether acute organ dysfunction present (HCC)    UTI (urinary tract infection)

## 2025-04-16 NOTE — TELEPHONE ENCOUNTER
Patient sent an appointment request asking to be scheduled for a hospital follow-up. Per patient he was hospitalized for 5 days. I informed him there are no openings until July and offered another provider for this appointment. Patient is asking if  can squeeze him in to the schedule.

## 2025-04-17 ENCOUNTER — NURSE ONLY (OUTPATIENT)
Dept: SURGERY | Facility: CLINIC | Age: 60
End: 2025-04-17
Payer: COMMERCIAL

## 2025-04-17 DIAGNOSIS — R33.9 URINARY RETENTION: Primary | ICD-10-CM

## 2025-04-17 NOTE — PROGRESS NOTES
Patient here today for voiding trial. I brought patient and wife into exam room, verified his full name and  and introduced myself. Patient says he is here today for voiding trial, but upon out conversation patient says he did have two voiding trials already and he was not able to urinate at all. Also patient was performing CIC and he wasn't able to urinate in his own at all at that time as well. I then consulted with Dr. Hernandez regarding this and the following plan was determined, cancel the voiding trial and have patient come in for UDS first and then Cystoscopy. I then explained to patient this plan, and patient agreed, then Scheduled patient for 2025 for UDS/Cystoscopy.

## 2025-04-18 NOTE — PROGRESS NOTES
Provider Clarification    Additional information on the cause and effect relationship between UTI and self cath or Murphy prior to admission .    UTI due to or associated with intermittent straight cath     This note is part of the patient's medical record.

## 2025-04-18 NOTE — PROGRESS NOTES
Provider Clarification     Additional information on the status of the infection.    Can status of sepsis be clarified?          No clear sepsis identified: had fevers with prostatitis, no bacteremia or hypotension    This note is part of the patient's medical record.

## 2025-04-22 ENCOUNTER — TELEPHONE (OUTPATIENT)
Dept: SURGERY | Facility: CLINIC | Age: 60
End: 2025-04-22

## 2025-04-22 ENCOUNTER — PROCEDURE (OUTPATIENT)
Dept: SURGERY | Facility: CLINIC | Age: 60
End: 2025-04-22
Payer: COMMERCIAL

## 2025-04-22 VITALS — RESPIRATION RATE: 18 BRPM | SYSTOLIC BLOOD PRESSURE: 118 MMHG | DIASTOLIC BLOOD PRESSURE: 72 MMHG | HEART RATE: 85 BPM

## 2025-04-22 DIAGNOSIS — N13.8 BPH WITH OBSTRUCTION/LOWER URINARY TRACT SYMPTOMS: ICD-10-CM

## 2025-04-22 DIAGNOSIS — R33.9 URINARY RETENTION: ICD-10-CM

## 2025-04-22 DIAGNOSIS — N40.1 BPH WITH OBSTRUCTION/LOWER URINARY TRACT SYMPTOMS: ICD-10-CM

## 2025-04-22 DIAGNOSIS — Z01.818 PREOPERATIVE TESTING: Primary | ICD-10-CM

## 2025-04-22 DIAGNOSIS — R33.9 URINARY RETENTION: Primary | ICD-10-CM

## 2025-04-22 PROCEDURE — 51797 INTRAABDOMINAL PRESSURE TEST: CPT | Performed by: UROLOGY

## 2025-04-22 PROCEDURE — 52000 CYSTOURETHROSCOPY: CPT | Performed by: UROLOGY

## 2025-04-22 PROCEDURE — 51784 ANAL/URINARY MUSCLE STUDY: CPT | Performed by: UROLOGY

## 2025-04-22 PROCEDURE — 99214 OFFICE O/P EST MOD 30 MIN: CPT | Performed by: UROLOGY

## 2025-04-22 PROCEDURE — 51729 CYSTOMETROGRAM W/VP&UP: CPT | Performed by: UROLOGY

## 2025-04-22 RX ORDER — CLOTRIMAZOLE AND BETAMETHASONE DIPROPIONATE 10; .64 MG/G; MG/G
1 CREAM TOPICAL 2 TIMES DAILY
Qty: 45 G | Refills: 1 | Status: SHIPPED | OUTPATIENT
Start: 2025-04-22

## 2025-04-22 RX ORDER — CLOTRIMAZOLE AND BETAMETHASONE DIPROPIONATE 10; .64 MG/G; MG/G
1 CREAM TOPICAL 2 TIMES DAILY
Qty: 45 G | Refills: 1 | Status: SHIPPED | OUTPATIENT
Start: 2025-04-22 | End: 2025-04-22

## 2025-04-22 NOTE — PROGRESS NOTES
Luis Miguel Shelton is a 59 year old male.    HPI:   No chief complaint on file.        59-year-old male presents in follow-up to visit March 28, 2025 with BPH, lower urinary tract symptoms, persistent urinary retention.    Has been hospitalized since have seen the patient with UTI difficulty inserting straight catheters.  Has a longstanding history of diabetes since 2008.  He presents today for urodynamics and office cystoscopy in anticipation of possible surgical management options.  There is a reported history of difficulty inserting a 14 Serbian straight catheter.  He presented today with an indwelling Murphy catheter which was placed during his hospitalization.  Nursing staff reports no issues placing the CMG catheters at this time.    Evaluation includes a prostate MRI April 14, 2025 showing a calculated volume of 144 cm³ normal  Renal bladder ultrasound April 11, 2025 shows a thickened bladder wall, normal-sized kidneys without evidence of hydronephrosis.  CT abdomen pelvis with contrast April 10, 2025 demonstrates bladder wall thickening, mild bilateral hydroureteronephrosis suggesting an ascending urinary tract infection.  Renal ultrasound March 27, 2025 showing no hydronephrosis with a Murphy catheter in place.  Urine culture during a recent hospitalization April 10, 2025 showed greater than 100,000 pansensitive E. coli.    HISTORY:  Past Medical History[1]   Past Surgical History[2]   Family History[3]   Social History: Short Social Hx on File[4]     Medications (Active prior to today's visit):  Current Medications[5]    Allergies:  Allergies[6]      ROS:       PHYSICAL EXAM:   What follows is a brief summary of the urodynamics report.    The patient was felt to a maximum volume of 294 mL.  He had a mild sensation of bladder fullness at that volume.  There are noes evidence of bladder contractions during the filling phase.  His first sensation occurred at a volume of 75 mL.  Detrusor pressure during  the filling phase peaked at 23 cm of water.  He was unable to void with a CMG catheter in place.  The catheter was subsequently removed and we attempted to have the patient void without it and he was again unsuccessful at a volume of 294 mL capacity.  As such the patient was prepped for an office cystoscopy subsequently.    Luis Miguel Shelton  : 1965  Referring Physician: No ref. provider found     No chief complaint on file.          CYSTOSCOPY    Anesthesia:  2% lidocaine gel    Urethra: Normal  Prostate / Pelvic: Abnormal large bilobar prostate enlargement, at least 4.5 cm prostatic urethral length no median lobe  Bladder: Normal.  No tumor, stone, diverticulum, or glomerulation  U.O's: Abnormal mild to moderate bladder cystitis noted along the bladder floor and posterior wall  Trabeculation: Grade 3      POST CYSTOSCOPY MEDICATIONS: sample one tablet Cipro 500mg given to patient    DIAGNOSIS:     PLAN: Reviewed findings at length with patient.  Discussed options for management.see below       ASSESSMENT/PLAN:   Assessment   Encounter Diagnoses   Name Primary?    Urinary retention [R33.9] Yes    BPH with obstruction/lower urinary tract symptoms      Reviewed findings at length with patient.  Discussed options for management.  Recommended proceeding with cystoscopy, bipolar TURP of the prostate.  Risks and benefits were discussed at length with patient.  I also explicitly told the patient that I cannot guarantee successful voiding subsequently.  He has had difficulty inserting the straight cath and performing a TUR of the prostate may assist and make it easier for him to place those catheters if he needs to continue to self catheterize afterwards.  Etiology of poor bladder function may be related to longstanding diabetes or effects from longstanding bladder outlet obstruction from BPH.  He understands this clearly.  Risks and side effects of the surgery including but not limited to bleeding,  infection, incontinence, impotence, ejaculatory changes were reviewed with the patient and he understands and agrees.  My office will schedule him accordingly.  He will need to return to the office 1 week prior to obtain a catheter urine specimen for culture.  He will require preoperative PCP clearance for surgery given longstanding diabetes.           Orders This Visit:  No orders of the defined types were placed in this encounter.      Meds This Visit:  Requested Prescriptions      No prescriptions requested or ordered in this encounter       Imaging & Referrals:  None     4/22/2025  David Hernandez MD               [1]   Past Medical History:   Chest pain    normal stress nuclear; normal stress echo    Colon polyps    Hyperplastic, repeat in 10 years    Diabetes (HCC)    diet controlled w/ no BDR - 2008    Disorder of eye, unspecified    increased C/D ratio - OU    Lipid screening    MGD (meibomian gland dysfunction)    OU    Obesity (BMI 30-39.9)    Other and unspecified hyperlipidemia    Type II or unspecified type diabetes mellitus without mention of complication, not stated as uncontrolled    Unspecified essential hypertension   [2]   Past Surgical History:  Procedure Laterality Date    Colonoscopy N/A 11/15/2016    Procedure: COLONOSCOPY;  Surgeon: GISEL Ramirez MD;  Location: [facility] ENDOSCOPY    Shoulder arthroscopy Left 04/28/2020   [3]   Family History  Problem Relation Age of Onset    Gastro-Intestinal Disorder Brother         celiac sprue    Cancer Paternal Grandfather         Colon Ca    Cancer Other         liver cancer - close relative    Diabetes Neg     Glaucoma Neg     Macular degeneration Neg    [4]   Social History  Socioeconomic History    Marital status:    Tobacco Use    Smoking status: Never    Smokeless tobacco: Never   Vaping Use    Vaping status: Never Used   Substance and Sexual Activity    Alcohol use: Not Currently     Alcohol/week: 0.0 standard drinks of alcohol    Drug use: No     Sexual activity: Yes     Partners: Female   Other Topics Concern    Caffeine Concern Yes     Comment: coffee/tea, >6cups/day     Social Drivers of Health     Food Insecurity: No Food Insecurity (4/10/2025)    NCSS - Food Insecurity     Worried About Running Out of Food in the Last Year: No     Ran Out of Food in the Last Year: No   Transportation Needs: No Transportation Needs (4/10/2025)    NCSS - Transportation     Lack of Transportation: No   Housing Stability: Not At Risk (4/10/2025)    NCSS - Housing/Utilities     Has Housing: Yes     Worried About Losing Housing: No     Unable to Get Utilities: No   [5]   Current Outpatient Medications   Medication Sig Dispense Refill    metFORMIN 500 MG Oral Tab Take 2 tablets (1,000 mg total) by mouth 2 (two) times daily with meals. 360 tablet 3    ciprofloxacin 500 MG Oral Tab Take 1 tablet (500 mg total) by mouth 2 (two) times daily for 14 days. 28 tablet 0    Insulin Glargine-yfgn (SEMGLEE, YFGN,) 100 UNIT/ML Subcutaneous Solution Pen-injector Inject 10 Units into the skin daily. 3 mL 5    Insulin Pen Needle (BD PEN NEEDLE SHANTELL U/F) 32G X 4 MM Does not apply Misc INJECT 1 NEEDLE AS DIRECTED  EVERY NIGHT FOR USE WITH LANTUS  / INSULIN GLARGINE 90 each 3    insulin glargine 100 UNIT/ML Subcutaneous Solution Pen-injector Inject 10 Units into the skin nightly. 3 mL 3    tamsulosin 0.4 MG Oral Cap Take 1 capsule (0.4 mg total) by mouth daily. Take 1/2 hour following the same meal each day 90 capsule 3    dutasteride 0.5 MG Oral Cap Take 1 capsule (0.5 mg total) by mouth daily. 90 capsule 3    amLODIPine 5 MG Oral Tab Take 1 tablet (5 mg total) by mouth daily. 90 tablet 3    lisinopril 40 MG Oral Tab Take 1 tablet (40 mg total) by mouth daily. 90 tablet 3    Cholecalciferol 125 MCG (5000 UT) Oral Tab Take 1 tablet (5,000 Units total) by mouth in the morning.      simvastatin 40 MG Oral Tab Take 1 tablet (40 mg total) by mouth daily. 90 tablet 3    Continuous Glucose Sensor  (FREESTYLE DARIA 3 SENSOR) Does not apply Misc 1 each every 14 (fourteen) days. Type 2 diabetes mellitus without complication, without long-term current use of insulin ([facility]) E11.9 6 each 3    TRUEplus Lancets 30G Does not apply Misc 1 lancet to check glucose three times daily 300 each 3    BABY ASPIRIN OR Take 81 mg by mouth.      Glucose Blood (CONTOUR NEXT TEST) In Vitro Strip Use 1 (one) new strip three times daily for blood glucose monitoring 300 strip 1   [6]   Allergies  Allergen Reactions    Azithromycin DIARRHEA and NAUSEA AND VOMITING    Sulfa Antibiotics UNKNOWN    Sulfanilamide      Other reaction(s): Hives

## 2025-04-22 NOTE — TELEPHONE ENCOUNTER
Patient called regarding his appointment on 04/25/2025 at 1pm. Patient stated he was informed today that he needs to have surgery. Per patient he wants to know if he can complete his pre-op at that appointment.

## 2025-04-22 NOTE — TELEPHONE ENCOUNTER
Urology Surgery Scheduling Request    Location: [facility] OR    Surgeon: SPRING Hernandez MD    Asst. Surgeon:     Diagnosis: BPH, urinary retention    Procedure: Cystoscopy, bipolar TURP of the prostate    Procedure CPT Code (if known):     Anesthesia: General     Time Frame: Next available    Time needed: 1 hour    Special Equipment:     On Call to OR: Ancef (cefazolin)    Admission: AM Admit    Pre-op Testing: CBC, BMP, EKG, and Urine Culture   He needs a cathed urine specimen through a nurse visit at least 7 days prior to surgery for a culture  Need Pre-op Clearance: PCP    Estimated Post Op/Follow Up Appt: 1 week.    David Hernandez MD  4/22/2025

## 2025-04-22 NOTE — TELEPHONE ENCOUNTER
Called patient, let him know we will be sending the prescription to [facility].

## 2025-04-22 NOTE — PROGRESS NOTES
Pt came today to have a CMG study and Cystoscopy done. Prepared patient per protocol. Pt had mckay catheter so I removed the mckay catheter first. I  then explained the CMG test to the patient, he verbalized his understanding and agreed to proceed. I inserted the bladder and rectal catheters and started to SW infusion. Guided patient through the individual steps of the CMG. I instilled approx. 294 mL when the patient stated that he could no longer hold the urine and he really needs to go to the bathroom and also have a bowel movement. I then removed all catheters since patient needed to go to the bathroom. Upon patient returning to the procedure room patient stated he wasn't unable to urinate. I then prepped patient for cystoscopy procedure. Dr. Hernandez then entered the room to perform cystoscopy. After the cystoscopy a new 16 FR straight catheter was inserted and connected to the leg back and secured with stat lock to the left upper thigh. Patient was then redressed and instructed to call us if he has any questions/concerns.

## 2025-04-22 NOTE — TELEPHONE ENCOUNTER
Patient calling requesting to have prescription to be send to [facility] in [place].Please advise       clotrimazole-betamethasone 1-0.05 %

## 2025-04-22 NOTE — TELEPHONE ENCOUNTER
Spoke with pt, scheduled surgery for 5/14/25.  Pt scheduled for a RN visit on 5/6/25 @ 8:20a, to provide Urine culture collected by Catheter.  Pt to complete additional preoperative labs on 5/6/25. Pt aware to make PCP appointment for clearance    1 week follow up scheduled for 5/22/25 at 2:40p with Dr Hernandez.      Surgery information sent to pt thru portal.

## 2025-04-25 ENCOUNTER — OFFICE VISIT (OUTPATIENT)
Dept: INTERNAL MEDICINE CLINIC | Facility: CLINIC | Age: 60
End: 2025-04-25
Payer: COMMERCIAL

## 2025-04-25 ENCOUNTER — TELEPHONE (OUTPATIENT)
Dept: SURGERY | Facility: CLINIC | Age: 60
End: 2025-04-25

## 2025-04-25 ENCOUNTER — PATIENT MESSAGE (OUTPATIENT)
Dept: SURGERY | Facility: CLINIC | Age: 60
End: 2025-04-25

## 2025-04-25 VITALS
HEIGHT: 72 IN | RESPIRATION RATE: 20 BRPM | BODY MASS INDEX: 28.55 KG/M2 | SYSTOLIC BLOOD PRESSURE: 104 MMHG | OXYGEN SATURATION: 98 % | HEART RATE: 84 BPM | DIASTOLIC BLOOD PRESSURE: 57 MMHG | TEMPERATURE: 97 F | WEIGHT: 210.81 LBS

## 2025-04-25 DIAGNOSIS — A41.9 SEPSIS, DUE TO UNSPECIFIED ORGANISM, UNSPECIFIED WHETHER ACUTE ORGAN DYSFUNCTION PRESENT (HCC): Primary | ICD-10-CM

## 2025-04-25 DIAGNOSIS — E11.9 TYPE 2 DIABETES MELLITUS WITHOUT COMPLICATION, WITHOUT LONG-TERM CURRENT USE OF INSULIN (HCC): ICD-10-CM

## 2025-04-25 DIAGNOSIS — N13.8 BPH WITH OBSTRUCTION/LOWER URINARY TRACT SYMPTOMS: ICD-10-CM

## 2025-04-25 DIAGNOSIS — N40.1 BPH WITH OBSTRUCTION/LOWER URINARY TRACT SYMPTOMS: ICD-10-CM

## 2025-04-25 DIAGNOSIS — R33.9 URINARY RETENTION: ICD-10-CM

## 2025-04-25 PROBLEM — M99.09 SOMATIC DYSFUNCTION: Status: ACTIVE | Noted: 2019-04-10

## 2025-04-25 PROCEDURE — 99214 OFFICE O/P EST MOD 30 MIN: CPT | Performed by: INTERNAL MEDICINE

## 2025-04-25 PROCEDURE — 3008F BODY MASS INDEX DOCD: CPT | Performed by: INTERNAL MEDICINE

## 2025-04-25 PROCEDURE — G2211 COMPLEX E/M VISIT ADD ON: HCPCS | Performed by: INTERNAL MEDICINE

## 2025-04-25 PROCEDURE — 3078F DIAST BP <80 MM HG: CPT | Performed by: INTERNAL MEDICINE

## 2025-04-25 PROCEDURE — 3074F SYST BP LT 130 MM HG: CPT | Performed by: INTERNAL MEDICINE

## 2025-04-25 PROCEDURE — 3051F HG A1C>EQUAL 7.0%<8.0%: CPT | Performed by: INTERNAL MEDICINE

## 2025-04-25 NOTE — PROGRESS NOTES
HPI:    Patient ID: Luis Miguel Shelton is a 59 year old male.    HPI  Patient is here for follow-up on recent hospitalization.  I last saw him on March 12.  At that time he was having some urinary incontinence and questionable fecal incontinence.  He saw urology.  He was placed on tamsulosin and dutasteride.  At 1 point, advised to self catheterize regularly.  He was admitted to the hospital in April 10.  At that time, the self-catheterization attempt had failed.  He had urinary retention.  He was in the ER a few ties before being admitted due to failure to catheterize successfully. He passed out in his bathroom trying to catheterize himself. Felt to have possible sepsis.  Cultures grew out E. coli from the urine.  CT and MRI were done.  Murphy catheter was placed.  Initially placed on ceftriaxone, then meropenem.  Sent home on 2 weeks of oral Cipro.  Was discharged on April 15 which was about 10 days ago.  He is scheduled for cystoscopy with transurethral resection of the prostate on May 14.    He went to another urologist. After that, he stopped the dutasteride. Pt has questions about the effectiveness of the TURP. Father had prostate issues. Brother also had enlarged prostate. Last PSA was 8.64 on 3/12, prior to the self cath.    Blood sugars running about 160. He never stopped the metformin. Has some pale drainage drainage on the outside of the Murphy. It is getting better. Treating it with meticulous cleaning and Lotrisone. Current Murphy drainage is yellow and clear. He has improved his diet and fluid intake.     Last A1C on 4/10 was 7.2.     He is still on the cipro. The Murphy remains until surgery. He was last at work on March 25th. He was unable to work due to the frequent self catheterization, weakness. He has not returned to work since then.        Problem List[1]       HISTORY:  Past Medical History[2]   Past Surgical History[3]   Family History[4]   Short Social Hx on File[5]       Review of Systems         Current Medications[6]  Allergies:Allergies[7]     PHYSICAL EXAM:   /57 (BP Location: Left arm, Patient Position: Sitting, Cuff Size: large)   Pulse 84   Temp 97.3 °F (36.3 °C) (Temporal)   Resp 20   Ht 6' (1.829 m)   Wt 210 lb 12.8 oz (95.6 kg)   SpO2 98%   BMI 28.59 kg/m²      Physical Exam  Constitutional:       Appearance: Normal appearance. He is well-developed.   Eyes:      Conjunctiva/sclera: Conjunctivae normal.   Neck:      Vascular: No carotid bruit.   Cardiovascular:      Rate and Rhythm: Normal rate and regular rhythm.      Pulses: Normal pulses.      Heart sounds: Normal heart sounds. No murmur heard.  Pulmonary:      Effort: Pulmonary effort is normal.      Breath sounds: Normal breath sounds. No wheezing or rales.   Abdominal:      General: Bowel sounds are normal.      Palpations: Abdomen is soft. There is no mass.      Tenderness: There is no abdominal tenderness.   Genitourinary:     Penis: Normal.       Comments: Murphy catheter in place  Musculoskeletal:      Right lower leg: No edema.      Left lower leg: No edema.   Lymphadenopathy:      Cervical: No cervical adenopathy.   Skin:     General: Skin is warm and dry.      Findings: No rash.   Neurological:      General: No focal deficit present.      Mental Status: He is alert.   Psychiatric:         Mood and Affect: Mood normal.         Behavior: Behavior normal.         Thought Content: Thought content normal.          Wt Readings from Last 6 Encounters:   04/25/25 210 lb 12.8 oz (95.6 kg)   04/10/25 213 lb (96.6 kg)   04/13/25 212 lb 15.4 oz (96.6 kg)   03/30/25 210 lb (95.3 kg)   03/29/25 210 lb (95.3 kg)   03/12/25 212 lb (96.2 kg)             ASSESSMENT/PLAN:   1. Sepsis, due to unspecified organism, unspecified whether acute organ dysfunction present (HCC)  Patient with recent sepsis with likely urinary source.  Cultures grew out E. coli from the urine.  Placed on IV antibiotics.  All of this due to urinary retention.   Urinary retention secondary to enlarged prostate.  Now with Murphy catheter.  He is still taking the Cipro.  Afebrile.  No evidence of active sepsis at this time.  Still with considerable weakness and fatigue related to the hospitalization and serious infection.    2. Urinary retention  Murphy catheter in place.  He is going to be having a TURP procedure in a couple of weeks.    3. BPH with obstruction/lower urinary tract symptoms  As above.  Urinary retention secondary to large prostate.  Continue current treatment.  Will be having a transurethral prostatic resection.    4. Type 2 diabetes mellitus without complication, without long-term current use of insulin (HCC)  A1c is reasonably controlled particularly given the infections and medical problems.  Last was 7.2.  Continue current treatment.    Discussed patient's ability to return to work.  He is still somewhat weak and debilitated from the sepsis and hospitalization.  In addition, he has the ongoing urinary retention with requirement of the catheter.  He will be having surgery soon.  Await decision from urology on amount of time after the procedure that he will need to stay off of work.        Meds This Visit:  Requested Prescriptions      No prescriptions requested or ordered in this encounter       Imaging & Referrals:  None         Kemal Montoya MD        [1]   Patient Active Problem List  Diagnosis    Type 2 diabetes mellitus without complication, without long-term current use of insulin (HCC)    Hypertriglyceridemia    Obesity    HTN (hypertension)    Myopia    Colon polyps    Acute pain of right shoulder    Superior glenoid labrum lesion of right shoulder    Disorder of rotator cuff syndrome of right shoulder and allied disorder    BPH with obstruction/lower urinary tract symptoms    Obesity (BMI 30-39.9)    Open fracture of head of left humerus, initial encounter    Right upper quadrant pain    Glaucoma suspect, bilateral    Age-related nuclear cataract  of both eyes    Vitreous floaters of both eyes    Neck pain    Urinary retention    Sepsis, due to unspecified organism, unspecified whether acute organ dysfunction present (HCC)    UTI (urinary tract infection)    Acute upper respiratory infection    Backache    Benign neoplasm of skin    Carpal tunnel syndrome    Disorder of liver    Elevated blood pressure reading    Ganglion of tendon sheath    Generalized pain    Hordeolum internum    Infective otitis externa    Malaise and fatigue    Otorrhea    Somatic dysfunction    Vitreous opacity   [2]   Past Medical History:   Chest pain    normal stress nuclear; normal stress echo    Colon polyps    Hyperplastic, repeat in 10 years    Diabetes (HCC)    diet controlled w/ no BDR - 2008    Disorder of eye, unspecified    increased C/D ratio - OU    Lipid screening    MGD (meibomian gland dysfunction)    OU    Obesity (BMI 30-39.9)    Other and unspecified hyperlipidemia    Type II or unspecified type diabetes mellitus without mention of complication, not stated as uncontrolled    Unspecified essential hypertension   [3]   Past Surgical History:  Procedure Laterality Date    Colonoscopy N/A 11/15/2016    Procedure: COLONOSCOPY;  Surgeon: GISEL Ramirez MD;  Location: [facility] ENDOSCOPY    Shoulder arthroscopy Left 04/28/2020   [4]   Family History  Problem Relation Age of Onset    Gastro-Intestinal Disorder Brother         celiac sprue    Cancer Paternal Grandfather         Colon Ca    Cancer Other         liver cancer - close relative    Diabetes Neg     Glaucoma Neg     Macular degeneration Neg    [5]   Social History  Socioeconomic History    Marital status:    Tobacco Use    Smoking status: Never    Smokeless tobacco: Never   Vaping Use    Vaping status: Never Used   Substance and Sexual Activity    Alcohol use: Not Currently     Alcohol/week: 0.0 standard drinks of alcohol    Drug use: No    Sexual activity: Yes     Partners: Female   Other Topics Concern     Caffeine Concern Yes     Comment: coffee/tea, >6cups/day     Social Drivers of Health     Food Insecurity: No Food Insecurity (4/10/2025)    NCSS - Food Insecurity     Worried About Running Out of Food in the Last Year: No     Ran Out of Food in the Last Year: No   Transportation Needs: No Transportation Needs (4/10/2025)    NCSS - Transportation     Lack of Transportation: No   Housing Stability: Not At Risk (4/10/2025)    NCSS - Housing/Utilities     Has Housing: Yes     Worried About Losing Housing: No     Unable to Get Utilities: No   [6]   Current Outpatient Medications   Medication Sig Dispense Refill    clotrimazole-betamethasone 1-0.05 % External Cream Apply 1 Application topically 2 (two) times daily. 45 g 1    metFORMIN 500 MG Oral Tab Take 2 tablets (1,000 mg total) by mouth 2 (two) times daily with meals. 360 tablet 3    ciprofloxacin 500 MG Oral Tab Take 1 tablet (500 mg total) by mouth 2 (two) times daily for 14 days. 28 tablet 0    Insulin Glargine-yfgn (SEMGLEE, YFGN,) 100 UNIT/ML Subcutaneous Solution Pen-injector Inject 10 Units into the skin daily. 3 mL 5    Insulin Pen Needle (BD PEN NEEDLE SHANTELL U/F) 32G X 4 MM Does not apply Misc INJECT 1 NEEDLE AS DIRECTED  EVERY NIGHT FOR USE WITH LANTUS  / INSULIN GLARGINE 90 each 3    insulin glargine 100 UNIT/ML Subcutaneous Solution Pen-injector Inject 10 Units into the skin nightly. 3 mL 3    tamsulosin 0.4 MG Oral Cap Take 1 capsule (0.4 mg total) by mouth daily. Take 1/2 hour following the same meal each day 90 capsule 3    amLODIPine 5 MG Oral Tab Take 1 tablet (5 mg total) by mouth daily. 90 tablet 3    lisinopril 40 MG Oral Tab Take 1 tablet (40 mg total) by mouth daily. 90 tablet 3    Cholecalciferol 125 MCG (5000 UT) Oral Tab Take 1 tablet (5,000 Units total) by mouth in the morning.      simvastatin 40 MG Oral Tab Take 1 tablet (40 mg total) by mouth daily. 90 tablet 3    Continuous Glucose Sensor (FREESTYLE DARIA 3 SENSOR) Does not apply Misc 1  each every 14 (fourteen) days. Type 2 diabetes mellitus without complication, without long-term current use of insulin ([facility]) E11.9 6 each 3    TRUEplus Lancets 30G Does not apply Misc 1 lancet to check glucose three times daily 300 each 3    Glucose Blood (CONTOUR NEXT TEST) In Vitro Strip Use 1 (one) new strip three times daily for blood glucose monitoring 300 strip 1   [7]   Allergies  Allergen Reactions    Azithromycin DIARRHEA and NAUSEA AND VOMITING    Sulfa Antibiotics UNKNOWN    Sulfanilamide      Other reaction(s): Hives

## 2025-04-25 NOTE — TELEPHONE ENCOUNTER
- TE created and MCM routed to [facility] and surgical scheduling     Serafin Shelton to [facility] Clinical Staff (supporting David Hernandez MD) (Selected Message)        4/25/25  2:43 PM  Hi Dr Hernandez,     After my TURP surgery on May 14, I'll be staying overnight in the hospital for one night.     On what date do you anticipate me being well enough to return to work?  (I assume I'll have the Murphy catheter installed for at least 7-10 days).     My employer would like an estimated date when I can return to work. Perhaps after Memorial Day? --Tuesday May 27?     Thank you.     Serafin

## 2025-04-27 ENCOUNTER — PATIENT MESSAGE (OUTPATIENT)
Dept: INTERNAL MEDICINE CLINIC | Facility: CLINIC | Age: 60
End: 2025-04-27

## 2025-04-28 ENCOUNTER — PATIENT MESSAGE (OUTPATIENT)
Dept: SURGERY | Facility: CLINIC | Age: 60
End: 2025-04-28

## 2025-04-28 ENCOUNTER — TELEPHONE (OUTPATIENT)
Dept: SURGERY | Facility: CLINIC | Age: 60
End: 2025-04-28

## 2025-04-28 NOTE — TELEPHONE ENCOUNTER
Pt's message 4-25-25.  Pt called.  Scheduled for surgery on 5-14-25.  Pt's primary care physician has a form from pt's employer.  Asked to call to get pt's work status and return to work date.   remote work since pt has a mckay until surgery and return to work on 5-27-25 full time.  Please call pt

## 2025-04-28 NOTE — TELEPHONE ENCOUNTER
Please call the patient back.  Let him know that if his job does not involve heavy lifting I think 2 weeks off at least would be reasonable.  If there is no heavy lifting involved with his work but I anticipate 1 week off work until the Murphy catheter is removed would be reasonable as well.

## 2025-04-30 PROBLEM — N39.0 UTI (URINARY TRACT INFECTION): Status: RESOLVED | Noted: 2025-04-10 | Resolved: 2025-04-30

## 2025-04-30 PROBLEM — R10.11 RIGHT UPPER QUADRANT PAIN: Status: RESOLVED | Noted: 2021-01-04 | Resolved: 2025-04-30

## 2025-04-30 PROBLEM — E66.9 OBESITY (BMI 30-39.9): Status: RESOLVED | Noted: 2018-08-09 | Resolved: 2025-04-30

## 2025-04-30 NOTE — TELEPHONE ENCOUNTER
Per patient calling requesting a call back from nurse Itzel to clarify her voice message to him. Please call back.

## 2025-04-30 NOTE — TELEPHONE ENCOUNTER
I completed the form and returned to the nurses station at the [facility] office.  Please contact patient.

## 2025-04-30 NOTE — TELEPHONE ENCOUNTER
Tasked to pt's PCP, Dr. Montoya.     David Hernandez MD      4/28/25  8:10 AM  Note     Please call the patient back.  Let him know that if his job does not involve heavy lifting I think 2 weeks off at least would be reasonable.  If there is no heavy lifting involved with his work but I anticipate 1 week off work until the Murphy catheter is removed would be reasonable as well.

## 2025-04-30 NOTE — TELEPHONE ENCOUNTER
I s/w pt and informed him of the response msg from [facility] as stated below and he stated he will call Dr. Montoya's office to see if he signed that form.

## 2025-05-01 ENCOUNTER — TELEPHONE (OUTPATIENT)
Dept: INTERNAL MEDICINE CLINIC | Facility: CLINIC | Age: 60
End: 2025-05-01

## 2025-05-01 NOTE — TELEPHONE ENCOUNTER
Dr. Montoya,    Please sign off on form if you agree to: F[employer] for duty/disability form-Work remotely from home full time starting 5/1/25.     -Signature page will be the first page scanned  -From your Inbasket, Highlight the patient and click Chart   -Double click the 5/1/25 Forms Completion telephone encounter  -Scroll down to the Media section   -Click the blue Hyperlink: [employer] for duty, Dr. Montoya, 5/1/25  -Click Acknowledge located in the top right ribbon/menu   -Drag the mouse into the blank space of the document and a + sign will appear. Left click to   electronically sign the document.  -Once signed, simply exit out of the screen and you signature will be saved.     Thank you,  Delmis TORRES

## 2025-05-01 NOTE — TELEPHONE ENCOUNTER
please see Delmis message below.   Patient did call the office and he stated that he needs this form to be signed as soon as possible.  Patient aware  will be in the office tomorrow

## 2025-05-01 NOTE — TELEPHONE ENCOUNTER
Called pt to inform this is ready for , he states that Delmis from forms department knows where to send this form. Informed pt we would try to get in touch with forms department.

## 2025-05-01 NOTE — TELEPHONE ENCOUNTER
Family Medical Leave Act/Fitness for duty forms rec'd via testhub. Sent testhub for Release of Information. Logged for processing. Spoke to patient and details below. Patient stated needs filled out asap and will send new paperwork for upcoming surgery.    Type of Leave: Continuous  Reason for Leave: Sepsis  Start date of leave:  End date of leave: 5/1/25 limited duty to work from home full time  How many flare ups per month/length?:  How many appts per month/length?:   Was Fee and Turnaround info Given?:

## 2025-05-02 ENCOUNTER — PATIENT MESSAGE (OUTPATIENT)
Dept: SURGERY | Facility: CLINIC | Age: 60
End: 2025-05-02

## 2025-05-02 ENCOUNTER — TELEPHONE (OUTPATIENT)
Dept: INTERNAL MEDICINE CLINIC | Facility: CLINIC | Age: 60
End: 2025-05-02

## 2025-05-02 ENCOUNTER — PATIENT MESSAGE (OUTPATIENT)
Dept: INTERNAL MEDICINE CLINIC | Facility: CLINIC | Age: 60
End: 2025-05-02

## 2025-05-02 RX ORDER — ACYCLOVIR 800 MG/1
1 TABLET ORAL
Qty: 6 EACH | Refills: 3 | Status: SHIPPED | OUTPATIENT
Start: 2025-05-02

## 2025-05-03 NOTE — TELEPHONE ENCOUNTER
Dr Montoya sent prescription for Freestyle Buzz 3 sensor to [facility] pharmacy today.   See encounter 5/2/25 for [employer] message sent to patient on this matter.     Please reply to pool: EM RN TRIAGE

## 2025-05-03 NOTE — TELEPHONE ENCOUNTER
See patients' mychart message and  RN response. Dr Montoya sent prescription for Freestyle Buzz 3 sensor to [facility] pharmacy today.    Please reply to pool: EM RN TRIAGE

## 2025-05-06 ENCOUNTER — LAB ENCOUNTER (OUTPATIENT)
Dept: LAB | Facility: HOSPITAL | Age: 60
End: 2025-05-06
Attending: UROLOGY
Payer: COMMERCIAL

## 2025-05-06 ENCOUNTER — EKG ENCOUNTER (OUTPATIENT)
Dept: LAB | Facility: HOSPITAL | Age: 60
End: 2025-05-06
Attending: UROLOGY
Payer: COMMERCIAL

## 2025-05-06 ENCOUNTER — NURSE ONLY (OUTPATIENT)
Dept: SURGERY | Facility: CLINIC | Age: 60
End: 2025-05-06
Payer: COMMERCIAL

## 2025-05-06 DIAGNOSIS — N39.0 RECURRENT UTI: Primary | ICD-10-CM

## 2025-05-06 DIAGNOSIS — Z01.818 PREOPERATIVE TESTING: ICD-10-CM

## 2025-05-06 LAB
ANION GAP SERPL CALC-SCNC: 9 MMOL/L (ref 0–18)
ATRIAL RATE: 70 BPM
BASOPHILS # BLD AUTO: 0.05 X10(3) UL (ref 0–0.2)
BASOPHILS NFR BLD AUTO: 0.9 %
BILIRUB UR QL: NEGATIVE
BUN BLD-MCNC: 23 MG/DL (ref 9–23)
BUN/CREAT SERPL: 23.7 (ref 10–20)
CALCIUM BLD-MCNC: 9.5 MG/DL (ref 8.7–10.4)
CHLORIDE SERPL-SCNC: 103 MMOL/L (ref 98–112)
CLARITY UR: CLEAR
CO2 SERPL-SCNC: 27 MMOL/L (ref 21–32)
CREAT BLD-MCNC: 0.97 MG/DL (ref 0.7–1.3)
DEPRECATED RDW RBC AUTO: 49.1 FL (ref 35.1–46.3)
EGFRCR SERPLBLD CKD-EPI 2021: 90 ML/MIN/1.73M2 (ref 60–?)
EOSINOPHIL # BLD AUTO: 0.16 X10(3) UL (ref 0–0.7)
EOSINOPHIL NFR BLD AUTO: 2.8 %
ERYTHROCYTE [DISTWIDTH] IN BLOOD BY AUTOMATED COUNT: 14.2 % (ref 11–15)
FASTING STATUS PATIENT QL REPORTED: NO
GLUCOSE BLD-MCNC: 152 MG/DL (ref 70–99)
GLUCOSE UR-MCNC: NORMAL MG/DL
HCT VFR BLD AUTO: 40.8 % (ref 39–53)
HGB BLD-MCNC: 13.4 G/DL (ref 13–17.5)
IMM GRANULOCYTES # BLD AUTO: 0.02 X10(3) UL (ref 0–1)
IMM GRANULOCYTES NFR BLD: 0.4 %
KETONES UR-MCNC: NEGATIVE MG/DL
LEUKOCYTE ESTERASE UR QL STRIP.AUTO: NEGATIVE
LYMPHOCYTES # BLD AUTO: 1.82 X10(3) UL (ref 1–4)
LYMPHOCYTES NFR BLD AUTO: 32.1 %
MCH RBC QN AUTO: 31.2 PG (ref 26–34)
MCHC RBC AUTO-ENTMCNC: 32.8 G/DL (ref 31–37)
MCV RBC AUTO: 95.1 FL (ref 80–100)
MONOCYTES # BLD AUTO: 0.39 X10(3) UL (ref 0.1–1)
MONOCYTES NFR BLD AUTO: 6.9 %
NEUTROPHILS # BLD AUTO: 3.23 X10 (3) UL (ref 1.5–7.7)
NEUTROPHILS # BLD AUTO: 3.23 X10(3) UL (ref 1.5–7.7)
NEUTROPHILS NFR BLD AUTO: 56.9 %
NITRITE UR QL STRIP.AUTO: NEGATIVE
OSMOLALITY SERPL CALC.SUM OF ELEC: 295 MOSM/KG (ref 275–295)
P AXIS: 29 DEGREES
P-R INTERVAL: 178 MS
PH UR: 5.5 [PH] (ref 5–8)
PLATELET # BLD AUTO: 179 10(3)UL (ref 150–450)
POTASSIUM SERPL-SCNC: 4.6 MMOL/L (ref 3.5–5.1)
PROT UR-MCNC: NEGATIVE MG/DL
Q-T INTERVAL: 402 MS
QRS DURATION: 98 MS
QTC CALCULATION (BEZET): 434 MS
R AXIS: 24 DEGREES
RBC # BLD AUTO: 4.29 X10(6)UL (ref 4.3–5.7)
SODIUM SERPL-SCNC: 139 MMOL/L (ref 136–145)
SP GR UR STRIP: 1.02 (ref 1–1.03)
T AXIS: 35 DEGREES
UROBILINOGEN UR STRIP-ACNC: NORMAL
VENTRICULAR RATE: 70 BPM
WBC # BLD AUTO: 5.7 X10(3) UL (ref 4–11)

## 2025-05-06 PROCEDURE — 80048 BASIC METABOLIC PNL TOTAL CA: CPT

## 2025-05-06 PROCEDURE — 36415 COLL VENOUS BLD VENIPUNCTURE: CPT

## 2025-05-06 PROCEDURE — 93005 ELECTROCARDIOGRAM TRACING: CPT

## 2025-05-06 PROCEDURE — 87086 URINE CULTURE/COLONY COUNT: CPT | Performed by: UROLOGY

## 2025-05-06 PROCEDURE — 85025 COMPLETE CBC W/AUTO DIFF WBC: CPT

## 2025-05-06 PROCEDURE — 81001 URINALYSIS AUTO W/SCOPE: CPT | Performed by: UROLOGY

## 2025-05-06 PROCEDURE — 93010 ELECTROCARDIOGRAM REPORT: CPT | Performed by: INTERNAL MEDICINE

## 2025-05-06 NOTE — PROGRESS NOTES
-Pt presents to Urology for [place] to collect UA/ CS pre-op for 5/14/25 surgery w/ PAYTON.  -Pt positions self on exam table; draped for privacy to lower jeans/ underwear to midthigh; alcohol wipe connection between catheter/ tubing & separate to collect urine specimen then reconnected;  pt redressed self.  -Pt requested stat-lock change completed.  -Pt c/o irritation penis tip & top of scrotum.  He uses Dove soap to shower. Instructed to place gauze between penis/ scrotum to avoid skin-to-skin contact; leave open to air post shower; reconsider fungal cream application to avoid irritation.  -Encounter complete.

## 2025-05-07 ENCOUNTER — TELEPHONE (OUTPATIENT)
Dept: INTERNAL MEDICINE CLINIC | Facility: CLINIC | Age: 60
End: 2025-05-07

## 2025-05-07 NOTE — TELEPHONE ENCOUNTER
Patient contacts clinic reporting he has upcoming urologic surgery and he discussed this with Dr. Montoya at his 04/25 visit.  States he discussed pre operative clearance at that time.  Inquires if Dr. Montoya will clear him for surgery.  Had blood work and EKG done per Dr. Hernandez.  Note mentiions surgery but not clearance.  Dr. Montoya please advise.

## 2025-05-08 NOTE — TELEPHONE ENCOUNTER
I have placed an addendum on the patient's most recent progress note with me.  It is copied and pasted below.  Patient is cleared for surgery.  I do not need to see him again for clearance.    \"Addendum:   Patient has been scheduled for cystoscopy and TURP procedure in the near future.  Preoperative testing has been done.  I reviewed the results.  EKG showed normal sinus rhythm with no abnormalities.  Basic metabolic profile normal except for blood sugar of 152.  Normal CBC.  Based on the preoperative testing as well as the history and physical as documented above, patient is medically cleared to proceed with the planned cystoscopy and TURP procedure.\"

## 2025-05-09 ENCOUNTER — OFFICE VISIT (OUTPATIENT)
Dept: SURGERY | Facility: CLINIC | Age: 60
End: 2025-05-09
Payer: COMMERCIAL

## 2025-05-09 DIAGNOSIS — N39.0 RECURRENT UTI: ICD-10-CM

## 2025-05-09 DIAGNOSIS — N13.8 BPH WITH OBSTRUCTION/LOWER URINARY TRACT SYMPTOMS: Primary | ICD-10-CM

## 2025-05-09 DIAGNOSIS — R33.9 URINARY RETENTION: ICD-10-CM

## 2025-05-09 DIAGNOSIS — N40.1 BPH WITH OBSTRUCTION/LOWER URINARY TRACT SYMPTOMS: Primary | ICD-10-CM

## 2025-05-09 PROCEDURE — 99213 OFFICE O/P EST LOW 20 MIN: CPT | Performed by: UROLOGY

## 2025-05-09 NOTE — PROGRESS NOTES
Luis Miguel Shelton is a 59 year old male.    HPI:     Chief Complaint   Patient presents with    BPH     Pt is scheduled for [facility] 5/14/25 cystoscopy, bipolar TURP of the prostate       59-year-old male who is scheduled next week May 14, 2025 for cystoscopy and bipolar TUR of the prostate for management of persistent urinary retention.  Managed currently with an indwelling Murphy catheter.  He failed clean intermittent catheterization previously after he was admitted with UTI and difficulty inserting the catheter.  Preoperative labs reviewed all showing unremarkable findings.      HISTORY:  Past Medical History[1]   Past Surgical History[2]   Family History[3]   Social History: Short Social Hx on File[4]     Medications (Active prior to today's visit):  Current Medications[5]    Allergies:  Allergies[6]      ROS:       PHYSICAL EXAM:        ASSESSMENT/PLAN:   Assessment   Encounter Diagnoses   Name Primary?    BPH with obstruction/lower urinary tract symptoms Yes    Recurrent UTI     Urinary retention        Will proceed with cystoscopy and bipolar TUR of the prostate as scheduled next week.  All questions answered.  We will proceed accordingly.         Orders This Visit:  No orders of the defined types were placed in this encounter.      Meds This Visit:  Requested Prescriptions      No prescriptions requested or ordered in this encounter       Imaging & Referrals:  None     5/9/2025  David Hrenandez MD               [1]   Past Medical History:   Chest pain    normal stress nuclear; normal stress echo    Colon polyps    Hyperplastic, repeat in 10 years    Diabetes (HCC)    diet controlled w/ no BDR - 2008    Disorder of eye, unspecified    increased C/D ratio - OU    Lipid screening    MGD (meibomian gland dysfunction)    OU    Obesity (BMI 30-39.9)    Other and unspecified hyperlipidemia    Type II or unspecified type diabetes mellitus without mention of complication, not stated as uncontrolled    Unspecified  essential hypertension   [2]   Past Surgical History:  Procedure Laterality Date    Colonoscopy N/A 11/15/2016    Procedure: COLONOSCOPY;  Surgeon: GISEL Ramirez MD;  Location: [facility] ENDOSCOPY    Shoulder arthroscopy Left 04/28/2020   [3]   Family History  Problem Relation Age of Onset    Gastro-Intestinal Disorder Brother         celiac sprue    Cancer Paternal Grandfather         Colon Ca    Cancer Other         liver cancer - close relative    Diabetes Neg     Glaucoma Neg     Macular degeneration Neg    [4]   Social History  Socioeconomic History    Marital status:    Tobacco Use    Smoking status: Never    Smokeless tobacco: Never   Vaping Use    Vaping status: Never Used   Substance and Sexual Activity    Alcohol use: Not Currently     Alcohol/week: 0.0 standard drinks of alcohol    Drug use: No    Sexual activity: Yes     Partners: Female   Other Topics Concern    Caffeine Concern Yes     Comment: coffee/tea, >6cups/day     Social Drivers of Health     Food Insecurity: No Food Insecurity (4/10/2025)    NCSS - Food Insecurity     Worried About Running Out of Food in the Last Year: No     Ran Out of Food in the Last Year: No   Transportation Needs: No Transportation Needs (4/10/2025)    NCSS - Transportation     Lack of Transportation: No   Housing Stability: Not At Risk (4/10/2025)    NCSS - Housing/Utilities     Has Housing: Yes     Worried About Losing Housing: No     Unable to Get Utilities: No   [5]   Current Outpatient Medications   Medication Sig Dispense Refill    Continuous Glucose Sensor (FREESTYLE DARIA 3 SENSOR) Does not apply Misc 1 each every 14 (fourteen) days. Type 2 diabetes mellitus without complication, without long-term current use of insulin (HCC) E11.9 6 each 3    clotrimazole-betamethasone 1-0.05 % External Cream Apply 1 Application topically 2 (two) times daily. 45 g 1    metFORMIN 500 MG Oral Tab Take 2 tablets (1,000 mg total) by mouth 2 (two) times daily with meals. 360 tablet  3    Insulin Glargine-yfgn (SEMGLEE, YFGN,) 100 UNIT/ML Subcutaneous Solution Pen-injector Inject 10 Units into the skin daily. 3 mL 5    Insulin Pen Needle (BD PEN NEEDLE SHANTELL U/F) 32G X 4 MM Does not apply Misc INJECT 1 NEEDLE AS DIRECTED  EVERY NIGHT FOR USE WITH LANTUS  / INSULIN GLARGINE 90 each 3    insulin glargine 100 UNIT/ML Subcutaneous Solution Pen-injector Inject 10 Units into the skin nightly. 3 mL 3    tamsulosin 0.4 MG Oral Cap Take 1 capsule (0.4 mg total) by mouth daily. Take 1/2 hour following the same meal each day 90 capsule 3    amLODIPine 5 MG Oral Tab Take 1 tablet (5 mg total) by mouth daily. 90 tablet 3    lisinopril 40 MG Oral Tab Take 1 tablet (40 mg total) by mouth daily. 90 tablet 3    Cholecalciferol 125 MCG (5000 UT) Oral Tab Take 1 tablet (5,000 Units total) by mouth in the morning.      simvastatin 40 MG Oral Tab Take 1 tablet (40 mg total) by mouth daily. 90 tablet 3    TRUEplus Lancets 30G Does not apply Misc 1 lancet to check glucose three times daily 300 each 3    Glucose Blood (CONTOUR NEXT TEST) In Vitro Strip Use 1 (one) new strip three times daily for blood glucose monitoring 300 strip 1   [6]   Allergies  Allergen Reactions    Azithromycin DIARRHEA and NAUSEA AND VOMITING    Sulfa Antibiotics UNKNOWN    Sulfanilamide      Other reaction(s): Hives

## 2025-05-12 ENCOUNTER — PATIENT MESSAGE (OUTPATIENT)
Dept: INTERNAL MEDICINE CLINIC | Facility: CLINIC | Age: 60
End: 2025-05-12

## 2025-05-13 ENCOUNTER — TELEPHONE (OUTPATIENT)
Dept: SURGERY | Facility: CLINIC | Age: 60
End: 2025-05-13

## 2025-05-13 RX ORDER — ACYCLOVIR 800 MG/1
1 TABLET ORAL
Qty: 6 EACH | Refills: 3 | OUTPATIENT
Start: 2025-05-13

## 2025-05-13 NOTE — TELEPHONE ENCOUNTER
Outpatient Medication Detail     Disp Refills Start End    Continuous Glucose Sensor (FREESTYLE DARIA 3 SENSOR) Does not apply Misc 6 each 3 5/2/2025 --    Sig - Route: 1 each every 14 (fourteen) days. Type 2 diabetes mellitus without complication, without long-term current use of insulin (HCC) E11.9 - Does not apply    Sent to pharmacy as: FreeStyle Daria 3 Sensor    Notes to Pharmacy: Type 2 diabetes mellitus without complication, without long-term current use of insulin (HCC) E11.9    E-Prescribing Status: Receipt confirmed by pharmacy (5/2/2025  9:52 AM CDT)      Pharmacy    [id number] [place] [address] AT [facility] [address], [phone], [phone]

## 2025-05-13 NOTE — TELEPHONE ENCOUNTER
Routed to [facility] to run for protocol , thanks.     Requested Prescriptions     Pending Prescriptions Disp Refills    Continuous Glucose Sensor (FREESTYLE DARIA 3 PLUS SENSOR) Does not apply Misc 6 each 3     Si each every 14 (fourteen) days. 1 each every 14 (fourteen) days -  FreeStyle Daria 3 PLUS monitor       Last Office Visit with PCP: 2025

## 2025-05-13 NOTE — TELEPHONE ENCOUNTER
He should stop the clotrimazole cream.  Would avoid putting anything on it for now and see how it looks.  Unfortunately, the Murphy catheter can often cause these irritative symptoms.  If he wishes to try a regular over-the-counter cream like Aveeno just for lubricating I think that would be reasonable.  I would suggest proceeding with surgery as scheduled for tomorrow.

## 2025-05-13 NOTE — TELEPHONE ENCOUNTER
Per patient is scheduled for surgery tomorrow, states penis infection seems to be worse, unsure if he could still have surgery. Please call thank you.

## 2025-05-13 NOTE — TELEPHONE ENCOUNTER
- s/w pt; pt identity verified with name and   - pt c/o continued irritation at tip of penis, it is \"red and chapped, states that Dr. Hernandez had prescribed some clotrimazole betamethasone, but it doesn't seem to be helping in fact thinks it made it worse, has tried \"vitamin E\" as recommended by a nurse, also not helping.    - pt denies fever, chills, discharge  - pt does not wish to cancel his procedure, but wanted Dr. Hernandez to be aware and determine if they can proceed with the surgery tomorrow.  - routed to [facility]

## 2025-05-13 NOTE — TELEPHONE ENCOUNTER
I s/w pt and gave him the response msg info as stated below in K[facility]'s msg and pt verbalized understanding and compliance.

## 2025-05-14 ENCOUNTER — ANESTHESIA (OUTPATIENT)
Dept: SURGERY | Facility: HOSPITAL | Age: 60
End: 2025-05-14
Payer: COMMERCIAL

## 2025-05-14 ENCOUNTER — HOSPITAL ENCOUNTER (OUTPATIENT)
Facility: HOSPITAL | Age: 60
Discharge: HOME | End: 2025-05-16
Attending: UROLOGY | Admitting: UROLOGY
Payer: COMMERCIAL

## 2025-05-14 ENCOUNTER — ANESTHESIA EVENT (OUTPATIENT)
Dept: SURGERY | Facility: HOSPITAL | Age: 60
End: 2025-05-14
Payer: COMMERCIAL

## 2025-05-14 DIAGNOSIS — R33.9 URINARY RETENTION: ICD-10-CM

## 2025-05-14 DIAGNOSIS — N40.1 BPH WITH OBSTRUCTION/LOWER URINARY TRACT SYMPTOMS: ICD-10-CM

## 2025-05-14 DIAGNOSIS — N13.8 BPH WITH OBSTRUCTION/LOWER URINARY TRACT SYMPTOMS: ICD-10-CM

## 2025-05-14 LAB
DEPRECATED RDW RBC AUTO: 48.7 FL (ref 35.1–46.3)
ERYTHROCYTE [DISTWIDTH] IN BLOOD BY AUTOMATED COUNT: 13.7 % (ref 11–15)
GLUCOSE BLDC GLUCOMTR-MCNC: 141 MG/DL (ref 70–99)
GLUCOSE BLDC GLUCOMTR-MCNC: 153 MG/DL (ref 70–99)
GLUCOSE BLDC GLUCOMTR-MCNC: 188 MG/DL (ref 70–99)
GLUCOSE BLDC GLUCOMTR-MCNC: 214 MG/DL (ref 70–99)
GLUCOSE BLDC GLUCOMTR-MCNC: 222 MG/DL (ref 70–99)
HCT VFR BLD AUTO: 36.5 % (ref 39–53)
HGB BLD-MCNC: 11.9 G/DL (ref 13–17.5)
MCH RBC QN AUTO: 31.3 PG (ref 26–34)
MCHC RBC AUTO-ENTMCNC: 32.6 G/DL (ref 31–37)
MCV RBC AUTO: 96.1 FL (ref 80–100)
PLATELET # BLD AUTO: 117 10(3)UL (ref 150–450)
RBC # BLD AUTO: 3.8 X10(6)UL (ref 4.3–5.7)
WBC # BLD AUTO: 6.2 X10(3) UL (ref 4–11)

## 2025-05-14 PROCEDURE — 99214 OFFICE O/P EST MOD 30 MIN: CPT | Performed by: HOSPITALIST

## 2025-05-14 PROCEDURE — 52601 PROSTATECTOMY (TURP): CPT | Performed by: UROLOGY

## 2025-05-14 RX ORDER — MORPHINE SULFATE 10 MG/ML
6 INJECTION, SOLUTION INTRAMUSCULAR; INTRAVENOUS EVERY 10 MIN PRN
Status: DISCONTINUED | OUTPATIENT
Start: 2025-05-14 | End: 2025-05-14 | Stop reason: HOSPADM

## 2025-05-14 RX ORDER — ONDANSETRON 2 MG/ML
INJECTION INTRAMUSCULAR; INTRAVENOUS AS NEEDED
Status: DISCONTINUED | OUTPATIENT
Start: 2025-05-14 | End: 2025-05-14 | Stop reason: SURG

## 2025-05-14 RX ORDER — LIDOCAINE HYDROCHLORIDE 10 MG/ML
INJECTION, SOLUTION EPIDURAL; INFILTRATION; INTRACAUDAL; PERINEURAL AS NEEDED
Status: DISCONTINUED | OUTPATIENT
Start: 2025-05-14 | End: 2025-05-14 | Stop reason: SURG

## 2025-05-14 RX ORDER — MAGNESIUM HYDROXIDE 1200 MG/15ML
3000 LIQUID ORAL CONTINUOUS
Status: DISCONTINUED | OUTPATIENT
Start: 2025-05-14 | End: 2025-05-16

## 2025-05-14 RX ORDER — NICOTINE POLACRILEX 4 MG
15 LOZENGE BUCCAL
Status: DISCONTINUED | OUTPATIENT
Start: 2025-05-14 | End: 2025-05-14 | Stop reason: HOSPADM

## 2025-05-14 RX ORDER — PHENYLEPHRINE HCL 10 MG/ML
VIAL (ML) INJECTION AS NEEDED
Status: DISCONTINUED | OUTPATIENT
Start: 2025-05-14 | End: 2025-05-14 | Stop reason: SURG

## 2025-05-14 RX ORDER — DEXTROSE MONOHYDRATE 25 G/50ML
50 INJECTION, SOLUTION INTRAVENOUS
Status: DISCONTINUED | OUTPATIENT
Start: 2025-05-14 | End: 2025-05-14 | Stop reason: HOSPADM

## 2025-05-14 RX ORDER — AMLODIPINE BESYLATE 5 MG/1
5 TABLET ORAL DAILY
Status: DISCONTINUED | OUTPATIENT
Start: 2025-05-15 | End: 2025-05-16

## 2025-05-14 RX ORDER — POLYETHYLENE GLYCOL 3350 17 G/17G
17 POWDER, FOR SOLUTION ORAL DAILY PRN
Status: DISCONTINUED | OUTPATIENT
Start: 2025-05-14 | End: 2025-05-16

## 2025-05-14 RX ORDER — ACETAMINOPHEN 500 MG
1000 TABLET ORAL ONCE
Status: COMPLETED | OUTPATIENT
Start: 2025-05-14 | End: 2025-05-14

## 2025-05-14 RX ORDER — SODIUM PHOSPHATE, DIBASIC AND SODIUM PHOSPHATE, MONOBASIC 7; 19 G/230ML; G/230ML
1 ENEMA RECTAL ONCE AS NEEDED
Status: DISCONTINUED | OUTPATIENT
Start: 2025-05-14 | End: 2025-05-16

## 2025-05-14 RX ORDER — HYDROCHLOROTHIAZIDE 12.5 MG/1
1 CAPSULE ORAL AS DIRECTED
Qty: 6 EACH | Refills: 3 | Status: SHIPPED | OUTPATIENT
Start: 2025-05-14

## 2025-05-14 RX ORDER — PHENAZOPYRIDINE HYDROCHLORIDE 100 MG/1
200 TABLET, FILM COATED ORAL 3 TIMES DAILY PRN
Status: DISCONTINUED | OUTPATIENT
Start: 2025-05-14 | End: 2025-05-16

## 2025-05-14 RX ORDER — NICOTINE POLACRILEX 4 MG
15 LOZENGE BUCCAL
Status: DISCONTINUED | OUTPATIENT
Start: 2025-05-14 | End: 2025-05-16

## 2025-05-14 RX ORDER — HYDROMORPHONE HYDROCHLORIDE 1 MG/ML
0.2 INJECTION, SOLUTION INTRAMUSCULAR; INTRAVENOUS; SUBCUTANEOUS EVERY 5 MIN PRN
Status: DISCONTINUED | OUTPATIENT
Start: 2025-05-14 | End: 2025-05-14 | Stop reason: HOSPADM

## 2025-05-14 RX ORDER — LIDOCAINE HYDROCHLORIDE 20 MG/ML
6 JELLY TOPICAL AS NEEDED
Status: DISCONTINUED | OUTPATIENT
Start: 2025-05-14 | End: 2025-05-16

## 2025-05-14 RX ORDER — ATORVASTATIN CALCIUM 20 MG/1
20 TABLET, FILM COATED ORAL NIGHTLY
Status: DISCONTINUED | OUTPATIENT
Start: 2025-05-14 | End: 2025-05-16

## 2025-05-14 RX ORDER — NALOXONE HYDROCHLORIDE 0.4 MG/ML
80 INJECTION, SOLUTION INTRAMUSCULAR; INTRAVENOUS; SUBCUTANEOUS AS NEEDED
Status: DISCONTINUED | OUTPATIENT
Start: 2025-05-14 | End: 2025-05-14 | Stop reason: HOSPADM

## 2025-05-14 RX ORDER — ENOXAPARIN SODIUM 100 MG/ML
40 INJECTION SUBCUTANEOUS DAILY
Status: DISCONTINUED | OUTPATIENT
Start: 2025-05-14 | End: 2025-05-16

## 2025-05-14 RX ORDER — HYDROMORPHONE HYDROCHLORIDE 1 MG/ML
0.4 INJECTION, SOLUTION INTRAMUSCULAR; INTRAVENOUS; SUBCUTANEOUS EVERY 5 MIN PRN
Status: DISCONTINUED | OUTPATIENT
Start: 2025-05-14 | End: 2025-05-14 | Stop reason: HOSPADM

## 2025-05-14 RX ORDER — MORPHINE SULFATE 4 MG/ML
4 INJECTION, SOLUTION INTRAMUSCULAR; INTRAVENOUS EVERY 10 MIN PRN
Status: DISCONTINUED | OUTPATIENT
Start: 2025-05-14 | End: 2025-05-14 | Stop reason: HOSPADM

## 2025-05-14 RX ORDER — MORPHINE SULFATE 4 MG/ML
2 INJECTION, SOLUTION INTRAMUSCULAR; INTRAVENOUS EVERY 10 MIN PRN
Status: DISCONTINUED | OUTPATIENT
Start: 2025-05-14 | End: 2025-05-14 | Stop reason: HOSPADM

## 2025-05-14 RX ORDER — DEXAMETHASONE SODIUM PHOSPHATE 4 MG/ML
VIAL (ML) INJECTION AS NEEDED
Status: DISCONTINUED | OUTPATIENT
Start: 2025-05-14 | End: 2025-05-14 | Stop reason: SURG

## 2025-05-14 RX ORDER — NICOTINE POLACRILEX 4 MG
30 LOZENGE BUCCAL
Status: DISCONTINUED | OUTPATIENT
Start: 2025-05-14 | End: 2025-05-16

## 2025-05-14 RX ORDER — SENNOSIDES 8.6 MG
17.2 TABLET ORAL NIGHTLY PRN
Status: DISCONTINUED | OUTPATIENT
Start: 2025-05-14 | End: 2025-05-16

## 2025-05-14 RX ORDER — TAMSULOSIN HYDROCHLORIDE 0.4 MG/1
0.4 CAPSULE ORAL
Status: DISCONTINUED | OUTPATIENT
Start: 2025-05-14 | End: 2025-05-16

## 2025-05-14 RX ORDER — SODIUM CHLORIDE, SODIUM LACTATE, POTASSIUM CHLORIDE, CALCIUM CHLORIDE 600; 310; 30; 20 MG/100ML; MG/100ML; MG/100ML; MG/100ML
INJECTION, SOLUTION INTRAVENOUS CONTINUOUS
Status: DISCONTINUED | OUTPATIENT
Start: 2025-05-14 | End: 2025-05-16

## 2025-05-14 RX ORDER — ACETAMINOPHEN 500 MG
1000 TABLET ORAL EVERY 8 HOURS SCHEDULED
Status: DISCONTINUED | OUTPATIENT
Start: 2025-05-14 | End: 2025-05-16

## 2025-05-14 RX ORDER — BISACODYL 10 MG
10 SUPPOSITORY, RECTAL RECTAL
Status: DISCONTINUED | OUTPATIENT
Start: 2025-05-14 | End: 2025-05-16

## 2025-05-14 RX ORDER — NICOTINE POLACRILEX 4 MG
30 LOZENGE BUCCAL
Status: DISCONTINUED | OUTPATIENT
Start: 2025-05-14 | End: 2025-05-14 | Stop reason: HOSPADM

## 2025-05-14 RX ORDER — HYDROMORPHONE HYDROCHLORIDE 1 MG/ML
0.6 INJECTION, SOLUTION INTRAMUSCULAR; INTRAVENOUS; SUBCUTANEOUS EVERY 5 MIN PRN
Status: DISCONTINUED | OUTPATIENT
Start: 2025-05-14 | End: 2025-05-14 | Stop reason: HOSPADM

## 2025-05-14 RX ORDER — INSULIN DEGLUDEC 100 U/ML
15 INJECTION, SOLUTION SUBCUTANEOUS NIGHTLY
Status: DISCONTINUED | OUTPATIENT
Start: 2025-05-14 | End: 2025-05-16

## 2025-05-14 RX ORDER — DEXTROSE MONOHYDRATE 25 G/50ML
50 INJECTION, SOLUTION INTRAVENOUS
Status: DISCONTINUED | OUTPATIENT
Start: 2025-05-14 | End: 2025-05-16

## 2025-05-14 RX ORDER — LISINOPRIL 20 MG/1
40 TABLET ORAL DAILY
Status: DISCONTINUED | OUTPATIENT
Start: 2025-05-15 | End: 2025-05-16

## 2025-05-14 RX ADMIN — ONDANSETRON 4 MG: 2 INJECTION INTRAMUSCULAR; INTRAVENOUS at 10:25:00

## 2025-05-14 RX ADMIN — PHENYLEPHRINE HCL 100 MCG: 10 MG/ML VIAL (ML) INJECTION at 11:38:00

## 2025-05-14 RX ADMIN — DEXAMETHASONE SODIUM PHOSPHATE 4 MG: 4 MG/ML VIAL (ML) INJECTION at 10:25:00

## 2025-05-14 RX ADMIN — LIDOCAINE HYDROCHLORIDE 50 MG: 10 INJECTION, SOLUTION EPIDURAL; INFILTRATION; INTRACAUDAL; PERINEURAL at 10:25:00

## 2025-05-14 RX ADMIN — SODIUM CHLORIDE, SODIUM LACTATE, POTASSIUM CHLORIDE, CALCIUM CHLORIDE: 600; 310; 30; 20 INJECTION, SOLUTION INTRAVENOUS at 12:01:00

## 2025-05-14 RX ADMIN — PHENYLEPHRINE HCL 100 MCG: 10 MG/ML VIAL (ML) INJECTION at 11:17:00

## 2025-05-14 NOTE — INTERVAL H&P NOTE
Pre-op Diagnosis: BPH with obstruction/lower urinary tract symptoms [N40.1, N13.8]  Urinary retention [R33.9]    The above referenced H&P was reviewed by David Hernandez MD on 5/14/2025, the patient was examined and no significant changes have occurred in the patient's condition since the H&P was performed.  I discussed with the patient and/or legal representative the potential benefits, risks and side effects of this procedure; the likelihood of the patient achieving goals; and potential problems that might occur during recuperation.  I discussed reasonable alternatives to the procedure, including risks, benefits and side effects related to the alternatives and risks related to not receiving this procedure.  We will proceed with procedure as planned.

## 2025-05-14 NOTE — ANESTHESIA PREPROCEDURE EVALUATION
Anesthesia PreOp Note    HPI:     Luis Miguel Shelton is a 59 year old male who presents for preoperative consultation requested by: David Hernandez MD    Date of Surgery: 5/14/2025    Procedure(s):  Cystoscopy, bipolar transurethral resection of the prostate  Indication: BPH with obstruction/lower urinary tract symptoms [N40.1, N13.8]  Urinary retention [R33.9]    Relevant Problems   No relevant active problems       NPO:  Last Liquid Consumption Date: 05/13/25  Last Liquid Consumption Time: 2000  Last Solid Consumption Date: 05/13/25  Last Solid Consumption Time: 2000  Last Liquid Consumption Date: 05/13/25          History Review:  Patient Active Problem List    Diagnosis Date Noted    Urinary retention 04/10/2025    Sepsis, due to unspecified organism, unspecified whether acute organ dysfunction present (HCC) 04/10/2025    Neck pain 09/26/2023    Age-related nuclear cataract of both eyes 06/06/2023    Vitreous floaters of both eyes 06/06/2023    Glaucoma suspect, bilateral 04/15/2021    Open fracture of head of left humerus, initial encounter 03/23/2020    Somatic dysfunction 04/10/2019    BPH with obstruction/lower urinary tract symptoms 04/19/2018    Disorder of rotator cuff syndrome of right shoulder and allied disorder 03/22/2018    Acute pain of right shoulder 03/13/2018    Superior glenoid labrum lesion of right shoulder 03/13/2018    Colon polyps     HTN (hypertension) 03/19/2015    Vitreous opacity 05/08/2014    Carpal tunnel syndrome 01/28/2014    Disorder of liver 10/14/2013    Hypertriglyceridemia 09/10/2013    Type 2 diabetes mellitus without complication, without long-term current use of insulin (HCC) 02/28/2013    Myopia 02/28/2013    Backache 11/09/2009    Ganglion of tendon sheath 06/28/2008       Past Medical History[1]    Past Surgical History[2]    Prescriptions Prior to Admission[3]  Current Medications and Prescriptions Ordered in Epic[4]    Allergies[5]    Family History[6]  Social Hx  on file[7]    Available pre-op labs reviewed.  Lab Results   Component Value Date    WBC 5.7 05/06/2025    RBC 4.29 (L) 05/06/2025    HGB 13.4 05/06/2025    HCT 40.8 05/06/2025    MCV 95.1 05/06/2025    MCH 31.2 05/06/2025    MCHC 32.8 05/06/2025    RDW 14.2 05/06/2025    .0 05/06/2025     Lab Results   Component Value Date     05/06/2025    K 4.6 05/06/2025     05/06/2025    CO2 27.0 05/06/2025    BUN 23 05/06/2025    CREATSERUM 0.97 05/06/2025     (H) 05/06/2025    PGLU 141 (H) 05/14/2025    CA 9.5 05/06/2025          Vital Signs:  Body mass index is 27.05 kg/m².   height is 1.854 m (6' 1\") and weight is 93 kg (205 lb). His oral temperature is 98.3 °F (36.8 °C). His blood pressure is 127/78 and his pulse is 82. His respiration is 16 and oxygen saturation is 97%.   Vitals:    05/09/25 1650 05/14/25 0845   BP:  127/78   Pulse:  82   Resp:  16   Temp:  98.3 °F (36.8 °C)   TempSrc:  Oral   SpO2:  97%   Weight: 95.3 kg (210 lb) 93 kg (205 lb)   Height: 1.854 m (6' 1\")         Anesthesia Evaluation     Patient summary reviewed and Nursing notes reviewed    No history of anesthetic complications   Airway   Mallampati: II  Neck ROM: full  Dental      Pulmonary - negative ROS and normal exam   Cardiovascular - negative ROS and normal exam  (+) hypertension    Neuro/Psych - negative ROS     GI/Hepatic/Renal - negative ROS     Endo/Other - negative ROS   (+) diabetes mellitus  Abdominal  - normal exam                 Anesthesia Plan:   ASA:  2  Plan:   General  Airway:  LMA  Post-op Pain Management: IV analgesics  Informed Consent Plan and Risks Discussed With:  Patient      I have informed Luis Miguel Shelton and/or legal guardian or family member of the nature of the anesthetic plan, benefits, risks including possible dental damage if relevant, major complications, and any alternative forms of anesthetic management.   All of the patient's questions were answered to the best of my ability.  The patient desires the anesthetic management as planned.  Lauri London MD  5/14/2025 9:44 AM  Present on Admission:  **None**           [1]   Past Medical History:   Anesthesia complication    hx of asthma like symptoms after extubation; ? from irritation on the lining , Albuterol given, symptoms resolved    BPH (benign prostatic hyperplasia)    Chest pain    normal stress nuclear; normal stress echo    Colon polyps    Hyperplastic, repeat in 10 years    Diabetes (HCC)    diet controlled w/ no BDR - 2008    Disorder of eye, unspecified    increased C/D ratio - OU    High blood pressure    High cholesterol    Incontinence    mckay catheter due to urinary retention    Lipid screening    MGD (meibomian gland dysfunction)    OU    Obesity (BMI 30-39.9)    Other and unspecified hyperlipidemia    Type II or unspecified type diabetes mellitus without mention of complication, not stated as uncontrolled    Unspecified essential hypertension   [2]   Past Surgical History:  Procedure Laterality Date    Colonoscopy N/A 11/15/2016    Procedure: COLONOSCOPY;  Surgeon: GISEL Ramirez MD;  Location: [facility] ENDOSCOPY    Shoulder arthroscopy Left 04/28/2020    Spine surgery procedure unlisted  01/2024    cervical spine C5-C5 fusion   [3]   Medications Prior to Admission   Medication Sig Dispense Refill Last Dose/Taking    Continuous Glucose Sensor (FREESTYLE DARIA 3 SENSOR) Does not apply Misc 1 each every 14 (fourteen) days. Type 2 diabetes mellitus without complication, without long-term current use of insulin (HCC) E11.9 6 each 3 Taking    clotrimazole-betamethasone 1-0.05 % External Cream Apply 1 Application topically 2 (two) times daily. 45 g 1 Past Week    metFORMIN 500 MG Oral Tab Take 2 tablets (1,000 mg total) by mouth 2 (two) times daily with meals. 360 tablet 3 5/13/2025 at  5:00 PM    insulin glargine 100 UNIT/ML Subcutaneous Solution Pen-injector Inject 10 Units into the skin nightly. (Patient taking differently:  Inject 15 Units into the skin nightly.) 3 mL 3 5/13/2025 at  8:00 PM    tamsulosin 0.4 MG Oral Cap Take 1 capsule (0.4 mg total) by mouth daily. Take 1/2 hour following the same meal each day (Patient taking differently: Take 1 capsule (0.4 mg total) by mouth After dinner. Take 1/2 hour following the same meal each day) 90 capsule 3 5/13/2025 at  5:30 PM    amLODIPine 5 MG Oral Tab Take 1 tablet (5 mg total) by mouth daily. 90 tablet 3 5/14/2025 at  8:00 AM    lisinopril 40 MG Oral Tab Take 1 tablet (40 mg total) by mouth daily. 90 tablet 3 5/13/2025 at  8:00 AM    Cholecalciferol 125 MCG (5000 UT) Oral Tab Take 1 tablet (5,000 Units total) by mouth in the morning.   5/13/2025 at  8:00 AM    simvastatin 40 MG Oral Tab Take 1 tablet (40 mg total) by mouth daily. (Patient taking differently: Take 1 tablet (40 mg total) by mouth nightly.) 90 tablet 3 5/13/2025 at  9:00 PM    Insulin Glargine-yfgn (SEMGLEE, YFGN,) 100 UNIT/ML Subcutaneous Solution Pen-injector Inject 10 Units into the skin daily. 3 mL 5 Unknown    Insulin Pen Needle (BD PEN NEEDLE SHANTELL U/F) 32G X 4 MM Does not apply Misc INJECT 1 NEEDLE AS DIRECTED  EVERY NIGHT FOR USE WITH LANTUS  / INSULIN GLARGINE 90 each 3     TRUEplus Lancets 30G Does not apply Misc 1 lancet to check glucose three times daily 300 each 3     Glucose Blood (CONTOUR NEXT TEST) In Vitro Strip Use 1 (one) new strip three times daily for blood glucose monitoring 300 strip 1    [4]   Current Facility-Administered Medications Ordered in Epic   Medication Dose Route Frequency Provider Last Rate Last Admin    lactated ringers infusion   Intravenous Continuous David Hernandez MD 20 mL/hr at 05/14/25 0849 New Bag at 05/14/25 0849    ceFAZolin (Ancef) 2g in 10mL IV syringe premix  2 g Intravenous Once David Hernandez MD         No current [facility]-ordered outpatient medications on file.   [5]   Allergies  Allergen Reactions    Azithromycin DIARRHEA     Abdominal cramping     Sulfa Antibiotics FACE  FLUSHING     Migraine headaches   [6]   Family History  Problem Relation Age of Onset    Other (Other) Father         BPH    No Known Problems Mother     Cancer Paternal Grandfather         Colon Ca    Gastro-Intestinal Disorder Brother         celiac sprue    Cancer Other         liver cancer - close relative    Diabetes Neg     Glaucoma Neg     Macular degeneration Neg    [7]   Social History  Socioeconomic History    Marital status:    Tobacco Use    Smoking status: Never    Smokeless tobacco: Never   Vaping Use    Vaping status: Never Used   Substance and Sexual Activity    Alcohol use: Not Currently     Alcohol/week: 0.0 standard drinks of alcohol    Drug use: No    Sexual activity: Yes     Partners: Female   Other Topics Concern    Caffeine Concern Yes     Comment: coffee/tea, >6cups/day

## 2025-05-14 NOTE — ANESTHESIA POSTPROCEDURE EVALUATION
Patient: Luis Miguel Shelton    Procedure Summary       Date: 05/14/25 Room / Location: [facility] MAIN OR 14 / [facility] MAIN OR    Anesthesia Start: 1021 Anesthesia Stop: 1212    Procedure: Cystoscopy, bipolar transurethral resection of the prostate Diagnosis:       BPH with obstruction/lower urinary tract symptoms      Urinary retention      (BPH with obstruction/lower urinary tract symptoms [N40.1, N13.8]Urinary retention [R33.9])    Surgeons: David Hernandez MD Anesthesiologist: Lauri London MD    Anesthesia Type: general ASA Status: 2            Anesthesia Type: general    Vitals Value Taken Time   /77 05/14/25 12:23   Temp 98.3 05/14/25 12:24   Pulse 77 05/14/25 12:23   Resp 14 05/14/25 12:23   SpO2 97 % 05/14/25 12:23   Vitals shown include unfiled device data.    EM AN Post Evaluation:   Patient Evaluated in PACU  Patient Participation: complete - patient participated  Level of Consciousness: awake  Pain Management: adequate  Airway Patency:patent  Dental exam unchanged from preop  Yes    Cardiovascular Status: acceptable  Respiratory Status: acceptable  Postoperative Hydration acceptable      Lauri London MD  5/14/2025 12:24 PM

## 2025-05-14 NOTE — ANESTHESIA PROCEDURE NOTES
Airway  Date/Time: 5/14/2025 10:30 AM  Reason: Elective      General Information and Staff   Patient location during procedure: OR  Anesthesiologist: Lauri London MD  Performed: anesthesiologist   Performed by: Lauri London MD  Authorized by: Lauri London MD        Indications and Patient Condition  Indications for airway management: anesthesia  Sedation level: deep      Preoxygenated: yesPatient position: sniffing    Mask difficulty assessment: 1 - vent by mask    Final Airway Details    Final airway type: supraglottic airway      Successful airway: classic  Size: 4     Number of attempts at approach: 1

## 2025-05-14 NOTE — OPERATIVE REPORT
[facility]  part of [facility]    Operative Note     Luis Miguel [facility] Location: OR   [facility] [id number] MRN [id number]   Admission Date 5/14/2025 Operation Date 5/14/2025   Attending Physician David Hernandez MD Operating Physician David Hernandez MD      Preoperative Diagnosis: BPH with obstruction/lower urinary tract symptoms [N40.1, N13.8]  Urinary retention [R33.9]     Postoperative Diagnosis: BPH with obstruction/lower urinary tract symptoms [N40.1, N13.8]Urinary retention [R33.9]     Procedure Performed:   Cystoscopy, bipolar transurethral resection of the prostate     Primary Surgeon: David Hernandez MD      Assistant: None     Surgical Findings: Bilobar prostate enlargement, 4.5 cm prostatic urethral length     Anesthesia: General     Complications: None     Implants: * No implants in log *     Specimen: Prostate TUR chips for pathology     Drains: 22 [place]ican coudé tipped three-way Murphy catheter with a 30 cc balloon     Condition: Stable     Estimated Blood Loss: No data recorded     Summary of Case: After consent was obtained and preoperative antibiotics were administered the patient was brought into the operating room.  Anesthesia was administered and he was placed in the dorsal lithotomy position.  The groin was prepped and draped in the usual sterile standard fashion.  I dilated the distal urethra using [place] sounds starting at 20 [place] to 28 [place]ican.  The 26 [place]ican continuous-flow resectoscope with a visualizing  was then placed per urethra.  Findings are noted in the findings section of this report.  The Aquino working element with a bipolar cutting loop was used.  I resected the lateral lobes of the prostate from the bladder neck to the verumontanum.  The location of the ureteral orifices was noted intermittently throughout the case.  Prostate chips were intermittently removed using an Ilich evacuator.  Hemostasis was established.  A nice channel was created  through the prostatic urethra on inspection with the tip of the resectoscope at the level of the verumontanum.  No evidence of bleeding was seen.  The resectoscope was removed.  22 Ukrainian three-way Murphy catheter coudé with a 30 cc balloon was inserted and the patient was started on continuous bladder irrigation.  He was placed in the supine position awakened extubated and taken out to the postanesthesia care unit in stable condition.  I was present and performed the entirety of his procedure.  There were no perioperative or immediate postop complications.     David Hernandez MD  5/14/2025  12:04 PM

## 2025-05-14 NOTE — TELEPHONE ENCOUNTER
Please review: medication fails/has no protocol attached    Dr. Montoya - patient requesting new prescription for the Buzz 3 PLUS sensors, as the plain 3 sensors are no longer made    New prescription pended for your review and approval    No future appointments with primary care medicine.

## 2025-05-14 NOTE — H&P
Luis Miguel Shelton is a 59 year old male.     HPI:           Chief Complaint   Patient presents with    BPH       Pt is scheduled for [facility] 5/14/25 cystoscopy, bipolar TURP of the prostate         59-year-old male who is scheduled next week May 14, 2025 for cystoscopy and bipolar TUR of the prostate for management of persistent urinary retention.  Managed currently with an indwelling Murphy catheter.  He failed clean intermittent catheterization previously after he was admitted with UTI and difficulty inserting the catheter.  Preoperative labs reviewed all showing unremarkable findings.        HISTORY:  [Past Medical History]    [Past Medical History]   Chest pain     normal stress nuclear; normal stress echo    Colon polyps     Hyperplastic, repeat in 10 years    Diabetes (HCC)     diet controlled w/ no BDR - 2008    Disorder of eye, unspecified     increased C/D ratio - OU    Lipid screening    MGD (meibomian gland dysfunction)     OU    Obesity (BMI 30-39.9)    Other and unspecified hyperlipidemia    Type II or unspecified type diabetes mellitus without mention of complication, not stated as uncontrolled    Unspecified essential hypertension      [Past Surgical History]    [Past Surgical History]        Procedure Laterality Date    Colonoscopy N/A 11/15/2016     Procedure: COLONOSCOPY;  Surgeon: GISEL Ramirez MD;  Location: [facility] ENDOSCOPY    Shoulder arthroscopy Left 04/28/2020      [Family History]    [Family History]        Problem Relation Age of Onset    Gastro-Intestinal Disorder Brother           celiac sprue    Cancer Paternal Grandfather           Colon Ca    Cancer Other           liver cancer - close relative    Diabetes Neg      Glaucoma Neg      Macular degeneration Neg        Social History: [Short Social Hx on File]    [Short Social Hx on File]  Social History        Socioeconomic History    Marital status:    Tobacco Use    Smoking status: Never    Smokeless tobacco: Never   Vaping  CATARACTS, OU - CONTINUE TO MONITOR FOR NOW. [facility] GIVEN, FOLLOW. Use    Vaping status: Never Used   Substance and Sexual Activity    Alcohol use: Not Currently       Alcohol/week: 0.0 standard drinks of alcohol    Drug use: No    Sexual activity: Yes       Partners: Female   Other Topics Concern    Caffeine Concern Yes       Comment: coffee/tea, >6cups/day      Social Drivers of Health           Food Insecurity: No Food Insecurity (4/10/2025)     NCSS - Food Insecurity      Worried About Running Out of Food in the Last Year: No      Ran Out of Food in the Last Year: No   Transportation Needs: No Transportation Needs (4/10/2025)     NCSS - Transportation      Lack of Transportation: No   Housing Stability: Not At Risk (4/10/2025)     NCSS - Housing/Utilities      Has Housing: Yes      Worried About Losing Housing: No      Unable to Get Utilities: No         Medications (Active prior to today's visit):  [Current Medications]    [Current Medications]         Current Outpatient Medications   Medication Sig Dispense Refill    Continuous Glucose Sensor (FREESTYLE DARIA 3 SENSOR) Does not apply Misc 1 each every 14 (fourteen) days. Type 2 diabetes mellitus without complication, without long-term current use of insulin (HCC) E11.9 6 each 3    clotrimazole-betamethasone 1-0.05 % External Cream Apply 1 Application topically 2 (two) times daily. 45 g 1    metFORMIN 500 MG Oral Tab Take 2 tablets (1,000 mg total) by mouth 2 (two) times daily with meals. 360 tablet 3    Insulin Glargine-yfgn (SEMGLEE, YFGN,) 100 UNIT/ML Subcutaneous Solution Pen-injector Inject 10 Units into the skin daily. 3 mL 5    Insulin Pen Needle (BD PEN NEEDLE SHANTELL U/F) 32G X 4 MM Does not apply Misc INJECT 1 NEEDLE AS DIRECTED  EVERY NIGHT FOR USE WITH LANTUS  / INSULIN GLARGINE 90 each 3    insulin glargine 100 UNIT/ML Subcutaneous Solution Pen-injector Inject 10 Units into the skin nightly. 3 mL 3    tamsulosin 0.4 MG Oral Cap Take 1 capsule (0.4 mg total) by mouth daily. Take 1/2 hour following the same meal each  day 90 capsule 3    amLODIPine 5 MG Oral Tab Take 1 tablet (5 mg total) by mouth daily. 90 tablet 3    lisinopril 40 MG Oral Tab Take 1 tablet (40 mg total) by mouth daily. 90 tablet 3    Cholecalciferol 125 MCG (5000 UT) Oral Tab Take 1 tablet (5,000 Units total) by mouth in the morning.        simvastatin 40 MG Oral Tab Take 1 tablet (40 mg total) by mouth daily. 90 tablet 3    TRUEplus Lancets 30G Does not apply Misc 1 lancet to check glucose three times daily 300 each 3    Glucose Blood (CONTOUR NEXT TEST) In Vitro Strip Use 1 (one) new strip three times daily for blood glucose monitoring 300 strip 1        Allergies:  [Allergies]    [Allergies]        Allergen Reactions    Azithromycin DIARRHEA and NAUSEA AND VOMITING    Sulfa Antibiotics UNKNOWN    Sulfanilamide         Other reaction(s): Hives           ROS:      Reviewed and otherwise unremarkable  PHYSICAL EXAM:   In no apparent distress  Intact neurologically grossly  Abdomen soft nontender nondistended  Phallus and testicles both unremarkable.     ASSESSMENT/PLAN:      Assessment       Encounter Diagnoses   Name Primary?    BPH with obstruction/lower urinary tract symptoms Yes    Recurrent UTI      Urinary retention           Will proceed with cystoscopy and bipolar TUR of the prostate as scheduled next week.  All questions answered.  We will proceed accordingly.           Orders This Visit:  No orders of the defined types were placed in this encounter.        Meds This Visit:  Requested Prescriptions        No prescriptions requested or ordered in this encounter         Imaging & Referrals:  None

## 2025-05-14 NOTE — CONSULTS
[facility]    PATIENT'S NAME: AILYN ZIMMER   ATTENDING PHYSICIAN: David Hernandez MD   CONSULTING PHYSICIAN: Kiki Hooper MD   PATIENT ACCOUNT#:   [id number]    LOCATION:  [facility] PACU 3 [facility] 10  MEDICAL RECORD #:   [id number]       YOB: 1965  ADMISSION DATE:       05/14/2025      CONSULT DATE:  05/14/2025    REPORT OF CONSULTATION      REASON FOR ADMISSION:  Post transurethral resection of the prostate.    HISTORY OF PRESENT ILLNESS:  Patient is a 59-year-old  male with chronic enlarged prostate and lower urinary tract obstructive symptoms and recurrent urinary tract infections, failed outpatient conservative medical management options.  Scheduled today for above-mentioned procedure by his urologist, Dr. Hernandez.  Postoperatively, transferred to PACU for further monitoring.     PAST MEDICAL HISTORY:  Benign prostatic hypertrophy, hypertension, hyperlipidemia, diabetes mellitus type 2.    PAST SURGICAL HISTORY:  Anterior cervical discectomy and fusion, left shoulder arthroscopic procedure.    MEDICATIONS:  Please see medication reconciliation list.     ALLERGIES:  Sulfa.  Side effects to azithromycin.      SOCIAL HISTORY:  No tobacco, alcohol, or drug use.      FAMILY HISTORY:  Father had benign prostatic hypertrophy.      REVIEW OF SYSTEMS:  Currently resting in bed.  Lower penile discomfort.  No chest pain.  No shortness of breath.  Other 12-point review of systems is negative.       PHYSICAL EXAMINATION:    GENERAL:  Alert and oriented to time, place and person.  No acute distress.   VITAL SIGNS:  Temperature 97.6, pulse 68, respiratory rate 15, blood pressure 119/81, pulse ox 97% on room air.  HEENT:  Atraumatic.  Oropharynx clear.  Moist mucous membranes.  Ears and nose normal.  Eyes:  Anicteric sclerae.   NECK:  Supple.  No lymphadenopathy.  Trachea midline.    LUNGS:  Clear to auscultation bilaterally.  Normal respiratory effort.   HEART:  Regular rate and  rhythm.  S1 and S2 auscultated.  No murmur.    ABDOMEN:  Soft, nondistended.  No tenderness.    EXTREMITIES:  No edema, clubbing or cyanosis.   GENITOURINARY:  Murphy catheter in place with continuous bladder irrigation and Murphy catheter bag with bloody urine.  NEUROLOGIC:  Motor and sensory intact.    ASSESSMENT AND PLAN:    1.   Benign prostatic hypertrophy with lower urinary tract obstructive symptoms, status post cystoscopy and bipolar transurethral resection of the prostate.  Pain control.  Continuous bladder irrigation.  DVT prophylaxis.  Monitor hemodynamic status.    2.   Diabetes mellitus type 2.  Continue home medications.  Monitor Accu-Cheks.  Sliding scale insulin.  3.   Essential hypertension.  Continue home medications and monitor.  4.   Hyperlipidemia.  Continue home medications.    Dictated By Kiki Hooper MD  d: 05/14/2025 13:03:08  t: 05/14/2025 13:22:11  [facility] [id number]  FB/

## 2025-05-15 LAB
ANION GAP SERPL CALC-SCNC: 7 MMOL/L (ref 0–18)
BUN BLD-MCNC: 14 MG/DL (ref 9–23)
BUN/CREAT SERPL: 13.9 (ref 10–20)
CALCIUM BLD-MCNC: 8.9 MG/DL (ref 8.7–10.4)
CHLORIDE SERPL-SCNC: 106 MMOL/L (ref 98–112)
CO2 SERPL-SCNC: 26 MMOL/L (ref 21–32)
CREAT BLD-MCNC: 1.01 MG/DL (ref 0.7–1.3)
DEPRECATED RDW RBC AUTO: 46.6 FL (ref 35.1–46.3)
EGFRCR SERPLBLD CKD-EPI 2021: 86 ML/MIN/1.73M2 (ref 60–?)
ERYTHROCYTE [DISTWIDTH] IN BLOOD BY AUTOMATED COUNT: 13.7 % (ref 11–15)
GLUCOSE BLD-MCNC: 164 MG/DL (ref 70–99)
GLUCOSE BLDC GLUCOMTR-MCNC: 136 MG/DL (ref 70–99)
GLUCOSE BLDC GLUCOMTR-MCNC: 137 MG/DL (ref 70–99)
GLUCOSE BLDC GLUCOMTR-MCNC: 145 MG/DL (ref 70–99)
GLUCOSE BLDC GLUCOMTR-MCNC: 155 MG/DL (ref 70–99)
GLUCOSE BLDC GLUCOMTR-MCNC: 159 MG/DL (ref 70–99)
HCT VFR BLD AUTO: 35 % (ref 39–53)
HGB BLD-MCNC: 11.9 G/DL (ref 13–17.5)
MCH RBC QN AUTO: 31.7 PG (ref 26–34)
MCHC RBC AUTO-ENTMCNC: 34 G/DL (ref 31–37)
MCV RBC AUTO: 93.3 FL (ref 80–100)
OSMOLALITY SERPL CALC.SUM OF ELEC: 292 MOSM/KG (ref 275–295)
PLATELET # BLD AUTO: 143 10(3)UL (ref 150–450)
POTASSIUM SERPL-SCNC: 4.1 MMOL/L (ref 3.5–5.1)
RBC # BLD AUTO: 3.75 X10(6)UL (ref 4.3–5.7)
SODIUM SERPL-SCNC: 139 MMOL/L (ref 136–145)
WBC # BLD AUTO: 8.9 X10(3) UL (ref 4–11)

## 2025-05-15 PROCEDURE — 99215 OFFICE O/P EST HI 40 MIN: CPT | Performed by: INTERNAL MEDICINE

## 2025-05-15 NOTE — SPIRITUAL CARE NOTE
Spiritual Care Visit Note    Patient Name: Luis Miguel Shelton Date of Spiritual Care Visit: 05/15/25   : 1965 Primary Dx: BPH with obstruction/lower urinary tract symptoms       Referred By: Referral From: Nurse    Spiritual Care Taxonomy:    Intended Effects: Aligning care plan with patient's values    Methods: Collaborate with care team member, Offer support    Interventions: Silent prayer    Visit Type/Summary:     - Spiritual Care: Responded to a request via the on call phone Consulted with RN prior to visit. Provided support for patient's spiritual/Buddhist requests. Coordinated Yazidi Communion and verified NPO status.  remains available for follow up.    Spiritual Care support can be requested via an Epic consult. For urgent/immediate needs, please contact the On Call  at: [place]: ext [phone]    ALE Soares   [phone]

## 2025-05-15 NOTE — PROGRESS NOTES
[facility]  part of [facility]    Urology Progress Note    Luis Miguel Shelton Patient Status:  Outpatient in a Bed    1965 MRN [id number]   Location [facility] 4W/SW/SE Attending David Hernandez MD   Hosp Day # 0 PCP Kemal Montoya MD        Subjective:   Pt is resting comfortably in bed. Afebrile, VSS. States pain is better than yesterday, just c/o some irritation to penis. Murphy is draining light pink urine on slow-moderate rate CBI. No c/o chest pain or difficulty breathing. Per the patient, phlebotomy had a difficult time drawing his blood this morning and he is waiting for a redraw.    WBC 6.2 -> 8.9  Hgb 11.9 -> 11.9      Objective:     24hr Min/Max:  Temp  Min: 97.4 °F (36.3 °C)  Max: 98.3 °F (36.8 °C)  Pulse  Min: 57  Max: 82  BP  Min: 107/74  Max: 127/78  Resp  Min: 9  Max: 16  SpO2  Min: 93 %  Max: 100 %    Most Recent :    Vitals:    05/15/25 1129   BP: 129/85   Pulse: 88   Resp: 18   Temp:      [unfilled]  No intake/output data recorded.    GENERAL APPEARANCE: well developed, well nourished, in no acute distress  EYES: sclera non-icteric  ORAL CAVITY: mucosa moist  NECK/THYROID: no obvious masses or goiter  CV/RESP: non-labored breathing  ABDOMEN: soft, non-tender, non-distended  : Murphy draining light pink urine on slow-moderate rate CBI  EXTREMITIES: warm, well perfused, no clubbing, cyanosis or edema          Results:     Lab Results   Component Value Date    WBC 8.9 05/15/2025    HGB 11.9 (L) 05/15/2025    HCT 35.0 (L) 05/15/2025    .0 (L) 05/15/2025    CREATSERUM 1.01 05/15/2025    BUN 14 05/15/2025     05/15/2025    K 4.1 05/15/2025     05/15/2025    CO2 26.0 05/15/2025     (H) 05/15/2025    CA 8.9 05/15/2025    ALB 4.8 2024    ALKPHO 66 2024    BILT 0.6 2024    TP 7.0 2024    AST 18 2024    ALT 18 2024    PTT 26.7 2024    INR 0.89 2024    T4F 1.0 2024    TSH 5.348 (H)  11/27/2024    CASI 39 01/04/2021     01/04/2021    PSA 8.64 (H) 03/12/2025    DDIMER 0.27 01/04/2021    TROP <0.045 01/04/2021             Assessment and Plan:     Pt is a 59 year old male who is POD 1 status-post cystoscopy with bipolar transurethral resection of the prostate with Dr Hernandez. Doing well. Murphy is draining light pink urine on slow-moderate rate CBI. CBI clamped at 0825. Reassessed 15 minutes later and was cherry red. Was restarted on slow rate. Noted to be red by RN at 11:30, instructed to increase flow to slow-moderate rate.    Recommendations:  - Titrate CBI to keep urine clear/pink  - Encourage fluids      All questions answered. Patient verbalizes understanding and agrees with plan.    RATNA Fiore  [facility]rology  5/15/2025

## 2025-05-15 NOTE — PLAN OF CARE
Patient is alert and oriented. Room air. Vital signs stable. Murphy in place with CBI continued at a moderate rate. IVF continued. Call light and personal belongings within each. Safety precautions in place.     Problem: Patient Centered Care  Goal: Patient preferences are identified and integrated in the patient's plan of care  Description: Interventions:  - Provide timely, complete, and accurate information to patient/family  - Incorporate patient and family knowledge, values, beliefs, and cultural backgrounds into the planning and delivery of care  - Encourage patient/family to participate in care and decision-making at the level they choose  - Honor patient and family perspectives and choices  Outcome: Progressing     Problem: PAIN - ADULT  Goal: Verbalizes/displays adequate comfort level or patient's stated pain goal  Description: INTERVENTIONS:  - Encourage pt to monitor pain and request assistance  - Assess pain using appropriate pain scale  - Administer analgesics based on type and severity of pain and evaluate response  - Implement non-pharmacological measures as appropriate and evaluate response  - Consider cultural and social influences on pain and pain management  - Manage/alleviate anxiety  - Utilize distraction and/or relaxation techniques  - Monitor for opioid side effects  - Notify MD/LIP if interventions unsuccessful or patient reports new pain  - Anticipate increased pain with activity and pre-medicate as appropriate  Outcome: Progressing     Problem: DISCHARGE PLANNING  Goal: Discharge to home or other facility with appropriate resources  Description: INTERVENTIONS:  - Identify barriers to discharge w/pt and caregiver  - Include patient/family/discharge partner in discharge planning  - Arrange for needed discharge resources and transportation as appropriate  - Identify discharge learning needs (meds, wound care, etc)  - Arrange for interpreters to assist at discharge as needed  - Consider  post-discharge preferences of patient/family/discharge partner  - Complete POLST form as appropriate  - Assess patient's ability to be responsible for managing their own health  - Refer to Case Management Department for coordinating discharge planning if the patient needs post-hospital services based on physician/LIP order or complex needs related to functional status, cognitive ability or social support system  Outcome: Progressing     Problem: GENITOURINARY - ADULT  Goal: Absence of urinary retention  Description: INTERVENTIONS:  - Assess patient’s ability to void and empty bladder  - Monitor intake/output and perform bladder scan as needed  - Follow urinary retention protocol/standard of care  - Consider collaborating with pharmacy to review patient's medication profile  - Implement strategies to promote bladder emptying  Outcome: Progressing     Problem: HEMATOLOGIC - ADULT  Goal: Maintains hematologic stability  Description: INTERVENTIONS  - Assess for signs and symptoms of bleeding or hemorrhage  - Monitor labs and vital signs for trends  - Administer supportive blood products/factors, fluids and medications as ordered and appropriate  - Administer supportive blood products/factors as ordered and appropriate  Outcome: Progressing

## 2025-05-15 NOTE — PROGRESS NOTES
[facility]  [facility] Progress Note     Luis Miguel Shelton Patient Status:  Outpatient in a Bed    1965 MRN [id number]   Location [facility] 4W/SW/SE Attending David Hernandez MD   Hosp Day # 0 PCP Kemla Montoya MD       Subjective:     Patient seen sitting in bed.  Family bedside.  Patient denies acute events overnight.  Patient denies current pelvic pain.    Objective:      Vital signs:  Vitals:    05/15/25 0026 05/15/25 0439 05/15/25 0900 05/15/25 1129   BP: 109/76 124/76 131/78 129/85   BP Location: Right arm Right arm Right arm Right arm   Pulse: 65 69 77 88   Resp: 14  16 18   Temp: 97.6 °F (36.4 °C) 97.6 °F (36.4 °C)     TempSrc: Oral Oral  Oral   SpO2: 98% 98% 97% 98%   Weight:       Height:           Intake/Output Summary (Last 24 hours) at 5/15/2025 1214  Last data filed at 5/15/2025 0644  Gross per 24 hour   Intake 1710 ml   Output 3625 ml   Net -1915 ml           Physical Exam:    GENERAL:  Awake and alert, in no acute distress.  HEART:  Regular rhythm, regular rate  LUNGS:  Air entry was good.  No increased work of breathing or wheezes   ABDOMEN: Soft and non-tender.  : Catheter in place.  CBI running.  Clear urine in tube.  Did note pink urine in bag.  PSYCHIATRIC: Normal mood    Diagnostic Data:    Labs:    Recent Labs   Lab 25  1258 05/15/25  1010   WBC 6.2 8.9   HGB 11.9* 11.9*   MCV 96.1 93.3   .0* 143.0*       Recent Labs   Lab 05/15/25  1010   *   BUN 14   CREATSERUM 1.01   CA 8.9      K 4.1      CO2 26.0           Estimated Creatinine Clearance: 90.1 mL/min (based on SCr of 1.01 mg/dL).    No results for input(s): \"PTP\", \"INR\" in the last 168 hours.         COVID-19  Lab Results   Component Value Date    COVID19 Detected (A) 2022       Pro-Calcitonin  No results for input(s): \"PCT\" in the last 168 hours.    Cardiac  No results for input(s): \"TROP\", \"PBNP\" in the last 168  hours.    Inflammatory Markers  No results for input(s): \"CRP\", \"HAL\", \"LDH\", \"DDIMER\" in the last 168 hours.    Culture:  No results found for this visit on 05/14/25.    No results found.          Medications: Scheduled Medications[1]    Assessment & Plan:        BPH with obstruction/lower urinary tract symptoms  -status post cystoscopy and bipolar transurethral resection of the prostate.   - cont pain control, as needed  -Urology on consult.  Attempted clamping trial of CBI this a.m. with recurrent hematuria.  Restarted on CBI.  Continue at this time.  Appreciate further urology recommendations.  - Encourage Incentive spirometry and ambulation.        Type 2 diabetes mellitus   - Monitoring Blood glucose with POC checks  - SSI to cover hyperglycemia  - Hypoglycemia protocol  - Will monitor and adjust agents as needed.      HTN  - controlled  - CPM  - Monitor and adjust as needed    Hyperlipidemia  -Statin    Chronic anemia  -Normocytic   - Is currently having hematuria from procedure.  - Hemoglobin remains near baseline.  - Continue to monitor      Plan of care discussed with patient and family at bedside . Discussed management/test result(s) with Rn     Quality:  DVT Prophylaxis: Lovenox  CODE status: Full  Estimated date of discharge: TBD  Discharge is dependent on: clinical stability    52 minutes spent discussing with other providers, examining patient, obtaining history, reviewing medical records, interpreting and communicating test results/imaging, ordering tests/medications, discussing plan of care and documenting information.      Ever Hay MD          This note was prepared using Dragon Medical voice recognition dictation software. As a result errors may occur. When identified these errors have been corrected. While every attempt is made to correct errors during dictation discrepancies may still exist         [1]    amLODIPine  5 mg Oral Daily    insulin degludec  15 Units Subcutaneous Nightly     lisinopril  40 mg Oral Daily    metFORMIN  1,000 mg Oral BID with meals    atorvastatin  20 mg Oral Nightly    tamsulosin  0.4 mg Oral After dinner    insulin aspart  1-7 Units Subcutaneous TID CC    enoxaparin  40 mg Subcutaneous Daily    acetaminophen  1,000 mg Oral Q8H QIAN

## 2025-05-16 ENCOUNTER — TELEPHONE (OUTPATIENT)
Dept: SURGERY | Facility: CLINIC | Age: 60
End: 2025-05-16

## 2025-05-16 VITALS
WEIGHT: 205 LBS | DIASTOLIC BLOOD PRESSURE: 68 MMHG | RESPIRATION RATE: 18 BRPM | TEMPERATURE: 98 F | OXYGEN SATURATION: 97 % | HEART RATE: 88 BPM | SYSTOLIC BLOOD PRESSURE: 119 MMHG | HEIGHT: 73 IN | BODY MASS INDEX: 27.17 KG/M2

## 2025-05-16 LAB
ANION GAP SERPL CALC-SCNC: 6 MMOL/L (ref 0–18)
BASOPHILS # BLD AUTO: 0.03 X10(3) UL (ref 0–0.2)
BASOPHILS NFR BLD AUTO: 0.5 %
BUN BLD-MCNC: 17 MG/DL (ref 9–23)
BUN/CREAT SERPL: 19.8 (ref 10–20)
CALCIUM BLD-MCNC: 8.7 MG/DL (ref 8.7–10.4)
CHLORIDE SERPL-SCNC: 106 MMOL/L (ref 98–112)
CO2 SERPL-SCNC: 29 MMOL/L (ref 21–32)
CREAT BLD-MCNC: 0.86 MG/DL (ref 0.7–1.3)
DEPRECATED RDW RBC AUTO: 47.2 FL (ref 35.1–46.3)
EGFRCR SERPLBLD CKD-EPI 2021: 100 ML/MIN/1.73M2 (ref 60–?)
EOSINOPHIL # BLD AUTO: 0.12 X10(3) UL (ref 0–0.7)
EOSINOPHIL NFR BLD AUTO: 1.9 %
ERYTHROCYTE [DISTWIDTH] IN BLOOD BY AUTOMATED COUNT: 13.9 % (ref 11–15)
GLUCOSE BLD-MCNC: 116 MG/DL (ref 70–99)
GLUCOSE BLDC GLUCOMTR-MCNC: 120 MG/DL (ref 70–99)
GLUCOSE BLDC GLUCOMTR-MCNC: 124 MG/DL (ref 70–99)
HCT VFR BLD AUTO: 34.4 % (ref 39–53)
HGB BLD-MCNC: 11.3 G/DL (ref 13–17.5)
IMM GRANULOCYTES # BLD AUTO: 0.01 X10(3) UL (ref 0–1)
IMM GRANULOCYTES NFR BLD: 0.2 %
LYMPHOCYTES # BLD AUTO: 2.1 X10(3) UL (ref 1–4)
LYMPHOCYTES NFR BLD AUTO: 33.6 %
MCH RBC QN AUTO: 30.6 PG (ref 26–34)
MCHC RBC AUTO-ENTMCNC: 32.8 G/DL (ref 31–37)
MCV RBC AUTO: 93.2 FL (ref 80–100)
MONOCYTES # BLD AUTO: 0.49 X10(3) UL (ref 0.1–1)
MONOCYTES NFR BLD AUTO: 7.8 %
NEUTROPHILS # BLD AUTO: 3.5 X10 (3) UL (ref 1.5–7.7)
NEUTROPHILS # BLD AUTO: 3.5 X10(3) UL (ref 1.5–7.7)
NEUTROPHILS NFR BLD AUTO: 56 %
OSMOLALITY SERPL CALC.SUM OF ELEC: 295 MOSM/KG (ref 275–295)
PLATELET # BLD AUTO: 123 10(3)UL (ref 150–450)
POTASSIUM SERPL-SCNC: 4.4 MMOL/L (ref 3.5–5.1)
RBC # BLD AUTO: 3.69 X10(6)UL (ref 4.3–5.7)
SODIUM SERPL-SCNC: 141 MMOL/L (ref 136–145)
WBC # BLD AUTO: 6.3 X10(3) UL (ref 4–11)

## 2025-05-16 PROCEDURE — 99214 OFFICE O/P EST MOD 30 MIN: CPT | Performed by: INTERNAL MEDICINE

## 2025-05-16 NOTE — PLAN OF CARE
February 19, 2020     Patient: Jesse San  YOB: 2009   Date of Visit: 2/19/2020       To Whom it May Concern:    Vitaliy Pressley is under my professional care  He was seen in my office on 2/19/2020  He may return to school on today 02/19/2020  If you have any questions or concerns, please don't hesitate to call           Sincerely,          Wanda Wheatley MD        CC: No Recipients Patient alert and oriented X4, vitals stable. Denies pain. Continues with mckay with CBI at mod rate; attempted to wean off CBI but unsuccessful due to urine turning cherry colored. CBI clamped at 1230. Per Dr Hernandez its ok for urine to turn red as long as no clots are present. Post clamping CBI urine varied between yellow, pink and red at times. RUSLAN Luong by to see patient and is ok with color. Patient cleared for discharge. Patient tolerating diet. Independent in room, refusing lovenox, md aware. Discharge instructions reviewed with patient and wife at bedside including follow up appointments; continued and discontinued medications; mckay care and when to seek medical attention. Leg bag applied, overnight bag provided. PIV removed. Patient able to ambulate with wife to [facility].    Problem: Patient Centered Care  Goal: Patient preferences are identified and integrated in the patient's plan of care  Description: Interventions:  - What would you like us to know as we care for you?   - Provide timely, complete, and accurate information to patient/family  - Incorporate patient and family knowledge, values, beliefs, and cultural backgrounds into the planning and delivery of care  - Encourage patient/family to participate in care and decision-making at the level they choose  - Honor patient and family perspectives and choices  5/16/2025 1704 by Yuli Norris, RN  Outcome: Adequate for Discharge  5/16/2025 1655 by Yuli Norris, RN  Outcome: Progressing     Problem: PAIN - ADULT  Goal: Verbalizes/displays adequate comfort level or patient's stated pain goal  Description: INTERVENTIONS:  - Encourage pt to monitor pain and request assistance  - Assess pain using appropriate pain scale  - Administer analgesics based on type and severity of pain and evaluate response  - Implement non-pharmacological measures as appropriate and evaluate response  - Consider cultural and social influences on pain and pain management  -  Manage/alleviate anxiety  - Utilize distraction and/or relaxation techniques  - Monitor for opioid side effects  - Notify MD/LIP if interventions unsuccessful or patient reports new pain  - Anticipate increased pain with activity and pre-medicate as appropriate  5/16/2025 1704 by Yuli Norris, RN  Outcome: Adequate for Discharge  5/16/2025 1655 by Yuli Norris, RN  Outcome: Progressing     Problem: DISCHARGE PLANNING  Goal: Discharge to home or other facility with appropriate resources  Description: INTERVENTIONS:  - Identify barriers to discharge w/pt and caregiver  - Include patient/family/discharge partner in discharge planning  - Arrange for needed discharge resources and transportation as appropriate  - Identify discharge learning needs (meds, wound care, etc)  - Arrange for interpreters to assist at discharge as needed  - Consider post-discharge preferences of patient/family/discharge partner  - Complete POLST form as appropriate  - Assess patient's ability to be responsible for managing their own health  - Refer to Case Management Department for coordinating discharge planning if the patient needs post-hospital services based on physician/LIP order or complex needs related to functional status, cognitive ability or social support system  5/16/2025 1704 by Yuli Norris, RN  Outcome: Adequate for Discharge  5/16/2025 1655 by Yuli Norris, RN  Outcome: Progressing     Problem: GENITOURINARY - ADULT  Goal: Absence of urinary retention  Description: INTERVENTIONS:  - Assess patient’s ability to void and empty bladder  - Monitor intake/output and perform bladder scan as needed  - Follow urinary retention protocol/standard of care  - Consider collaborating with pharmacy to review patient's medication profile  - Implement strategies to promote bladder emptying  5/16/2025 1704 by Yuli Norris, RN  Outcome: Adequate for Discharge  5/16/2025 1655 by Yuli Norris, RN  Outcome: Progressing      Problem: HEMATOLOGIC - ADULT  Goal: Maintains hematologic stability  Description: INTERVENTIONS  - Assess for signs and symptoms of bleeding or hemorrhage  - Monitor labs and vital signs for trends  - Administer supportive blood products/factors, fluids and medications as ordered and appropriate  - Administer supportive blood products/factors as ordered and appropriate  5/16/2025 1704 by Yuli Norris, RN  Outcome: Adequate for Discharge  5/16/2025 1655 by Yuli Norris, RN  Outcome: Progressing

## 2025-05-16 NOTE — TELEPHONE ENCOUNTER
I called patient, assisted him with scheduling his nursing appointment for a voiding trial, patient agreed to time and date.

## 2025-05-16 NOTE — DISCHARGE INSTRUCTIONS
Diet:  - After anesthesia, begin with clear liquids. You may take what you like to eat or drink. Depending on how you feel the following day, you may resume your normal diet. Avoid heavy meals.   - Drink 6 to 8 glasses of water/fluids a day unless your healthcare provider tells you to limit your fluids.     Activity:  - Be sure to walk at least six times per day. This helps prevents blood clots in the legs, which can travel to the lung and become life-threatening. You may take walks outside. You   - You should avoid strenuous activity for ~ 3-4 weeks. This includes activities such as golf, tennis, cutting the grass, stretching exercises, lifting weights and so forth. You should avoid carrying anything over fifteen pounds for this three-four week period.  - Don’t drive if you are taking any pain medications.      Showering: You may shower following catheter removal. You may also take a bath, or use a jacuzzi, pool, or soaking tub.     Medication:   - If you take blood thinners (such as aspirin or plavix) please resume after your follow-up appointment to have your mckay removed.   - If you have any pain, you can obtain good pain relief by taking two acetaminophen (Tylenol) every four hours while awake for the first several days.   - You may receive a prescription for a stool softener to avoid straining after surgery. Take plenty of fiber and water or over the counter stool softener to avoid constipation. It may take a few days to have your first bowel movement after surgery. You may use two tabs of over the counter Senekot or Senegen twice daily as needed for constipation.     Postoperative appointment: You will need a postoperative visit within a few weeks after your discharge. Call the office to make an appointment if you do not already have one.     Call the office or come to the emergency room for fever over 101°F, difficulty breathing, chest pain, palpitations, significant nausea or vomiting, leg swelling or  pain.     Future Appointments   Date Time Provider Department Center   5/21/2025  8:20 AM [facility] UROLOGY NURSE [facility]   5/22/2025  2:40 PM David Hernandez MD [facility]

## 2025-05-16 NOTE — DISCHARGE SUMMARY
[facility]  part of [facility]    Discharge Summary    Luis Miguel Shelton Patient Status:  Outpatient in a Bed    1965 MRN [id number]   Location [facility] 4W/SW/SE Attending David Hernandez MD   Hosp Day # 0 PCP Kemal Montoya MD     Date of Admission: 2025     Date of Discharge: 25      Lace+ Score: 64  59-90 High Risk  29-58 Medium Risk  0-28   Low Risk.    TCM Follow-Up Recommendation:  LACE > 58: High Risk of readmission after discharge from the hospital.    DISCHARGE DX: Principal Problem:    BPH with obstruction/lower urinary tract symptoms  Active Problems:    Type 2 diabetes mellitus without complication, without long-term current use of insulin (HCC)    Hypertriglyceridemia    HTN (hypertension)       The patient was seen and examined on day of discharge and this discharge summary is in conjunction with any daily progress note from day of discharge.    HPI per admitting physician: \"Patient is a 59-year-old  male with chronic enlarged prostate and lower urinary tract obstructive symptoms and recurrent urinary tract infections, failed outpatient conservative medical management options. Scheduled today for above-mentioned procedure by his urologist, Dr. Hernandez. Postoperatively, transferred to PACU for further monitoring. \"    Hospital Course:      BPH with obstruction/lower urinary tract symptoms  -status post cystoscopy and bipolar transurethral resection of the prostate.   - cont pain control, as needed  -Urology on consult.  Previously failed clamping trial of CBI this a.m. with recurrent hematuria.  Restarted on CBI.  Continue at this time.  Appreciate further urology recommendations.  - Encourage Incentive spirometry and ambulation.       Type 2 diabetes mellitus   - Monitoring Blood glucose with POC checks  - Resume home regimen     HTN  - controlled  - CPM  - PCP to monitor and adjust as needed     Hyperlipidemia  -Statin     Chronic  anemia  -Normocytic   - Is currently having hematuria from procedure.  - Hemoglobin with mild decrease from baseline.    -Likely related to hematuria as above      Physical Exam:    Vitals:    05/15/25 2008 05/16/25 0449 05/16/25 0810 05/16/25 1110   BP: 125/74 116/76 116/69 119/68   BP Location: Right arm Right arm Right arm Right arm   Pulse: 70 84 86 88   Resp: 17 18 18 18   Temp: 98.4 °F (36.9 °C) 98.3 °F (36.8 °C)  98.1 °F (36.7 °C)   TempSrc: Oral Oral  Oral   SpO2: 97% 98% 96% 97%   Weight:       Height:         Patient Weight for the past 72 hrs:   Weight   05/14/25 0845 205 lb (93 kg)       Intake/Output Summary (Last 24 hours) at 5/16/2025 1547  Last data filed at 5/16/2025 1254  Gross per 24 hour   Intake 940 ml   Output 7400 ml   Net -6460 ml       GENERAL:  Awake and alert, in no acute distress.  HEART:  Regular rhythm, regular rate  LUNGS:  Air entry was good.  No increased work of breathing or wheezes   ABDOMEN: Soft and non-tender.  : Catheter in place.   Pink-colored urine  PSYCHIATRIC: Normal mood    CULTURE:   No results found for this visit on 05/14/25.    IMAGING STUDIES: SOME MAY NEED FOLLOW UP WITH PCP         LABS :     Lab Results   Component Value Date    WBC 6.3 05/16/2025    HGB 11.3 (L) 05/16/2025    HCT 34.4 (L) 05/16/2025    .0 (L) 05/16/2025    CREATSERUM 0.86 05/16/2025    BUN 17 05/16/2025     05/16/2025    K 4.4 05/16/2025     05/16/2025    CO2 29.0 05/16/2025     (H) 05/16/2025    CA 8.7 05/16/2025    ALB 4.8 11/27/2024    ALKPHO 66 11/27/2024    BILT 0.6 11/27/2024    TP 7.0 11/27/2024    AST 18 11/27/2024    ALT 18 11/27/2024    PTT 26.7 01/11/2024    INR 0.89 01/11/2024    T4F 1.0 11/27/2024    TSH 5.348 (H) 11/27/2024    CASI 39 01/04/2021     01/04/2021    PSA 8.64 (H) 03/12/2025    DDIMER 0.27 01/04/2021    TROP <0.045 01/04/2021       Recent Labs   Lab 05/14/25  1258 05/15/25  1010 05/16/25  0527   RBC 3.80* 3.75* 3.69*   HGB 11.9* 11.9*  11.3*   HCT 36.5* 35.0* 34.4*   MCV 96.1 93.3 93.2   MCH 31.3 31.7 30.6   MCHC 32.6 34.0 32.8   RDW 13.7 13.7 13.9   NEPRELIM  --   --  3.50   WBC 6.2 8.9 6.3   .0* 143.0* 123.0*     Recent Labs   Lab 05/15/25  1010 05/16/25  0527   * 116*   BUN 14 17   CREATSERUM 1.01 0.86   CA 8.9 8.7    141   K 4.1 4.4    106   CO2 26.0 29.0     Lab Results   Component Value Date    INR 0.89 01/11/2024       Disposition: Discharge to Home    Condition at Discharge: Stable     Discharge Medications:      Discharge Medications        CHANGE how you take these medications        Instructions Prescription details   FreeStyle Buzz 3 Sensor Misc  What changed: Another medication with the same name was added. Make sure you understand how and when to take each.      1 each every 14 (fourteen) days. Type 2 diabetes mellitus without complication, without long-term current use of insulin (HCC) E11.9   Quantity: 6 each  Refills: 3     FreeStyle Buzz 3 Plus Sensor Misc  What changed: You were already taking a medication with the same name, and this prescription was added. Make sure you understand how and when to take each.      1 each As Directed. INSERT 1 SENSOR ON THE BACK OF THE ARM AND CHANGE EVERY 15 DAYS.   Quantity: 6 each  Refills: 3     simvastatin 40 MG Tabs  Commonly known as: Zocor  What changed: when to take this      Take 1 tablet (40 mg total) by mouth daily.   Quantity: 90 tablet  Refills: 3     tamsulosin 0.4 MG Caps  Commonly known as: Flomax  What changed: when to take this      Take 1 capsule (0.4 mg total) by mouth daily. Take 1/2 hour following the same meal each day   Quantity: 90 capsule  Refills: 3            CONTINUE taking these medications        Instructions Prescription details   amLODIPine 5 MG Tabs  Commonly known as: Norvasc      Take 1 tablet (5 mg total) by mouth daily.   Quantity: 90 tablet  Refills: 3     BD Pen Needle Adriana U/F 32G X 4 MM Misc  Generic drug: Insulin Pen Needle       INJECT 1 NEEDLE AS DIRECTED  EVERY NIGHT FOR USE WITH LANTUS  / INSULIN GLARGINE   Quantity: 90 each  Refills: 3     Cholecalciferol 125 MCG (5000 UT) Tabs  Commonly known as: VITAMIN D-3      Take 1 tablet (5,000 Units total) by mouth in the morning.   Refills: 0     clotrimazole-betamethasone 1-0.05 % Crea  Commonly known as: Lotrisone      Apply 1 Application topically 2 (two) times daily.   Quantity: 45 g  Refills: 1     Contour Next Test Strp  Generic drug: Glucose Blood      Use 1 (one) new strip three times daily for blood glucose monitoring   Quantity: 300 strip  Refills: 1     insulin glargine 100 UNIT/ML Sopn      Inject 10 Units into the skin nightly.   Quantity: 3 mL  Refills: 3     lisinopril 40 MG Tabs  Commonly known as: Prinivil; Zestril      Take 1 tablet (40 mg total) by mouth daily.   Quantity: 90 tablet  Refills: 3     metFORMIN 500 MG Tabs  Commonly known as: Glucophage      Take 2 tablets (1,000 mg total) by mouth 2 (two) times daily with meals.   Quantity: 360 tablet  Refills: 3     TRUEplus Lancets 30G Misc      1 lancet to check glucose three times daily   Quantity: 300 each  Refills: 3            STOP taking these medications      Insulin Glargine-yfgn 100 UNIT/ML Sopn  Commonly known as: Semglee (yfgn)                  Where to Get Your Medications        These medications were sent to [id number] [place], [place] - [address] AT [facility] [address], [phone], [phone]  [address], [facility] [address]      Phone: [phone]   FreeStyle Buzz 3 Plus Sensor Misc         Follow up Visits  David Hernandez MD  [address]  [address]  [facility] [id number] [phone]    Follow up      Kemal Montoya MD    Consultants         Provider   Role Specialty     Kiki Hooper MD      Consulting Physician HOSPITALIST              Other Discharge Instructions:       Discharge Instructions           Diet:  - After anesthesia, begin with clear liquids. You may  take what you like to eat or drink. Depending on how you feel the following day, you may resume your normal diet. Avoid heavy meals.   - Drink 6 to 8 glasses of water/fluids a day unless your healthcare provider tells you to limit your fluids.     Activity:  - Be sure to walk at least six times per day. This helps prevents blood clots in the legs, which can travel to the lung and become life-threatening. You may take walks outside. You   - You should avoid strenuous activity for ~ 3-4 weeks. This includes activities such as golf, tennis, cutting the grass, stretching exercises, lifting weights and so forth. You should avoid carrying anything over fifteen pounds for this three-four week period.  - Don’t drive if you are taking any pain medications.      Showering: You may shower following catheter removal. You may also take a bath, or use a jacuzzi, pool, or soaking tub.     Medication:   - If you take blood thinners (such as aspirin or plavix) please resume after your follow-up appointment to have your mckay removed.   - If you have any pain, you can obtain good pain relief by taking two acetaminophen (Tylenol) every four hours while awake for the first several days.   - You may receive a prescription for a stool softener to avoid straining after surgery. Take plenty of fiber and water or over the counter stool softener to avoid constipation. It may take a few days to have your first bowel movement after surgery. You may use two tabs of over the counter Senekot or Senegen twice daily as needed for constipation.     Postoperative appointment: You will need a postoperative visit within a few weeks after your discharge. Call the office to make an appointment if you do not already have one.     Call the office or come to the emergency room for fever over 101°F, difficulty breathing, chest pain, palpitations, significant nausea or vomiting, leg swelling or pain.     Future Appointments   Date Time Provider Department  [place]   5/21/2025  8:20 AM [facility]   5/22/2025  2:40 PM David Hernandez MD [facility]       Discharge References/Attachments    Leg Bag, Discharge Instructions (English)  Indwelling Urinary Catheter, Discharge Instructions (English)         ----------------------------------------------------  35 MIN SPENT ON THIS DC   Ever Hay MD    5/16/2025

## 2025-05-16 NOTE — PROGRESS NOTES
[facility]  part of [facility]    Urology Progress Note    Luis Miguel Shelton Patient Status:  Outpatient in a Bed    1965 MRN [id number]   Location [facility] 4W/SW/SE Attending David Hernandez MD   Hosp Day # 0 PCP Kemal Montoya MD        Subjective:   Pt is resting in bed. Afebrile, VSS. States pain is minimal. Murphy is draining pink urine on moderate rate CBI. No c/o chest pain or difficulty breathing.     WBC 6.2 -> 8.9 -> 6.3  Hgb 11.9 -> 11.9 -> 11.3  Cr: 1.01 -> 0.86      Objective:     Most Recent :    Vitals:    25 0810   BP: 116/69   Pulse: 86   Resp: 18   Temp:      No I/O data recorded last 2 shifts    General: No acute distress, cooperative and communicative  HEENT: Normocephalic, atraumatic, moist mucous membranes, no discharge, no gross neck abnormalities  Respiratory: No respiratory distress. Chest expansion equal bilaterally.  Genitourinary: Murphy in place, urine quality corresponds with #1 below without sediments/clots. On moderate rate CBI.              Results:     Lab Results   Component Value Date    WBC 6.3 2025    HGB 11.3 (L) 2025    HCT 34.4 (L) 2025    .0 (L) 2025    CREATSERUM 0.86 2025    BUN 17 2025     2025    K 4.4 2025     2025    CO2 29.0 2025     (H) 2025    CA 8.7 2025    ALB 4.8 2024    ALKPHO 66 2024    BILT 0.6 2024    TP 7.0 2024    AST 18 2024    ALT 18 2024    PTT 26.7 2024    INR 0.89 2024    T4F 1.0 2024    TSH 5.348 (H) 2024    CASI 39 2021     2021    PSA 8.64 (H) 2025    DDIMER 0.27 2021    TROP <0.045 2021             Assessment and Plan:     Pt is a 59 year old male who is POD 2 status-post cystoscopy with bipolar transurethral resection of the prostate with Dr Hernandez. Doing well. Murphy is draining light pink urine on moderate  rate CBI. CBI clamped at 0825 yesterday. Reassessed 15 minutes later and was cherry red, was restarted on slow rate. Noted to be red by RN at 11:30, instructed to increase flow to slow-moderate rate. CBI clamped this morning at 08:40. Reassessed at 9:05 and remained light pink. Will continue clamp trial.    Recommendations:  - Clamp trial this morning  - Encourage fluids  - May consider discharge with mckay if patient tolerates clamp trial for a few hours with clear/pink urine      All questions answered. Patient verbalizes understanding and agrees with plan.    Salome Murillo, APRN  05/16/2025

## 2025-05-16 NOTE — PLAN OF CARE
Patient alert and oriented X4, vitals stable. Denies pain. Continues with mckay with CBI at mod rate; attempted to wean off CBI but unsuccessful due to urine turning cherry colored. Manual irrigation required around 6 pm, several small clots removed. After irrigation urine flowing freely and CBI maintained. Patient tolerating diet. Independent in room, refusing lovenox, MD aware. Bed locked and in lowest position, call light within, calls appropriately for assistance.     Problem: Patient Centered Care  Goal: Patient preferences are identified and integrated in the patient's plan of care  Description: Interventions:  - What would you like us to know as we care for you?   - Provide timely, complete, and accurate information to patient/family  - Incorporate patient and family knowledge, values, beliefs, and cultural backgrounds into the planning and delivery of care  - Encourage patient/family to participate in care and decision-making at the level they choose  - Honor patient and family perspectives and choices  Outcome: Progressing     Problem: PAIN - ADULT  Goal: Verbalizes/displays adequate comfort level or patient's stated pain goal  Description: INTERVENTIONS:  - Encourage pt to monitor pain and request assistance  - Assess pain using appropriate pain scale  - Administer analgesics based on type and severity of pain and evaluate response  - Implement non-pharmacological measures as appropriate and evaluate response  - Consider cultural and social influences on pain and pain management  - Manage/alleviate anxiety  - Utilize distraction and/or relaxation techniques  - Monitor for opioid side effects  - Notify MD/LIP if interventions unsuccessful or patient reports new pain  - Anticipate increased pain with activity and pre-medicate as appropriate  Outcome: Progressing     Problem: DISCHARGE PLANNING  Goal: Discharge to home or other facility with appropriate resources  Description: INTERVENTIONS:  - Identify  barriers to discharge w/pt and caregiver  - Include patient/family/discharge partner in discharge planning  - Arrange for needed discharge resources and transportation as appropriate  - Identify discharge learning needs (meds, wound care, etc)  - Arrange for interpreters to assist at discharge as needed  - Consider post-discharge preferences of patient/family/discharge partner  - Complete POLST form as appropriate  - Assess patient's ability to be responsible for managing their own health  - Refer to Case Management Department for coordinating discharge planning if the patient needs post-hospital services based on physician/LIP order or complex needs related to functional status, cognitive ability or social support system  Outcome: Progressing     Problem: GENITOURINARY - ADULT  Goal: Absence of urinary retention  Description: INTERVENTIONS:  - Assess patient’s ability to void and empty bladder  - Monitor intake/output and perform bladder scan as needed  - Follow urinary retention protocol/standard of care  - Consider collaborating with pharmacy to review patient's medication profile  - Implement strategies to promote bladder emptying  Outcome: Progressing     Problem: HEMATOLOGIC - ADULT  Goal: Maintains hematologic stability  Description: INTERVENTIONS  - Assess for signs and symptoms of bleeding or hemorrhage  - Monitor labs and vital signs for trends  - Administer supportive blood products/factors, fluids and medications as ordered and appropriate  - Administer supportive blood products/factors as ordered and appropriate  Outcome: Progressing

## 2025-05-16 NOTE — PLAN OF CARE
Problem: Patient Centered Care  Goal: Patient preferences are identified and integrated in the patient's plan of care  Description: Interventions:  - What would you like us to know as we care for you?   - Provide timely, complete, and accurate information to patient/family  - Incorporate patient and family knowledge, values, beliefs, and cultural backgrounds into the planning and delivery of care  - Encourage patient/family to participate in care and decision-making at the level they choose  - Honor patient and family perspectives and choices  Outcome: Progressing     Problem: PAIN - ADULT  Goal: Verbalizes/displays adequate comfort level or patient's stated pain goal  Description: INTERVENTIONS:  - Encourage pt to monitor pain and request assistance  - Assess pain using appropriate pain scale  - Administer analgesics based on type and severity of pain and evaluate response  - Implement non-pharmacological measures as appropriate and evaluate response  - Consider cultural and social influences on pain and pain management  - Manage/alleviate anxiety  - Utilize distraction and/or relaxation techniques  - Monitor for opioid side effects  - Notify MD/LIP if interventions unsuccessful or patient reports new pain  - Anticipate increased pain with activity and pre-medicate as appropriate  Outcome: Progressing     Problem: DISCHARGE PLANNING  Goal: Discharge to home or other facility with appropriate resources  Description: INTERVENTIONS:  - Identify barriers to discharge w/pt and caregiver  - Include patient/family/discharge partner in discharge planning  - Arrange for needed discharge resources and transportation as appropriate  - Identify discharge learning needs (meds, wound care, etc)  - Arrange for interpreters to assist at discharge as needed  - Consider post-discharge preferences of patient/family/discharge partner  - Complete POLST form as appropriate  - Assess patient's ability to be responsible for managing  their own health  - Refer to Case Management Department for coordinating discharge planning if the patient needs post-hospital services based on physician/LIP order or complex needs related to functional status, cognitive ability or social support system  Outcome: Progressing     Problem: GENITOURINARY - ADULT  Goal: Absence of urinary retention  Description: INTERVENTIONS:  - Assess patient’s ability to void and empty bladder  - Monitor intake/output and perform bladder scan as needed  - Follow urinary retention protocol/standard of care  - Consider collaborating with pharmacy to review patient's medication profile  - Implement strategies to promote bladder emptying  Outcome: Progressing     Problem: HEMATOLOGIC - ADULT  Goal: Maintains hematologic stability  Description: INTERVENTIONS  - Assess for signs and symptoms of bleeding or hemorrhage  - Monitor labs and vital signs for trends  - Administer supportive blood products/factors, fluids and medications as ordered and appropriate  - Administer supportive blood products/factors as ordered and appropriate  Outcome: Progressing

## 2025-05-16 NOTE — PROGRESS NOTES
[facility]  [facility]     Luis Miguel Shelton Patient Status:  Outpatient in a Bed    1965 MRN [id number]   Location [facility] 4W/SW/SE Attending David Hernandez MD   Hosp Day # 0 PCP Kemal Montoya MD       Subjective:     Patient seen sitting in bed.  Patient denies acute events overnight.  Patient remains without pelvic pain.    Per RN, patient failed clamping trial this a.m.  Re-developed blood in urine.  Patient was restarted on CBI.    Objective:      Vital signs:  Vitals:    05/15/25 1129 05/15/25 2008 05/16/25 0449 25 0810   BP: 129/85 125/74 116/76 116/69   BP Location: Right arm Right arm Right arm Right arm   Pulse: 88 70 84 86   Resp: 18 17 18 18   Temp:  98.4 °F (36.9 °C) 98.3 °F (36.8 °C)    TempSrc: Oral Oral Oral    SpO2: 98% 97% 98% 96%   Weight:       Height:           Intake/Output Summary (Last 24 hours) at 2025 0920  Last data filed at 2025 0840  Gross per 24 hour   Intake 940 ml   Output 7400 ml   Net -6460 ml           Physical Exam:    GENERAL:  Awake and alert, in no acute distress.  HEART:  Regular rhythm, regular rate  LUNGS:  Air entry was good.  No increased work of breathing or wheezes   ABDOMEN: Soft and non-tender.  : Catheter in place.  CBI running.  Pink-colored urine in tube.  Did note cherry urine in bag.  PSYCHIATRIC: Normal mood    Diagnostic Data:    Labs:    Recent Labs   Lab 25  1258 05/15/25  1010 25  0527   WBC 6.2 8.9 6.3   HGB 11.9* 11.9* 11.3*   MCV 96.1 93.3 93.2   .0* 143.0* 123.0*       Recent Labs   Lab 05/15/25  1010 25  0527   * 116*   BUN 14 17   CREATSERUM 1.01 0.86   CA 8.9 8.7    141   K 4.1 4.4    106   CO2 26.0 29.0           Estimated Creatinine Clearance: 105.8 mL/min (based on SCr of 0.86 mg/dL).    No results for input(s): \"PTP\", \"INR\" in the last 168 hours.         COVID-19  Lab Results   Component Value Date     COVID19 Detected (A) 06/13/2022       Pro-Calcitonin  No results for input(s): \"PCT\" in the last 168 hours.    Cardiac  No results for input(s): \"TROP\", \"PBNP\" in the last 168 hours.    Inflammatory Markers  No results for input(s): \"CRP\", \"HAL\", \"LDH\", \"DDIMER\" in the last 168 hours.    Culture:  No results found for this visit on 05/14/25.    No results found.          Medications: Scheduled Medications[1]    Assessment & Plan:        BPH with obstruction/lower urinary tract symptoms  -status post cystoscopy and bipolar transurethral resection of the prostate.   - cont pain control, as needed  -Urology on consult.  Previously failed clamping trial of CBI this a.m. with recurrent hematuria.  Restarted on CBI.  Continue at this time.  Appreciate further urology recommendations.  - Encourage Incentive spirometry and ambulation.        Type 2 diabetes mellitus   - Monitoring Blood glucose with POC checks  - SSI to cover hyperglycemia  - Hypoglycemia protocol  - Will monitor and adjust agents as needed.      HTN  - controlled  - CPM  - Monitor and adjust as needed    Hyperlipidemia  -Statin    Chronic anemia  -Normocytic   - Is currently having hematuria from procedure.  - Hemoglobin with mild decrease from baseline.  Down to 11.3 today.  -Likely related to hematuria as above  - Continue to monitor      Plan of care discussed with patient and family at bedside . Discussed management/test result(s) with Rn     Quality:  DVT Prophylaxis: Lovenox  CODE status: Full  Estimated date of discharge: TBD  Discharge is dependent on: clinical stability      Ever Hay MD          This note was prepared using Dragon Medical voice recognition dictation software. As a result errors may occur. When identified these errors have been corrected. While every attempt is made to correct errors during dictation discrepancies may still exist         [1]    amLODIPine  5 mg Oral Daily    insulin degludec  15 Units Subcutaneous Nightly     lisinopril  40 mg Oral Daily    metFORMIN  1,000 mg Oral BID with meals    atorvastatin  20 mg Oral Nightly    tamsulosin  0.4 mg Oral After dinner    insulin aspart  1-7 Units Subcutaneous TID CC    enoxaparin  40 mg Subcutaneous Daily    acetaminophen  1,000 mg Oral Q8H QIAN

## 2025-05-18 ENCOUNTER — HOSPITAL ENCOUNTER (EMERGENCY)
Facility: HOSPITAL | Age: 60
Discharge: HOME | End: 2025-05-18
Attending: EMERGENCY MEDICINE
Payer: COMMERCIAL

## 2025-05-18 VITALS
BODY MASS INDEX: 27.17 KG/M2 | OXYGEN SATURATION: 98 % | RESPIRATION RATE: 15 BRPM | HEART RATE: 82 BPM | SYSTOLIC BLOOD PRESSURE: 135 MMHG | WEIGHT: 205 LBS | TEMPERATURE: 97 F | DIASTOLIC BLOOD PRESSURE: 84 MMHG | HEIGHT: 73 IN

## 2025-05-18 DIAGNOSIS — R33.9 URINARY RETENTION: Primary | ICD-10-CM

## 2025-05-18 PROCEDURE — 99283 EMERGENCY DEPT VISIT LOW MDM: CPT

## 2025-05-18 NOTE — ED INITIAL ASSESSMENT (HPI)
Pt arrives through triage with wife      complaints of possible blood clot to his mckay cath. Pt self irrigated bladder yesterday and blood clots came out. Pt tried to irrigate mckay again but isnt able to declot one of the mckay ports.  No visible blood clots noted in mckay bag    Denies pain     Recent TERP on 5/14

## 2025-05-18 NOTE — ED PROVIDER NOTES
Patient Seen in: [facility], [place] Emergency Department    History     Chief Complaint   Patient presents with    Cath Tube Problem       HPI    59-year-old male with a history of BPH status post TURP on May 14, 2025.  Who had urinary retention today, thought he saw blood clot in the Murphy when he tried to flush.  Denies any fevers or any flank pain or suprapubic pain.    History reviewed. Past Medical History[1]    History reviewed. Past Surgical History[2]      Medications :  Prescriptions Prior to Admission[3]     Family History[4]    Smoking Status: Social Hx on file[5]    Constitutional and vital signs reviewed.      Social History and Family History elements reviewed from today, pertinent positives to the presenting problem noted.    Physical Exam     ED Triage Vitals [05/18/25 1410]   /79   Pulse 86   Resp 18   Temp 97.3 °F (36.3 °C)   Temp src Temporal   SpO2 98 %   O2 Device None (Room air)       All measures to prevent infection transmission during my interaction with the patient were taken. The patient was already wearing a droplet mask on my arrival to the room. Personal protective equipment was worn throughout the duration of the exam.  Handwashing was performed prior to and after the exam.  Stethoscope and any equipment used during my examination was cleaned with super sani-cloth germicidal wipes following the exam.     Physical Exam    General: NAD  Head: Normocephalic and atraumatic.  Mouth/Throat/Ears/Nose: No hoarseness of voice  Eyes: Conjunctivae and EOM are normal.  Neck: Normal range of motion. Supple.   Cardiovascular: Normal rate  Respiratory/Chest: No tachypnea.  Gastrointestinal: Nondistended  : Normal-appearing scrotum, urethral Murphy with clear yellow urine draining into bag.  No CVA tenderness.  Musculoskeletal:No swelling or deformity.   Neurological: Alert and appropriate.   Skin: Skin is warm and dry. No pallor.  Psychiatric: Has a normal mood and affect.      ED Course       Labs Reviewed - No data to display    As Interpreted by me    Imaging Results Available and Reviewed while in ED: No results found.  ED Medications Administered: Medications - No data to display      [facility]     Vitals:    05/18/25 1410 05/18/25 1458 05/18/25 1500   BP: 124/79 125/79 135/84   Pulse: 86 80 82   Resp: 18 14 15   Temp: 97.3 °F (36.3 °C)     TempSrc: Temporal     SpO2: 98% 98%    Weight: 93 kg     Height: 185.4 cm (6' 1\")       *I personally reviewed and interpreted all ED vitals.    Pulse Ox: 98%, Room air, Normal       Medical Decision Making      Differential Diagnosis/ Diagnostic Considerations: Urinary retention    Complicating Factors: The patient already has BPH to contribute to the complexity of this ED evaluation.    I reviewed prior chart records including discharge summary from the May 14, 2025 admission.  Mckay was easily flushed, clear yellow urine passed.  No indication for CBI at this time.  Advised follow-up very closely with his urologist.    Dc In stable condition.  Patient is comfortable with the plan.         Disposition and Plan     Clinical Impression:  1. Urinary retention        Disposition:  Discharge    Follow-up:  David Hernandez MD  [address] [address]  [phone]    Schedule an appointment as soon as possible for a visit in 1 day(s)        Medications Prescribed:  Discharge Medication List as of 5/18/2025  3:08 PM                           [1]   Past Medical History:   Anesthesia complication    hx of asthma like symptoms after extubation; ? from irritation on the lining , Albuterol given, symptoms resolved    BPH (benign prostatic hyperplasia)    Chest pain    normal stress nuclear; normal stress echo    Colon polyps    Hyperplastic, repeat in 10 years    Diabetes (HCC)    diet controlled w/ no BDR - 2008    Disorder of eye, unspecified    increased C/D ratio - OU    High blood pressure    High cholesterol    Incontinence    mckay catheter due to  urinary retention    Lipid screening    MGD (meibomian gland dysfunction)    OU    Obesity (BMI 30-39.9)    Other and unspecified hyperlipidemia    Type II or unspecified type diabetes mellitus without mention of complication, not stated as uncontrolled    Unspecified essential hypertension   [2]   Past Surgical History:  Procedure Laterality Date    Colonoscopy N/A 11/15/2016    Procedure: COLONOSCOPY;  Surgeon: GISEL Ramirez MD;  Location: [facility] ENDOSCOPY    Shoulder arthroscopy Left 04/28/2020    Spine surgery procedure unlisted  01/2024    cervical spine C5-C5 fusion   [3] (Not in a hospital admission)   [4]   Family History  Problem Relation Age of Onset    Other (Other) Father         BPH    No Known Problems Mother     Cancer Paternal Grandfather         Colon Ca    Gastro-Intestinal Disorder Brother         celiac sprue    Cancer Other         liver cancer - close relative    Diabetes Neg     Glaucoma Neg     Macular degeneration Neg    [5]   Social History  Socioeconomic History    Marital status:    Tobacco Use    Smoking status: Never    Smokeless tobacco: Never   Vaping Use    Vaping status: Never Used   Substance and Sexual Activity    Alcohol use: Not Currently     Alcohol/week: 0.0 standard drinks of alcohol    Drug use: No    Sexual activity: Yes     Partners: Female   Other Topics Concern    Caffeine Concern Yes     Comment: coffee/tea, >6cups/day

## 2025-05-19 ENCOUNTER — TELEPHONE (OUTPATIENT)
Dept: SURGERY | Facility: CLINIC | Age: 60
End: 2025-05-19

## 2025-05-20 ENCOUNTER — TELEPHONE (OUTPATIENT)
Dept: SURGERY | Facility: CLINIC | Age: 60
End: 2025-05-20

## 2025-05-20 ENCOUNTER — PATIENT MESSAGE (OUTPATIENT)
Dept: SURGERY | Facility: CLINIC | Age: 60
End: 2025-05-20

## 2025-05-20 NOTE — TELEPHONE ENCOUNTER
Patient sent below message via my chart. Please advise.     Can I Get In Today? Pls?    [facility] (supporting David Hernandez MD) Sent  5/20/2025  6:11 AM       Dr Hernandez,     I have a nurse appt tomorrow (May 21) to remove the Murphy and do a voiding trial.      Can I please move this appt to today (May 20)?     The Murphy is really uncomfortable. Anytime today is acceptable.     Serafin

## 2025-05-22 ENCOUNTER — OFFICE VISIT (OUTPATIENT)
Dept: SURGERY | Facility: CLINIC | Age: 60
End: 2025-05-22
Payer: COMMERCIAL

## 2025-05-22 ENCOUNTER — TELEPHONE (OUTPATIENT)
Dept: SURGERY | Facility: CLINIC | Age: 60
End: 2025-05-22

## 2025-05-22 DIAGNOSIS — R33.9 URINARY RETENTION: ICD-10-CM

## 2025-05-22 DIAGNOSIS — N13.8 BPH WITH OBSTRUCTION/LOWER URINARY TRACT SYMPTOMS: Primary | ICD-10-CM

## 2025-05-22 DIAGNOSIS — R31.0 GROSS HEMATURIA: ICD-10-CM

## 2025-05-22 DIAGNOSIS — N40.1 BPH WITH OBSTRUCTION/LOWER URINARY TRACT SYMPTOMS: Primary | ICD-10-CM

## 2025-05-22 PROCEDURE — 99024 POSTOP FOLLOW-UP VISIT: CPT | Performed by: UROLOGY

## 2025-05-22 RX ORDER — CIPROFLOXACIN 500 MG/1
500 TABLET, FILM COATED ORAL ONCE
Status: COMPLETED | OUTPATIENT
Start: 2025-05-22 | End: 2025-05-22

## 2025-05-22 RX ADMIN — CIPROFLOXACIN 500 MG: 500 TABLET, FILM COATED ORAL at 16:22:00

## 2025-05-22 NOTE — PROGRESS NOTES
[facility] performed a straight cath on pt using a 14 FR coude tip cath and drained pt's bladder of 2135 ml of brownish red colored urine. [facility] gave V/O to give pt 1 tab of Cipro 500 mg PO now and 1 tab to take home and take 12 hrs later. I verified that pt had no allergies to cipro and I then gave him 1 tab of Cipro 500 mg by mouth and told him to take the next tab in 12 hrs and I gave him one to take home. [facility] also gave orders to give pt a sample bag with 16 FR coude tip caths. I gave the pt a sample bag of 14 FR coude tip caths to change over to since he has been having difficulty with the straight tip caths he is currently using. As per v/o from [facility] pt should cath nightly if he is able to urinate naturally urinate adequate amts and if not then he should cath 3 times daily indefinitely for the TX of his urinary retention. I told pt that I will send a new script to [address] [place] [address] for the coude caths as his choice of the type of cath. I also arranged a f/u appt for pt for 5/29 at 8:20 am.

## 2025-05-22 NOTE — TELEPHONE ENCOUNTER
Patient calling stating he is feeling discomfort and would like to know if he could come earlier than 2:40 pm. No openings available.Please advise

## 2025-05-22 NOTE — PROGRESS NOTES
Luis Miguel Shelton is a 59 year old male.    HPI:     Chief Complaint   Patient presents with    BPH     Urinary retention        59-year-old male who presents in follow-up to a previous cystoscopy, bipolar TURP of the prostate May 14, 2025 for persistent urinary retention in spite of maximal medical therapy.  He failed a clean intermittent catheterization scheduled prior to surgery.  An indwelling Murphy catheter had been present at that time.  Surgery was performed May 14, 2025.  He came into the office  Yesterday and a Murphy catheter was removed.  He states he has been voiding 250 to 300 cc at a time.  Last time he voided was earlier today.  Over the last 2 hours though he developed suprapubic discomfort and pain.  He was scheduled for follow-up with me this afternoon but came in sooner for a nurse visit.  Bladder scan showed a volume of 1366 mL.  He denies constipation.    HISTORY:  Past Medical History[1]   Past Surgical History[2]   Family History[3]   Social History: Short Social Hx on File[4]     Medications (Active prior to today's visit):  Current Medications[5]    Allergies:  Allergies[6]      ROS:       PHYSICAL EXAM:   I straight cath the patient with a 16 [place] coudé tip straight catheter draining more than 1400 cc of tea colored urine.  No clots were noted.     ASSESSMENT/PLAN:   Assessment   Encounter Diagnoses   Name Primary?    BPH with obstruction/lower urinary tract symptoms Yes    Urinary retention        Recommend:  - The patient is confident he is able to catheterize himself with a 16 [place]uneian coudé catheter.  He previously had difficulty because of what he perceived to be a UTI.  I gave him the option of having another session with our nursing staff and he thinks he is confident enough to be able to do it.  I told the patient that if he is able to void he should catheterize at night but if he is not voiding at all spontaneously he should be catheterizing 3 times a day.  I asked him to  follow-up otherwise in 1 week for follow-up.         Orders This Visit:  No orders of the defined types were placed in this encounter.      Meds This Visit:  Requested Prescriptions      No prescriptions requested or ordered in this encounter       Imaging & Referrals:  None     5/22/2025  David Hernandez MD               [1]   Past Medical History:   Anesthesia complication    hx of asthma like symptoms after extubation; ? from irritation on the lining , Albuterol given, symptoms resolved    BPH (benign prostatic hyperplasia)    Chest pain    normal stress nuclear; normal stress echo    Colon polyps    Hyperplastic, repeat in 10 years    Diabetes (HCC)    diet controlled w/ no BDR - 2008    Disorder of eye, unspecified    increased C/D ratio - OU    High blood pressure    High cholesterol    Incontinence    mckay catheter due to urinary retention    Lipid screening    MGD (meibomian gland dysfunction)    OU    Obesity (BMI 30-39.9)    Other and unspecified hyperlipidemia    Type II or unspecified type diabetes mellitus without mention of complication, not stated as uncontrolled    Unspecified essential hypertension   [2]   Past Surgical History:  Procedure Laterality Date    Colonoscopy N/A 11/15/2016    Procedure: COLONOSCOPY;  Surgeon: GISEL Ramirez MD;  Location: [facility] ENDOSCOPY    Shoulder arthroscopy Left 04/28/2020    Spine surgery procedure unlisted  01/2024    cervical spine C5-C5 fusion   [3]   Family History  Problem Relation Age of Onset    Other (Other) Father         BPH    No Known Problems Mother     Cancer Paternal Grandfather         Colon Ca    Gastro-Intestinal Disorder Brother         celiac sprue    Cancer Other         liver cancer - close relative    Diabetes Neg     Glaucoma Neg     Macular degeneration Neg    [4]   Social History  Socioeconomic History    Marital status:    Tobacco Use    Smoking status: Never    Smokeless tobacco: Never   Vaping Use    Vaping status: Never Used    Substance and Sexual Activity    Alcohol use: Not Currently     Alcohol/week: 0.0 standard drinks of alcohol    Drug use: No    Sexual activity: Yes     Partners: Female   Other Topics Concern    Caffeine Concern Yes     Comment: coffee/tea, >6cups/day     Social Drivers of Health     Food Insecurity: Patient Declined (5/14/2025)    NCSS - Food Insecurity     Worried About Running Out of Food in the Last Year: Patient declined     Ran Out of Food in the Last Year: Patient declined   Transportation Needs: Patient Declined (5/14/2025)    NCSS - Transportation     Lack of Transportation: Patient declined   Housing Stability: Patient Declined (5/14/2025)    NCSS - Housing/Utilities     Has Housing: Patient declined     Worried About Losing Housing: Patient declined     Unable to Get Utilities: Patient declined   [5]   Current Outpatient Medications   Medication Sig Dispense Refill    Continuous Glucose Sensor (FREESTYLE DARIA 3 PLUS SENSOR) Does not apply Misc 1 each As Directed. INSERT 1 SENSOR ON THE BACK OF THE ARM AND CHANGE EVERY 15 DAYS. 6 each 3    Continuous Glucose Sensor (FREESTYLE DARIA 3 SENSOR) Does not apply Misc 1 each every 14 (fourteen) days. Type 2 diabetes mellitus without complication, without long-term current use of insulin (HCC) E11.9 6 each 3    clotrimazole-betamethasone 1-0.05 % External Cream Apply 1 Application topically 2 (two) times daily. 45 g 1    metFORMIN 500 MG Oral Tab Take 2 tablets (1,000 mg total) by mouth 2 (two) times daily with meals. 360 tablet 3    Insulin Pen Needle (BD PEN NEEDLE SHANTELL U/F) 32G X 4 MM Does not apply Misc INJECT 1 NEEDLE AS DIRECTED  EVERY NIGHT FOR USE WITH LANTUS  / INSULIN GLARGINE 90 each 3    insulin glargine 100 UNIT/ML Subcutaneous Solution Pen-injector Inject 10 Units into the skin nightly. (Patient taking differently: Inject 15 Units into the skin nightly.) 3 mL 3    tamsulosin 0.4 MG Oral Cap Take 1 capsule (0.4 mg total) by mouth daily. Take 1/2  hour following the same meal each day (Patient taking differently: Take 1 capsule (0.4 mg total) by mouth After dinner. Take 1/2 hour following the same meal each day) 90 capsule 3    amLODIPine 5 MG Oral Tab Take 1 tablet (5 mg total) by mouth daily. 90 tablet 3    lisinopril 40 MG Oral Tab Take 1 tablet (40 mg total) by mouth daily. 90 tablet 3    Cholecalciferol 125 MCG (5000 UT) Oral Tab Take 1 tablet (5,000 Units total) by mouth in the morning.      simvastatin 40 MG Oral Tab Take 1 tablet (40 mg total) by mouth daily. (Patient taking differently: Take 1 tablet (40 mg total) by mouth nightly.) 90 tablet 3    TRUEplus Lancets 30G Does not apply Misc 1 lancet to check glucose three times daily 300 each 3    Glucose Blood (CONTOUR NEXT TEST) In Vitro Strip Use 1 (one) new strip three times daily for blood glucose monitoring 300 strip 1   [6]   Allergies  Allergen Reactions    Azithromycin DIARRHEA     Abdominal cramping     Sulfa Antibiotics FACE FLUSHING     Migraine headaches

## 2025-05-23 ENCOUNTER — TELEPHONE (OUTPATIENT)
Dept: SURGERY | Facility: CLINIC | Age: 60
End: 2025-05-23

## 2025-05-23 NOTE — TELEPHONE ENCOUNTER
Pt called.  Appointment yesterday 5-22-25. Given samples of catheter.  Order was sent to [facility].  Called [facility].  Advised order is ready but waiting for authorization from the doctor.  Order was for 4 times a day.  Asking for order to be changed to 8 times a day.  Please call

## 2025-05-28 NOTE — TELEPHONE ENCOUNTER
I re-faxed the MD notes by Rt. Fax as the fax apparently failed. I also signed the order for the caths again as it must have not gone thru the first time I sent it.

## 2025-05-29 ENCOUNTER — OFFICE VISIT (OUTPATIENT)
Dept: SURGERY | Facility: CLINIC | Age: 60
End: 2025-05-29
Payer: COMMERCIAL

## 2025-05-29 DIAGNOSIS — R33.9 URINARY RETENTION: ICD-10-CM

## 2025-05-29 DIAGNOSIS — N13.8 BPH WITH OBSTRUCTION/LOWER URINARY TRACT SYMPTOMS: Primary | ICD-10-CM

## 2025-05-29 DIAGNOSIS — N40.1 BPH WITH OBSTRUCTION/LOWER URINARY TRACT SYMPTOMS: Primary | ICD-10-CM

## 2025-05-29 PROCEDURE — 99024 POSTOP FOLLOW-UP VISIT: CPT | Performed by: UROLOGY

## 2025-05-29 NOTE — PROGRESS NOTES
Luis Miguel Shelton is a 59 year old male.    HPI:     Chief Complaint   Patient presents with    BPH     Pt has been CIC 7-8 times a day residual  300ml pt states having issues. Pt states yesterday 5/28/25 he urinated on his own 20ml had pressure so he self cath and drained 650ml        59-year-old male with a history of large BPH (144 g prostate) persistent urinary retention status post cystoscopy and bipolar TUR of the prostate performed on May 14, 2024.  Continues to perform clean intermittent catheterization usually every 3 hours.  Obtains volumes between 300 and 600 mL each time.  Has occasional blood in the urine but this appears to be improving.  On 1 occasion he did try to void prior to cathing and got 25 mL on Valsalva.  He states he does not have the urge to urinate especially because he is cathing so often.  No constipation is noted at this time.  Preoperative urodynamics were nondiagnostic as he was unable to void with a CMG catheter in place.      HISTORY:  Past Medical History[1]   Past Surgical History[2]   Family History[3]   Social History: Short Social Hx on File[4]     Medications (Active prior to today's visit):  Current Medications[5]    Allergies:  Allergies[6]      ROS:       PHYSICAL EXAM:        ASSESSMENT/PLAN:   Assessment   Encounter Diagnoses   Name Primary?    BPH with obstruction/lower urinary tract symptoms Yes    Urinary retention        Reviewed findings at length with patient.  Recommended decreasing the frequency of clean intermittent catheterizations to every 6 hours.  This may fill his bladder more and give him the sense of needing to urinate.  I suspect he may have chronically elevated bladder volumes and may not have a sense to urinate at volumes between 300 to 600 mL.  I explained to the patient that if he is able to empty the bladder completely twice a day that should keep him out of trouble.  He remains on tamsulosin but appears to have discontinued his dutasteride.   This was on instruction from a second opinion urologist that he saw.  I again encouraged the patient to remain on this medication after discussing pros and cons specially at minimizing the need for additional surgery down the road.  We agreed that he would follow-up in 2 weeks to assess his response.       Orders This Visit:  No orders of the defined types were placed in this encounter.      Meds This Visit:  Requested Prescriptions      No prescriptions requested or ordered in this encounter       Imaging & Referrals:  None     5/29/2025  David Hernandez MD               [1]   Past Medical History:   Anesthesia complication    hx of asthma like symptoms after extubation; ? from irritation on the lining , Albuterol given, symptoms resolved    BPH (benign prostatic hyperplasia)    Chest pain    normal stress nuclear; normal stress echo    Colon polyps    Hyperplastic, repeat in 10 years    Diabetes (HCC)    diet controlled w/ no BDR - 2008    Disorder of eye, unspecified    increased C/D ratio - OU    High blood pressure    High cholesterol    Incontinence    mckay catheter due to urinary retention    Lipid screening    MGD (meibomian gland dysfunction)    OU    Obesity (BMI 30-39.9)    Other and unspecified hyperlipidemia    Type II or unspecified type diabetes mellitus without mention of complication, not stated as uncontrolled    Unspecified essential hypertension   [2]   Past Surgical History:  Procedure Laterality Date    Colonoscopy N/A 11/15/2016    Procedure: COLONOSCOPY;  Surgeon: GISEL Ramirez MD;  Location: [facility] ENDOSCOPY    Shoulder arthroscopy Left 04/28/2020    Spine surgery procedure unlisted  01/2024    cervical spine C5-C5 fusion   [3]   Family History  Problem Relation Age of Onset    Other (Other) Father         BPH    No Known Problems Mother     Cancer Paternal Grandfather         Colon Ca    Gastro-Intestinal Disorder Brother         celiac sprue    Cancer Other         liver cancer - close  relative    Diabetes Neg     Glaucoma Neg     Macular degeneration Neg    [4]   Social History  Socioeconomic History    Marital status:    Tobacco Use    Smoking status: Never    Smokeless tobacco: Never   Vaping Use    Vaping status: Never Used   Substance and Sexual Activity    Alcohol use: Not Currently     Alcohol/week: 0.0 standard drinks of alcohol    Drug use: No    Sexual activity: Yes     Partners: Female   Other Topics Concern    Caffeine Concern Yes     Comment: coffee/tea, >6cups/day     Social Drivers of Health     Food Insecurity: Patient Declined (5/14/2025)    NCSS - Food Insecurity     Worried About Running Out of Food in the Last Year: Patient declined     Ran Out of Food in the Last Year: Patient declined   Transportation Needs: Patient Declined (5/14/2025)    NCSS - Transportation     Lack of Transportation: Patient declined   Housing Stability: Patient Declined (5/14/2025)    NCSS - Housing/Utilities     Has Housing: Patient declined     Worried About Losing Housing: Patient declined     Unable to Get Utilities: Patient declined   [5]   Current Outpatient Medications   Medication Sig Dispense Refill    Continuous Glucose Sensor (FREESTYLE DARIA 3 PLUS SENSOR) Does not apply Misc 1 each As Directed. INSERT 1 SENSOR ON THE BACK OF THE ARM AND CHANGE EVERY 15 DAYS. 6 each 3    Continuous Glucose Sensor (FREESTYLE DARIA 3 SENSOR) Does not apply Misc 1 each every 14 (fourteen) days. Type 2 diabetes mellitus without complication, without long-term current use of insulin (HCC) E11.9 6 each 3    clotrimazole-betamethasone 1-0.05 % External Cream Apply 1 Application topically 2 (two) times daily. 45 g 1    metFORMIN 500 MG Oral Tab Take 2 tablets (1,000 mg total) by mouth 2 (two) times daily with meals. 360 tablet 3    Insulin Pen Needle (BD PEN NEEDLE SHANTELL U/F) 32G X 4 MM Does not apply Misc INJECT 1 NEEDLE AS DIRECTED  EVERY NIGHT FOR USE WITH LANTUS  / INSULIN GLARGINE 90 each 3    insulin  glargine 100 UNIT/ML Subcutaneous Solution Pen-injector Inject 10 Units into the skin nightly. (Patient taking differently: Inject 15 Units into the skin nightly.) 3 mL 3    tamsulosin 0.4 MG Oral Cap Take 1 capsule (0.4 mg total) by mouth daily. Take 1/2 hour following the same meal each day (Patient taking differently: Take 1 capsule (0.4 mg total) by mouth After dinner. Take 1/2 hour following the same meal each day) 90 capsule 3    amLODIPine 5 MG Oral Tab Take 1 tablet (5 mg total) by mouth daily. 90 tablet 3    lisinopril 40 MG Oral Tab Take 1 tablet (40 mg total) by mouth daily. 90 tablet 3    Cholecalciferol 125 MCG (5000 UT) Oral Tab Take 1 tablet (5,000 Units total) by mouth in the morning.      simvastatin 40 MG Oral Tab Take 1 tablet (40 mg total) by mouth daily. (Patient taking differently: Take 1 tablet (40 mg total) by mouth nightly.) 90 tablet 3    TRUEplus Lancets 30G Does not apply Misc 1 lancet to check glucose three times daily 300 each 3    Glucose Blood (CONTOUR NEXT TEST) In Vitro Strip Use 1 (one) new strip three times daily for blood glucose monitoring 300 strip 1   [6]   Allergies  Allergen Reactions    Azithromycin DIARRHEA     Abdominal cramping     Sulfa Antibiotics FACE FLUSHING     Migraine headaches

## 2025-06-05 ENCOUNTER — PATIENT MESSAGE (OUTPATIENT)
Dept: INTERNAL MEDICINE CLINIC | Facility: CLINIC | Age: 60
End: 2025-06-05

## 2025-06-05 NOTE — TELEPHONE ENCOUNTER
Please review: medication fails/has no protocol attached.    Dr. Montoya - patient reports he is injecting 15 units nightly, not 10 units.     Please advise units adjusted on prescription.     No future appointments with primary care medicine   Last office visit: 4/25/25

## 2025-06-05 NOTE — TELEPHONE ENCOUNTER
eSrafin Shelton to [facility] Em R[facility] Triage (supporting Kemal Montoya MD)        6/5/25  2:16 PM  Thanks, Daniela,     I'm injecting 15 units nightly.  : /     Serafin    6/5/25  2:15 PM  Daniela Randhawa, [facility] routed this conversation to Cory

## 2025-06-05 NOTE — TELEPHONE ENCOUNTER
Please review; protocol failed/ has no protocol        Please see patients MyChart Message    Serafin Shelton to P Em Rn Triage (supporting Kemal Montoya MD)        6/5/25  2:16 PM  Thanks, Daniela,     I'm injecting 15 units nightly.  : /     Serafin

## 2025-06-05 NOTE — TELEPHONE ENCOUNTER
Need to confirm units of Lantus patient is injecting. Patient reported on 5/14/25 that he injects 15 units instead of 10 units.    Prescribed: 10 Units, Subcutaneous, Nightly  Patient taking differently: 15 Units Subcutaneous Nightly, Reported on 5/14/2025

## 2025-06-06 ENCOUNTER — PATIENT MESSAGE (OUTPATIENT)
Dept: INTERNAL MEDICINE CLINIC | Facility: CLINIC | Age: 60
End: 2025-06-06

## 2025-06-12 ENCOUNTER — TELEPHONE (OUTPATIENT)
Dept: INTERNAL MEDICINE CLINIC | Facility: CLINIC | Age: 60
End: 2025-06-12

## 2025-06-12 NOTE — TELEPHONE ENCOUNTER
[email or web address]  Key: [id number]      Current Outpatient Medications:     insulin glargine (LANTUS) 100 UNIT/ML Subcutaneous Solution, Inject 15 Units into the skin nightly., Disp: 3 each, Rfl: 3

## 2025-06-13 ENCOUNTER — OFFICE VISIT (OUTPATIENT)
Dept: SURGERY | Facility: CLINIC | Age: 60
End: 2025-06-13
Payer: COMMERCIAL

## 2025-06-13 DIAGNOSIS — N13.8 BPH WITH OBSTRUCTION/LOWER URINARY TRACT SYMPTOMS: Primary | ICD-10-CM

## 2025-06-13 DIAGNOSIS — N40.1 BPH WITH OBSTRUCTION/LOWER URINARY TRACT SYMPTOMS: Primary | ICD-10-CM

## 2025-06-13 DIAGNOSIS — R33.9 URINARY RETENTION: ICD-10-CM

## 2025-06-13 PROCEDURE — 99024 POSTOP FOLLOW-UP VISIT: CPT | Performed by: UROLOGY

## 2025-06-13 NOTE — PROGRESS NOTES
Luis Miguel Shelton is a 59 year old male.    HPI:     Chief Complaint   Patient presents with    Follow - Up     2 week follow up, patient states he did not resume on dudasteride, and he stopped taking tamsulosin on Sunday. Patient states that he doesn't need tamsulosin and started to improve a little bit.        59-year-old male in follow-up to visit May 29, 2025 with a history of large BPH (144 g) persistent urinary retention status post bipolar TUR of the prostate May 14, 2024.  Continues on clean intermittent catheterization which she is doing 3 times per day.  He is also spontaneously voiding in between catheterizations anywhere between the 100 up to 300 mL at a time.  Obtains residuals in the 300 to 600 mL range when he self catheters.  He has been apprehensive about being on 5 alpha reductase inhibitors.  Denies any gross hematuria or dysuria.  No issues inserting the catheter.  He discontinued tamsulosin and has not ever started on 5 alpha reductase/dutasteride.      HISTORY:  Past Medical History[1]   Past Surgical History[2]   Family History[3]   Social History: Short Social Hx on File[4]     Medications (Active prior to today's visit):  Current Medications[5]    Allergies:  Allergies[6]      ROS:       PHYSICAL EXAM:        ASSESSMENT/PLAN:   Assessment   Encounter Diagnoses   Name Primary?    BPH with obstruction/lower urinary tract symptoms Yes    Urinary retention        Recommend:  - Continue on clean intermittent catheterization as he is doing 3 times daily indefinitely.  If residuals begin to consistently decrease below 250 mL he may discontinue.  - I again strongly encouraged the patient to start on dutasteride.  He will consider this.  I told the patient that I will decrease the risk of additional surgery down the line, minimize the risk of prostate related bleeding and urinary retention moving forward.  He will consider it.  We agreed that he would follow-up otherwise in 3 months with our  APRN staff.  He will call the office if he has any questions or concerns.         Orders This Visit:  No orders of the defined types were placed in this encounter.      Meds This Visit:  Requested Prescriptions      No prescriptions requested or ordered in this encounter       Imaging & Referrals:  None     6/13/2025  David Hernandez MD               [1]   Past Medical History:   Anesthesia complication    hx of asthma like symptoms after extubation; ? from irritation on the lining , Albuterol given, symptoms resolved    BPH (benign prostatic hyperplasia)    Chest pain    normal stress nuclear; normal stress echo    Colon polyps    Hyperplastic, repeat in 10 years    Diabetes (HCC)    diet controlled w/ no BDR - 2008    Disorder of eye, unspecified    increased C/D ratio - OU    High blood pressure    High cholesterol    Incontinence    mckay catheter due to urinary retention    Lipid screening    MGD (meibomian gland dysfunction)    OU    Obesity (BMI 30-39.9)    Other and unspecified hyperlipidemia    Type II or unspecified type diabetes mellitus without mention of complication, not stated as uncontrolled    Unspecified essential hypertension   [2]   Past Surgical History:  Procedure Laterality Date    Colonoscopy N/A 11/15/2016    Procedure: COLONOSCOPY;  Surgeon: GISEL Ramirez MD;  Location: [facility] ENDOSCOPY    Shoulder arthroscopy Left 04/28/2020    Spine surgery procedure unlisted  01/2024    cervical spine C5-C5 fusion   [3]   Family History  Problem Relation Age of Onset    Other (Other) Father         BPH    No Known Problems Mother     Cancer Paternal Grandfather         Colon Ca    Gastro-Intestinal Disorder Brother         celiac sprue    Cancer Other         liver cancer - close relative    Diabetes Neg     Glaucoma Neg     Macular degeneration Neg    [4]   Social History  Socioeconomic History    Marital status:    Tobacco Use    Smoking status: Never    Smokeless tobacco: Never   Vaping Use     Vaping status: Never Used   Substance and Sexual Activity    Alcohol use: Not Currently     Alcohol/week: 0.0 standard drinks of alcohol    Drug use: No    Sexual activity: Yes     Partners: Female   Other Topics Concern    Caffeine Concern Yes     Comment: coffee/tea, >6cups/day     Social Drivers of Health     Food Insecurity: Patient Declined (5/14/2025)    NCSS - Food Insecurity     Worried About Running Out of Food in the Last Year: Patient declined     Ran Out of Food in the Last Year: Patient declined   Transportation Needs: Patient Declined (5/14/2025)    NCSS - Transportation     Lack of Transportation: Patient declined   Housing Stability: Patient Declined (5/14/2025)    NCSS - Housing/Utilities     Has Housing: Patient declined     Worried About Losing Housing: Patient declined     Unable to Get Utilities: Patient declined   [5]   Current Outpatient Medications   Medication Sig Dispense Refill    insulin glargine (LANTUS) 100 UNIT/ML Subcutaneous Solution Inject 15 Units into the skin nightly. 3 each 3    insulin glargine 100 UNIT/ML Subcutaneous Solution Pen-injector Inject 15 Units into the skin nightly. 15 mL 1    insulin glargine 100 UNIT/ML Subcutaneous Solution Pen-injector Inject 15 Units into the skin nightly. 3 each 1    Continuous Glucose Sensor (FREESTYLE DARIA 3 PLUS SENSOR) Does not apply Misc 1 each As Directed. INSERT 1 SENSOR ON THE BACK OF THE ARM AND CHANGE EVERY 15 DAYS. 6 each 3    Continuous Glucose Sensor (FREESTYLE DARIA 3 SENSOR) Does not apply Misc 1 each every 14 (fourteen) days. Type 2 diabetes mellitus without complication, without long-term current use of insulin (HCC) E11.9 6 each 3    clotrimazole-betamethasone 1-0.05 % External Cream Apply 1 Application topically 2 (two) times daily. 45 g 1    metFORMIN 500 MG Oral Tab Take 2 tablets (1,000 mg total) by mouth 2 (two) times daily with meals. 360 tablet 3    Insulin Pen Needle (BD PEN NEEDLE SHANTELL U/F) 32G X 4 MM Does not  apply Misc INJECT 1 NEEDLE AS DIRECTED  EVERY NIGHT FOR USE WITH LANTUS  / INSULIN GLARGINE 90 each 3    tamsulosin 0.4 MG Oral Cap Take 1 capsule (0.4 mg total) by mouth daily. Take 1/2 hour following the same meal each day (Patient taking differently: Take 1 capsule (0.4 mg total) by mouth After dinner. Take 1/2 hour following the same meal each day) 90 capsule 3    amLODIPine 5 MG Oral Tab Take 1 tablet (5 mg total) by mouth daily. 90 tablet 3    lisinopril 40 MG Oral Tab Take 1 tablet (40 mg total) by mouth daily. 90 tablet 3    Cholecalciferol 125 MCG (5000 UT) Oral Tab Take 1 tablet (5,000 Units total) by mouth in the morning.      simvastatin 40 MG Oral Tab Take 1 tablet (40 mg total) by mouth daily. (Patient taking differently: Take 1 tablet (40 mg total) by mouth nightly.) 90 tablet 3    TRUEplus Lancets 30G Does not apply Misc 1 lancet to check glucose three times daily 300 each 3    Glucose Blood (CONTOUR NEXT TEST) In Vitro Strip Use 1 (one) new strip three times daily for blood glucose monitoring 300 strip 1   [6]   Allergies  Allergen Reactions    Azithromycin DIARRHEA     Abdominal cramping     Sulfa Antibiotics FACE FLUSHING     Migraine headaches

## 2025-06-27 ENCOUNTER — PATIENT MESSAGE (OUTPATIENT)
Dept: SURGERY | Facility: CLINIC | Age: 60
End: 2025-06-27

## 2025-07-07 ENCOUNTER — PATIENT MESSAGE (OUTPATIENT)
Dept: INTERNAL MEDICINE CLINIC | Facility: CLINIC | Age: 60
End: 2025-07-07

## 2025-07-08 NOTE — TELEPHONE ENCOUNTER
Previous documentation suggests that catheterization residuals have been >500mL. As such, increasing in catheterization frequency is justified. If residuals begin to consistently decrease below 250mL then he can reduce the frequency of catheterizations. Can change to 6x per day for now and then follow up in 3 months to assess how his residuals have been.

## 2025-07-10 NOTE — TELEPHONE ENCOUNTER
I s/w pt and questioned his residual urine output when he does CIC and he stated between 200-325 ml. I also asked about how much urine he voids naturally and he stated 100-200 ml. I asked if he is experiencing pain or burning with urination, hematuria, cloudy, foul smelling urine or fever/chills and he denied. I told pt that I do not feel that he needs to increase is CIC frequency if he is getting the residuals he has. I asked if he is drinking excessive amts of fluids and he denied. I told pt hat I feel he is over thinking this and should not think that if he has 300 ml in his bladder that he is going to damage his bladder muscle as most people do not urinate till they have about that much in their bladder and he is also urinating naturally fairly decent amts. I explained that if he did not void naturally and his residuals were large then there would be a need to increase the CIC frequency. Pt verbalized understanding and will continue at 4 times daily CIC.

## 2025-08-01 ENCOUNTER — HOSPITAL ENCOUNTER (EMERGENCY)
Facility: HOSPITAL | Age: 60
Discharge: HOME | End: 2025-08-01
Attending: EMERGENCY MEDICINE

## 2025-08-01 ENCOUNTER — HOSPITAL ENCOUNTER (OUTPATIENT)
Age: 60
Discharge: ANOTHER HOSPITAL | End: 2025-08-01

## 2025-08-01 ENCOUNTER — PATIENT MESSAGE (OUTPATIENT)
Dept: INTERNAL MEDICINE CLINIC | Facility: CLINIC | Age: 60
End: 2025-08-01

## 2025-08-01 VITALS
OXYGEN SATURATION: 98 % | HEART RATE: 70 BPM | DIASTOLIC BLOOD PRESSURE: 83 MMHG | TEMPERATURE: 98 F | BODY MASS INDEX: 28.44 KG/M2 | WEIGHT: 210 LBS | SYSTOLIC BLOOD PRESSURE: 133 MMHG | HEIGHT: 72 IN | RESPIRATION RATE: 14 BRPM

## 2025-08-01 VITALS
TEMPERATURE: 98 F | SYSTOLIC BLOOD PRESSURE: 139 MMHG | BODY MASS INDEX: 28.44 KG/M2 | DIASTOLIC BLOOD PRESSURE: 74 MMHG | WEIGHT: 210 LBS | RESPIRATION RATE: 18 BRPM | OXYGEN SATURATION: 98 % | HEART RATE: 82 BPM | HEIGHT: 72 IN

## 2025-08-01 DIAGNOSIS — R82.90 CLOUDY URINE: ICD-10-CM

## 2025-08-01 DIAGNOSIS — R42 DIZZINESS: Primary | ICD-10-CM

## 2025-08-01 DIAGNOSIS — N30.01 ACUTE CYSTITIS WITH HEMATURIA: Primary | ICD-10-CM

## 2025-08-01 DIAGNOSIS — R07.9 CHEST PAIN OF UNCERTAIN ETIOLOGY: ICD-10-CM

## 2025-08-01 DIAGNOSIS — E11.9 TYPE 2 DIABETES MELLITUS WITHOUT COMPLICATION, WITHOUT LONG-TERM CURRENT USE OF INSULIN (HCC): Primary | ICD-10-CM

## 2025-08-01 DIAGNOSIS — H81.10 BENIGN PAROXYSMAL POSITIONAL VERTIGO, UNSPECIFIED LATERALITY: ICD-10-CM

## 2025-08-01 LAB
ALBUMIN SERPL-MCNC: 5 G/DL (ref 3.2–4.8)
ALP LIVER SERPL-CCNC: 54 U/L (ref 45–117)
ALT SERPL-CCNC: 16 U/L (ref 10–49)
ANION GAP SERPL CALC-SCNC: 8 MMOL/L (ref 0–18)
AST SERPL-CCNC: 18 U/L (ref ?–34)
ATRIAL RATE: 79 BPM
BASOPHILS # BLD AUTO: 0.04 X10(3) UL (ref 0–0.2)
BASOPHILS NFR BLD AUTO: 0.6 %
BILIRUB DIRECT SERPL-MCNC: 0.1 MG/DL (ref ?–0.3)
BILIRUB SERPL-MCNC: 0.4 MG/DL (ref 0.3–1.2)
BILIRUB UR QL: NEGATIVE
BUN BLD-MCNC: 17 MG/DL (ref 9–23)
BUN/CREAT SERPL: 20.2 (ref 10–20)
CALCIUM BLD-MCNC: 9.4 MG/DL (ref 8.7–10.4)
CHLORIDE SERPL-SCNC: 108 MMOL/L (ref 98–112)
CO2 SERPL-SCNC: 25 MMOL/L (ref 21–32)
COLOR UR: YELLOW
CREAT BLD-MCNC: 0.84 MG/DL (ref 0.7–1.3)
DEPRECATED RDW RBC AUTO: 42.9 FL (ref 35.1–46.3)
EGFRCR SERPLBLD CKD-EPI 2021: 101 ML/MIN/1.73M2 (ref 60–?)
EOSINOPHIL # BLD AUTO: 0.13 X10(3) UL (ref 0–0.7)
EOSINOPHIL NFR BLD AUTO: 1.8 %
ERYTHROCYTE [DISTWIDTH] IN BLOOD BY AUTOMATED COUNT: 13.2 % (ref 11–15)
GLUCOSE BLD-MCNC: 164 MG/DL (ref 70–99)
GLUCOSE BLDC GLUCOMTR-MCNC: 172 MG/DL (ref 70–99)
GLUCOSE UR-MCNC: NORMAL MG/DL
HCT VFR BLD AUTO: 40.8 % (ref 39–53)
HGB BLD-MCNC: 14 G/DL (ref 13–17.5)
IMM GRANULOCYTES # BLD AUTO: 0.01 X10(3) UL (ref 0–1)
IMM GRANULOCYTES NFR BLD: 0.1 %
LEUKOCYTE ESTERASE UR QL STRIP.AUTO: 500
LYMPHOCYTES # BLD AUTO: 2 X10(3) UL (ref 1–4)
LYMPHOCYTES NFR BLD AUTO: 28 %
MCH RBC QN AUTO: 30.8 PG (ref 26–34)
MCHC RBC AUTO-ENTMCNC: 34.3 G/DL (ref 31–37)
MCV RBC AUTO: 89.9 FL (ref 80–100)
MONOCYTES # BLD AUTO: 0.42 X10(3) UL (ref 0.1–1)
MONOCYTES NFR BLD AUTO: 5.9 %
NEUTROPHILS # BLD AUTO: 4.55 X10 (3) UL (ref 1.5–7.7)
NEUTROPHILS # BLD AUTO: 4.55 X10(3) UL (ref 1.5–7.7)
NEUTROPHILS NFR BLD AUTO: 63.6 %
NITRITE UR QL STRIP.AUTO: NEGATIVE
OSMOLALITY SERPL CALC.SUM OF ELEC: 297 MOSM/KG (ref 275–295)
P AXIS: 33 DEGREES
P-R INTERVAL: 172 MS
PH UR: 5.5 (ref 5–8)
PLATELET # BLD AUTO: 176 10(3)UL (ref 150–450)
POTASSIUM SERPL-SCNC: 3.9 MMOL/L (ref 3.5–5.1)
PROT SERPL-MCNC: 7.1 G/DL (ref 5.7–8.2)
PROT UR-MCNC: 70 MG/DL
Q-T INTERVAL: 392 MS
QRS DURATION: 100 MS
QTC CALCULATION (BEZET): 449 MS
R AXIS: 48 DEGREES
RBC # BLD AUTO: 4.54 X10(6)UL (ref 4.3–5.7)
RBC #/AREA URNS AUTO: >10 /HPF
SODIUM SERPL-SCNC: 141 MMOL/L (ref 136–145)
SP GR UR STRIP: >1.03 (ref 1–1.03)
T AXIS: 44 DEGREES
TROPONIN I SERPL HS-MCNC: <3 NG/L (ref ?–53)
UROBILINOGEN UR STRIP-ACNC: NORMAL
VENTRICULAR RATE: 79 BPM
WBC # BLD AUTO: 7.2 X10(3) UL (ref 4–11)
WBC #/AREA URNS AUTO: >50 /HPF
WBC CLUMPS UR QL AUTO: PRESENT /HPF

## 2025-08-01 PROCEDURE — 85025 COMPLETE CBC W/AUTO DIFF WBC: CPT | Performed by: EMERGENCY MEDICINE

## 2025-08-01 PROCEDURE — 99213 OFFICE O/P EST LOW 20 MIN: CPT | Performed by: NURSE PRACTITIONER

## 2025-08-01 PROCEDURE — 80048 BASIC METABOLIC PNL TOTAL CA: CPT | Performed by: EMERGENCY MEDICINE

## 2025-08-01 PROCEDURE — 81001 URINALYSIS AUTO W/SCOPE: CPT | Performed by: EMERGENCY MEDICINE

## 2025-08-01 PROCEDURE — 99284 EMERGENCY DEPT VISIT MOD MDM: CPT

## 2025-08-01 PROCEDURE — 82962 GLUCOSE BLOOD TEST: CPT | Performed by: NURSE PRACTITIONER

## 2025-08-01 PROCEDURE — 80076 HEPATIC FUNCTION PANEL: CPT | Performed by: EMERGENCY MEDICINE

## 2025-08-01 PROCEDURE — 84484 ASSAY OF TROPONIN QUANT: CPT | Performed by: EMERGENCY MEDICINE

## 2025-08-01 PROCEDURE — 99283 EMERGENCY DEPT VISIT LOW MDM: CPT

## 2025-08-01 PROCEDURE — 87086 URINE CULTURE/COLONY COUNT: CPT | Performed by: EMERGENCY MEDICINE

## 2025-08-01 PROCEDURE — 93000 ELECTROCARDIOGRAM COMPLETE: CPT | Performed by: NURSE PRACTITIONER

## 2025-08-01 PROCEDURE — 87077 CULTURE AEROBIC IDENTIFY: CPT | Performed by: EMERGENCY MEDICINE

## 2025-08-01 PROCEDURE — 36415 COLL VENOUS BLD VENIPUNCTURE: CPT

## 2025-08-01 RX ORDER — CEPHALEXIN 500 MG/1
500 CAPSULE ORAL ONCE
Status: COMPLETED | OUTPATIENT
Start: 2025-08-01 | End: 2025-08-01

## 2025-08-01 RX ORDER — CEPHALEXIN 500 MG/1
500 CAPSULE ORAL 4 TIMES DAILY
Qty: 28 CAPSULE | Refills: 0 | Status: SHIPPED | OUTPATIENT
Start: 2025-08-01 | End: 2025-08-17 | Stop reason: CLARIF

## 2025-08-06 ENCOUNTER — OFFICE VISIT (OUTPATIENT)
Facility: LOCATION | Age: 60
End: 2025-08-06

## 2025-08-06 ENCOUNTER — NURSE TRIAGE (OUTPATIENT)
Dept: INTERNAL MEDICINE CLINIC | Facility: CLINIC | Age: 60
End: 2025-08-06

## 2025-08-06 DIAGNOSIS — H60.8X3 CHRONIC ECZEMATOUS OTITIS EXTERNA OF BOTH EARS: ICD-10-CM

## 2025-08-06 DIAGNOSIS — R42 DIZZINESS: Primary | ICD-10-CM

## 2025-08-06 PROCEDURE — 92504 EAR MICROSCOPY EXAMINATION: CPT | Performed by: STUDENT IN AN ORGANIZED HEALTH CARE EDUCATION/TRAINING PROGRAM

## 2025-08-06 PROCEDURE — 99213 OFFICE O/P EST LOW 20 MIN: CPT | Performed by: STUDENT IN AN ORGANIZED HEALTH CARE EDUCATION/TRAINING PROGRAM

## 2025-08-06 RX ORDER — MECLIZINE HYDROCHLORIDE 25 MG/1
25 TABLET ORAL 3 TIMES DAILY PRN
Qty: 30 TABLET | Refills: 0 | Status: SHIPPED | OUTPATIENT
Start: 2025-08-06

## 2025-08-06 RX ORDER — FLUOCINOLONE ACETONIDE 0.11 MG/ML
3 OIL AURICULAR (OTIC) 3 TIMES DAILY
Qty: 20 ML | Refills: 5 | Status: SHIPPED | OUTPATIENT
Start: 2025-08-06 | End: 2025-08-20

## 2025-08-13 RX ORDER — SIMVASTATIN 40 MG
40 TABLET ORAL DAILY
Qty: 90 TABLET | Refills: 3 | Status: SHIPPED | OUTPATIENT
Start: 2025-08-13

## 2025-08-13 RX ORDER — SIMVASTATIN 40 MG
40 TABLET ORAL DAILY
Qty: 90 TABLET | Refills: 3 | OUTPATIENT
Start: 2025-08-13

## 2025-08-13 RX ORDER — HYDROCHLOROTHIAZIDE 12.5 MG/1
1 CAPSULE ORAL AS DIRECTED
Qty: 6 EACH | Refills: 2 | Status: SHIPPED | OUTPATIENT
Start: 2025-08-13

## 2025-08-13 RX ORDER — INSULIN GLARGINE 100 [IU]/ML
15 INJECTION, SOLUTION SUBCUTANEOUS NIGHTLY
Qty: 3 EACH | Refills: 3 | Status: SHIPPED | OUTPATIENT
Start: 2025-08-13 | End: 2026-08-08

## 2025-08-17 ENCOUNTER — HOSPITAL ENCOUNTER (OUTPATIENT)
Age: 60
Discharge: ANOTHER HOSPITAL | End: 2025-08-17

## 2025-08-17 ENCOUNTER — APPOINTMENT (OUTPATIENT)
Dept: CT IMAGING | Facility: HOSPITAL | Age: 60
End: 2025-08-17
Attending: STUDENT IN AN ORGANIZED HEALTH CARE EDUCATION/TRAINING PROGRAM

## 2025-08-17 ENCOUNTER — HOSPITAL ENCOUNTER (INPATIENT)
Facility: HOSPITAL | Age: 60
LOS: 1 days | Discharge: HOME | End: 2025-08-18
Attending: STUDENT IN AN ORGANIZED HEALTH CARE EDUCATION/TRAINING PROGRAM | Admitting: HOSPITALIST

## 2025-08-17 VITALS
DIASTOLIC BLOOD PRESSURE: 87 MMHG | SYSTOLIC BLOOD PRESSURE: 125 MMHG | HEART RATE: 121 BPM | OXYGEN SATURATION: 100 % | TEMPERATURE: 103 F | RESPIRATION RATE: 34 BRPM

## 2025-08-17 DIAGNOSIS — R00.0 TACHYCARDIA: ICD-10-CM

## 2025-08-17 DIAGNOSIS — R06.82 TACHYPNEA: ICD-10-CM

## 2025-08-17 DIAGNOSIS — R50.9 FEVER, UNSPECIFIED FEVER CAUSE: ICD-10-CM

## 2025-08-17 DIAGNOSIS — A41.9 SEPSIS DUE TO URINARY TRACT INFECTION (HCC): Primary | ICD-10-CM

## 2025-08-17 DIAGNOSIS — N39.0 SEPSIS DUE TO URINARY TRACT INFECTION (HCC): Primary | ICD-10-CM

## 2025-08-17 DIAGNOSIS — R35.0 URINARY FREQUENCY: Primary | ICD-10-CM

## 2025-08-17 LAB
ANION GAP SERPL CALC-SCNC: 13 MMOL/L (ref 0–18)
BASOPHILS # BLD AUTO: 0.04 X10(3) UL (ref 0–0.2)
BASOPHILS NFR BLD AUTO: 0.3 %
BILIRUB UR QL: NEGATIVE
BUN BLD-MCNC: 13 MG/DL (ref 9–23)
BUN/CREAT SERPL: 13.1 (ref 10–20)
CALCIUM BLD-MCNC: 9.5 MG/DL (ref 8.7–10.4)
CHLORIDE SERPL-SCNC: 105 MMOL/L (ref 98–112)
CO2 SERPL-SCNC: 21 MMOL/L (ref 21–32)
CREAT BLD-MCNC: 0.99 MG/DL (ref 0.7–1.3)
DEPRECATED RDW RBC AUTO: 42.6 FL (ref 35.1–46.3)
EGFRCR SERPLBLD CKD-EPI 2021: 88 ML/MIN/1.73M2 (ref 60–?)
EOSINOPHIL # BLD AUTO: 0.04 X10(3) UL (ref 0–0.7)
EOSINOPHIL NFR BLD AUTO: 0.3 %
ERYTHROCYTE [DISTWIDTH] IN BLOOD BY AUTOMATED COUNT: 13.1 % (ref 11–15)
EST. AVERAGE GLUCOSE BLD GHB EST-MCNC: 160 MG/DL (ref 68–126)
GLUCOSE BLD-MCNC: 168 MG/DL (ref 70–99)
GLUCOSE BLDC GLUCOMTR-MCNC: 137 MG/DL (ref 70–99)
GLUCOSE BLDC GLUCOMTR-MCNC: 157 MG/DL (ref 70–99)
GLUCOSE UR-MCNC: NORMAL MG/DL
HBA1C MFR BLD: 7.2 % (ref ?–5.7)
HCT VFR BLD AUTO: 40 % (ref 39–53)
HGB BLD-MCNC: 13.6 G/DL (ref 13–17.5)
IMM GRANULOCYTES # BLD AUTO: 0.03 X10(3) UL (ref 0–1)
IMM GRANULOCYTES NFR BLD: 0.2 %
KETONES UR-MCNC: 40 MG/DL
LACTATE SERPL-SCNC: 1 MMOL/L (ref 0.5–2)
LACTATE SERPL-SCNC: 2.5 MMOL/L (ref 0.5–2)
LEUKOCYTE ESTERASE UR QL STRIP.AUTO: 500
LYMPHOCYTES # BLD AUTO: 0.88 X10(3) UL (ref 1–4)
LYMPHOCYTES NFR BLD AUTO: 7.3 %
MCH RBC QN AUTO: 30.4 PG (ref 26–34)
MCHC RBC AUTO-ENTMCNC: 34 G/DL (ref 31–37)
MCV RBC AUTO: 89.5 FL (ref 80–100)
MONOCYTES # BLD AUTO: 0.95 X10(3) UL (ref 0.1–1)
MONOCYTES NFR BLD AUTO: 7.9 %
NEUTROPHILS # BLD AUTO: 10.15 X10 (3) UL (ref 1.5–7.7)
NEUTROPHILS # BLD AUTO: 10.15 X10(3) UL (ref 1.5–7.7)
NEUTROPHILS NFR BLD AUTO: 84 %
OSMOLALITY SERPL CALC.SUM OF ELEC: 292 MOSM/KG (ref 275–295)
PH UR: 6 (ref 5–8)
PLATELET # BLD AUTO: 143 10(3)UL (ref 150–450)
POTASSIUM SERPL-SCNC: 3.5 MMOL/L (ref 3.5–5.1)
PROT UR-MCNC: 30 MG/DL
RBC # BLD AUTO: 4.47 X10(6)UL (ref 4.3–5.7)
SODIUM SERPL-SCNC: 139 MMOL/L (ref 136–145)
SP GR UR STRIP: 1.02 (ref 1–1.03)
UROBILINOGEN UR STRIP-ACNC: NORMAL
WBC # BLD AUTO: 12.1 X10(3) UL (ref 4–11)
WBC #/AREA URNS AUTO: >50 /HPF
YEAST UR QL: PRESENT /HPF

## 2025-08-17 PROCEDURE — 99214 OFFICE O/P EST MOD 30 MIN: CPT | Performed by: NURSE PRACTITIONER

## 2025-08-17 PROCEDURE — 74176 CT ABD & PELVIS W/O CONTRAST: CPT | Performed by: STUDENT IN AN ORGANIZED HEALTH CARE EDUCATION/TRAINING PROGRAM

## 2025-08-17 PROCEDURE — 99223 1ST HOSP IP/OBS HIGH 75: CPT | Performed by: HOSPITALIST

## 2025-08-17 RX ORDER — IBUPROFEN 200 MG
200 TABLET ORAL EVERY 4 HOURS PRN
Status: DISCONTINUED | OUTPATIENT
Start: 2025-08-17 | End: 2025-08-18

## 2025-08-17 RX ORDER — NICOTINE POLACRILEX 4 MG
30 LOZENGE BUCCAL
Status: DISCONTINUED | OUTPATIENT
Start: 2025-08-17 | End: 2025-08-18

## 2025-08-17 RX ORDER — SENNOSIDES 8.6 MG
17.2 TABLET ORAL NIGHTLY PRN
Status: DISCONTINUED | OUTPATIENT
Start: 2025-08-17 | End: 2025-08-18

## 2025-08-17 RX ORDER — INSULIN DEGLUDEC 100 U/ML
10 INJECTION, SOLUTION SUBCUTANEOUS NIGHTLY
Status: DISCONTINUED | OUTPATIENT
Start: 2025-08-17 | End: 2025-08-18

## 2025-08-17 RX ORDER — TAMSULOSIN HYDROCHLORIDE 0.4 MG/1
0.4 CAPSULE ORAL DAILY
Status: DISCONTINUED | OUTPATIENT
Start: 2025-08-17 | End: 2025-08-18

## 2025-08-17 RX ORDER — BISACODYL 10 MG
10 SUPPOSITORY, RECTAL RECTAL
Status: DISCONTINUED | OUTPATIENT
Start: 2025-08-17 | End: 2025-08-18

## 2025-08-17 RX ORDER — IBUPROFEN 600 MG/1
600 TABLET, FILM COATED ORAL ONCE
Status: COMPLETED | OUTPATIENT
Start: 2025-08-17 | End: 2025-08-17

## 2025-08-17 RX ORDER — ONDANSETRON 2 MG/ML
4 INJECTION INTRAMUSCULAR; INTRAVENOUS EVERY 6 HOURS PRN
Status: DISCONTINUED | OUTPATIENT
Start: 2025-08-17 | End: 2025-08-18

## 2025-08-17 RX ORDER — SODIUM CHLORIDE 9 MG/ML
INJECTION, SOLUTION INTRAVENOUS CONTINUOUS
Status: DISCONTINUED | OUTPATIENT
Start: 2025-08-17 | End: 2025-08-18

## 2025-08-17 RX ORDER — DEXTROSE MONOHYDRATE 25 G/50ML
50 INJECTION, SOLUTION INTRAVENOUS
Status: DISCONTINUED | OUTPATIENT
Start: 2025-08-17 | End: 2025-08-18

## 2025-08-17 RX ORDER — PROCHLORPERAZINE EDISYLATE 5 MG/ML
5 INJECTION INTRAMUSCULAR; INTRAVENOUS EVERY 8 HOURS PRN
Status: DISCONTINUED | OUTPATIENT
Start: 2025-08-17 | End: 2025-08-18

## 2025-08-17 RX ORDER — POTASSIUM CHLORIDE 1500 MG/1
40 TABLET, EXTENDED RELEASE ORAL EVERY 4 HOURS
Status: COMPLETED | OUTPATIENT
Start: 2025-08-17 | End: 2025-08-17

## 2025-08-17 RX ORDER — NICOTINE POLACRILEX 4 MG
15 LOZENGE BUCCAL
Status: DISCONTINUED | OUTPATIENT
Start: 2025-08-17 | End: 2025-08-18

## 2025-08-17 RX ORDER — ACETAMINOPHEN 500 MG
1000 TABLET ORAL ONCE
Status: COMPLETED | OUTPATIENT
Start: 2025-08-17 | End: 2025-08-17

## 2025-08-17 RX ORDER — POLYETHYLENE GLYCOL 3350 17 G/17G
17 POWDER, FOR SOLUTION ORAL DAILY PRN
Status: DISCONTINUED | OUTPATIENT
Start: 2025-08-17 | End: 2025-08-18

## 2025-08-17 RX ORDER — HEPARIN SODIUM 5000 [USP'U]/ML
5000 INJECTION, SOLUTION INTRAVENOUS; SUBCUTANEOUS EVERY 12 HOURS SCHEDULED
Status: DISCONTINUED | OUTPATIENT
Start: 2025-08-17 | End: 2025-08-18

## 2025-08-17 RX ORDER — SODIUM PHOSPHATE, DIBASIC AND SODIUM PHOSPHATE, MONOBASIC 7; 19 G/230ML; G/230ML
1 ENEMA RECTAL ONCE AS NEEDED
Status: DISCONTINUED | OUTPATIENT
Start: 2025-08-17 | End: 2025-08-18

## 2025-08-18 ENCOUNTER — TELEPHONE (OUTPATIENT)
Dept: SURGERY | Facility: CLINIC | Age: 60
End: 2025-08-18

## 2025-08-18 VITALS
BODY MASS INDEX: 27.08 KG/M2 | RESPIRATION RATE: 16 BRPM | HEART RATE: 72 BPM | OXYGEN SATURATION: 99 % | WEIGHT: 204.31 LBS | DIASTOLIC BLOOD PRESSURE: 86 MMHG | SYSTOLIC BLOOD PRESSURE: 152 MMHG | TEMPERATURE: 98 F | HEIGHT: 73 IN

## 2025-08-18 LAB
ALBUMIN SERPL-MCNC: 4 G/DL (ref 3.2–4.8)
ALBUMIN/GLOB SERPL: 2.1 (ref 1–2)
ALP LIVER SERPL-CCNC: 43 U/L (ref 45–117)
ALT SERPL-CCNC: 12 U/L (ref 10–49)
ANION GAP SERPL CALC-SCNC: 7 MMOL/L (ref 0–18)
AST SERPL-CCNC: 12 U/L (ref ?–34)
BASOPHILS # BLD AUTO: 0.02 X10(3) UL (ref 0–0.2)
BASOPHILS NFR BLD AUTO: 0.2 %
BILIRUB SERPL-MCNC: 0.4 MG/DL (ref 0.3–1.2)
BUN BLD-MCNC: 12 MG/DL (ref 9–23)
BUN/CREAT SERPL: 15.4 (ref 10–20)
CALCIUM BLD-MCNC: 8.5 MG/DL (ref 8.7–10.4)
CHLORIDE SERPL-SCNC: 111 MMOL/L (ref 98–112)
CO2 SERPL-SCNC: 23 MMOL/L (ref 21–32)
CREAT BLD-MCNC: 0.78 MG/DL (ref 0.7–1.3)
DEPRECATED RDW RBC AUTO: 42.5 FL (ref 35.1–46.3)
EGFRCR SERPLBLD CKD-EPI 2021: 103 ML/MIN/1.73M2 (ref 60–?)
EOSINOPHIL # BLD AUTO: 0.19 X10(3) UL (ref 0–0.7)
EOSINOPHIL NFR BLD AUTO: 2.3 %
ERYTHROCYTE [DISTWIDTH] IN BLOOD BY AUTOMATED COUNT: 13.3 % (ref 11–15)
GLOBULIN PLAS-MCNC: 1.9 G/DL (ref 2–3.5)
GLUCOSE BLD-MCNC: 131 MG/DL (ref 70–99)
GLUCOSE BLDC GLUCOMTR-MCNC: 151 MG/DL (ref 70–99)
HCT VFR BLD AUTO: 33.9 % (ref 39–53)
HGB BLD-MCNC: 11.8 G/DL (ref 13–17.5)
IMM GRANULOCYTES # BLD AUTO: 0.02 X10(3) UL (ref 0–1)
IMM GRANULOCYTES NFR BLD: 0.2 %
LYMPHOCYTES # BLD AUTO: 2.08 X10(3) UL (ref 1–4)
LYMPHOCYTES NFR BLD AUTO: 25.6 %
MCH RBC QN AUTO: 30.5 PG (ref 26–34)
MCHC RBC AUTO-ENTMCNC: 34.8 G/DL (ref 31–37)
MCV RBC AUTO: 87.6 FL (ref 80–100)
MONOCYTES # BLD AUTO: 0.77 X10(3) UL (ref 0.1–1)
MONOCYTES NFR BLD AUTO: 9.5 %
NEUTROPHILS # BLD AUTO: 5.03 X10 (3) UL (ref 1.5–7.7)
NEUTROPHILS # BLD AUTO: 5.03 X10(3) UL (ref 1.5–7.7)
NEUTROPHILS NFR BLD AUTO: 62.2 %
OSMOLALITY SERPL CALC.SUM OF ELEC: 294 MOSM/KG (ref 275–295)
PLATELET # BLD AUTO: 135 10(3)UL (ref 150–450)
POTASSIUM SERPL-SCNC: 3.8 MMOL/L (ref 3.5–5.1)
POTASSIUM SERPL-SCNC: 3.8 MMOL/L (ref 3.5–5.1)
PROT SERPL-MCNC: 5.9 G/DL (ref 5.7–8.2)
RBC # BLD AUTO: 3.87 X10(6)UL (ref 4.3–5.7)
SODIUM SERPL-SCNC: 141 MMOL/L (ref 136–145)
WBC # BLD AUTO: 8.1 X10(3) UL (ref 4–11)

## 2025-08-18 PROCEDURE — 99239 HOSP IP/OBS DSCHRG MGMT >30: CPT | Performed by: HOSPITALIST

## 2025-08-18 PROCEDURE — 99222 1ST HOSP IP/OBS MODERATE 55: CPT | Performed by: PHYSICIAN ASSISTANT

## 2025-08-18 PROCEDURE — 99221 1ST HOSP IP/OBS SF/LOW 40: CPT | Performed by: UROLOGY

## 2025-08-18 RX ORDER — VANCOMYCIN HYDROCHLORIDE 125 MG/1
125 CAPSULE ORAL DAILY
Qty: 7 CAPSULE | Refills: 0 | Status: SHIPPED | OUTPATIENT
Start: 2025-08-18

## 2025-08-18 RX ORDER — CEFUROXIME AXETIL 500 MG/1
500 TABLET ORAL 2 TIMES DAILY
Qty: 14 TABLET | Refills: 0 | Status: SHIPPED | OUTPATIENT
Start: 2025-08-18 | End: 2025-08-25

## 2025-08-18 RX ORDER — POTASSIUM CHLORIDE 1.5 G/1.58G
40 POWDER, FOR SOLUTION ORAL ONCE
Status: COMPLETED | OUTPATIENT
Start: 2025-08-18 | End: 2025-08-18

## 2025-08-19 ENCOUNTER — PATIENT OUTREACH (OUTPATIENT)
Age: 60
End: 2025-08-19

## 2025-08-21 ENCOUNTER — PATIENT OUTREACH (OUTPATIENT)
Age: 60
End: 2025-08-21

## 2025-08-23 ENCOUNTER — TELEPHONE (OUTPATIENT)
Dept: INTERNAL MEDICINE CLINIC | Facility: CLINIC | Age: 60
End: 2025-08-23

## 2025-08-23 DIAGNOSIS — E11.9 TYPE 2 DIABETES MELLITUS WITHOUT COMPLICATION, WITHOUT LONG-TERM CURRENT USE OF INSULIN (HCC): Primary | ICD-10-CM

## 2025-08-26 ENCOUNTER — TELEPHONE (OUTPATIENT)
Dept: INTERNAL MEDICINE CLINIC | Facility: CLINIC | Age: 60
End: 2025-08-26

## 2025-08-27 ENCOUNTER — TELEPHONE (OUTPATIENT)
Dept: INTERNAL MEDICINE CLINIC | Facility: CLINIC | Age: 60
End: 2025-08-27

## 2025-08-29 ENCOUNTER — OFFICE VISIT (OUTPATIENT)
Dept: INTERNAL MEDICINE CLINIC | Facility: CLINIC | Age: 60
End: 2025-08-29

## 2025-08-29 VITALS
DIASTOLIC BLOOD PRESSURE: 62 MMHG | TEMPERATURE: 98 F | BODY MASS INDEX: 27.83 KG/M2 | RESPIRATION RATE: 18 BRPM | HEIGHT: 73 IN | HEART RATE: 88 BPM | SYSTOLIC BLOOD PRESSURE: 138 MMHG | OXYGEN SATURATION: 98 % | WEIGHT: 210 LBS

## 2025-08-29 DIAGNOSIS — R33.9 URINARY RETENTION: ICD-10-CM

## 2025-08-29 DIAGNOSIS — E78.1 HYPERTRIGLYCERIDEMIA: ICD-10-CM

## 2025-08-29 DIAGNOSIS — N30.01 ACUTE CYSTITIS WITH HEMATURIA: ICD-10-CM

## 2025-08-29 DIAGNOSIS — Z09 HOSPITAL DISCHARGE FOLLOW-UP: Primary | ICD-10-CM

## 2025-08-29 DIAGNOSIS — E11.9 TYPE 2 DIABETES MELLITUS WITHOUT COMPLICATION, WITHOUT LONG-TERM CURRENT USE OF INSULIN (HCC): ICD-10-CM

## 2025-08-29 PROCEDURE — 99214 OFFICE O/P EST MOD 30 MIN: CPT | Performed by: NURSE PRACTITIONER

## 2025-08-29 PROCEDURE — 3075F SYST BP GE 130 - 139MM HG: CPT | Performed by: NURSE PRACTITIONER

## 2025-08-29 PROCEDURE — 3051F HG A1C>EQUAL 7.0%<8.0%: CPT | Performed by: NURSE PRACTITIONER

## 2025-08-29 PROCEDURE — 3078F DIAST BP <80 MM HG: CPT | Performed by: NURSE PRACTITIONER

## 2025-08-29 PROCEDURE — 3008F BODY MASS INDEX DOCD: CPT | Performed by: NURSE PRACTITIONER

## (undated) DIAGNOSIS — Z79.4 TYPE 2 DIABETES MELLITUS WITHOUT COMPLICATION, WITH LONG-TERM CURRENT USE OF INSULIN (HCC): Primary | ICD-10-CM

## (undated) DIAGNOSIS — E11.9 TYPE 2 DIABETES MELLITUS WITHOUT COMPLICATION, WITH LONG-TERM CURRENT USE OF INSULIN (HCC): Primary | ICD-10-CM

## (undated) DEVICE — MEDI-VAC NON-CONDUCTIVE SUCTION TUBING: Brand: CARDINAL HEALTH

## (undated) DEVICE — SOLUTION IRRIG 1000ML ST H2O AQUALITE PLAS

## (undated) DEVICE — SOLUTION IRRIG 3000ML 0.9% NACL FLX CONT

## (undated) DEVICE — BLADDER EVACUATOR: Brand: UROVAC BLADDER EVACUATOR

## (undated) DEVICE — Device

## (undated) DEVICE — PATIENT RETURN ELECTRODE, SINGLE-USE, CONTACT QUALITY MONITORING, ADULT, WITH 9FT CORD, FOR PATIENTS WEIGING OVER 33LBS. (15KG): Brand: MEGADYNE

## (undated) DEVICE — GAMMEX® PI HYBRID SIZE 8, STERILE POWDER-FREE SURGICAL GLOVE, POLYISOPRENE AND NEOPRENE BLEND: Brand: GAMMEX

## (undated) DEVICE — ENDOSCOPIC VALVE WITH ADAPTER.: Brand: SURSEAL® II

## (undated) DEVICE — CUTTING LOOP, BIPOLAR, 24/26 FR.: Brand: N.A.

## (undated) DEVICE — PACK CUSTOM CYSTO

## (undated) DEVICE — DEVICE STATLOCK 2-WAY TRICOT DISP

## (undated) DEVICE — URINE DRAINAGE DUAL FUNCTION BAG, NEEDLE SAMPLING, ANTI-REFLUX DEVICE, DRAIN TUBE, 4000 ML: Brand: DOVER

## (undated) NOTE — MR AVS SNAPSHOT
[address] [address]  [phone]               Thank you for choosing us for your health care visit with Gela Devine MD.  We are glad to serve you and happy to provide you with this summar Take 1 tablet (500 mg total) by mouth 3 (three) times daily.    Commonly known as:  GLUCOPHAGE           simvastatin 20 MG Tabs   TAKE 1 TABLET BY MOUTH EVERY EVENING   Commonly known as:  ZOCOR           TRUE METRIX GO GLUCOSE METER w/Device Kit   1 Stick Make half your plate fruits and vegetables Highly refined, white starches including white bread, rice and pasta   Eat plenty of protein, keep the fat content low Sugars:  sodas and sports drinks, candies and desserts   Eat plenty of low-fat dairy products

## (undated) NOTE — LETTER
No referring provider defined for this encounter.        04/13/21        Patient: Rhina Sifuentes   YOB: 1965   Date of Visit: 4/13/2021       Dear  Dr. Nik Davenport MD,      Thank you for referring Rhina Sifuentes to my p

## (undated) NOTE — ED AVS SNAPSHOT
Dylon Regalado   MRN: [id number]    Department:  [facility] Emergency Department   Date of Visit:  12/15/2018           Disclosure     Insurance plans vary and the physician(s) referred by the ER may not be covered by your plan.  Please co CARE PHYSICIAN AT ONCE OR RETURN IMMEDIATELY TO THE EMERGENCY DEPARTMENT. If you have been prescribed any medication(s), please fill your prescription right away and begin taking the medication(s) as directed.   If you believe that any of the medications

## (undated) NOTE — LETTER
June 13, 2024      No Recipients     Patient: Luis Miguel Shelton   YOB: 1965   Date of Visit: 6/13/2024       Dear Dr. Osorio Recipients:    Thank you for referring Luis Miguel Shelton to me for evaluation. Here is my assessment and plan of care:    Luis Miguel Shelton is a 58 year old male.    HPI:     HPI    Pt here for a DM eye exam.  No specific vision complaints or concerns.   Wears glasses just for night driving.     Pt has been a diabetic for 15-16 years       Pt's diabetes is currently controlled by pills and insulin.   Pt BS monitored regularly.    Pt's last blood sugar was  125 mg/dl  Last HA1C was 9.3 on 01/11/24  Endocrinologist: Judith.        Last edited by Rola Chowdary OT on 6/13/2024  4:23 PM.        Patient History:  Past Medical History:    Chest pain    normal stress nuclear; normal stress echo    Colon polyps    Hyperplastic, repeat in 10 years    Diabetes (HCC)    diet controlled w/ no BDR - 2008    Disorder of eye, unspecified    increased C/D ratio - OU    Lipid screening    MGD (meibomian gland dysfunction)    OU    Obesity (BMI 30-39.9)    Other and unspecified hyperlipidemia    Type II or unspecified type diabetes mellitus without mention of complication, not stated as uncontrolled    Unspecified essential hypertension       Surgical History: Luis Miguel Shelton has a past surgical history that includes colonoscopy (N/A, 11/15/2016) (Procedure: COLONOSCOPY;  Surgeon: GISEL Ramirez MD;  [facility]: [facility]COPY) and shoulder arthroscopy (Left, 04/28/2020).    Family History   Problem Relation Age of Onset    Gastro-Intestinal Disorder Brother         celiac sprue    Cancer Paternal Grandfather         Colon Ca    Cancer Other         liver cancer - close relative    Diabetes Neg     Glaucoma Neg     Macular degeneration Neg        Social History:   Social History     Socioeconomic History    Marital status:    Tobacco Use    Smoking status: Never     Smokeless tobacco: Never   Vaping Use    Vaping status: Never Used   Substance and Sexual Activity    Alcohol use: Not Currently     Alcohol/week: 0.0 standard drinks of alcohol    Drug use: No    Sexual activity: Yes     Partners: Female   Other Topics Concern    Caffeine Concern Yes     Comment: coffee/tea, >6cups/day       Medications:  Current Outpatient Medications   Medication Sig Dispense Refill    Continuous Glucose Sensor (FREESTYLE DARIA 3 SENSOR) Does not apply Misc 1 each every 14 (fourteen) days. Type 2 diabetes mellitus without complication, without long-term current use of insulin (HCC) E11.9 6 each 3    amLODIPine 5 MG Oral Tab Take 1 tablet (5 mg total) by mouth daily. 90 tablet 0    lisinopril 40 MG Oral Tab Take 1 tablet (40 mg total) by mouth daily. 90 tablet 0    insulin glargine (LANTUS SOLOSTAR) 100 UNIT/ML Subcutaneous Solution Pen-injector Inject 10 Units into the skin nightly. 30 mL 11    simvastatin 40 MG Oral Tab Take 1 tablet (40 mg total) by mouth daily.      metFORMIN 500 MG Oral Tab Take 2 tablets (1,000 mg total) by mouth 2 (two) times daily with meals. 360 tablet 3    TRUEplus Lancets 30G Does not apply Misc 1 lancet to check glucose three times daily 300 each 3    BABY ASPIRIN OR Take 81 mg by mouth.      Glucose Blood (CONTOUR NEXT TEST) In Vitro Strip Use 1 (one) new strip three times daily for blood glucose monitoring 300 strip 1    Insulin Pen Needle (PEN NEEDLES) 32G X 4 MM Does not apply Misc Inject 1 Needle as directed nightly. For use with Lantus / insulin glargine 100 each 3    Continuous Glucose Sensor (FREESTYLE DARIA 2 SENSOR) Does not apply Misc 1 each every 14 (fourteen) days. 6 each 4       Allergies:  Allergies   Allergen Reactions    Sulfa Antibiotics UNKNOWN    Sulfanilamide      Other reaction(s): Hives       ROS:       PHYSICAL EXAM:     Base Eye Exam       Visual Acuity (Snellen - Linear)         Right Left    Dist cc 20/20 20/20    Near sc 20/20 20/20   DVA  with the last glasses Rx in the phoropter   NVA - Pt prefers reading without glasses.              Tonometry (Icare, 4:30 PM)         Right Left    Pressure 19 18              Pupils         Pupils    Right PERRL    Left PERRL              Visual Fields         Left Right     Full Full              Extraocular Movement         Right Left     Full Full              Dilation       Both eyes: 1.0% Mydriacyl and 2.5% Bhupendra Synephrine @ 4:33 PM                  Slit Lamp and Fundus Exam       Slit Lamp Exam         Right Left    Lids/Lashes Dermatochalasis, Meibomian gland dysfunction Dermatochalasis, Meibomian gland dysfunction    Conjunctiva/Sclera Normal Normal    Cornea Clear Clear    Anterior Chamber Deep and quiet Deep and quiet    Iris Normal Normal    Lens Trace Nuclear sclerosis Trace Nuclear sclerosis    Vitreous Vitreous floaters Vitreous floaters              Fundus Exam         Right Left    Disc Good rim, Temporal crescent Good rim, Temporal crescent    C/D Ratio 0.5 0.5    Macula Normal-no BDR Normal-no BDR    Vessels Normal Normal    Periphery Normal Normal                  Refraction       Wearing Rx         Sphere Cylinder Axis    Right -1.50 +0.75 060    Left -1.25 +0.75 135      Type: Forgot Distance only              Manifest Refraction    Declines refraction, states have glasses that helps.                     ASSESSMENT/PLAN:     Diagnoses and Plan:     Type 2 diabetes mellitus without complication, without long-term current use of insulin (HCC)  Diabetes type II: no background of retinopathy, no signs of neovascularization noted.  Discussed ocular and systemic benefits of blood sugar control.  Diagnosis and treatment discussed in detail with patient.    Will see patient back in 1 year for a diabetic eye exam.    Age-related nuclear cataract of both eyes  Discussed early cataracts with patient. Told patient that cataracts are age appropriate and they are not surgical at this time.  No treatment  recommended at this time.       Vitreous floaters of both eyes   There is no evidence of retinal pathology.  All signs and symptoms of retinal detachment/tears explained in detail.    Patient instructed to call the office if they experience increase in floaters, increase in flashes of light, loss of vision or curtain or veil effect.       No orders of the defined types were placed in this encounter.      Meds This Visit:  Requested Prescriptions      No prescriptions requested or ordered in this encounter        Follow up instructions:  Return in about 1 year (around 6/13/2025) for Diabetic eye exam.    6/13/2024  Scribed by: Efrain Palma MD        If you have questions, please do not hesitate to call me. I look forward to following Luis Miguel along with you.    Sincerely,        Efrain Palma MD        CC:   No Recipients    Document electronically generated by: Efrain Palma MD

## (undated) NOTE — LETTER
Tali Betancourt,     This is the [facility], office of Dr. Kemal Montoya    Thank you for putting your trust in [facility].  Our goal is to deliver the highest quality healthcare and an exceptional patient experience. Upon reviewing of your medical record shows you are due for the following:       Annual Physical Exam  Foot Exam   Blood pressure follow up   Diabetic A1C follow up           Please call [phone] to schedule your appointment or schedule online via [employer].     If you changed to a new provider at another facility, please notify the clinic to update your records.    If you had any recent testing at another facility, please have your results faxed to our office at [phone].           Thank you and have a great day!

## (undated) NOTE — MR AVS SNAPSHOT
[address] [address]  [phone]               Thank you for choosing us for your health care visit with Daren Matt MD.  We are glad to serve you and happy to provide you with this summar lisinopril 40 MG Tabs   TAKE 1 TABLET(40 MG) BY MOUTH EVERY DAY   Commonly known as:  PRINIVIL,ZESTRIL           MetFORMIN HCl 500 MG Tabs   Take 1 tablet (500 mg total) by mouth 4 (four) times daily.    What changed:  when to take this   Commonly known as

## (undated) NOTE — ED AVS SNAPSHOT
Tanvir Coates   MRN: [id number]    Department:  [facility] Emergency Department   Date of Visit:  12/19/2018           Disclosure     Insurance plans vary and the physician(s) referred by the ER may not be covered by your plan.  Please co CARE PHYSICIAN AT ONCE OR RETURN IMMEDIATELY TO THE EMERGENCY DEPARTMENT. If you have been prescribed any medication(s), please fill your prescription right away and begin taking the medication(s) as directed.   If you believe that any of the medications

## (undated) NOTE — LETTER
June 6, 2023      No Recipients     Patient: Winter Fenton   YOB: 1965   Date of Visit: 6/6/2023       Dear Dr. Monica Downs Recipients: Thank you for referring Tad Campbell to me for evaluation. Here is my assessment and plan of care:    Winter Fenton is a 65year old male. HPI:     HPI    Pt has been a diabetic for 13 years       Pt's diabetes is currently controlled by pills and insulin   Pt checks BS daily 178 today   Pt's last blood sugar was   Last HA1C was 7.4 on 2/8/23  Endocrinologist: none      NP here for a diabetic eye exam. Pt was last seen with Dr Caitie Ochoa on 4/15/21. Pt states vision is stable. He wears his distance glasses for driving when needed, he states he sees well out of current glasses. Pt denies surgeries to his eyes and family history of glaucoma or macular degeneration. Last edited by Rosamaria Hayes OT on 6/6/2023  4:45 PM.        Patient History:  Past Medical History:   Diagnosis Date    Chest pain 2004,2010    normal stress nuclear; normal stress echo    Colon polyps 11/2016    Hyperplastic, repeat in 10 years    Diabetes ([facility] [address]     diet controlled w/ no BDR - 2008    Disorder of eye, unspecified 2008    increased C/D ratio - OU    Lipid screening 9/10/2013    MGD (meibomian gland dysfunction) 2008    OU    Obesity (BMI 30-39. 9)     Other and unspecified hyperlipidemia     Type II or unspecified type diabetes mellitus without mention of complication, not stated as uncontrolled     Unspecified essential hypertension        Surgical History: Winter Fenton has a past surgical history that includes colonoscopy (N/A, 11/15/2016) (Procedure: COLONOSCOPY;  Surgeon: Sylvia Hood MD;  Location: [facility] ENDOSCOPY) and shoulder arthroscopy (Left, 04/28/2020).     Family History   Problem Relation Age of Onset    Gastro-Intestinal Disorder Brother         celiac sprue    Cancer Paternal Grandfather         Colon Ca    Cancer Other liver cancer - close relative    Diabetes Neg     Glaucoma Neg     Macular degeneration Neg        Social History:   Social History     Socioeconomic History    Marital status:    Tobacco Use    Smoking status: Never    Smokeless tobacco: Never   Vaping Use    Vaping Use: Never used   Substance and Sexual Activity    Alcohol use: Not Currently     Alcohol/week: 0.0 standard drinks of alcohol    Drug use: No    Sexual activity: Yes     Partners: Female   Other Topics Concern    Caffeine Concern Yes     Comment: coffee/tea, >6cups/day       Medications:  Current Outpatient Medications   Medication Sig Dispense Refill    simvastatin 20 MG Oral Tab Take 1 tablet (20 mg total) by mouth every evening. 90 tablet 1    metFORMIN 500 MG Oral Tab Take 1 tablet (500 mg total) by mouth 4 (four) times daily. 360 tablet 3    Continuous Blood Gluc Transmit (DEXCOM G6 TRANSMITTER) Does not apply Misc Use as indicated to closely monitor blood sugars 1 each 0    Continuous Blood Gluc Sensor (DEXCOM G6 SENSOR) Does not apply Misc Use as indicated to monitor blood sugars 1 each 0    TRUEplus Lancets 30G Does not apply Misc USE TO TEST BLOOD GLUCOSE  DAILY 100 each 3    insulin glargine (LANTUS SOLOSTAR) 100 UNIT/ML Subcutaneous Solution Pen-injector Inject 30 Units into the skin nightly. 30 mL 5    Ascorbic Acid (VITAMIN C) 1000 MG Oral Tab Take 1 tablet (1,000 mg total) by mouth every other day. Cholecalciferol (VITAMIN D3) 250 MCG (37788 UT) Oral Cap Take 1 capsule by mouth every other day. Glucose Blood (CONTOUR NEXT TEST) In Vitro Strip 1 each by Other route daily. 100 strip 3    Insulin Pen Needle (PEN NEEDLES) 32G X 4 MM Does not apply Misc 1 each daily. 90 each 3    AMLODIPINE 5 MG Oral Tab TAKE 1 TABLET BY MOUTH  DAILY 90 tablet 3    LISINOPRIL 40 MG Oral Tab TAKE 1 TABLET BY MOUTH  DAILY 90 tablet 3    aspirin 81 MG Oral Tab EC Take 1 tablet (81 mg total) by mouth daily.       zinc sulfate 220 (50 Zn) MG Oral Cap Take 1 capsule (220 mg total) by mouth every other day. Blood Glucose Monitoring Suppl (TRUE METRIX GO GLUCOSE METER) W/DEVICE Does not apply Kit 1 Stick by Does not apply route daily.  E11.9 1 kit 0       Allergies:    Sulfa Antibiotics       UNKNOWN  Sulfanilamide               Comment:Other reaction(s): Hives    ROS:       PHYSICAL EXAM:     Base Eye Exam       Visual Acuity (Snellen - Linear)         Right Left    Dist cc 20/20 20/20    Near sc 20/20 20/20   DVA checked with last Rx in phoropter              Tonometry (Icare, 4:17 PM)         Right Left    Pressure 17 16              Pupils         Pupils    Right PERRL    Left PERRL              Visual Fields         Left Right     Full Full              Extraocular Movement         Right Left     Full, Ortho Full, Ortho              Dilation       Both eyes: 1.0% Mydriacyl and 2.5% Bhupendra Synephrine @ 4:17 PM                  Slit Lamp and Fundus Exam       Slit Lamp Exam         Right Left    Lids/Lashes Dermatochalasis, Meibomian gland dysfunction Dermatochalasis, Meibomian gland dysfunction    Conjunctiva/Sclera Normal Normal    Cornea Clear Clear    Anterior Chamber Deep and quiet Deep and quiet    Iris Normal Normal    Lens Trace Nuclear sclerosis Trace Nuclear sclerosis    Vitreous Vitreous floaters Vitreous floaters              Fundus Exam         Right Left    Disc Good rim, Temporal crescent Good rim, Temporal crescent    C/D Ratio 0.5 0.5    Macula Normal-no BDR Normal-no BDR    Vessels Normal Normal    Periphery Normal Normal                  Refraction       Wearing Rx         Sphere Cylinder Axis    Right -1.50 +0.75 060    Left -1.25 +0.75 135      Type: FORGOT Distance only              Manifest Refraction    Pt is happy with current glasses                     ASSESSMENT/PLAN:     Diagnoses and Plan:     Type 2 diabetes mellitus without complication, without long-term current use of insulin (HCC)  Diabetes type II: no background of retinopathy, no signs of neovascularization noted. Discussed ocular and systemic benefits of blood sugar control. Diagnosis and treatment discussed in detail with patient. Will see patient in 1 year for a diabetic exam    Age-related nuclear cataract of both eyes  Discussed early cataracts with patient. No treatment recommended at this time. Vitreous floaters of both eyes   There is no evidence of retinal pathology. All signs and symptoms of retinal detachment/tears explained in detail. Patient instructed to call the office if they experience increase in floaters, increase in flashes of light, loss of vision or curtain or veil effect. No orders of the defined types were placed in this encounter. Meds This Visit:  Requested Prescriptions      No prescriptions requested or ordered in this encounter        Follow up instructions:  Return in about 1 year (around 6/6/2024) for Diabetic eye exam.    6/6/2023  Scribed by: Donna Cordoba MD      If you have questions, please do not hesitate to call me. I look forward to following [facility] along with you.     Sincerely,        Donna Cordoba MD        CC:   No Recipients    Document electronically generated by: Donna Cordoba MD

## (undated) NOTE — LETTER
[facility] [address] [address]  [phone]       Tali Bolanos,       This is the [facility], office of Dr. Kemal Montoya    Thank you for putting your trust in [facility].  Our goal is to deliver the highest quality healthcare and an exceptional patient experience. Upon reviewing of your medical record shows you are due for the following:       Annual Physical    Blood pressure follow up   Diabetic A1C follow up   Diabetic Foot Exam           Please call [phone] to schedule your appointment or schedule online via [employer].     If you changed to a new provider at another facility, please notify the clinic to update your records.    If you had any recent testing at another facility, please have your results faxed to our office at [phone].               Thank you and have a great day!